# Patient Record
Sex: FEMALE | Race: WHITE | Employment: UNEMPLOYED | URBAN - METROPOLITAN AREA
[De-identification: names, ages, dates, MRNs, and addresses within clinical notes are randomized per-mention and may not be internally consistent; named-entity substitution may affect disease eponyms.]

---

## 2017-02-07 ENCOUNTER — TELEPHONE (OUTPATIENT)
Dept: FAMILY MEDICINE CLINIC | Age: 37
End: 2017-02-07

## 2017-02-09 ENCOUNTER — OFFICE VISIT (OUTPATIENT)
Dept: FAMILY MEDICINE CLINIC | Age: 37
End: 2017-02-09

## 2017-02-09 VITALS
BODY MASS INDEX: 27.46 KG/M2 | TEMPERATURE: 98.8 F | DIASTOLIC BLOOD PRESSURE: 84 MMHG | RESPIRATION RATE: 16 BRPM | HEART RATE: 80 BPM | WEIGHT: 155 LBS | SYSTOLIC BLOOD PRESSURE: 104 MMHG | HEIGHT: 63 IN

## 2017-02-09 DIAGNOSIS — R10.31 RIGHT LOWER QUADRANT ABDOMINAL PAIN: Primary | ICD-10-CM

## 2017-02-09 DIAGNOSIS — R11.0 NAUSEA: ICD-10-CM

## 2017-02-09 PROCEDURE — 99214 OFFICE O/P EST MOD 30 MIN: CPT | Performed by: NURSE PRACTITIONER

## 2017-02-09 RX ORDER — ACETAMINOPHEN AND CODEINE PHOSPHATE 300; 30 MG/1; MG/1
1-2 TABLET ORAL EVERY 6 HOURS PRN
Qty: 60 TABLET | Refills: 0 | Status: ON HOLD | OUTPATIENT
Start: 2017-02-09 | End: 2017-09-13 | Stop reason: HOSPADM

## 2017-02-09 RX ORDER — CIPROFLOXACIN 500 MG/1
500 TABLET, FILM COATED ORAL 2 TIMES DAILY
Qty: 20 TABLET | Refills: 0 | Status: SHIPPED | OUTPATIENT
Start: 2017-02-09 | End: 2017-02-19

## 2017-02-09 RX ORDER — METRONIDAZOLE 500 MG/1
500 TABLET ORAL 3 TIMES DAILY
Qty: 30 TABLET | Refills: 0 | Status: SHIPPED | OUTPATIENT
Start: 2017-02-09 | End: 2017-02-19

## 2017-02-09 ASSESSMENT — ENCOUNTER SYMPTOMS
EYES NEGATIVE: 1
BLOOD IN STOOL: 0
ANAL BLEEDING: 1
DIARRHEA: 0
RESPIRATORY NEGATIVE: 1
ABDOMINAL PAIN: 1
NAUSEA: 1
VOMITING: 0

## 2017-02-22 ENCOUNTER — TELEPHONE (OUTPATIENT)
Dept: FAMILY MEDICINE CLINIC | Age: 37
End: 2017-02-22

## 2017-02-22 RX ORDER — TRAMADOL HYDROCHLORIDE 50 MG/1
50 TABLET ORAL EVERY 6 HOURS PRN
Qty: 30 TABLET | Refills: 0 | Status: SHIPPED | OUTPATIENT
Start: 2017-02-22 | End: 2017-03-04

## 2017-02-22 RX ORDER — AZITHROMYCIN 250 MG/1
TABLET, FILM COATED ORAL
Qty: 6 TABLET | Refills: 0 | Status: SHIPPED | OUTPATIENT
Start: 2017-02-22 | End: 2017-03-04

## 2017-08-10 ENCOUNTER — HOSPITAL ENCOUNTER (OUTPATIENT)
Age: 37
Discharge: HOME | End: 2017-08-10
Payer: COMMERCIAL

## 2017-08-10 PROCEDURE — 93005 ELECTROCARDIOGRAM TRACING: CPT

## 2017-08-11 LAB
EKG ATRIAL RATE: 55 BPM
EKG P AXIS: 75 DEGREES
EKG P-R INTERVAL: 134 MS
EKG Q-T INTERVAL: 410 MS
EKG QRS DURATION: 86 MS
EKG QTC CALCULATION (BAZETT): 392 MS
EKG R AXIS: 51 DEGREES
EKG T AXIS: 40 DEGREES
EKG VENTRICULAR RATE: 55 BPM

## 2017-09-13 ENCOUNTER — ANESTHESIA (OUTPATIENT)
Dept: OPERATING ROOM | Age: 37
End: 2017-09-13
Payer: COMMERCIAL

## 2017-09-13 ENCOUNTER — ANESTHESIA EVENT (OUTPATIENT)
Dept: OPERATING ROOM | Age: 37
End: 2017-09-13
Payer: COMMERCIAL

## 2017-09-13 ENCOUNTER — HOSPITAL ENCOUNTER (OUTPATIENT)
Age: 37
Setting detail: OUTPATIENT SURGERY
Discharge: HOME | End: 2017-09-13
Attending: OBSTETRICS & GYNECOLOGY | Admitting: OBSTETRICS & GYNECOLOGY
Payer: COMMERCIAL

## 2017-09-13 VITALS
WEIGHT: 156.6 LBS | HEART RATE: 87 BPM | SYSTOLIC BLOOD PRESSURE: 101 MMHG | HEIGHT: 62 IN | OXYGEN SATURATION: 100 % | BODY MASS INDEX: 28.82 KG/M2 | DIASTOLIC BLOOD PRESSURE: 68 MMHG | TEMPERATURE: 97.8 F | RESPIRATION RATE: 16 BRPM

## 2017-09-13 VITALS
SYSTOLIC BLOOD PRESSURE: 141 MMHG | RESPIRATION RATE: 1 BRPM | DIASTOLIC BLOOD PRESSURE: 69 MMHG | TEMPERATURE: 97.7 F | OXYGEN SATURATION: 100 %

## 2017-09-13 LAB — PREGNANCY, URINE: NEGATIVE

## 2017-09-13 PROCEDURE — 7100000011 HC PHASE II RECOVERY - ADDTL 15 MIN: Performed by: OBSTETRICS & GYNECOLOGY

## 2017-09-13 PROCEDURE — 2500000003 HC RX 250 WO HCPCS: Performed by: NURSE ANESTHETIST, CERTIFIED REGISTERED

## 2017-09-13 PROCEDURE — 7100000000 HC PACU RECOVERY - FIRST 15 MIN: Performed by: OBSTETRICS & GYNECOLOGY

## 2017-09-13 PROCEDURE — 7100000010 HC PHASE II RECOVERY - FIRST 15 MIN: Performed by: OBSTETRICS & GYNECOLOGY

## 2017-09-13 PROCEDURE — 2580000003 HC RX 258: Performed by: OBSTETRICS & GYNECOLOGY

## 2017-09-13 PROCEDURE — 88305 TISSUE EXAM BY PATHOLOGIST: CPT

## 2017-09-13 PROCEDURE — 3600000003 HC SURGERY LEVEL 3 BASE: Performed by: OBSTETRICS & GYNECOLOGY

## 2017-09-13 PROCEDURE — 3700000001 HC ADD 15 MINUTES (ANESTHESIA): Performed by: OBSTETRICS & GYNECOLOGY

## 2017-09-13 PROCEDURE — 81025 URINE PREGNANCY TEST: CPT

## 2017-09-13 PROCEDURE — 6360000002 HC RX W HCPCS: Performed by: NURSE ANESTHETIST, CERTIFIED REGISTERED

## 2017-09-13 PROCEDURE — 2720000010 HC SURG SUPPLY STERILE: Performed by: OBSTETRICS & GYNECOLOGY

## 2017-09-13 PROCEDURE — 3600000013 HC SURGERY LEVEL 3 ADDTL 15MIN: Performed by: OBSTETRICS & GYNECOLOGY

## 2017-09-13 PROCEDURE — 3700000000 HC ANESTHESIA ATTENDED CARE: Performed by: OBSTETRICS & GYNECOLOGY

## 2017-09-13 PROCEDURE — C1758 CATHETER, URETERAL: HCPCS | Performed by: OBSTETRICS & GYNECOLOGY

## 2017-09-13 PROCEDURE — 7100000001 HC PACU RECOVERY - ADDTL 15 MIN: Performed by: OBSTETRICS & GYNECOLOGY

## 2017-09-13 PROCEDURE — 6370000000 HC RX 637 (ALT 250 FOR IP): Performed by: OBSTETRICS & GYNECOLOGY

## 2017-09-13 PROCEDURE — 6360000002 HC RX W HCPCS: Performed by: ANESTHESIOLOGY

## 2017-09-13 RX ORDER — SODIUM CHLORIDE 0.9 % (FLUSH) 0.9 %
10 SYRINGE (ML) INJECTION PRN
Status: CANCELLED | OUTPATIENT
Start: 2017-09-13

## 2017-09-13 RX ORDER — SODIUM CHLORIDE 0.9 % (FLUSH) 0.9 %
10 SYRINGE (ML) INJECTION PRN
Status: DISCONTINUED | OUTPATIENT
Start: 2017-09-13 | End: 2017-09-13 | Stop reason: HOSPADM

## 2017-09-13 RX ORDER — ONDANSETRON 2 MG/ML
INJECTION INTRAMUSCULAR; INTRAVENOUS PRN
Status: DISCONTINUED | OUTPATIENT
Start: 2017-09-13 | End: 2017-09-13 | Stop reason: SDUPTHER

## 2017-09-13 RX ORDER — SODIUM CHLORIDE 9 MG/ML
INJECTION, SOLUTION INTRAVENOUS CONTINUOUS
Status: DISCONTINUED | OUTPATIENT
Start: 2017-09-13 | End: 2017-09-13

## 2017-09-13 RX ORDER — HYDROCODONE BITARTRATE AND ACETAMINOPHEN 5; 325 MG/1; MG/1
TABLET ORAL
Status: DISCONTINUED
Start: 2017-09-13 | End: 2017-09-13 | Stop reason: HOSPADM

## 2017-09-13 RX ORDER — HYDROCODONE BITARTRATE AND ACETAMINOPHEN 5; 325 MG/1; MG/1
2 TABLET ORAL EVERY 4 HOURS PRN
Status: DISCONTINUED | OUTPATIENT
Start: 2017-09-13 | End: 2017-09-13 | Stop reason: HOSPADM

## 2017-09-13 RX ORDER — DOCUSATE SODIUM 100 MG/1
100 CAPSULE, LIQUID FILLED ORAL 2 TIMES DAILY
Status: CANCELLED | OUTPATIENT
Start: 2017-09-13

## 2017-09-13 RX ORDER — HYDROCODONE BITARTRATE AND ACETAMINOPHEN 5; 325 MG/1; MG/1
1 TABLET ORAL EVERY 6 HOURS PRN
Qty: 5 TABLET | Refills: 0 | Status: SHIPPED | OUTPATIENT
Start: 2017-09-13 | End: 2017-09-16

## 2017-09-13 RX ORDER — MORPHINE SULFATE 10 MG/ML
INJECTION, SOLUTION INTRAMUSCULAR; INTRAVENOUS PRN
Status: DISCONTINUED | OUTPATIENT
Start: 2017-09-13 | End: 2017-09-13 | Stop reason: SDUPTHER

## 2017-09-13 RX ORDER — FENTANYL CITRATE 50 UG/ML
50 INJECTION, SOLUTION INTRAMUSCULAR; INTRAVENOUS EVERY 5 MIN PRN
Status: DISCONTINUED | OUTPATIENT
Start: 2017-09-13 | End: 2017-09-13 | Stop reason: HOSPADM

## 2017-09-13 RX ORDER — FENTANYL CITRATE 50 UG/ML
INJECTION, SOLUTION INTRAMUSCULAR; INTRAVENOUS PRN
Status: DISCONTINUED | OUTPATIENT
Start: 2017-09-13 | End: 2017-09-13 | Stop reason: SDUPTHER

## 2017-09-13 RX ORDER — LABETALOL HYDROCHLORIDE 5 MG/ML
5 INJECTION, SOLUTION INTRAVENOUS EVERY 10 MIN PRN
Status: DISCONTINUED | OUTPATIENT
Start: 2017-09-13 | End: 2017-09-13 | Stop reason: HOSPADM

## 2017-09-13 RX ORDER — MEPERIDINE HYDROCHLORIDE 25 MG/ML
12.5 INJECTION INTRAMUSCULAR; INTRAVENOUS; SUBCUTANEOUS EVERY 5 MIN PRN
Status: DISCONTINUED | OUTPATIENT
Start: 2017-09-13 | End: 2017-09-13 | Stop reason: HOSPADM

## 2017-09-13 RX ORDER — ACETAMINOPHEN 325 MG/1
650 TABLET ORAL EVERY 4 HOURS PRN
Status: CANCELLED | OUTPATIENT
Start: 2017-09-13

## 2017-09-13 RX ORDER — SODIUM CHLORIDE 0.9 % (FLUSH) 0.9 %
10 SYRINGE (ML) INJECTION EVERY 12 HOURS SCHEDULED
Status: CANCELLED | OUTPATIENT
Start: 2017-09-13

## 2017-09-13 RX ORDER — KETOROLAC TROMETHAMINE 30 MG/ML
INJECTION, SOLUTION INTRAMUSCULAR; INTRAVENOUS PRN
Status: DISCONTINUED | OUTPATIENT
Start: 2017-09-13 | End: 2017-09-13 | Stop reason: SDUPTHER

## 2017-09-13 RX ORDER — ONDANSETRON 2 MG/ML
4 INJECTION INTRAMUSCULAR; INTRAVENOUS EVERY 6 HOURS PRN
Status: DISCONTINUED | OUTPATIENT
Start: 2017-09-13 | End: 2017-09-13 | Stop reason: HOSPADM

## 2017-09-13 RX ORDER — MORPHINE SULFATE 2 MG/ML
INJECTION, SOLUTION INTRAMUSCULAR; INTRAVENOUS
Status: DISCONTINUED
Start: 2017-09-13 | End: 2017-09-13 | Stop reason: HOSPADM

## 2017-09-13 RX ORDER — ONDANSETRON 2 MG/ML
4 INJECTION INTRAMUSCULAR; INTRAVENOUS
Status: DISCONTINUED | OUTPATIENT
Start: 2017-09-13 | End: 2017-09-13 | Stop reason: HOSPADM

## 2017-09-13 RX ORDER — IBUPROFEN 600 MG/1
600 TABLET ORAL EVERY 6 HOURS PRN
Qty: 60 TABLET | Refills: 1 | Status: SHIPPED | OUTPATIENT
Start: 2017-09-13 | End: 2017-09-25

## 2017-09-13 RX ORDER — SODIUM CHLORIDE 0.9 % (FLUSH) 0.9 %
10 SYRINGE (ML) INJECTION EVERY 12 HOURS SCHEDULED
Status: DISCONTINUED | OUTPATIENT
Start: 2017-09-13 | End: 2017-09-13 | Stop reason: HOSPADM

## 2017-09-13 RX ORDER — DEXAMETHASONE SODIUM PHOSPHATE 4 MG/ML
INJECTION, SOLUTION INTRA-ARTICULAR; INTRALESIONAL; INTRAMUSCULAR; INTRAVENOUS; SOFT TISSUE PRN
Status: DISCONTINUED | OUTPATIENT
Start: 2017-09-13 | End: 2017-09-13 | Stop reason: SDUPTHER

## 2017-09-13 RX ORDER — KETOROLAC TROMETHAMINE 30 MG/ML
INJECTION, SOLUTION INTRAMUSCULAR; INTRAVENOUS
Status: DISCONTINUED
Start: 2017-09-13 | End: 2017-09-13 | Stop reason: HOSPADM

## 2017-09-13 RX ORDER — ATROPINE SULFATE 1 MG/ML
INJECTION, SOLUTION INTRAMUSCULAR; INTRAVENOUS; SUBCUTANEOUS PRN
Status: DISCONTINUED | OUTPATIENT
Start: 2017-09-13 | End: 2017-09-13 | Stop reason: SDUPTHER

## 2017-09-13 RX ORDER — KETOROLAC TROMETHAMINE 30 MG/ML
30 INJECTION, SOLUTION INTRAMUSCULAR; INTRAVENOUS EVERY 6 HOURS
Status: CANCELLED | OUTPATIENT
Start: 2017-09-13 | End: 2017-09-15

## 2017-09-13 RX ORDER — LIDOCAINE HYDROCHLORIDE 10 MG/ML
INJECTION, SOLUTION INFILTRATION; PERINEURAL PRN
Status: DISCONTINUED | OUTPATIENT
Start: 2017-09-13 | End: 2017-09-13 | Stop reason: SDUPTHER

## 2017-09-13 RX ORDER — PROPOFOL 10 MG/ML
INJECTION, EMULSION INTRAVENOUS PRN
Status: DISCONTINUED | OUTPATIENT
Start: 2017-09-13 | End: 2017-09-13 | Stop reason: SDUPTHER

## 2017-09-13 RX ORDER — HYDROCODONE BITARTRATE AND ACETAMINOPHEN 5; 325 MG/1; MG/1
1 TABLET ORAL EVERY 4 HOURS PRN
Status: DISCONTINUED | OUTPATIENT
Start: 2017-09-13 | End: 2017-09-13 | Stop reason: HOSPADM

## 2017-09-13 RX ORDER — MIDAZOLAM HYDROCHLORIDE 1 MG/ML
INJECTION INTRAMUSCULAR; INTRAVENOUS PRN
Status: DISCONTINUED | OUTPATIENT
Start: 2017-09-13 | End: 2017-09-13 | Stop reason: SDUPTHER

## 2017-09-13 RX ORDER — SODIUM CHLORIDE, SODIUM LACTATE, POTASSIUM CHLORIDE, CALCIUM CHLORIDE 600; 310; 30; 20 MG/100ML; MG/100ML; MG/100ML; MG/100ML
INJECTION, SOLUTION INTRAVENOUS CONTINUOUS
Status: DISCONTINUED | OUTPATIENT
Start: 2017-09-13 | End: 2017-09-13 | Stop reason: HOSPADM

## 2017-09-13 RX ADMIN — FENTANYL CITRATE 25 MCG: 50 INJECTION INTRAMUSCULAR; INTRAVENOUS at 08:31

## 2017-09-13 RX ADMIN — FENTANYL CITRATE 50 MCG: 50 INJECTION INTRAMUSCULAR; INTRAVENOUS at 09:10

## 2017-09-13 RX ADMIN — SODIUM CHLORIDE: 9 INJECTION, SOLUTION INTRAVENOUS at 07:22

## 2017-09-13 RX ADMIN — ATROPINE SULFATE 0.5 MG: 1 INJECTION, SOLUTION INTRAMUSCULAR; INTRAVENOUS; SUBCUTANEOUS at 08:24

## 2017-09-13 RX ADMIN — LIDOCAINE HYDROCHLORIDE 50 MG: 10 INJECTION, SOLUTION INFILTRATION; PERINEURAL at 08:08

## 2017-09-13 RX ADMIN — MIDAZOLAM HYDROCHLORIDE 2 MG: 1 INJECTION INTRAMUSCULAR; INTRAVENOUS at 08:00

## 2017-09-13 RX ADMIN — MORPHINE SULFATE 2 MG: 10 INJECTION, SOLUTION INTRAMUSCULAR; INTRAVENOUS at 08:58

## 2017-09-13 RX ADMIN — HYDROCODONE BITARTRATE AND ACETAMINOPHEN 2 TABLET: 5; 325 TABLET ORAL at 10:02

## 2017-09-13 RX ADMIN — DEXAMETHASONE SODIUM PHOSPHATE 4 MG: 4 INJECTION, SOLUTION INTRAMUSCULAR; INTRAVENOUS at 08:15

## 2017-09-13 RX ADMIN — MEPERIDINE HYDROCHLORIDE 12.5 MG: 25 INJECTION, SOLUTION INTRAMUSCULAR; INTRAVENOUS; SUBCUTANEOUS at 09:05

## 2017-09-13 RX ADMIN — PROPOFOL 140 MG: 10 INJECTION, EMULSION INTRAVENOUS at 08:08

## 2017-09-13 RX ADMIN — MEPERIDINE HYDROCHLORIDE 12.5 MG: 25 INJECTION, SOLUTION INTRAMUSCULAR; INTRAVENOUS; SUBCUTANEOUS at 09:00

## 2017-09-13 RX ADMIN — KETOROLAC TROMETHAMINE 30 MG: 30 INJECTION, SOLUTION INTRAMUSCULAR; INTRAVENOUS at 08:59

## 2017-09-13 RX ADMIN — ONDANSETRON 4 MG: 2 INJECTION INTRAMUSCULAR; INTRAVENOUS at 08:15

## 2017-09-13 RX ADMIN — FENTANYL CITRATE 50 MCG: 50 INJECTION INTRAMUSCULAR; INTRAVENOUS at 09:15

## 2017-09-13 RX ADMIN — KETOROLAC TROMETHAMINE 30 MG: 30 INJECTION, SOLUTION INTRAMUSCULAR; INTRAVENOUS at 08:47

## 2017-09-13 RX ADMIN — FENTANYL CITRATE 50 MCG: 50 INJECTION INTRAMUSCULAR; INTRAVENOUS at 08:10

## 2017-09-13 ASSESSMENT — PULMONARY FUNCTION TESTS
PIF_VALUE: 0
PIF_VALUE: 4
PIF_VALUE: 4
PIF_VALUE: 0
PIF_VALUE: 3
PIF_VALUE: 4
PIF_VALUE: 3
PIF_VALUE: 0
PIF_VALUE: 15
PIF_VALUE: 0
PIF_VALUE: 0
PIF_VALUE: 4
PIF_VALUE: 3
PIF_VALUE: 0
PIF_VALUE: 0
PIF_VALUE: 3
PIF_VALUE: 0
PIF_VALUE: 3
PIF_VALUE: 2
PIF_VALUE: 3
PIF_VALUE: 4
PIF_VALUE: 3
PIF_VALUE: 3
PIF_VALUE: 4
PIF_VALUE: 4
PIF_VALUE: 3
PIF_VALUE: 1
PIF_VALUE: 4
PIF_VALUE: 6
PIF_VALUE: 7
PIF_VALUE: 13
PIF_VALUE: 10
PIF_VALUE: 0
PIF_VALUE: 2
PIF_VALUE: 4
PIF_VALUE: 3
PIF_VALUE: 4
PIF_VALUE: 8
PIF_VALUE: 3
PIF_VALUE: 2
PIF_VALUE: 3
PIF_VALUE: 11
PIF_VALUE: 3
PIF_VALUE: 0
PIF_VALUE: 4
PIF_VALUE: 8
PIF_VALUE: 2
PIF_VALUE: 5

## 2017-09-13 ASSESSMENT — PAIN SCALES - GENERAL
PAINLEVEL_OUTOF10: 9
PAINLEVEL_OUTOF10: 5
PAINLEVEL_OUTOF10: 0
PAINLEVEL_OUTOF10: 8
PAINLEVEL_OUTOF10: 10
PAINLEVEL_OUTOF10: 5
PAINLEVEL_OUTOF10: 6
PAINLEVEL_OUTOF10: 8

## 2017-09-13 ASSESSMENT — PAIN DESCRIPTION - DESCRIPTORS: DESCRIPTORS: CRAMPING;SHARP

## 2017-09-13 ASSESSMENT — PAIN DESCRIPTION - LOCATION
LOCATION: ABDOMEN
LOCATION: ABDOMEN

## 2017-09-13 ASSESSMENT — PAIN DESCRIPTION - PAIN TYPE
TYPE: SURGICAL PAIN
TYPE: SURGICAL PAIN

## 2017-09-13 ASSESSMENT — PAIN DESCRIPTION - FREQUENCY: FREQUENCY: CONTINUOUS

## 2017-09-25 ENCOUNTER — OFFICE VISIT (OUTPATIENT)
Dept: FAMILY MEDICINE CLINIC | Age: 37
End: 2017-09-25
Payer: COMMERCIAL

## 2017-09-25 VITALS
WEIGHT: 154 LBS | TEMPERATURE: 98.2 F | HEART RATE: 76 BPM | DIASTOLIC BLOOD PRESSURE: 82 MMHG | SYSTOLIC BLOOD PRESSURE: 112 MMHG | RESPIRATION RATE: 12 BRPM | HEIGHT: 63 IN | BODY MASS INDEX: 27.29 KG/M2

## 2017-09-25 DIAGNOSIS — K52.9 ACUTE GASTROENTERITIS: Primary | ICD-10-CM

## 2017-09-25 DIAGNOSIS — Z98.890 S/P DILATATION AND CURETTAGE: ICD-10-CM

## 2017-09-25 PROCEDURE — 99213 OFFICE O/P EST LOW 20 MIN: CPT | Performed by: NURSE PRACTITIONER

## 2017-09-25 PROCEDURE — 96372 THER/PROPH/DIAG INJ SC/IM: CPT | Performed by: NURSE PRACTITIONER

## 2017-09-25 RX ORDER — CIPROFLOXACIN 500 MG/1
500 TABLET, FILM COATED ORAL 2 TIMES DAILY
COMMUNITY
End: 2017-12-18

## 2017-09-25 RX ORDER — ONDANSETRON 4 MG/1
4 TABLET, FILM COATED ORAL EVERY 8 HOURS PRN
Qty: 20 TABLET | Refills: 0 | Status: SHIPPED | OUTPATIENT
Start: 2017-09-25 | End: 2017-12-18

## 2017-09-25 RX ORDER — SUCRALFATE 1 G/1
1 TABLET ORAL 4 TIMES DAILY
Qty: 60 TABLET | Refills: 0 | Status: SHIPPED | OUTPATIENT
Start: 2017-09-25 | End: 2017-12-18

## 2017-09-25 RX ORDER — PROMETHAZINE HYDROCHLORIDE 25 MG/ML
25 INJECTION, SOLUTION INTRAMUSCULAR; INTRAVENOUS ONCE
Status: COMPLETED | OUTPATIENT
Start: 2017-09-25 | End: 2017-09-25

## 2017-09-25 RX ORDER — METRONIDAZOLE 500 MG/1
500 TABLET ORAL 2 TIMES DAILY
COMMUNITY
End: 2017-12-18

## 2017-09-25 RX ORDER — FAMOTIDINE 40 MG/1
40 TABLET, FILM COATED ORAL EVERY EVENING
Qty: 30 TABLET | Refills: 0 | Status: SHIPPED | OUTPATIENT
Start: 2017-09-25 | End: 2018-01-08 | Stop reason: ALTCHOICE

## 2017-09-25 RX ADMIN — PROMETHAZINE HYDROCHLORIDE 25 MG: 25 INJECTION, SOLUTION INTRAMUSCULAR; INTRAVENOUS at 09:03

## 2017-09-25 NOTE — LETTER
[facility]  [address] [place] [address] Rd., [address] [address]  Phone: [phone]  Fax: [phone]    [place] [facility] SSDjarrod Briseno NP        September 25, 2017     Patient: Nataliia Pemberton   YOB: 1980   Date of Visit: 9/25/2017       To Whom it May Concern:    Hetal Hernandez was seen in my clinic on 9/25/2017. She may return to work on 9/29/17. Please excuse Joceline's absences. If you have any questions or concerns, please don't hesitate to call.     Sincerely,         [facility], ALEXIA

## 2017-09-25 NOTE — MR AVS SNAPSHOT
You may receive a survey about your visit with us today. The feedback from our patients helps us identify what is working well and where the service to all patients can be enhanced. Thank you! Today's Medication Changes          These changes are accurate as of: 9/25/17  8:56 AM.  If you have any questions, ask your nurse or doctor. START taking these medications           famotidine 40 MG tablet   Commonly known as:  PEPCID   Instructions: Take 1 tablet by mouth every evening   Quantity:  30 tablet   Refills:  0   Started by:  Yazmin De Oliveira NP       ondansetron 4 MG tablet   Commonly known as:  ZOFRAN   Instructions: Take 1 tablet by mouth every 8 hours as needed for Nausea or Vomiting   Quantity:  20 tablet   Refills:  0   Started by:  Yazmin De Oliveira NP       sucralfate 1 GM tablet   Commonly known as:  CARAFATE   Instructions:   Take 1 tablet by mouth 4 times daily   Quantity:  60 tablet   Refills:  0   Started by:  Yazmin De Oliveira NP         STOP taking these medications           ibuprofen 600 MG tablet   Commonly known as:  ADVIL;MOTRIN   Stopped by:  Yazmin De Oliveira NP            Where to Get Your Medications      These medications were sent to [address], [address]  [address]     Phone:  [phone]     famotidine 40 MG tablet    ondansetron 4 MG tablet    sucralfate 1 GM tablet               Your Current Medications Are              metroNIDAZOLE (FLAGYL) 500 MG tablet Take 500 mg by mouth 2 times daily    ciprofloxacin (CIPRO) 500 MG tablet Take 500 mg by mouth 2 times daily    ondansetron (ZOFRAN) 4 MG tablet Take 1 tablet by mouth every 8 hours as needed for Nausea or Vomiting    sucralfate (CARAFATE) 1 GM tablet Take 1 tablet by mouth 4 times daily    famotidine (PEPCID) 40 MG tablet Take 1 tablet by mouth every evening      Allergies              Amoxicillin Hives Additional Information        Basic Information     Date Of Birth Sex Race Ethnicity Preferred Language    1980 Female White Non-/Non  English      Problem List as of 9/25/2017  Date Reviewed: 9/13/2017          None      Preventive Care        Date Due    HIV screening is recommended for all people regardless of risk factors  aged 15-65 years at least once (lifetime) who have never been HIV tested. 6/2/1996    Tetanus Combination Vaccine (1 - Tdap) 6/2/2000    Yearly Flu Vaccine (1) 9/1/2017    Pap Smear 6/15/2018            MyChart Signup           Our records indicate that you have an active [employer] account. You can view your After Visit Summary by going to [email or web address] and logging in with your [employer] username and password. If you don't have a [employer] username and password but a parent or guardian has access to your record, the parent or guardian should login with their own [employer] username and password and access your record to view the After Visit Summary. Additional Information  If you have questions, please contact the physician practice where you receive care. Remember, [employer] is NOT to be used for urgent needs. For medical emergencies, dial 911. For questions regarding your [employer] account call [phone]. If you have a clinical question, please call your doctor's office.

## 2017-09-28 ENCOUNTER — TELEPHONE (OUTPATIENT)
Dept: FAMILY MEDICINE CLINIC | Age: 37
End: 2017-09-28

## 2017-10-04 ASSESSMENT — ENCOUNTER SYMPTOMS
RESPIRATORY NEGATIVE: 1
DIARRHEA: 1
EYES NEGATIVE: 1
VOMITING: 1
NAUSEA: 1

## 2017-10-04 NOTE — PROGRESS NOTES
After obtaining consent, and per orders of Av Gregory, injection of Phenergan 25 mg was given in left glut IM by Heber Matos. Patient instructed to remain in clinic for 20 minutes afterwards, and to report any adverse reaction to me immediately.
and curettage      Requested Prescriptions     Signed Prescriptions Disp Refills    ondansetron (ZOFRAN) 4 MG tablet 20 tablet 0     Sig: Take 1 tablet by mouth every 8 hours as needed for Nausea or Vomiting    sucralfate (CARAFATE) 1 GM tablet 60 tablet 0     Sig: Take 1 tablet by mouth 4 times daily    famotidine (PEPCID) 40 MG tablet 30 tablet 0     Sig: Take 1 tablet by mouth every evening     No orders of the defined types were placed in this encounter. Patient given educational materials - see patient instructions. Discussed use, benefit, and side effects of prescribed medications. All patient questions answered. Pt voiced understanding. Reviewed health maintenance. Patient agreed with treatment plan. Follow up as directed.        hold atb    Electronically signed by Ashley Treviño NP on 10/4/2017 at 9:47 AM

## 2017-12-18 ENCOUNTER — TELEPHONE (OUTPATIENT)
Dept: FAMILY MEDICINE CLINIC | Age: 37
End: 2017-12-18

## 2017-12-18 ENCOUNTER — OFFICE VISIT (OUTPATIENT)
Dept: FAMILY MEDICINE CLINIC | Age: 37
End: 2017-12-18
Payer: COMMERCIAL

## 2017-12-18 VITALS
BODY MASS INDEX: 28.7 KG/M2 | HEART RATE: 88 BPM | TEMPERATURE: 98.4 F | WEIGHT: 162 LBS | DIASTOLIC BLOOD PRESSURE: 80 MMHG | SYSTOLIC BLOOD PRESSURE: 116 MMHG | RESPIRATION RATE: 12 BRPM | HEIGHT: 63 IN

## 2017-12-18 DIAGNOSIS — Z87.442 PERSONAL HISTORY OF RENAL CALCULI: ICD-10-CM

## 2017-12-18 DIAGNOSIS — R30.0 DYSURIA: ICD-10-CM

## 2017-12-18 DIAGNOSIS — R10.9 RIGHT FLANK PAIN: Primary | ICD-10-CM

## 2017-12-18 LAB
BILIRUBIN URINE: NEGATIVE
BLOOD URINE, POC: NEGATIVE
CHARACTER, URINE: CLEAR
COLOR, URINE: YELLOW
GLUCOSE URINE: NEGATIVE MG/DL
KETONES, URINE: NEGATIVE
LEUKOCYTE CLUMPS, URINE: NORMAL
NITRITE, URINE: NEGATIVE
PH, URINE: 7.5
PROTEIN, URINE: NEGATIVE MG/DL
SPECIFIC GRAVITY, URINE: 1.02 (ref 1–1.03)
UROBILINOGEN, URINE: 0.2 EU/DL

## 2017-12-18 PROCEDURE — 99213 OFFICE O/P EST LOW 20 MIN: CPT | Performed by: NURSE PRACTITIONER

## 2017-12-18 PROCEDURE — 81003 URINALYSIS AUTO W/O SCOPE: CPT | Performed by: NURSE PRACTITIONER

## 2017-12-18 RX ORDER — KETOROLAC TROMETHAMINE 10 MG/1
10 TABLET, FILM COATED ORAL EVERY 6 HOURS PRN
Qty: 20 TABLET | Refills: 0 | Status: SHIPPED | OUTPATIENT
Start: 2017-12-18 | End: 2018-01-08 | Stop reason: ALTCHOICE

## 2017-12-18 RX ORDER — CYCLOBENZAPRINE HCL 5 MG
5 TABLET ORAL 3 TIMES DAILY PRN
Qty: 30 TABLET | Refills: 0 | Status: SHIPPED | OUTPATIENT
Start: 2017-12-18 | End: 2017-12-28

## 2017-12-18 RX ORDER — CIPROFLOXACIN 500 MG/1
500 TABLET, FILM COATED ORAL 2 TIMES DAILY
Qty: 20 TABLET | Refills: 0 | Status: SHIPPED | OUTPATIENT
Start: 2017-12-18 | End: 2017-12-28

## 2017-12-18 ASSESSMENT — PATIENT HEALTH QUESTIONNAIRE - PHQ9
2. FEELING DOWN, DEPRESSED OR HOPELESS: 0
SUM OF ALL RESPONSES TO PHQ9 QUESTIONS 1 & 2: 0
SUM OF ALL RESPONSES TO PHQ QUESTIONS 1-9: 0
1. LITTLE INTEREST OR PLEASURE IN DOING THINGS: 0

## 2017-12-18 NOTE — PATIENT INSTRUCTIONS
You may receive a survey about your visit with us today. The feedback from our patients helps us identify what is working well and where the service to all patients can be enhanced. Thank you! Patient Education        Kidney Stone: Care Instructions  Your Care Instructions    Kidney stones are formed when salts, minerals, and other substances normally found in the urine clump together. They can be as small as grains of sand or, rarely, as large as golf balls. While the stone is traveling through the ureter, which is the tube that carries urine from the kidney to the bladder, you will probably feel pain. The pain may be mild or very severe. You may also have some blood in your urine. As soon as the stone reaches the bladder, any intense pain should go away. If a stone is too large to pass on its own, you may need a medical procedure to help you pass the stone. The doctor has checked you carefully, but problems can develop later. If you notice any problems or new symptoms, get medical treatment right away. Follow-up care is a key part of your treatment and safety. Be sure to make and go to all appointments, and call your doctor if you are having problems. It's also a good idea to know your test results and keep a list of the medicines you take. How can you care for yourself at home? · Drink plenty of fluids, enough so that your urine is light yellow or clear like water. If you have kidney, heart, or liver disease and have to limit fluids, talk with your doctor before you increase the amount of fluids you drink. · Take pain medicines exactly as directed. Call your doctor if you think you are having a problem with your medicine. ¨ If the doctor gave you a prescription medicine for pain, take it as prescribed. ¨ If you are not taking a prescription pain medicine, ask your doctor if you can take an over-the-counter medicine. Read and follow all instructions on the label.   · Your doctor may ask you to strain your urine so that you can collect your kidney stone when it passes. You can use a kitchen strainer or a tea strainer to catch the stone. Store it in a plastic bag until you see your doctor again. Preventing future kidney stones  Some changes in your diet may help prevent kidney stones. Depending on the cause of your stones, your doctor may recommend that you:  · Drink plenty of fluids, enough so that your urine is light yellow or clear like water. If you have kidney, heart, or liver disease and have to limit fluids, talk with your doctor before you increase the amount of fluids you drink. · Limit coffee, tea, and alcohol. Also avoid grapefruit juice. · Do not take more than the recommended daily dose of vitamins C and D.  · Avoid antacids such as Gaviscon, Maalox, Mylanta, or Tums. · Limit the amount of salt (sodium) in your diet. · Eat a balanced diet that is not too high in protein. · Limit foods that are high in a substance called oxalate, which can cause kidney stones. These foods include dark green vegetables, rhubarb, chocolate, wheat bran, nuts, cranberries, and beans. When should you call for help? Call your doctor now or seek immediate medical care if:  ? · You cannot keep down fluids. ? · Your pain gets worse. ? · You have a fever or chills. ? · You have new or worse pain in your back just below your rib cage (the flank area). ? · You have new or more blood in your urine. ? Watch closely for changes in your health, and be sure to contact your doctor if:  ? · You do not get better as expected. Where can you learn more? Go to [email or web address]://Gaming for [email or web address].[email or web address] and sign in to your [employer] account. Enter J030 in the [employer] box to learn more about \"Kidney Stone: Care Instructions. \"     If you do not have an account, please click on the \"Sign Up Now\" link. Current as of: May 12, 2017  Content Version: 11.4  © 3217-7484 Healthwise, [employer].  Care instructions adapted under license by [facility] ([facility]). If you have questions about a medical condition or this instruction, always ask your healthcare professional. [address] any warranty or liability for your use of this information.

## 2017-12-18 NOTE — LETTER
[facility]  [address] [place] [address] Rd., [address] [address]  Phone: [phone]  Fax: [phone]    Ashley Treviño NP        December 18, 2017     Patient: Lorna Zamarripa   YOB: 1980   Date of Visit: 12/18/2017       To Whom it May Concern:    Asha Diamond was seen in my clinic on 12/18/2017. She may return to work on 12/21/2017. If you have any questions or concerns, please don't hesitate to call.     Sincerely,         Ashley Treviño NP

## 2017-12-18 NOTE — TELEPHONE ENCOUNTER
Patient saw Rivka Beatty today and was to return to work on 12/21/17. She stated that to use FMLA she has to be off 3 1/2 to 4 days. So she's requesting to return to work on Friday, 12/22/17. Please let her know if that's okay.

## 2017-12-20 LAB — URINE CULTURE, ROUTINE: NORMAL

## 2017-12-21 ENCOUNTER — HOSPITAL ENCOUNTER (OUTPATIENT)
Dept: CT IMAGING | Age: 37
Discharge: HOME | End: 2017-12-21
Payer: COMMERCIAL

## 2017-12-21 DIAGNOSIS — R10.9 RIGHT FLANK PAIN: ICD-10-CM

## 2017-12-21 PROCEDURE — 74176 CT ABD & PELVIS W/O CONTRAST: CPT

## 2017-12-31 NOTE — PROGRESS NOTES
Urine Trace  NEGATIVE Final 12/18/2017 10:30 AM [facility]   Color, Urine Yellow  YELLOW-STR Final 12/18/2017 10:30 AM [facility]   Character, Urine Clear  CLR-SL.ZULY Final 12/18/2017 10:30 AM [facility]   Performed at [facility] under CLIA # [id number]   Testing Performed By     Sarahi Coello Name Director Address Valid Date Range   [address]          Impression/Plan:  1. Right flank pain    2. Dysuria    3. Personal history of renal calculi      Requested Prescriptions     Signed Prescriptions Disp Refills    ciprofloxacin (CIPRO) 500 MG tablet 20 tablet 0     Sig: Take 1 tablet by mouth 2 times daily for 10 days    cyclobenzaprine (FLEXERIL) 5 MG tablet 30 tablet 0     Sig: Take 1 tablet by mouth 3 times daily as needed for Muscle spasms    ketorolac (TORADOL) 10 MG tablet 20 tablet 0     Sig: Take 1 tablet by mouth every 6 hours as needed for Pain     Orders Placed This Encounter   Procedures    Urine Culture     Order Specific Question:   Specify (ex-cath, midstream, cysto, etc)? Answer:   midstream    Urine Culture    CT ABDOMEN PELVIS WO IV CONTRAST Additional Contrast? None     Standing Status:   Future     Number of Occurrences:   1     Standing Expiration Date:   12/18/2018     Order Specific Question:   Additional Contrast?     Answer:   None     Order Specific Question:   Reason for exam:     Answer:   right flank pain hx stones    POCT Urinalysis No Micro (Auto)       Patient given educational materials - see patient instructions. Discussed use, benefit, and side effects of prescribed medications. All patient questions answered. Pt voiced understanding. Reviewed health maintenance. Patient agreed with treatment plan. Follow up as directed.           Electronically signed by José Prabhakar, ALEXIA on 12/31/2017 at 8:33 AM

## 2018-01-08 ENCOUNTER — OFFICE VISIT (OUTPATIENT)
Dept: FAMILY MEDICINE CLINIC | Age: 38
End: 2018-01-08
Payer: COMMERCIAL

## 2018-01-08 VITALS
SYSTOLIC BLOOD PRESSURE: 114 MMHG | DIASTOLIC BLOOD PRESSURE: 86 MMHG | HEART RATE: 88 BPM | BODY MASS INDEX: 27.66 KG/M2 | HEIGHT: 64 IN | RESPIRATION RATE: 12 BRPM | TEMPERATURE: 98.1 F | WEIGHT: 162 LBS

## 2018-01-08 DIAGNOSIS — J02.8 ACUTE PHARYNGITIS DUE TO OTHER SPECIFIED ORGANISMS: Primary | ICD-10-CM

## 2018-01-08 DIAGNOSIS — R59.0 CERVICAL LYMPHADENOPATHY: ICD-10-CM

## 2018-01-08 PROCEDURE — 99213 OFFICE O/P EST LOW 20 MIN: CPT | Performed by: NURSE PRACTITIONER

## 2018-01-08 RX ORDER — PREDNISONE 10 MG/1
10 TABLET ORAL DAILY
Qty: 7 TABLET | Refills: 0 | Status: SHIPPED | OUTPATIENT
Start: 2018-01-08 | End: 2018-01-15

## 2018-01-08 RX ORDER — AZITHROMYCIN 250 MG/1
TABLET, FILM COATED ORAL
Qty: 6 TABLET | Refills: 0 | Status: SHIPPED | OUTPATIENT
Start: 2018-01-08 | End: 2018-01-18

## 2018-01-11 ASSESSMENT — ENCOUNTER SYMPTOMS
RESPIRATORY NEGATIVE: 1
SORE THROAT: 1
EYES NEGATIVE: 1
ALLERGIC/IMMUNOLOGIC NEGATIVE: 1
DIARRHEA: 1

## 2018-01-11 NOTE — PROGRESS NOTES
medications for this visit. Allergies   Allergen Reactions    Amoxicillin Hives     Health Maintenance   Topic Date Due    HIV screen  1996    DTaP/Tdap/Td vaccine (1 - Tdap) 2000    Flu vaccine (1) 2017    Cervical cancer screen  06/15/2018         Objective:     Physical Exam   Constitutional: She is oriented to person, place, and time. She appears well-developed and well-nourished. HENT:   Head: Normocephalic. Mouth/Throat: Posterior oropharyngeal erythema present. No oropharyngeal exudate. Eyes: Conjunctivae are normal.   Neck: Neck supple. Cardiovascular: Normal rate, regular rhythm, normal heart sounds and intact distal pulses. Pulmonary/Chest: Effort normal and breath sounds normal.   Abdominal: Soft. Musculoskeletal: Normal range of motion. Lymphadenopathy:     She has cervical adenopathy. Neurological: She is alert and oriented to person, place, and time. Skin: Skin is warm and dry. Psychiatric: She has a normal mood and affect. Nursing note and vitals reviewed. /86   Pulse 88   Temp 98.1 °F (36.7 °C) (Oral)   Resp 12   Ht 5' 3.5\" (1.613 m)   Wt 162 lb (73.5 kg)   BMI 28.25 kg/m²       Impression/Plan:  1. Acute pharyngitis due to other specified organisms    2. Cervical lymphadenopathy      Requested Prescriptions     Signed Prescriptions Disp Refills    azithromycin (ZITHROMAX Z-NBA) 250 MG tablet 6 tablet 0     Si pills orally for 1 day, then 1 pill orally for 4 days    predniSONE (DELTASONE) 10 MG tablet 7 tablet 0     Sig: Take 1 tablet by mouth daily for 7 days     No orders of the defined types were placed in this encounter. Patient given educational materials - see patient instructions. Discussed use, benefit, and side effects of prescribed medications. All patient questions answered. Pt voiced understanding. Reviewed health maintenance. Patient agreed with treatment plan. Follow up as directed.           Electronically signed by Ronda Raza NP on 1/11/2018 at 1:27 PM

## 2018-01-22 ENCOUNTER — TELEPHONE (OUTPATIENT)
Dept: FAMILY MEDICINE CLINIC | Age: 38
End: 2018-01-22

## 2018-01-22 RX ORDER — OSELTAMIVIR PHOSPHATE 75 MG/1
75 CAPSULE ORAL DAILY
Qty: 10 CAPSULE | Refills: 0 | Status: SHIPPED | OUTPATIENT
Start: 2018-01-22 | End: 2018-02-01

## 2018-02-13 ENCOUNTER — HOSPITAL ENCOUNTER (OUTPATIENT)
Age: 38
Discharge: HOME | End: 2018-02-13
Payer: COMMERCIAL

## 2018-02-13 LAB
EKG ATRIAL RATE: 64 BPM
EKG P AXIS: 70 DEGREES
EKG P-R INTERVAL: 150 MS
EKG Q-T INTERVAL: 402 MS
EKG QRS DURATION: 82 MS
EKG QTC CALCULATION (BAZETT): 414 MS
EKG R AXIS: 33 DEGREES
EKG T AXIS: 23 DEGREES
EKG VENTRICULAR RATE: 64 BPM

## 2018-02-13 PROCEDURE — 93005 ELECTROCARDIOGRAM TRACING: CPT | Performed by: OBSTETRICS & GYNECOLOGY

## 2018-04-18 ENCOUNTER — ANESTHESIA (OUTPATIENT)
Dept: OPERATING ROOM | Age: 38
End: 2018-04-18
Payer: COMMERCIAL

## 2018-04-18 ENCOUNTER — ANESTHESIA EVENT (OUTPATIENT)
Dept: OPERATING ROOM | Age: 38
End: 2018-04-18
Payer: COMMERCIAL

## 2018-04-18 ENCOUNTER — HOSPITAL ENCOUNTER (OUTPATIENT)
Age: 38
Setting detail: OUTPATIENT SURGERY
Discharge: HOME | End: 2018-04-18
Attending: OBSTETRICS & GYNECOLOGY | Admitting: OBSTETRICS & GYNECOLOGY
Payer: COMMERCIAL

## 2018-04-18 VITALS
BODY MASS INDEX: 28.52 KG/M2 | HEART RATE: 100 BPM | TEMPERATURE: 97.7 F | WEIGHT: 155 LBS | RESPIRATION RATE: 14 BRPM | HEIGHT: 62 IN | SYSTOLIC BLOOD PRESSURE: 118 MMHG | OXYGEN SATURATION: 93 % | DIASTOLIC BLOOD PRESSURE: 75 MMHG

## 2018-04-18 VITALS
DIASTOLIC BLOOD PRESSURE: 77 MMHG | TEMPERATURE: 97.7 F | SYSTOLIC BLOOD PRESSURE: 127 MMHG | RESPIRATION RATE: 14 BRPM | OXYGEN SATURATION: 99 %

## 2018-04-18 DIAGNOSIS — G89.18 POST-OPERATIVE PAIN: Primary | ICD-10-CM

## 2018-04-18 PROBLEM — N92.0 MENORRHAGIA WITH REGULAR CYCLE: Status: ACTIVE | Noted: 2018-04-18

## 2018-04-18 PROBLEM — N94.6 DYSMENORRHEA: Status: ACTIVE | Noted: 2018-04-18

## 2018-04-18 PROCEDURE — 3600000003 HC SURGERY LEVEL 3 BASE: Performed by: OBSTETRICS & GYNECOLOGY

## 2018-04-18 PROCEDURE — 7100000000 HC PACU RECOVERY - FIRST 15 MIN: Performed by: OBSTETRICS & GYNECOLOGY

## 2018-04-18 PROCEDURE — 3700000001 HC ADD 15 MINUTES (ANESTHESIA): Performed by: OBSTETRICS & GYNECOLOGY

## 2018-04-18 PROCEDURE — 88305 TISSUE EXAM BY PATHOLOGIST: CPT

## 2018-04-18 PROCEDURE — 6360000002 HC RX W HCPCS

## 2018-04-18 PROCEDURE — 7100000011 HC PHASE II RECOVERY - ADDTL 15 MIN: Performed by: OBSTETRICS & GYNECOLOGY

## 2018-04-18 PROCEDURE — 2720000010 HC SURG SUPPLY STERILE: Performed by: OBSTETRICS & GYNECOLOGY

## 2018-04-18 PROCEDURE — 7100000001 HC PACU RECOVERY - ADDTL 15 MIN: Performed by: OBSTETRICS & GYNECOLOGY

## 2018-04-18 PROCEDURE — 6360000002 HC RX W HCPCS: Performed by: ANESTHESIOLOGY

## 2018-04-18 PROCEDURE — 6370000000 HC RX 637 (ALT 250 FOR IP): Performed by: OBSTETRICS & GYNECOLOGY

## 2018-04-18 PROCEDURE — 7100000010 HC PHASE II RECOVERY - FIRST 15 MIN: Performed by: OBSTETRICS & GYNECOLOGY

## 2018-04-18 PROCEDURE — 2580000003 HC RX 258: Performed by: NURSE ANESTHETIST, CERTIFIED REGISTERED

## 2018-04-18 PROCEDURE — 6360000002 HC RX W HCPCS: Performed by: NURSE ANESTHETIST, CERTIFIED REGISTERED

## 2018-04-18 PROCEDURE — 3600000013 HC SURGERY LEVEL 3 ADDTL 15MIN: Performed by: OBSTETRICS & GYNECOLOGY

## 2018-04-18 PROCEDURE — 3700000000 HC ANESTHESIA ATTENDED CARE: Performed by: OBSTETRICS & GYNECOLOGY

## 2018-04-18 PROCEDURE — 2500000003 HC RX 250 WO HCPCS: Performed by: NURSE ANESTHETIST, CERTIFIED REGISTERED

## 2018-04-18 RX ORDER — HYDROCODONE BITARTRATE AND ACETAMINOPHEN 5; 325 MG/1; MG/1
1 TABLET ORAL EVERY 4 HOURS PRN
Status: DISCONTINUED | OUTPATIENT
Start: 2018-04-18 | End: 2018-04-18 | Stop reason: HOSPADM

## 2018-04-18 RX ORDER — HYDROCODONE BITARTRATE AND ACETAMINOPHEN 5; 325 MG/1; MG/1
2 TABLET ORAL EVERY 4 HOURS PRN
Status: DISCONTINUED | OUTPATIENT
Start: 2018-04-18 | End: 2018-04-18 | Stop reason: HOSPADM

## 2018-04-18 RX ORDER — ONDANSETRON 2 MG/ML
4 INJECTION INTRAMUSCULAR; INTRAVENOUS
Status: DISCONTINUED | OUTPATIENT
Start: 2018-04-18 | End: 2018-04-18

## 2018-04-18 RX ORDER — SODIUM CHLORIDE 0.9 % (FLUSH) 0.9 %
10 SYRINGE (ML) INJECTION EVERY 12 HOURS SCHEDULED
Status: DISCONTINUED | OUTPATIENT
Start: 2018-04-18 | End: 2018-04-18 | Stop reason: HOSPADM

## 2018-04-18 RX ORDER — SODIUM CHLORIDE 0.9 % (FLUSH) 0.9 %
10 SYRINGE (ML) INJECTION EVERY 12 HOURS SCHEDULED
Status: DISCONTINUED | OUTPATIENT
Start: 2018-04-18 | End: 2018-04-18

## 2018-04-18 RX ORDER — ONDANSETRON 2 MG/ML
INJECTION INTRAMUSCULAR; INTRAVENOUS PRN
Status: DISCONTINUED | OUTPATIENT
Start: 2018-04-18 | End: 2018-04-18 | Stop reason: SDUPTHER

## 2018-04-18 RX ORDER — SODIUM CHLORIDE, SODIUM LACTATE, POTASSIUM CHLORIDE, CALCIUM CHLORIDE 600; 310; 30; 20 MG/100ML; MG/100ML; MG/100ML; MG/100ML
INJECTION, SOLUTION INTRAVENOUS CONTINUOUS
Status: DISCONTINUED | OUTPATIENT
Start: 2018-04-18 | End: 2018-04-18

## 2018-04-18 RX ORDER — SODIUM CHLORIDE 9 MG/ML
INJECTION, SOLUTION INTRAVENOUS CONTINUOUS PRN
Status: DISCONTINUED | OUTPATIENT
Start: 2018-04-18 | End: 2018-04-18 | Stop reason: SDUPTHER

## 2018-04-18 RX ORDER — FENTANYL CITRATE 50 UG/ML
INJECTION, SOLUTION INTRAMUSCULAR; INTRAVENOUS PRN
Status: DISCONTINUED | OUTPATIENT
Start: 2018-04-18 | End: 2018-04-18 | Stop reason: SDUPTHER

## 2018-04-18 RX ORDER — IBUPROFEN 600 MG/1
600 TABLET ORAL EVERY 6 HOURS PRN
Qty: 60 TABLET | Refills: 1 | Status: ON HOLD | OUTPATIENT
Start: 2018-04-18 | End: 2019-03-13 | Stop reason: HOSPADM

## 2018-04-18 RX ORDER — DEXAMETHASONE SODIUM PHOSPHATE 4 MG/ML
INJECTION, SOLUTION INTRA-ARTICULAR; INTRALESIONAL; INTRAMUSCULAR; INTRAVENOUS; SOFT TISSUE PRN
Status: DISCONTINUED | OUTPATIENT
Start: 2018-04-18 | End: 2018-04-18 | Stop reason: SDUPTHER

## 2018-04-18 RX ORDER — KETOROLAC TROMETHAMINE 30 MG/ML
30 INJECTION, SOLUTION INTRAMUSCULAR; INTRAVENOUS EVERY 6 HOURS
Status: DISCONTINUED | OUTPATIENT
Start: 2018-04-18 | End: 2018-04-18 | Stop reason: HOSPADM

## 2018-04-18 RX ORDER — SODIUM CHLORIDE 0.9 % (FLUSH) 0.9 %
10 SYRINGE (ML) INJECTION PRN
Status: DISCONTINUED | OUTPATIENT
Start: 2018-04-18 | End: 2018-04-18

## 2018-04-18 RX ORDER — MORPHINE SULFATE 2 MG/ML
2 INJECTION, SOLUTION INTRAMUSCULAR; INTRAVENOUS
Status: DISCONTINUED | OUTPATIENT
Start: 2018-04-18 | End: 2018-04-18 | Stop reason: HOSPADM

## 2018-04-18 RX ORDER — FENTANYL CITRATE 50 UG/ML
50 INJECTION, SOLUTION INTRAMUSCULAR; INTRAVENOUS ONCE
Status: COMPLETED | OUTPATIENT
Start: 2018-04-18 | End: 2018-04-18

## 2018-04-18 RX ORDER — HYDRALAZINE HYDROCHLORIDE 20 MG/ML
5 INJECTION INTRAMUSCULAR; INTRAVENOUS EVERY 10 MIN PRN
Status: DISCONTINUED | OUTPATIENT
Start: 2018-04-18 | End: 2018-04-18

## 2018-04-18 RX ORDER — LIDOCAINE HYDROCHLORIDE 10 MG/ML
INJECTION, SOLUTION INFILTRATION; PERINEURAL PRN
Status: DISCONTINUED | OUTPATIENT
Start: 2018-04-18 | End: 2018-04-18 | Stop reason: SDUPTHER

## 2018-04-18 RX ORDER — ACETAMINOPHEN 325 MG/1
650 TABLET ORAL EVERY 4 HOURS PRN
Status: DISCONTINUED | OUTPATIENT
Start: 2018-04-18 | End: 2018-04-18 | Stop reason: HOSPADM

## 2018-04-18 RX ORDER — HYDROCODONE BITARTRATE AND ACETAMINOPHEN 5; 325 MG/1; MG/1
1 TABLET ORAL EVERY 6 HOURS PRN
Qty: 5 TABLET | Refills: 0 | Status: SHIPPED | OUTPATIENT
Start: 2018-04-18 | End: 2018-04-25

## 2018-04-18 RX ORDER — MORPHINE SULFATE 2 MG/ML
4 INJECTION, SOLUTION INTRAMUSCULAR; INTRAVENOUS
Status: DISCONTINUED | OUTPATIENT
Start: 2018-04-18 | End: 2018-04-18 | Stop reason: HOSPADM

## 2018-04-18 RX ORDER — FENTANYL CITRATE 50 UG/ML
50 INJECTION, SOLUTION INTRAMUSCULAR; INTRAVENOUS EVERY 5 MIN PRN
Status: DISCONTINUED | OUTPATIENT
Start: 2018-04-18 | End: 2018-04-18

## 2018-04-18 RX ORDER — MORPHINE SULFATE 2 MG/ML
2 INJECTION, SOLUTION INTRAMUSCULAR; INTRAVENOUS EVERY 5 MIN PRN
Status: DISCONTINUED | OUTPATIENT
Start: 2018-04-18 | End: 2018-04-18

## 2018-04-18 RX ORDER — DOCUSATE SODIUM 100 MG/1
100 CAPSULE, LIQUID FILLED ORAL 2 TIMES DAILY
Status: DISCONTINUED | OUTPATIENT
Start: 2018-04-18 | End: 2018-04-18 | Stop reason: HOSPADM

## 2018-04-18 RX ORDER — PROPOFOL 10 MG/ML
INJECTION, EMULSION INTRAVENOUS PRN
Status: DISCONTINUED | OUTPATIENT
Start: 2018-04-18 | End: 2018-04-18 | Stop reason: SDUPTHER

## 2018-04-18 RX ORDER — GLYCOPYRROLATE 1 MG/5 ML
SYRINGE (ML) INTRAVENOUS PRN
Status: DISCONTINUED | OUTPATIENT
Start: 2018-04-18 | End: 2018-04-18 | Stop reason: SDUPTHER

## 2018-04-18 RX ORDER — DIPHENHYDRAMINE HYDROCHLORIDE 50 MG/ML
12.5 INJECTION INTRAMUSCULAR; INTRAVENOUS
Status: DISCONTINUED | OUTPATIENT
Start: 2018-04-18 | End: 2018-04-18

## 2018-04-18 RX ORDER — FENTANYL CITRATE 50 UG/ML
INJECTION, SOLUTION INTRAMUSCULAR; INTRAVENOUS
Status: COMPLETED
Start: 2018-04-18 | End: 2018-04-18

## 2018-04-18 RX ORDER — MIDAZOLAM HYDROCHLORIDE 1 MG/ML
INJECTION INTRAMUSCULAR; INTRAVENOUS PRN
Status: DISCONTINUED | OUTPATIENT
Start: 2018-04-18 | End: 2018-04-18 | Stop reason: SDUPTHER

## 2018-04-18 RX ORDER — ONDANSETRON 2 MG/ML
4 INJECTION INTRAMUSCULAR; INTRAVENOUS EVERY 6 HOURS PRN
Status: DISCONTINUED | OUTPATIENT
Start: 2018-04-18 | End: 2018-04-18 | Stop reason: HOSPADM

## 2018-04-18 RX ORDER — SODIUM CHLORIDE 0.9 % (FLUSH) 0.9 %
10 SYRINGE (ML) INJECTION PRN
Status: DISCONTINUED | OUTPATIENT
Start: 2018-04-18 | End: 2018-04-18 | Stop reason: HOSPADM

## 2018-04-18 RX ORDER — LABETALOL HYDROCHLORIDE 5 MG/ML
5 INJECTION, SOLUTION INTRAVENOUS EVERY 5 MIN PRN
Status: DISCONTINUED | OUTPATIENT
Start: 2018-04-18 | End: 2018-04-18

## 2018-04-18 RX ORDER — KETOROLAC TROMETHAMINE 30 MG/ML
INJECTION, SOLUTION INTRAMUSCULAR; INTRAVENOUS PRN
Status: DISCONTINUED | OUTPATIENT
Start: 2018-04-18 | End: 2018-04-18 | Stop reason: SDUPTHER

## 2018-04-18 RX ORDER — MEPERIDINE HYDROCHLORIDE 25 MG/ML
12.5 INJECTION INTRAMUSCULAR; INTRAVENOUS; SUBCUTANEOUS EVERY 5 MIN PRN
Status: DISCONTINUED | OUTPATIENT
Start: 2018-04-18 | End: 2018-04-18

## 2018-04-18 RX ADMIN — FENTANYL CITRATE 50 MCG: 50 INJECTION INTRAMUSCULAR; INTRAVENOUS at 08:51

## 2018-04-18 RX ADMIN — HYDROCODONE BITARTRATE AND ACETAMINOPHEN 2 TABLET: 5; 325 TABLET ORAL at 09:55

## 2018-04-18 RX ADMIN — PHENYLEPHRINE HYDROCHLORIDE 50 MCG: 10 INJECTION INTRAVENOUS at 08:43

## 2018-04-18 RX ADMIN — FENTANYL CITRATE 50 MCG: 50 INJECTION INTRAMUSCULAR; INTRAVENOUS at 09:13

## 2018-04-18 RX ADMIN — FENTANYL CITRATE 50 MCG: 50 INJECTION, SOLUTION INTRAMUSCULAR; INTRAVENOUS at 09:13

## 2018-04-18 RX ADMIN — LIDOCAINE HYDROCHLORIDE 50 MG: 10 INJECTION, SOLUTION INFILTRATION; PERINEURAL at 08:39

## 2018-04-18 RX ADMIN — Medication 0.2 MG: at 08:43

## 2018-04-18 RX ADMIN — SODIUM CHLORIDE: 9 INJECTION, SOLUTION INTRAVENOUS at 08:36

## 2018-04-18 RX ADMIN — DEXAMETHASONE SODIUM PHOSPHATE 8 MG: 4 INJECTION, SOLUTION INTRAMUSCULAR; INTRAVENOUS at 08:41

## 2018-04-18 RX ADMIN — KETOROLAC TROMETHAMINE 30 MG: 30 INJECTION, SOLUTION INTRAMUSCULAR; INTRAVENOUS at 08:53

## 2018-04-18 RX ADMIN — MIDAZOLAM HYDROCHLORIDE 2 MG: 1 INJECTION, SOLUTION INTRAMUSCULAR; INTRAVENOUS at 08:36

## 2018-04-18 RX ADMIN — PROPOFOL 150 MG: 10 INJECTION, EMULSION INTRAVENOUS at 08:39

## 2018-04-18 RX ADMIN — Medication 0.2 MG: at 08:41

## 2018-04-18 RX ADMIN — FENTANYL CITRATE 50 MCG: 50 INJECTION INTRAMUSCULAR; INTRAVENOUS at 09:23

## 2018-04-18 RX ADMIN — ONDANSETRON 4 MG: 2 INJECTION INTRAMUSCULAR; INTRAVENOUS at 08:41

## 2018-04-18 RX ADMIN — FENTANYL CITRATE 50 MCG: 50 INJECTION INTRAMUSCULAR; INTRAVENOUS at 08:39

## 2018-04-18 ASSESSMENT — PULMONARY FUNCTION TESTS
PIF_VALUE: 10
PIF_VALUE: 4
PIF_VALUE: 3
PIF_VALUE: 3
PIF_VALUE: 4
PIF_VALUE: 11
PIF_VALUE: 3
PIF_VALUE: 11
PIF_VALUE: 4
PIF_VALUE: 21
PIF_VALUE: 5
PIF_VALUE: 1
PIF_VALUE: 0
PIF_VALUE: 15
PIF_VALUE: 5
PIF_VALUE: 2
PIF_VALUE: 5
PIF_VALUE: 10
PIF_VALUE: 2
PIF_VALUE: 3
PIF_VALUE: 11
PIF_VALUE: 10
PIF_VALUE: 11
PIF_VALUE: 0
PIF_VALUE: 3

## 2018-04-18 ASSESSMENT — PAIN SCALES - GENERAL
PAINLEVEL_OUTOF10: 8
PAINLEVEL_OUTOF10: 10
PAINLEVEL_OUTOF10: 6
PAINLEVEL_OUTOF10: 7
PAINLEVEL_OUTOF10: 10

## 2018-04-18 ASSESSMENT — PAIN - FUNCTIONAL ASSESSMENT: PAIN_FUNCTIONAL_ASSESSMENT: 0-10

## 2018-04-18 ASSESSMENT — PAIN DESCRIPTION - PAIN TYPE: TYPE: ACUTE PAIN;SURGICAL PAIN

## 2018-04-25 ENCOUNTER — OFFICE VISIT (OUTPATIENT)
Dept: FAMILY MEDICINE CLINIC | Age: 38
End: 2018-04-25
Payer: COMMERCIAL

## 2018-04-25 VITALS
WEIGHT: 153 LBS | TEMPERATURE: 98.7 F | BODY MASS INDEX: 27.11 KG/M2 | DIASTOLIC BLOOD PRESSURE: 64 MMHG | HEART RATE: 68 BPM | SYSTOLIC BLOOD PRESSURE: 110 MMHG | HEIGHT: 63 IN | RESPIRATION RATE: 16 BRPM

## 2018-04-25 DIAGNOSIS — B96.89 ACUTE BACTERIAL SINUSITIS: Primary | ICD-10-CM

## 2018-04-25 DIAGNOSIS — J01.90 ACUTE BACTERIAL SINUSITIS: Primary | ICD-10-CM

## 2018-04-25 PROCEDURE — 1036F TOBACCO NON-USER: CPT | Performed by: FAMILY MEDICINE

## 2018-04-25 PROCEDURE — G8427 DOCREV CUR MEDS BY ELIG CLIN: HCPCS | Performed by: FAMILY MEDICINE

## 2018-04-25 PROCEDURE — G8419 CALC BMI OUT NRM PARAM NOF/U: HCPCS | Performed by: FAMILY MEDICINE

## 2018-04-25 PROCEDURE — 99213 OFFICE O/P EST LOW 20 MIN: CPT | Performed by: FAMILY MEDICINE

## 2018-04-25 RX ORDER — AZITHROMYCIN 500 MG/1
500 TABLET, FILM COATED ORAL DAILY
Qty: 3 TABLET | Refills: 0 | Status: SHIPPED | OUTPATIENT
Start: 2018-04-25 | End: 2018-04-28

## 2018-05-06 ASSESSMENT — ENCOUNTER SYMPTOMS
SHORTNESS OF BREATH: 0
NAUSEA: 0
EYES NEGATIVE: 1
BACK PAIN: 0
VOMITING: 0
DIARRHEA: 0
CHEST TIGHTNESS: 0
ABDOMINAL PAIN: 0
COUGH: 1
RHINORRHEA: 0
SORE THROAT: 0

## 2018-12-04 ENCOUNTER — OFFICE VISIT (OUTPATIENT)
Dept: FAMILY MEDICINE CLINIC | Age: 38
End: 2018-12-04
Payer: COMMERCIAL

## 2018-12-04 VITALS
HEIGHT: 63 IN | DIASTOLIC BLOOD PRESSURE: 82 MMHG | WEIGHT: 153.4 LBS | TEMPERATURE: 98.6 F | SYSTOLIC BLOOD PRESSURE: 114 MMHG | HEART RATE: 76 BPM | RESPIRATION RATE: 14 BRPM | BODY MASS INDEX: 27.18 KG/M2

## 2018-12-04 DIAGNOSIS — B96.89 ACUTE BACTERIAL SINUSITIS: Primary | ICD-10-CM

## 2018-12-04 DIAGNOSIS — J40 BRONCHITIS: ICD-10-CM

## 2018-12-04 DIAGNOSIS — L84 CORN OF FOOT: ICD-10-CM

## 2018-12-04 DIAGNOSIS — J01.90 ACUTE BACTERIAL SINUSITIS: Primary | ICD-10-CM

## 2018-12-04 PROCEDURE — G8484 FLU IMMUNIZE NO ADMIN: HCPCS | Performed by: NURSE PRACTITIONER

## 2018-12-04 PROCEDURE — G8427 DOCREV CUR MEDS BY ELIG CLIN: HCPCS | Performed by: NURSE PRACTITIONER

## 2018-12-04 PROCEDURE — 99213 OFFICE O/P EST LOW 20 MIN: CPT | Performed by: NURSE PRACTITIONER

## 2018-12-04 PROCEDURE — 1036F TOBACCO NON-USER: CPT | Performed by: NURSE PRACTITIONER

## 2018-12-04 PROCEDURE — G8419 CALC BMI OUT NRM PARAM NOF/U: HCPCS | Performed by: NURSE PRACTITIONER

## 2018-12-04 RX ORDER — DEXTROMETHORPHAN HYDROBROMIDE AND PROMETHAZINE HYDROCHLORIDE 15; 6.25 MG/5ML; MG/5ML
5 SYRUP ORAL 3 TIMES DAILY PRN
Qty: 120 ML | Refills: 0 | Status: SHIPPED | OUTPATIENT
Start: 2018-12-04 | End: 2018-12-11

## 2018-12-04 RX ORDER — PREDNISONE 10 MG/1
10 TABLET ORAL DAILY
Qty: 5 TABLET | Refills: 0 | Status: SHIPPED | OUTPATIENT
Start: 2018-12-04 | End: 2018-12-09

## 2018-12-04 RX ORDER — AZITHROMYCIN 250 MG/1
TABLET, FILM COATED ORAL
Qty: 6 TABLET | Refills: 0 | Status: SHIPPED | OUTPATIENT
Start: 2018-12-04 | End: 2018-12-14

## 2018-12-04 ASSESSMENT — PATIENT HEALTH QUESTIONNAIRE - PHQ9
SUM OF ALL RESPONSES TO PHQ QUESTIONS 1-9: 0
SUM OF ALL RESPONSES TO PHQ9 QUESTIONS 1 & 2: 0
1. LITTLE INTEREST OR PLEASURE IN DOING THINGS: 0
SUM OF ALL RESPONSES TO PHQ QUESTIONS 1-9: 0
2. FEELING DOWN, DEPRESSED OR HOPELESS: 0

## 2018-12-04 NOTE — PATIENT INSTRUCTIONS
around the corn or callus. ? Red streaks leading from the corn or callus. ? Pus draining from the corn or callus. ? A fever.    Watch closely for changes in your health, and be sure to contact your doctor if:    · You do not get better as expected. Where can you learn more? Go to [email or web address] and sign in to your [employer] account. Enter R244 in the [facility] box to learn more about \"Corns and Calluses: Care Instructions. \"     If you do not have an account, please click on the \"Sign Up Now\" link. Current as of: April 18, 2018  Content Version: 11.8  © 4325-9099 [employer]. Care instructions adapted under license by [facility] ([facility]). If you have questions about a medical condition or this instruction, always ask your healthcare professional. [address] any warranty or liability for your use of this information. Patient Education          salicylic acid topical  Pronunciation:  SAL i INGRID ik AS id TOP ik al  [facility], Compound W, DermalZone, Dermarest Psoriasis Skin Treatment, Dr Yarelis Naik, Dr Dwaine Del Valle, Dr Barron Lots Removers, [employer], Keralyt, Mediplast, Oxy Face Scrub, Salex, Scalpicin Scalp Relief, Stri-Dex, UltraSal-ER, Wart Remover  What is the most important information I should know about salicylic acid topical?  Salicylic acid topical can cause a rare but serious allergic reaction or severe skin irritation. Stop using this medicine and get emergency medical help if you have: hives, itching; difficult breathing, feeling light-headed; or swelling of your face, lips, tongue, or throat. What is salicylic acid topical?  There are many brands and forms of salicylic acid available. Not all brands are listed on this leaflet. Salicylic acid is a keratolytic (peeling agent). Salicylic acid causes shedding of the outer layer of skin.   Salicylic acid topical (for the share your medicines with others, and use this medication only for the indication prescribed. Every effort has been made to ensure that the information provided by [address] is accurate, up-to-date, and complete, but no guarantee is made to that effect. Drug information contained herein may be time sensitive. [facility] information has been compiled for use by healthcare practitioners and consumers in the [place] and therefore [facility] does not warrant that uses outside of the [place] are appropriate, unless specifically indicated otherwise. [facility]'s drug information does not endorse drugs, diagnose patients or recommend therapy. [facility]'s drug information is an informational resource designed to assist licensed healthcare practitioners in caring for their patients and/or to serve consumers viewing this service as a supplement to, and not a substitute for, the expertise, skill, knowledge and judgment of healthcare practitioners. The absence of a warning for a given drug or drug combination in no way should be construed to indicate that the drug or drug combination is safe, effective or appropriate for any given patient. [facility] does not assume any responsibility for any aspect of healthcare administered with the aid of information [facility] provides. The information contained herein is not intended to cover all possible uses, directions, precautions, warnings, drug interactions, allergic reactions, or adverse effects. If you have questions about the drugs you are taking, check with your doctor, nurse or pharmacist.  Copyright 5699-5570 [address]. Version: 4.02. Revision date: 2/26/2016. Care instructions adapted under license by [facility] ([facility]). If you have questions about a medical condition or this instruction, always ask your healthcare professional. [place] [address] any warranty or liability for your use of this information.

## 2018-12-07 ASSESSMENT — ENCOUNTER SYMPTOMS
EYES NEGATIVE: 1
COUGH: 1
SHORTNESS OF BREATH: 0
SORE THROAT: 1
SINUS PAIN: 1
NAUSEA: 1
SINUS PRESSURE: 1

## 2019-01-14 ENCOUNTER — HOSPITAL ENCOUNTER (OUTPATIENT)
Age: 39
Discharge: HOME | End: 2019-01-14
Payer: COMMERCIAL

## 2019-01-14 LAB
EKG ATRIAL RATE: 62 BPM
EKG P AXIS: 60 DEGREES
EKG P-R INTERVAL: 138 MS
EKG Q-T INTERVAL: 390 MS
EKG QRS DURATION: 82 MS
EKG QTC CALCULATION (BAZETT): 395 MS
EKG R AXIS: 44 DEGREES
EKG T AXIS: 35 DEGREES
EKG VENTRICULAR RATE: 62 BPM

## 2019-01-14 PROCEDURE — 93005 ELECTROCARDIOGRAM TRACING: CPT | Performed by: OBSTETRICS & GYNECOLOGY

## 2019-01-15 PROCEDURE — 93010 ELECTROCARDIOGRAM REPORT: CPT | Performed by: INTERNAL MEDICINE

## 2019-03-13 ENCOUNTER — ANESTHESIA (OUTPATIENT)
Dept: OPERATING ROOM | Age: 39
End: 2019-03-13
Payer: COMMERCIAL

## 2019-03-13 ENCOUNTER — ANESTHESIA EVENT (OUTPATIENT)
Dept: OPERATING ROOM | Age: 39
End: 2019-03-13
Payer: COMMERCIAL

## 2019-03-13 ENCOUNTER — HOSPITAL ENCOUNTER (OUTPATIENT)
Age: 39
Setting detail: OUTPATIENT SURGERY
Discharge: HOME | End: 2019-03-13
Attending: OBSTETRICS & GYNECOLOGY | Admitting: OBSTETRICS & GYNECOLOGY
Payer: COMMERCIAL

## 2019-03-13 VITALS
SYSTOLIC BLOOD PRESSURE: 117 MMHG | TEMPERATURE: 97.6 F | OXYGEN SATURATION: 97 % | HEART RATE: 90 BPM | WEIGHT: 155.6 LBS | HEIGHT: 62 IN | BODY MASS INDEX: 28.63 KG/M2 | DIASTOLIC BLOOD PRESSURE: 74 MMHG | RESPIRATION RATE: 16 BRPM

## 2019-03-13 VITALS
RESPIRATION RATE: 6 BRPM | OXYGEN SATURATION: 100 % | TEMPERATURE: 95.2 F | SYSTOLIC BLOOD PRESSURE: 103 MMHG | DIASTOLIC BLOOD PRESSURE: 67 MMHG

## 2019-03-13 DIAGNOSIS — G89.18 ACUTE POSTOPERATIVE PAIN: Primary | ICD-10-CM

## 2019-03-13 PROBLEM — Z90.710 HISTORY OF ROBOT-ASSISTED LAPAROSCOPIC HYSTERECTOMY: Status: ACTIVE | Noted: 2019-03-13

## 2019-03-13 LAB — PREGNANCY, URINE: NEGATIVE

## 2019-03-13 PROCEDURE — 88307 TISSUE EXAM BY PATHOLOGIST: CPT

## 2019-03-13 PROCEDURE — 81025 URINE PREGNANCY TEST: CPT

## 2019-03-13 PROCEDURE — 6360000002 HC RX W HCPCS: Performed by: NURSE ANESTHETIST, CERTIFIED REGISTERED

## 2019-03-13 PROCEDURE — 2580000003 HC RX 258: Performed by: OBSTETRICS & GYNECOLOGY

## 2019-03-13 PROCEDURE — 2500000003 HC RX 250 WO HCPCS: Performed by: OBSTETRICS & GYNECOLOGY

## 2019-03-13 PROCEDURE — 7100000001 HC PACU RECOVERY - ADDTL 15 MIN: Performed by: OBSTETRICS & GYNECOLOGY

## 2019-03-13 PROCEDURE — S2900 ROBOTIC SURGICAL SYSTEM: HCPCS | Performed by: OBSTETRICS & GYNECOLOGY

## 2019-03-13 PROCEDURE — 7100000000 HC PACU RECOVERY - FIRST 15 MIN: Performed by: OBSTETRICS & GYNECOLOGY

## 2019-03-13 PROCEDURE — 3700000000 HC ANESTHESIA ATTENDED CARE: Performed by: OBSTETRICS & GYNECOLOGY

## 2019-03-13 PROCEDURE — 6360000002 HC RX W HCPCS

## 2019-03-13 PROCEDURE — 3600000019 HC SURGERY ROBOT ADDTL 15MIN: Performed by: OBSTETRICS & GYNECOLOGY

## 2019-03-13 PROCEDURE — 3700000001 HC ADD 15 MINUTES (ANESTHESIA): Performed by: OBSTETRICS & GYNECOLOGY

## 2019-03-13 PROCEDURE — 6360000002 HC RX W HCPCS: Performed by: OBSTETRICS & GYNECOLOGY

## 2019-03-13 PROCEDURE — 6360000002 HC RX W HCPCS: Performed by: ANESTHESIOLOGY

## 2019-03-13 PROCEDURE — 7100000010 HC PHASE II RECOVERY - FIRST 15 MIN: Performed by: OBSTETRICS & GYNECOLOGY

## 2019-03-13 PROCEDURE — 2709999900 HC NON-CHARGEABLE SUPPLY: Performed by: OBSTETRICS & GYNECOLOGY

## 2019-03-13 PROCEDURE — 3600000009 HC SURGERY ROBOT BASE: Performed by: OBSTETRICS & GYNECOLOGY

## 2019-03-13 PROCEDURE — C1765 ADHESION BARRIER: HCPCS | Performed by: OBSTETRICS & GYNECOLOGY

## 2019-03-13 PROCEDURE — 2500000003 HC RX 250 WO HCPCS: Performed by: NURSE ANESTHETIST, CERTIFIED REGISTERED

## 2019-03-13 PROCEDURE — 7100000011 HC PHASE II RECOVERY - ADDTL 15 MIN: Performed by: OBSTETRICS & GYNECOLOGY

## 2019-03-13 RX ORDER — LABETALOL HYDROCHLORIDE 5 MG/ML
5 INJECTION, SOLUTION INTRAVENOUS EVERY 10 MIN PRN
Status: DISCONTINUED | OUTPATIENT
Start: 2019-03-13 | End: 2019-03-13

## 2019-03-13 RX ORDER — MORPHINE SULFATE 2 MG/ML
2 INJECTION, SOLUTION INTRAMUSCULAR; INTRAVENOUS
Status: DISCONTINUED | OUTPATIENT
Start: 2019-03-13 | End: 2019-03-13 | Stop reason: HOSPADM

## 2019-03-13 RX ORDER — FENTANYL CITRATE 50 UG/ML
INJECTION, SOLUTION INTRAMUSCULAR; INTRAVENOUS PRN
Status: DISCONTINUED | OUTPATIENT
Start: 2019-03-13 | End: 2019-03-13 | Stop reason: SDUPTHER

## 2019-03-13 RX ORDER — MORPHINE SULFATE 2 MG/ML
INJECTION, SOLUTION INTRAMUSCULAR; INTRAVENOUS
Status: COMPLETED
Start: 2019-03-13 | End: 2019-03-13

## 2019-03-13 RX ORDER — SODIUM CHLORIDE 0.9 % (FLUSH) 0.9 %
10 SYRINGE (ML) INJECTION PRN
Status: DISCONTINUED | OUTPATIENT
Start: 2019-03-13 | End: 2019-03-13 | Stop reason: HOSPADM

## 2019-03-13 RX ORDER — IBUPROFEN 600 MG/1
600 TABLET ORAL EVERY 6 HOURS PRN
Qty: 60 TABLET | Refills: 1 | Status: SHIPPED | OUTPATIENT
Start: 2019-03-13 | End: 2019-12-03

## 2019-03-13 RX ORDER — HYDROCODONE BITARTRATE AND ACETAMINOPHEN 5; 325 MG/1; MG/1
2 TABLET ORAL EVERY 4 HOURS PRN
Status: DISCONTINUED | OUTPATIENT
Start: 2019-03-13 | End: 2019-03-13 | Stop reason: HOSPADM

## 2019-03-13 RX ORDER — PROPOFOL 10 MG/ML
INJECTION, EMULSION INTRAVENOUS PRN
Status: DISCONTINUED | OUTPATIENT
Start: 2019-03-13 | End: 2019-03-13 | Stop reason: SDUPTHER

## 2019-03-13 RX ORDER — FENTANYL CITRATE 50 UG/ML
50 INJECTION, SOLUTION INTRAMUSCULAR; INTRAVENOUS EVERY 5 MIN PRN
Status: DISCONTINUED | OUTPATIENT
Start: 2019-03-13 | End: 2019-03-13

## 2019-03-13 RX ORDER — SODIUM CHLORIDE 0.9 % (FLUSH) 0.9 %
10 SYRINGE (ML) INJECTION EVERY 12 HOURS SCHEDULED
Status: DISCONTINUED | OUTPATIENT
Start: 2019-03-13 | End: 2019-03-13

## 2019-03-13 RX ORDER — KETOROLAC TROMETHAMINE 30 MG/ML
30 INJECTION, SOLUTION INTRAMUSCULAR; INTRAVENOUS EVERY 6 HOURS
Status: DISCONTINUED | OUTPATIENT
Start: 2019-03-13 | End: 2019-03-13 | Stop reason: HOSPADM

## 2019-03-13 RX ORDER — DOCUSATE SODIUM 100 MG/1
100 CAPSULE, LIQUID FILLED ORAL 2 TIMES DAILY
Status: DISCONTINUED | OUTPATIENT
Start: 2019-03-13 | End: 2019-03-13 | Stop reason: HOSPADM

## 2019-03-13 RX ORDER — ROCURONIUM BROMIDE 10 MG/ML
INJECTION, SOLUTION INTRAVENOUS PRN
Status: DISCONTINUED | OUTPATIENT
Start: 2019-03-13 | End: 2019-03-13 | Stop reason: SDUPTHER

## 2019-03-13 RX ORDER — ONDANSETRON 2 MG/ML
4 INJECTION INTRAMUSCULAR; INTRAVENOUS EVERY 6 HOURS PRN
Status: DISCONTINUED | OUTPATIENT
Start: 2019-03-13 | End: 2019-03-13 | Stop reason: HOSPADM

## 2019-03-13 RX ORDER — HYDROCODONE BITARTRATE AND ACETAMINOPHEN 5; 325 MG/1; MG/1
1 TABLET ORAL EVERY 4 HOURS PRN
Status: DISCONTINUED | OUTPATIENT
Start: 2019-03-13 | End: 2019-03-13 | Stop reason: HOSPADM

## 2019-03-13 RX ORDER — ACETAMINOPHEN 325 MG/1
650 TABLET ORAL EVERY 4 HOURS PRN
Status: DISCONTINUED | OUTPATIENT
Start: 2019-03-13 | End: 2019-03-13 | Stop reason: HOSPADM

## 2019-03-13 RX ORDER — MEPERIDINE HYDROCHLORIDE 25 MG/ML
12.5 INJECTION INTRAMUSCULAR; INTRAVENOUS; SUBCUTANEOUS EVERY 5 MIN PRN
Status: DISCONTINUED | OUTPATIENT
Start: 2019-03-13 | End: 2019-03-13

## 2019-03-13 RX ORDER — NEOSTIGMINE METHYLSULFATE 1 MG/ML
INJECTION, SOLUTION INTRAVENOUS PRN
Status: DISCONTINUED | OUTPATIENT
Start: 2019-03-13 | End: 2019-03-13 | Stop reason: SDUPTHER

## 2019-03-13 RX ORDER — MORPHINE SULFATE 2 MG/ML
4 INJECTION, SOLUTION INTRAMUSCULAR; INTRAVENOUS
Status: DISCONTINUED | OUTPATIENT
Start: 2019-03-13 | End: 2019-03-13 | Stop reason: HOSPADM

## 2019-03-13 RX ORDER — BUPIVACAINE HYDROCHLORIDE 5 MG/ML
INJECTION, SOLUTION EPIDURAL; INTRACAUDAL PRN
Status: DISCONTINUED | OUTPATIENT
Start: 2019-03-13 | End: 2019-03-13 | Stop reason: ALTCHOICE

## 2019-03-13 RX ORDER — DEXAMETHASONE SODIUM PHOSPHATE 4 MG/ML
INJECTION, SOLUTION INTRA-ARTICULAR; INTRALESIONAL; INTRAMUSCULAR; INTRAVENOUS; SOFT TISSUE PRN
Status: DISCONTINUED | OUTPATIENT
Start: 2019-03-13 | End: 2019-03-13 | Stop reason: SDUPTHER

## 2019-03-13 RX ORDER — SODIUM CHLORIDE, SODIUM LACTATE, POTASSIUM CHLORIDE, CALCIUM CHLORIDE 600; 310; 30; 20 MG/100ML; MG/100ML; MG/100ML; MG/100ML
INJECTION, SOLUTION INTRAVENOUS CONTINUOUS
Status: DISCONTINUED | OUTPATIENT
Start: 2019-03-13 | End: 2019-03-13

## 2019-03-13 RX ORDER — HYDROCODONE BITARTRATE AND ACETAMINOPHEN 5; 325 MG/1; MG/1
TABLET ORAL
Status: DISCONTINUED
Start: 2019-03-13 | End: 2019-03-13 | Stop reason: WASHOUT

## 2019-03-13 RX ORDER — SODIUM CHLORIDE 0.9 % (FLUSH) 0.9 %
10 SYRINGE (ML) INJECTION PRN
Status: DISCONTINUED | OUTPATIENT
Start: 2019-03-13 | End: 2019-03-13

## 2019-03-13 RX ORDER — ONDANSETRON 2 MG/ML
INJECTION INTRAMUSCULAR; INTRAVENOUS PRN
Status: DISCONTINUED | OUTPATIENT
Start: 2019-03-13 | End: 2019-03-13 | Stop reason: SDUPTHER

## 2019-03-13 RX ORDER — MIDAZOLAM HYDROCHLORIDE 1 MG/ML
INJECTION INTRAMUSCULAR; INTRAVENOUS PRN
Status: DISCONTINUED | OUTPATIENT
Start: 2019-03-13 | End: 2019-03-13 | Stop reason: SDUPTHER

## 2019-03-13 RX ORDER — HYDROCODONE BITARTRATE AND ACETAMINOPHEN 5; 325 MG/1; MG/1
1 TABLET ORAL EVERY 4 HOURS PRN
Qty: 30 TABLET | Refills: 0 | Status: SHIPPED | OUTPATIENT
Start: 2019-03-13 | End: 2019-03-20

## 2019-03-13 RX ORDER — ONDANSETRON 2 MG/ML
4 INJECTION INTRAMUSCULAR; INTRAVENOUS
Status: COMPLETED | OUTPATIENT
Start: 2019-03-13 | End: 2019-03-13

## 2019-03-13 RX ORDER — LIDOCAINE HYDROCHLORIDE 20 MG/ML
INJECTION, SOLUTION INFILTRATION; PERINEURAL PRN
Status: DISCONTINUED | OUTPATIENT
Start: 2019-03-13 | End: 2019-03-13 | Stop reason: SDUPTHER

## 2019-03-13 RX ORDER — SODIUM CHLORIDE 0.9 % (FLUSH) 0.9 %
10 SYRINGE (ML) INJECTION EVERY 12 HOURS SCHEDULED
Status: DISCONTINUED | OUTPATIENT
Start: 2019-03-13 | End: 2019-03-13 | Stop reason: HOSPADM

## 2019-03-13 RX ORDER — GLYCOPYRROLATE 1 MG/5 ML
SYRINGE (ML) INTRAVENOUS PRN
Status: DISCONTINUED | OUTPATIENT
Start: 2019-03-13 | End: 2019-03-13 | Stop reason: SDUPTHER

## 2019-03-13 RX ADMIN — KETOROLAC TROMETHAMINE 30 MG: 30 INJECTION, SOLUTION INTRAMUSCULAR at 11:39

## 2019-03-13 RX ADMIN — Medication 0.6 MG: at 11:02

## 2019-03-13 RX ADMIN — FENTANYL CITRATE 50 MCG: 50 INJECTION INTRAMUSCULAR; INTRAVENOUS at 10:26

## 2019-03-13 RX ADMIN — MIDAZOLAM HYDROCHLORIDE 2 MG: 1 INJECTION, SOLUTION INTRAMUSCULAR; INTRAVENOUS at 09:50

## 2019-03-13 RX ADMIN — LIDOCAINE HYDROCHLORIDE 100 MG: 20 INJECTION, SOLUTION INFILTRATION; PERINEURAL at 09:54

## 2019-03-13 RX ADMIN — FENTANYL CITRATE 100 MCG: 50 INJECTION INTRAMUSCULAR; INTRAVENOUS at 09:54

## 2019-03-13 RX ADMIN — FENTANYL CITRATE 100 MCG: 50 INJECTION INTRAMUSCULAR; INTRAVENOUS at 10:23

## 2019-03-13 RX ADMIN — HYDROMORPHONE HYDROCHLORIDE 0.25 MG: 1 INJECTION, SOLUTION INTRAMUSCULAR; INTRAVENOUS; SUBCUTANEOUS at 11:56

## 2019-03-13 RX ADMIN — Medication 0.2 MG: at 10:13

## 2019-03-13 RX ADMIN — DEXAMETHASONE SODIUM PHOSPHATE 8 MG: 4 INJECTION, SOLUTION INTRAMUSCULAR; INTRAVENOUS at 09:54

## 2019-03-13 RX ADMIN — ROCURONIUM BROMIDE 40 MG: 10 INJECTION INTRAVENOUS at 09:54

## 2019-03-13 RX ADMIN — FENTANYL CITRATE 50 MCG: 50 INJECTION INTRAMUSCULAR; INTRAVENOUS at 11:50

## 2019-03-13 RX ADMIN — SODIUM CHLORIDE, POTASSIUM CHLORIDE, SODIUM LACTATE AND CALCIUM CHLORIDE: 600; 310; 30; 20 INJECTION, SOLUTION INTRAVENOUS at 08:46

## 2019-03-13 RX ADMIN — SODIUM CHLORIDE, POTASSIUM CHLORIDE, SODIUM LACTATE AND CALCIUM CHLORIDE: 600; 310; 30; 20 INJECTION, SOLUTION INTRAVENOUS at 10:52

## 2019-03-13 RX ADMIN — ONDANSETRON 4 MG: 2 INJECTION INTRAMUSCULAR; INTRAVENOUS at 13:56

## 2019-03-13 RX ADMIN — FENTANYL CITRATE 50 MCG: 50 INJECTION INTRAMUSCULAR; INTRAVENOUS at 11:42

## 2019-03-13 RX ADMIN — MORPHINE SULFATE 2 MG: 2 INJECTION, SOLUTION INTRAMUSCULAR; INTRAVENOUS at 14:05

## 2019-03-13 RX ADMIN — Medication 2 G: at 10:14

## 2019-03-13 RX ADMIN — NEOSTIGMINE METHYLSULFATE 4 MG: 1 INJECTION, SOLUTION INTRAVENOUS at 11:02

## 2019-03-13 RX ADMIN — PROPOFOL 150 MG: 10 INJECTION, EMULSION INTRAVENOUS at 09:54

## 2019-03-13 RX ADMIN — ONDANSETRON HYDROCHLORIDE 4 MG: 4 INJECTION, SOLUTION INTRAMUSCULAR; INTRAVENOUS at 09:25

## 2019-03-13 ASSESSMENT — PULMONARY FUNCTION TESTS
PIF_VALUE: 17
PIF_VALUE: 16
PIF_VALUE: 22
PIF_VALUE: 16
PIF_VALUE: 23
PIF_VALUE: 16
PIF_VALUE: 16
PIF_VALUE: 31
PIF_VALUE: 24
PIF_VALUE: 17
PIF_VALUE: 15
PIF_VALUE: 22
PIF_VALUE: 14
PIF_VALUE: 22
PIF_VALUE: 17
PIF_VALUE: 21
PIF_VALUE: 15
PIF_VALUE: 15
PIF_VALUE: 16
PIF_VALUE: 17
PIF_VALUE: 20
PIF_VALUE: 16
PIF_VALUE: 15
PIF_VALUE: 22
PIF_VALUE: 17
PIF_VALUE: 23
PIF_VALUE: 22
PIF_VALUE: 15
PIF_VALUE: 24
PIF_VALUE: 16
PIF_VALUE: 22
PIF_VALUE: 16
PIF_VALUE: 20
PIF_VALUE: 24
PIF_VALUE: 15
PIF_VALUE: 22
PIF_VALUE: 17
PIF_VALUE: 6
PIF_VALUE: 15
PIF_VALUE: 24
PIF_VALUE: 15
PIF_VALUE: 16
PIF_VALUE: 24
PIF_VALUE: 16
PIF_VALUE: 23
PIF_VALUE: 22
PIF_VALUE: 23
PIF_VALUE: 18
PIF_VALUE: 19
PIF_VALUE: 21
PIF_VALUE: 14
PIF_VALUE: 14
PIF_VALUE: 1
PIF_VALUE: 22
PIF_VALUE: 22
PIF_VALUE: 15
PIF_VALUE: 22
PIF_VALUE: 24
PIF_VALUE: 16
PIF_VALUE: 23
PIF_VALUE: 15
PIF_VALUE: 16
PIF_VALUE: 22
PIF_VALUE: 17
PIF_VALUE: 22
PIF_VALUE: 23
PIF_VALUE: 16
PIF_VALUE: 25
PIF_VALUE: 16
PIF_VALUE: 22
PIF_VALUE: 1
PIF_VALUE: 22
PIF_VALUE: 22
PIF_VALUE: 15
PIF_VALUE: 22
PIF_VALUE: 17
PIF_VALUE: 24
PIF_VALUE: 22
PIF_VALUE: 23
PIF_VALUE: 18
PIF_VALUE: 16
PIF_VALUE: 15
PIF_VALUE: 23
PIF_VALUE: 16
PIF_VALUE: 22

## 2019-03-13 ASSESSMENT — PAIN SCALES - GENERAL
PAINLEVEL_OUTOF10: 7
PAINLEVEL_OUTOF10: 6
PAINLEVEL_OUTOF10: 7
PAINLEVEL_OUTOF10: 1
PAINLEVEL_OUTOF10: 6
PAINLEVEL_OUTOF10: 8
PAINLEVEL_OUTOF10: 10
PAINLEVEL_OUTOF10: 10
PAINLEVEL_OUTOF10: 2
PAINLEVEL_OUTOF10: 0

## 2019-03-13 ASSESSMENT — PAIN DESCRIPTION - LOCATION: LOCATION: ABDOMEN

## 2019-03-13 ASSESSMENT — PAIN DESCRIPTION - PAIN TYPE: TYPE: SURGICAL PAIN

## 2019-03-13 ASSESSMENT — PAIN - FUNCTIONAL ASSESSMENT: PAIN_FUNCTIONAL_ASSESSMENT: 0-10

## 2019-03-13 ASSESSMENT — PAIN DESCRIPTION - PROGRESSION: CLINICAL_PROGRESSION: GRADUALLY IMPROVING

## 2019-06-17 ENCOUNTER — OFFICE VISIT (OUTPATIENT)
Dept: FAMILY MEDICINE CLINIC | Age: 39
End: 2019-06-17
Payer: COMMERCIAL

## 2019-06-17 VITALS
BODY MASS INDEX: 26.54 KG/M2 | DIASTOLIC BLOOD PRESSURE: 76 MMHG | TEMPERATURE: 98.9 F | RESPIRATION RATE: 14 BRPM | HEIGHT: 63 IN | HEART RATE: 56 BPM | WEIGHT: 149.8 LBS | SYSTOLIC BLOOD PRESSURE: 100 MMHG

## 2019-06-17 DIAGNOSIS — M75.51 ACUTE BURSITIS OF RIGHT SHOULDER: Primary | ICD-10-CM

## 2019-06-17 DIAGNOSIS — M25.511 ACUTE PAIN OF RIGHT SHOULDER: ICD-10-CM

## 2019-06-17 PROCEDURE — G8419 CALC BMI OUT NRM PARAM NOF/U: HCPCS | Performed by: NURSE PRACTITIONER

## 2019-06-17 PROCEDURE — G8427 DOCREV CUR MEDS BY ELIG CLIN: HCPCS | Performed by: NURSE PRACTITIONER

## 2019-06-17 PROCEDURE — 96372 THER/PROPH/DIAG INJ SC/IM: CPT | Performed by: NURSE PRACTITIONER

## 2019-06-17 PROCEDURE — 99213 OFFICE O/P EST LOW 20 MIN: CPT | Performed by: NURSE PRACTITIONER

## 2019-06-17 PROCEDURE — 1036F TOBACCO NON-USER: CPT | Performed by: NURSE PRACTITIONER

## 2019-06-17 RX ORDER — LIDOCAINE 40 MG/G
CREAM TOPICAL
Qty: 1 TUBE | Refills: 2 | Status: SHIPPED | OUTPATIENT
Start: 2019-06-17 | End: 2019-12-03

## 2019-06-17 RX ORDER — METHYLPREDNISOLONE ACETATE 80 MG/ML
120 INJECTION, SUSPENSION INTRA-ARTICULAR; INTRALESIONAL; INTRAMUSCULAR; SOFT TISSUE ONCE
Status: COMPLETED | OUTPATIENT
Start: 2019-06-17 | End: 2019-06-17

## 2019-06-17 RX ORDER — PREDNISONE 1 MG/1
TABLET ORAL
Qty: 30 TABLET | Refills: 0 | Status: SHIPPED | OUTPATIENT
Start: 2019-06-17 | End: 2019-06-27

## 2019-06-17 RX ADMIN — METHYLPREDNISOLONE ACETATE 120 MG: 80 INJECTION, SUSPENSION INTRA-ARTICULAR; INTRALESIONAL; INTRAMUSCULAR; SOFT TISSUE at 12:11

## 2019-06-17 ASSESSMENT — PATIENT HEALTH QUESTIONNAIRE - PHQ9
SUM OF ALL RESPONSES TO PHQ QUESTIONS 1-9: 0
1. LITTLE INTEREST OR PLEASURE IN DOING THINGS: 0
SUM OF ALL RESPONSES TO PHQ9 QUESTIONS 1 & 2: 0
SUM OF ALL RESPONSES TO PHQ QUESTIONS 1-9: 0
2. FEELING DOWN, DEPRESSED OR HOPELESS: 0

## 2019-06-19 ENCOUNTER — HOSPITAL ENCOUNTER (OUTPATIENT)
Dept: GENERAL RADIOLOGY | Age: 39
Discharge: HOME | End: 2019-06-19
Payer: COMMERCIAL

## 2019-06-19 ENCOUNTER — HOSPITAL ENCOUNTER (OUTPATIENT)
Age: 39
Discharge: HOME | End: 2019-06-19
Payer: COMMERCIAL

## 2019-06-19 DIAGNOSIS — M75.51 ACUTE BURSITIS OF RIGHT SHOULDER: ICD-10-CM

## 2019-06-19 PROCEDURE — 73030 X-RAY EXAM OF SHOULDER: CPT

## 2019-07-08 ASSESSMENT — ENCOUNTER SYMPTOMS
BACK PAIN: 0
APNEA: 0
CHEST TIGHTNESS: 0
EYE PAIN: 0
COUGH: 0
SHORTNESS OF BREATH: 0
WHEEZING: 0
ABDOMINAL PAIN: 0
ABDOMINAL DISTENTION: 0
PHOTOPHOBIA: 0
RHINORRHEA: 0
EYE DISCHARGE: 0

## 2019-07-10 ENCOUNTER — TELEPHONE (OUTPATIENT)
Dept: FAMILY MEDICINE CLINIC | Age: 39
End: 2019-07-10

## 2019-07-11 ENCOUNTER — OFFICE VISIT (OUTPATIENT)
Dept: FAMILY MEDICINE CLINIC | Age: 39
End: 2019-07-11
Payer: COMMERCIAL

## 2019-07-11 VITALS
RESPIRATION RATE: 16 BRPM | SYSTOLIC BLOOD PRESSURE: 122 MMHG | BODY MASS INDEX: 27.21 KG/M2 | DIASTOLIC BLOOD PRESSURE: 68 MMHG | HEART RATE: 66 BPM | TEMPERATURE: 98.5 F | WEIGHT: 151.2 LBS

## 2019-07-11 DIAGNOSIS — M25.511 RIGHT SHOULDER PAIN, UNSPECIFIED CHRONICITY: Primary | ICD-10-CM

## 2019-07-11 PROCEDURE — 1036F TOBACCO NON-USER: CPT | Performed by: NURSE PRACTITIONER

## 2019-07-11 PROCEDURE — G8419 CALC BMI OUT NRM PARAM NOF/U: HCPCS | Performed by: NURSE PRACTITIONER

## 2019-07-11 PROCEDURE — 99213 OFFICE O/P EST LOW 20 MIN: CPT | Performed by: NURSE PRACTITIONER

## 2019-07-11 PROCEDURE — G8427 DOCREV CUR MEDS BY ELIG CLIN: HCPCS | Performed by: NURSE PRACTITIONER

## 2019-07-11 RX ORDER — TRAMADOL HYDROCHLORIDE 50 MG/1
50 TABLET ORAL EVERY 4 HOURS PRN
Qty: 42 TABLET | Refills: 0 | Status: SHIPPED | OUTPATIENT
Start: 2019-07-11 | End: 2019-07-18

## 2019-07-11 RX ORDER — CELECOXIB 100 MG/1
100 CAPSULE ORAL 2 TIMES DAILY
Qty: 60 CAPSULE | Refills: 1 | Status: SHIPPED | OUTPATIENT
Start: 2019-07-11 | End: 2019-12-03

## 2019-07-11 NOTE — LETTER
[address]  [address]  [address] [address]  Phone: [phone]  Fax: [phone]    GERALDINE Jones CNP        July 11, 2019     Patient: Lukas Brandon   YOB: 1980   Date of Visit: 7/11/2019       To Whom it May Concern:    Viral Dillard was seen in my clinic on 7/11/2019. She may return to work on 7/29/19. If you have any questions or concerns, please don't hesitate to call.     Sincerely,         GERALDINE Jones CNP

## 2019-07-11 NOTE — PROGRESS NOTES
tablet by mouth every 4 hours as needed for Pain for up to 7 days. Intended supply: 7 days. Take lowest dose possible to manage pain 42 tablet 0    celecoxib (CELEBREX) 100 MG capsule Take 1 capsule by mouth 2 times daily 60 capsule 1    lidocaine (LMX) 4 % cream 3 times daily as needed for pain to shoulder /apply topically 1 Tube 2    ibuprofen (ADVIL;MOTRIN) 600 MG tablet Take 1 tablet by mouth every 6 hours as needed for Pain 60 tablet 1     No current facility-administered medications for this visit. Allergies   Allergen Reactions    Amoxicillin Hives     Health Maintenance   Topic Date Due    Varicella Vaccine (1 of 2 - 13+ 2-dose series) 06/02/1994    HIV screen  06/02/1996    DTaP/Tdap/Td vaccine (1 - Tdap) 06/02/2000    Cervical cancer screen  06/15/2018    Flu vaccine (1) 09/01/2019    Pneumococcal 0-64 years Vaccine  Aged Out         Objective:     Physical Exam   Constitutional: She is oriented to person, place, and time. She appears well-developed and well-nourished. HENT:   Nose: Nose normal.   Mouth/Throat: Oropharynx is clear and moist.   Neck: Normal range of motion. Neck supple. Cardiovascular: Normal rate, regular rhythm and normal heart sounds. Pulmonary/Chest: Effort normal and breath sounds normal.   Abdominal: Soft. Musculoskeletal:        Right shoulder: She exhibits decreased range of motion, tenderness, pain and decreased strength. She exhibits no effusion, no crepitus and no deformity. Arms:  Positive rotator cuff exam.  Posterior shoulder tenderness with palpation   Neurological: She is alert and oriented to person, place, and time. Skin: Skin is warm. Capillary refill takes 2 to 3 seconds. Psychiatric: She has a normal mood and affect. Her behavior is normal.   Nursing note and vitals reviewed.     /68 (Site: Left Upper Arm)   Pulse 66   Temp 98.5 °F (36.9 °C) (Oral)   Resp 16   Wt 151 lb 3.2 oz (68.6 kg)   BMI 27.21 kg/m²

## 2019-07-15 ENCOUNTER — TELEPHONE (OUTPATIENT)
Dept: FAMILY MEDICINE CLINIC | Age: 39
End: 2019-07-15

## 2019-07-15 NOTE — TELEPHONE ENCOUNTER
7/15/19  Patient requesting to RTW on 7/22/19 with restrictions(provider preference) after her MRI done 7/20/19. Please advise.    Gina/shiloh  Dolv: 7/11/19
Patient notified that slip is at .
Normal

## 2019-07-17 ENCOUNTER — TELEPHONE (OUTPATIENT)
Dept: FAMILY MEDICINE CLINIC | Age: 39
End: 2019-07-17

## 2019-07-18 NOTE — TELEPHONE ENCOUNTER
Of these therapies were already provided.   If patient has not participated in physical therapy please sign her up

## 2019-07-22 ENCOUNTER — HOSPITAL ENCOUNTER (OUTPATIENT)
Dept: OCCUPATIONAL THERAPY | Age: 39
Setting detail: THERAPIES SERIES
Discharge: HOME | End: 2019-07-22
Payer: COMMERCIAL

## 2019-07-22 PROCEDURE — 97110 THERAPEUTIC EXERCISES: CPT

## 2019-07-22 PROCEDURE — 97165 OT EVAL LOW COMPLEX 30 MIN: CPT

## 2019-07-22 ASSESSMENT — PAIN SCALES - GENERAL: PAINLEVEL_OUTOF10: 7

## 2019-07-22 NOTE — PROGRESS NOTES
inspecting parts, pain that wakes her up at night. Balance  Sitting Balance: Independent  Standing Balance: Independent           Functional Mobility  Assist Level: Independent                    Activity Tolerance: Additional Comments: Patient tolerated evaluation well. Assessment:  Performance deficits / Impairments: Decreased high-level IADLs, Decreased ADL status, Decreased ROM  Prognosis: Good  Clinical Decision Making: Clinical Decision making was of Low Complexity as the result of analysis of data from a problem focused assessment, a consideration of a limited number of treatment options, no significant comorbidities affecting the plan of care and no modification or assistance required to complete the evaluation. Patient Education:  Patient Education: Gave patient a HEP that consisted of trap muscle stretch, levator stretch, chin tuck stretch. Treatment Initiated : Started a stretching program, patient was given a handout to begin a HEP. Kinesiotaping was completed on the upper trap muscle. Plan:  Times per week: 2  Plan weeks: 6  Current Treatment Recommendations: ROM, Pain Management  Plan Comment: POC initiated. Patient is having surgery on 7/31, unable to have therapy for 2 weeks after. 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 Short term goal 2: Patient will increase IR to thumb tip to bra line without increased pain in 6 weeks. Short term goal 3: Patient will report independence with HEP to decrease pain to work. Long term goals  Time Frame for Long term goals : 6 weeks  Long term goal 1: Patient will report a decrease in pain of 4/10 to be able to stand and inspect parts at work in 6 weeks. Long term goal 2: Patient will increase IR to thumb tip to bra line without increased pain in 6 weeks.   Long

## 2019-07-24 ENCOUNTER — HOSPITAL ENCOUNTER (OUTPATIENT)
Dept: OCCUPATIONAL THERAPY | Age: 39
Setting detail: THERAPIES SERIES
Discharge: HOME | End: 2019-07-24
Payer: COMMERCIAL

## 2019-07-24 PROCEDURE — 97140 MANUAL THERAPY 1/> REGIONS: CPT

## 2019-07-24 PROCEDURE — 97110 THERAPEUTIC EXERCISES: CPT

## 2019-07-29 ENCOUNTER — HOSPITAL ENCOUNTER (OUTPATIENT)
Dept: OCCUPATIONAL THERAPY | Age: 39
Setting detail: THERAPIES SERIES
Discharge: HOME | End: 2019-07-29
Payer: COMMERCIAL

## 2019-07-29 PROCEDURE — 97140 MANUAL THERAPY 1/> REGIONS: CPT

## 2019-07-29 PROCEDURE — 97110 THERAPEUTIC EXERCISES: CPT

## 2019-08-14 ENCOUNTER — HOSPITAL ENCOUNTER (OUTPATIENT)
Dept: OCCUPATIONAL THERAPY | Age: 39
Setting detail: THERAPIES SERIES
End: 2019-08-14
Payer: COMMERCIAL

## 2019-08-19 ENCOUNTER — HOSPITAL ENCOUNTER (OUTPATIENT)
Dept: OCCUPATIONAL THERAPY | Age: 39
Setting detail: THERAPIES SERIES
Discharge: HOME | End: 2019-08-19
Payer: COMMERCIAL

## 2019-08-19 PROCEDURE — 97110 THERAPEUTIC EXERCISES: CPT

## 2019-08-19 PROCEDURE — 97140 MANUAL THERAPY 1/> REGIONS: CPT

## 2019-08-19 NOTE — PROGRESS NOTES
6051 . [facility] 49  OUTPATIENT OCCUPATIONAL THERAPY  Daily Note  [address]    Time In: 36  Time Out: 9192  Minutes: 23  Timed Code Treatment Minutes: 23 Minutes                Date: 2019  Patient Name: Lukas Brandon        CSN: [id number]   : 1980  (46 y.o.)  Gender: female   Referring Practitioner: CAROLINA Waterman  Diagnosis: M25.511          General:  OT Visit Information  Onset Date: 19  OT Insurance Information: [employer]- 20 VISITS per calendar year  Total # of Visits Approved: 20  Total # of Visits to Date: 4  Certification Period Expiration Date: 19  Progress Note Counter: 4/10  Comments: No follow up with practitioner. Restrictions/Precautions:  Restrictions/Precautions: General Precautions    Position Activity Restriction  Other position/activity restrictions: On work restrictions of no lifting          Subjective:  Subjective: Patient reports she had pain for two days straight after last session. Isn't too bad today. Pain:  Patient Currently in Pain: No       Objective:     Upper Extremity Function  UE Stretching: Upper trap stretch, levator stretch, chin tuck stretch   UE Strengthing: sidelying ER 1#, prone scaption, horiz abd, prone extension. Additional Activities Comment  Additional Activities: Able to palpate trigger points in R upper trap and levator. IASTM and MFR to trigger points at upper trap and levator. Activity Tolerance: Additional Comments: Patient tolerated treatment well. Assessment:  Assessment: Progressing toward goals. Relief felt after IASTM and MFR. She is undecided about dry needling after having pain last visit. After discussion that we could do either IASTM or dry needling to decrease pain, she stated that she would like to try dry needling at least one more session. Will plan for next week.     Patient Education:  Patient Education: Reviewed

## 2019-08-26 ENCOUNTER — HOSPITAL ENCOUNTER (OUTPATIENT)
Dept: OCCUPATIONAL THERAPY | Age: 39
Setting detail: THERAPIES SERIES
Discharge: HOME | End: 2019-08-26
Payer: COMMERCIAL

## 2019-08-26 PROCEDURE — 97110 THERAPEUTIC EXERCISES: CPT

## 2019-08-29 ENCOUNTER — HOSPITAL ENCOUNTER (OUTPATIENT)
Dept: OCCUPATIONAL THERAPY | Age: 39
Setting detail: THERAPIES SERIES
Discharge: HOME | End: 2019-08-29
Payer: COMMERCIAL

## 2019-08-29 PROCEDURE — 97140 MANUAL THERAPY 1/> REGIONS: CPT

## 2019-08-29 PROCEDURE — 97110 THERAPEUTIC EXERCISES: CPT

## 2019-08-29 ASSESSMENT — PAIN DESCRIPTION - LOCATION: LOCATION: SHOULDER

## 2019-08-29 ASSESSMENT — PAIN SCALES - GENERAL: PAINLEVEL_OUTOF10: 5

## 2019-08-29 ASSESSMENT — PAIN DESCRIPTION - ORIENTATION: ORIENTATION: RIGHT

## 2019-08-29 NOTE — PROGRESS NOTES
reports improvement in right shoulder pain since starting therapy  Prognosis: Good    Patient Education:  Patient Education: Reviewed next scheduled appointment time            Plan:  Plan Comment: Continue POC.   Specific instructions for Next Treatment: upper trap stretching, STM to upper trap and levator scap, periscapular strengthening                   Dayana Ayala, OTR/L [id number]

## 2019-09-05 ENCOUNTER — HOSPITAL ENCOUNTER (OUTPATIENT)
Dept: OCCUPATIONAL THERAPY | Age: 39
Setting detail: THERAPIES SERIES
Discharge: HOME | End: 2019-09-05
Payer: COMMERCIAL

## 2019-09-05 PROCEDURE — 97110 THERAPEUTIC EXERCISES: CPT

## 2019-09-09 ENCOUNTER — HOSPITAL ENCOUNTER (OUTPATIENT)
Dept: OCCUPATIONAL THERAPY | Age: 39
Setting detail: THERAPIES SERIES
Discharge: HOME | End: 2019-09-09
Payer: COMMERCIAL

## 2019-09-09 PROCEDURE — 97110 THERAPEUTIC EXERCISES: CPT

## 2019-12-03 ENCOUNTER — OFFICE VISIT (OUTPATIENT)
Dept: FAMILY MEDICINE CLINIC | Age: 39
End: 2019-12-03
Payer: COMMERCIAL

## 2019-12-03 VITALS
HEART RATE: 68 BPM | WEIGHT: 158 LBS | SYSTOLIC BLOOD PRESSURE: 118 MMHG | HEIGHT: 62 IN | BODY MASS INDEX: 29.08 KG/M2 | RESPIRATION RATE: 16 BRPM | TEMPERATURE: 98.4 F | DIASTOLIC BLOOD PRESSURE: 86 MMHG

## 2019-12-03 DIAGNOSIS — J02.9 ACUTE PHARYNGITIS, UNSPECIFIED ETIOLOGY: ICD-10-CM

## 2019-12-03 DIAGNOSIS — A08.4 VIRAL GASTROENTERITIS: Primary | ICD-10-CM

## 2019-12-03 PROCEDURE — G8427 DOCREV CUR MEDS BY ELIG CLIN: HCPCS | Performed by: NURSE PRACTITIONER

## 2019-12-03 PROCEDURE — 99213 OFFICE O/P EST LOW 20 MIN: CPT | Performed by: NURSE PRACTITIONER

## 2019-12-03 PROCEDURE — 1036F TOBACCO NON-USER: CPT | Performed by: NURSE PRACTITIONER

## 2019-12-03 PROCEDURE — G8484 FLU IMMUNIZE NO ADMIN: HCPCS | Performed by: NURSE PRACTITIONER

## 2019-12-03 PROCEDURE — G8419 CALC BMI OUT NRM PARAM NOF/U: HCPCS | Performed by: NURSE PRACTITIONER

## 2019-12-03 RX ORDER — PROMETHAZINE HYDROCHLORIDE 25 MG/1
25 TABLET ORAL EVERY 8 HOURS PRN
Qty: 20 TABLET | Refills: 0 | Status: SHIPPED | OUTPATIENT
Start: 2019-12-03 | End: 2019-12-10

## 2019-12-03 RX ORDER — AZITHROMYCIN 250 MG/1
TABLET, FILM COATED ORAL
Qty: 6 TABLET | Refills: 0 | Status: SHIPPED | OUTPATIENT
Start: 2019-12-03 | End: 2019-12-13

## 2019-12-06 ASSESSMENT — ENCOUNTER SYMPTOMS
SORE THROAT: 1
VOMITING: 1
NAUSEA: 1

## 2020-01-28 ENCOUNTER — OFFICE VISIT (OUTPATIENT)
Dept: FAMILY MEDICINE CLINIC | Age: 40
End: 2020-01-28
Payer: COMMERCIAL

## 2020-01-28 VITALS
TEMPERATURE: 98.8 F | SYSTOLIC BLOOD PRESSURE: 106 MMHG | HEART RATE: 92 BPM | DIASTOLIC BLOOD PRESSURE: 64 MMHG | RESPIRATION RATE: 20 BRPM | BODY MASS INDEX: 28.53 KG/M2 | WEIGHT: 156 LBS

## 2020-01-28 PROCEDURE — 99213 OFFICE O/P EST LOW 20 MIN: CPT | Performed by: NURSE PRACTITIONER

## 2020-01-28 RX ORDER — AZITHROMYCIN 250 MG/1
TABLET, FILM COATED ORAL
Qty: 6 TABLET | Refills: 0 | Status: SHIPPED | OUTPATIENT
Start: 2020-01-28 | End: 2020-02-07

## 2020-01-28 RX ORDER — PREDNISONE 1 MG/1
5 TABLET ORAL 2 TIMES DAILY
Qty: 10 TABLET | Refills: 0 | Status: SHIPPED | OUTPATIENT
Start: 2020-01-28 | End: 2020-02-02

## 2020-01-28 RX ORDER — DEXTROMETHORPHAN HYDROBROMIDE AND PROMETHAZINE HYDROCHLORIDE 15; 6.25 MG/5ML; MG/5ML
5 SYRUP ORAL 3 TIMES DAILY PRN
Qty: 180 ML | Refills: 0 | Status: SHIPPED | OUTPATIENT
Start: 2020-01-28 | End: 2020-02-07

## 2020-01-28 ASSESSMENT — PATIENT HEALTH QUESTIONNAIRE - PHQ9
SUM OF ALL RESPONSES TO PHQ QUESTIONS 1-9: 0
2. FEELING DOWN, DEPRESSED OR HOPELESS: 0
SUM OF ALL RESPONSES TO PHQ9 QUESTIONS 1 & 2: 0
1. LITTLE INTEREST OR PLEASURE IN DOING THINGS: 0
SUM OF ALL RESPONSES TO PHQ QUESTIONS 1-9: 0

## 2020-01-29 ASSESSMENT — ENCOUNTER SYMPTOMS
SORE THROAT: 1
COUGH: 1

## 2020-01-29 NOTE — PROGRESS NOTES
Never Used   Substance Use Topics    Alcohol use: No     Alcohol/week: 0.0 standard drinks      Current Outpatient Medications   Medication Sig Dispense Refill    promethazine-dextromethorphan (PROMETHAZINE-DM) 6.25-15 MG/5ML syrup Take 5 mLs by mouth 3 times daily as needed for Cough 180 mL 0    azithromycin (ZITHROMAX Z-NBA) 250 MG tablet 2 pills orally for 1 day, then 1 pill orally for 4 days 6 tablet 0    predniSONE (DELTASONE) 5 MG tablet Take 1 tablet by mouth 2 times daily for 5 days 10 tablet 0     No current facility-administered medications for this visit. Allergies   Allergen Reactions    Amoxicillin Hives     Health Maintenance   Topic Date Due    Varicella Vaccine (1 of 2 - 2-dose childhood series) 06/02/1982    DTaP/Tdap/Td vaccine (1 - Tdap) 06/02/1992    HIV screen  06/02/1996    Cervical cancer screen  06/15/2018    Flu vaccine (1) 09/01/2019    Pneumococcal 0-64 years Vaccine  Aged Out         Objective:     Physical Exam  Vitals signs and nursing note reviewed. Constitutional:       Appearance: She is well-developed. HENT:      Head: Normocephalic. Right Ear: Tympanic membrane normal.      Left Ear: Tympanic membrane normal.      Mouth/Throat:      Pharynx: Posterior oropharyngeal erythema present. Tonsils: No tonsillar exudate. Neck:      Musculoskeletal: Neck supple. Cardiovascular:      Rate and Rhythm: Normal rate. Heart sounds: Normal heart sounds. Pulmonary:      Effort: Pulmonary effort is normal.      Breath sounds: Rhonchi present. Abdominal:      General: Bowel sounds are normal.      Palpations: Abdomen is soft. Musculoskeletal: Normal range of motion. Skin:     General: Skin is warm and dry. Neurological:      Mental Status: She is alert and oriented to person, place, and time. /64   Pulse 92   Temp 98.8 °F (37.1 °C) (Oral)   Resp 20   Wt 156 lb (70.8 kg)   LMP 03/06/2019   BMI 28.53 kg/m²       Impression/Plan:  1. Acute pharyngitis, unspecified etiology    2. Bronchitis      Requested Prescriptions     Signed Prescriptions Disp Refills    promethazine-dextromethorphan (PROMETHAZINE-DM) 6.25-15 MG/5ML syrup 180 mL 0     Sig: Take 5 mLs by mouth 3 times daily as needed for Cough    azithromycin (ZITHROMAX Z-NBA) 250 MG tablet 6 tablet 0     Si pills orally for 1 day, then 1 pill orally for 4 days    predniSONE (DELTASONE) 5 MG tablet 10 tablet 0     Sig: Take 1 tablet by mouth 2 times daily for 5 days     No orders of the defined types were placed in this encounter. Patient giveneducational materials - see patient instructions. Discussed use, benefit, and side effects of prescribed medications. All patient questions answered. Pt voiced understanding. Reviewed health maintenance. Patient agreedwith treatment plan. Follow up as directed. Continue medications as prescribed.  Educational information on above diagnosis  provided per AVS.  For work this week    Electronically signed by GERALDINE Romero CNP on 2020 at 11:21 AM

## 2020-02-24 ENCOUNTER — TELEPHONE (OUTPATIENT)
Dept: FAMILY MEDICINE CLINIC | Age: 40
End: 2020-02-24

## 2020-03-10 ENCOUNTER — OFFICE VISIT (OUTPATIENT)
Dept: FAMILY MEDICINE CLINIC | Age: 40
End: 2020-03-10
Payer: COMMERCIAL

## 2020-03-10 VITALS
BODY MASS INDEX: 27.46 KG/M2 | RESPIRATION RATE: 16 BRPM | DIASTOLIC BLOOD PRESSURE: 70 MMHG | WEIGHT: 155 LBS | SYSTOLIC BLOOD PRESSURE: 104 MMHG | HEIGHT: 63 IN | HEART RATE: 72 BPM

## 2020-03-10 PROCEDURE — 99213 OFFICE O/P EST LOW 20 MIN: CPT | Performed by: FAMILY MEDICINE

## 2020-03-10 RX ORDER — ESCITALOPRAM OXALATE 10 MG/1
10 TABLET ORAL DAILY
Qty: 30 TABLET | Refills: 5 | Status: SHIPPED | OUTPATIENT
Start: 2020-03-10 | End: 2020-08-11 | Stop reason: SDUPTHER

## 2020-03-14 ASSESSMENT — ENCOUNTER SYMPTOMS
COUGH: 0
SHORTNESS OF BREATH: 0
SORE THROAT: 0
DIARRHEA: 0
CHEST TIGHTNESS: 0
ABDOMINAL PAIN: 0
VOMITING: 0
EYES NEGATIVE: 1
BACK PAIN: 0
NAUSEA: 0
RHINORRHEA: 0

## 2020-03-14 NOTE — PROGRESS NOTES
[address] [address]  Dept: [phone]  Dept Fax: [phone]  Loc: [phone]  PROGRESS NOTE      VisitDate: 3/10/2020    Gareth Blackwell is a 46 y.o. female who presents today for:     Chief Complaint   Patient presents with    Weight Gain     would like to discuss starting adipex         Subjective:  HPI  Patient comes in to discuss weight loss. She would like to try Adipex however her BMI is below 30. History of adjustment disorder, symptoms worsening would like to go back on Lexapro    Review of Systems   Constitutional: Negative for activity change, appetite change, fatigue and fever. HENT: Negative for congestion, rhinorrhea and sore throat. Eyes: Negative. Respiratory: Negative for cough, chest tightness and shortness of breath. Cardiovascular: Negative for chest pain and palpitations. Gastrointestinal: Negative for abdominal pain, diarrhea, nausea and vomiting. Genitourinary: Negative for dysuria and urgency. Musculoskeletal: Negative for arthralgias and back pain. Neurological: Negative for dizziness and headaches. Psychiatric/Behavioral: Positive for dysphoric mood. The patient is nervous/anxious.       Past Medical History:   Diagnosis Date    Kidney stone     2005 with pregnancy      Past Surgical History:   Procedure Laterality Date    COLONOSCOPY  03/25/2016    Dr. Esau Petersen 9/13/2017    DILATATION AND CURETTAGE HYSTEROSCOPY, POSSIBLE MYOSURE performed by Kraig Herrera DO at [place] [address]  04/18/2018    HERNIA REPAIR      HYSTERECTOMY ABDOMINAL N/A 3/13/2019    ROBOTIC TOTAL LAPAROSCOPIC HYSTERECTOMY WITH BILATERAL SALPINGECTOMY performed by Kraig Herrera DO at [address] [place] [address] HYSTEROSCOPY  04/18/2018    [place] HYSTEROSCOPY,W/ENDOMETRIAL ABLATION N/A 4/18/2018    HYSTEROSCOPY ENDOMETRIAL ABLATION performed by Josephine

## 2020-08-11 ENCOUNTER — TELEPHONE (OUTPATIENT)
Dept: FAMILY MEDICINE CLINIC | Age: 40
End: 2020-08-11

## 2020-08-11 RX ORDER — ESCITALOPRAM OXALATE 10 MG/1
10 TABLET ORAL DAILY
Qty: 90 TABLET | Refills: 1 | Status: SHIPPED | OUTPATIENT
Start: 2020-08-11 | End: 2020-08-20 | Stop reason: SDUPTHER

## 2020-08-11 NOTE — TELEPHONE ENCOUNTER
Jordan Walker called requesting a refill on the following medications:  Requested Prescriptions     Pending Prescriptions Disp Refills    escitalopram (LEXAPRO) 10 MG tablet 30 tablet 5     Sig: Take 1 tablet by mouth daily     Pharmacy verified: [facility] on [address] [facility]  . pv      Date of last visit: 3/10/2020  Date of next visit (if applicable): Visit date not found

## 2020-08-20 ENCOUNTER — OFFICE VISIT (OUTPATIENT)
Dept: FAMILY MEDICINE CLINIC | Age: 40
End: 2020-08-20
Payer: MEDICAID

## 2020-08-20 VITALS
DIASTOLIC BLOOD PRESSURE: 64 MMHG | RESPIRATION RATE: 16 BRPM | SYSTOLIC BLOOD PRESSURE: 110 MMHG | HEART RATE: 63 BPM | TEMPERATURE: 97.7 F | BODY MASS INDEX: 26.1 KG/M2 | WEIGHT: 146.2 LBS

## 2020-08-20 PROCEDURE — 99213 OFFICE O/P EST LOW 20 MIN: CPT | Performed by: FAMILY MEDICINE

## 2020-08-20 RX ORDER — ESCITALOPRAM OXALATE 20 MG/1
20 TABLET ORAL DAILY
Qty: 30 TABLET | Refills: 5 | Status: SHIPPED | OUTPATIENT
Start: 2020-08-20 | End: 2020-12-28

## 2020-08-22 ASSESSMENT — ENCOUNTER SYMPTOMS
SHORTNESS OF BREATH: 0
CHEST TIGHTNESS: 0
VOMITING: 0
ABDOMINAL PAIN: 0
RHINORRHEA: 0
EYES NEGATIVE: 1
BACK PAIN: 0
COUGH: 0
SORE THROAT: 0
NAUSEA: 0
DIARRHEA: 0

## 2020-08-22 NOTE — PROGRESS NOTES
[address] [place] [facility] [address]  Dept: [phone]  Dept Fax: [phone]  Loc: [phone]  PROGRESS NOTE      VisitDate: 8/20/2020    Nikky Preston is a 45 y.o. female who presents today for:     Chief Complaint   Patient presents with    Medication Check     follow up on lexapro, was working well at first, she thinks it may need increased         Subjective:  HPI  Follow-up adjustment disorder with mixed depression and anxious symptoms. She has been on Lexapro for extended period of time. Had been very effective for over the past few months she felt like it is been less effective and notices her symptoms worsening. No suicidal thoughts. Some difficulty with sleep appetite has been poor some low energy difficulty focusing concentrating and lack of motivation are evident  Review of Systems   Constitutional: Negative for activity change, appetite change, fatigue and fever. HENT: Negative for congestion, rhinorrhea and sore throat. Eyes: Negative. Respiratory: Negative for cough, chest tightness and shortness of breath. Cardiovascular: Negative for chest pain and palpitations. Gastrointestinal: Negative for abdominal pain, diarrhea, nausea and vomiting. Genitourinary: Negative for dysuria and urgency. Musculoskeletal: Negative for arthralgias and back pain. Neurological: Negative for dizziness and headaches. Psychiatric/Behavioral: Positive for decreased concentration and dysphoric mood. Negative for suicidal ideas. The patient is nervous/anxious.       Past Medical History:   Diagnosis Date    Kidney stone     2005 with pregnancy      Past Surgical History:   Procedure Laterality Date    COLONOSCOPY  03/25/2016    Dr. Mckeon Ashlee 9/13/2017    DILATATION AND CURETTAGE HYSTEROSCOPY, POSSIBLE MYOSURE performed by Meryl Asif DO at [place] [address] 04/18/2018    HERNIA REPAIR      HYSTERECTOMY ABDOMINAL N/A 3/13/2019    ROBOTIC TOTAL LAPAROSCOPIC HYSTERECTOMY WITH BILATERAL SALPINGECTOMY performed by Lasha Garner DO at [address] HYSTEROSCOPY  04/18/2018    [place] HYSTEROSCOPY,W/ENDOMETRIAL ABLATION N/A 4/18/2018    HYSTEROSCOPY ENDOMETRIAL ABLATION performed by Lasha Garner DO at [address] [facility]       Family History   Problem Relation Age of Onset    Heart Disease Father     High Blood Pressure Father     High Cholesterol Father     Diabetes Father     Asthma Neg Hx      Social History     Tobacco Use    Smoking status: Never Smoker    Smokeless tobacco: Never Used   Substance Use Topics    Alcohol use: No     Alcohol/week: 0.0 standard drinks      Current Outpatient Medications   Medication Sig Dispense Refill    escitalopram (LEXAPRO) 20 MG tablet Take 1 tablet by mouth daily 30 tablet 5     No current facility-administered medications for this visit. Allergies   Allergen Reactions    Amoxicillin Hives     Health Maintenance   Topic Date Due    Varicella vaccine (1 of 2 - 2-dose childhood series) 06/02/1982    HIV screen  06/02/1996    DTaP/Tdap/Td vaccine (1 - Tdap) 06/02/2000    Cervical cancer screen  06/15/2018    Flu vaccine (1) 09/01/2020    Hepatitis A vaccine  Aged Out    Hepatitis B vaccine  Aged Out    Hib vaccine  Aged Out    Meningococcal (ACWY) vaccine  Aged Out    Pneumococcal 0-64 years Vaccine  Aged Out         Objective:     Physical Exam  Constitutional:       General: She is not in acute distress. Appearance: She is well-developed. She is not diaphoretic. HENT:      Head: Normocephalic and atraumatic. Eyes:      General: No scleral icterus. Conjunctiva/sclera: Conjunctivae normal.   Neck:      Thyroid: No thyromegaly. Vascular: No JVD. Comments: No bruits  Cardiovascular:      Rate and Rhythm: Normal rate and regular rhythm.       Heart sounds: Normal heart sounds. Pulmonary:      Effort: Pulmonary effort is normal. No respiratory distress. Breath sounds: Normal breath sounds. No wheezing or rales. Abdominal:      Palpations: Abdomen is soft. There is no mass. Tenderness: There is no abdominal tenderness. There is no guarding. Musculoskeletal:         General: No tenderness. Skin:     General: Skin is warm and dry. Findings: No rash. Neurological:      Mental Status: She is alert and oriented to person, place, and time. Cranial Nerves: No cranial nerve deficit. /64 (Site: Left Upper Arm)   Pulse 63   Temp 97.7 °F (36.5 °C) (Temporal)   Resp 16   Wt 146 lb 3.2 oz (66.3 kg)   LMP 03/06/2019   BMI 26.10 kg/m²       Impression/Plan:  1. Adjustment disorder with mixed anxiety and depressed mood      Requested Prescriptions     Signed Prescriptions Disp Refills    escitalopram (LEXAPRO) 20 MG tablet 30 tablet 5     Sig: Take 1 tablet by mouth daily     No orders of the defined types were placed in this encounter. Patient giveneducational materials - see patient instructions. Discussed use, benefit, and side effects of prescribed medications. All patient questions answered. Pt voiced understanding. Reviewed health maintenance. Patient agreedwith treatment plan. Follow up as directed. **This report has been created using voice recognition software. It may contain minor errorswhich are inherent in voice recognition technology. **       Electronically signed by Julia Ferguson MD on 8/22/2020 at 11:18 AM

## 2020-08-25 ENCOUNTER — HOSPITAL ENCOUNTER (EMERGENCY)
Age: 40
Discharge: HOME | End: 2020-08-25
Payer: MEDICAID

## 2020-08-25 VITALS
BODY MASS INDEX: 26.5 KG/M2 | HEIGHT: 62 IN | WEIGHT: 144 LBS | HEART RATE: 72 BPM | DIASTOLIC BLOOD PRESSURE: 77 MMHG | OXYGEN SATURATION: 99 % | SYSTOLIC BLOOD PRESSURE: 109 MMHG | RESPIRATION RATE: 16 BRPM | TEMPERATURE: 97.8 F

## 2020-08-25 LAB
BILIRUBIN URINE: NEGATIVE
BLOOD, URINE: NEGATIVE
CHARACTER, URINE: CLEAR
COLOR: COLORLESS
GLUCOSE URINE: NEGATIVE MG/DL
KETONES, URINE: NEGATIVE
LEUKOCYTE ESTERASE, URINE: NEGATIVE
NITRITE, URINE: NEGATIVE
PH, URINE: 8 (ref 5–9)
PROTEIN, URINE: NEGATIVE MG/DL
SPECIFIC GRAVITY, URINE: 1.01 (ref 1–1.03)
UROBILINOGEN, URINE: 0.2 EU/DL (ref 0.2–1)

## 2020-08-25 PROCEDURE — 81003 URINALYSIS AUTO W/O SCOPE: CPT

## 2020-08-25 PROCEDURE — 99213 OFFICE O/P EST LOW 20 MIN: CPT | Performed by: NURSE PRACTITIONER

## 2020-08-25 PROCEDURE — 99213 OFFICE O/P EST LOW 20 MIN: CPT

## 2020-08-25 RX ORDER — METHYLPREDNISOLONE 4 MG/1
TABLET ORAL
Qty: 1 KIT | Refills: 0 | Status: SHIPPED | OUTPATIENT
Start: 2020-08-25 | End: 2020-08-31

## 2020-08-25 RX ORDER — IBUPROFEN 200 MG
200 TABLET ORAL EVERY 6 HOURS PRN
COMMUNITY
End: 2020-10-01 | Stop reason: ALTCHOICE

## 2020-08-25 RX ORDER — CYCLOBENZAPRINE HCL 10 MG
10 TABLET ORAL 3 TIMES DAILY PRN
Qty: 15 TABLET | Refills: 0 | Status: SHIPPED | OUTPATIENT
Start: 2020-08-25 | End: 2020-10-01

## 2020-08-25 ASSESSMENT — PAIN DESCRIPTION - PROGRESSION: CLINICAL_PROGRESSION: GRADUALLY WORSENING

## 2020-08-25 ASSESSMENT — ENCOUNTER SYMPTOMS
ABDOMINAL PAIN: 0
VOMITING: 0
COUGH: 0
SHORTNESS OF BREATH: 0
NAUSEA: 0

## 2020-08-25 ASSESSMENT — PAIN DESCRIPTION - LOCATION: LOCATION: BACK

## 2020-08-25 ASSESSMENT — PAIN DESCRIPTION - ONSET: ONSET: PROGRESSIVE

## 2020-08-25 ASSESSMENT — PAIN SCALES - GENERAL: PAINLEVEL_OUTOF10: 8

## 2020-08-25 ASSESSMENT — PAIN DESCRIPTION - ORIENTATION: ORIENTATION: RIGHT;LOWER

## 2020-08-25 ASSESSMENT — PAIN - FUNCTIONAL ASSESSMENT: PAIN_FUNCTIONAL_ASSESSMENT: PREVENTS OR INTERFERES SOME ACTIVE ACTIVITIES AND ADLS

## 2020-08-25 ASSESSMENT — PAIN DESCRIPTION - FREQUENCY: FREQUENCY: CONTINUOUS

## 2020-08-25 NOTE — ED TRIAGE NOTES
Pt to room 1 with c/o low back pain (right) x 2 weeks. She also states she has noticed a bit of urinary frequency. She has a history of kidney stones.

## 2020-08-25 NOTE — ED PROVIDER NOTES
[facility]  [facility] Encounter       CHIEF COMPLAINT       Chief Complaint   Patient presents with    Back Pain     low, right    Urinary Frequency     \"a little\"       Nurses Notes reviewed and I agree except as noted in the HPI. HISTORY OF PRESENT ILLNESS   Susy Morales is a 45 y.o. female who presents for evaluation of right lower back pain that is been ongoing for the past 2 weeks. Patient states that she has had a mild amount of urinary frequency but denies any significant dysuria or hematuria. She does report a history of kidney stones in the past.  She states that she has been taking ibuprofen twice a day for the past 2 weeks without much relief. She denies any known injury or trauma to the area or any new excessive activities. The history is provided by the patient. REVIEW OF SYSTEMS     Review of Systems   Constitutional: Negative for chills and fever. Respiratory: Negative for cough and shortness of breath. Cardiovascular: Negative for chest pain. Gastrointestinal: Negative for abdominal pain, nausea and vomiting. Genitourinary: Positive for frequency. Negative for dysuria. Musculoskeletal: Negative for arthralgias and myalgias. Skin: Negative for rash. Neurological: Negative for weakness and numbness. PAST MEDICAL HISTORY         Diagnosis Date    Kidney stone     2005 with pregnancy    Kidney stones        SURGICALHISTORY     Patient  has a past surgical history that includes hernia repair; Tubal ligation; Colonoscopy (03/25/2016); Dilation and curettage of uterus (N/A, 9/13/2017); hysteroscopy (04/18/2018); Endometrial ablation (04/18/2018); pr hysteroscopy,w/endometrial ablation (N/A, 4/18/2018); and HYSTERECTOMY ABDOMINAL (N/A, 3/13/2019).     CURRENT MEDICATIONS       Previous Medications    ESCITALOPRAM (LEXAPRO) 20 MG TABLET    Take 1 tablet by mouth daily    IBUPROFEN (ADVIL;MOTRIN) 200 MG TABLET    Take 200 mg by mouth every 6 hours as needed for Pain       ALLERGIES     Patient is is allergic to amoxicillin. Patients   There is no immunization history on file for this patient. FAMILY HISTORY     Patient's family history includes Diabetes in her father; Heart Disease in her father; High Blood Pressure in her father; High Cholesterol in her father. SOCIAL HISTORY     Patient  reports that she has never smoked. She has never used smokeless tobacco. She reports that she does not drink alcohol or use drugs. PHYSICAL EXAM     ED TRIAGE VITALS  BP: 109/77, Temp: 97.8 °F (36.6 °C), Pulse: 72, Resp: 16, SpO2: 99 %,Estimated body mass index is 26.34 kg/m² as calculated from the following:    Height as of this encounter: 5' 2\" (1.575 m). Weight as of this encounter: 144 lb (65.3 kg). ,Patient's last menstrual period was 03/06/2019. Physical Exam  Vitals signs and nursing note reviewed. Constitutional:       General: She is not in acute distress. Appearance: She is well-developed. She is not diaphoretic. Eyes:      Conjunctiva/sclera:      Right eye: Right conjunctiva is not injected. Left eye: Left conjunctiva is not injected. Pupils: Pupils are equal.   Neck:      Musculoskeletal: Normal range of motion. Cardiovascular:      Rate and Rhythm: Normal rate and regular rhythm. Heart sounds: No murmur. Pulmonary:      Effort: Pulmonary effort is normal. No respiratory distress. Breath sounds: Normal breath sounds. Musculoskeletal:      Right knee: She exhibits normal range of motion. Left knee: She exhibits normal range of motion. Lumbar back: She exhibits tenderness (right paraspinal). She exhibits no bony tenderness and no swelling. Skin:     General: Skin is warm. Findings: No rash. Neurological:      Mental Status: She is alert and oriented to person, place, and time.    Psychiatric:         Behavior: Behavior normal.         DIAGNOSTIC RESULTS     Labs:  Results for orders placed or performed during the hospital encounter of 08/25/20   Urinalysis   Result Value Ref Range    Glucose, Ur Negative NEGATIVE mg/dl    Bilirubin Urine Negative NEGATIVE    Ketones, Urine Negative NEGATIVE    Specific Gravity, Urine 1.015 1.002 - 1.03    Blood, Urine Negative NEGATIVE    pH, Urine 8.00 5.0 - 9.0    Protein, Urine Negative NEGATIVE mg/dl    Urobilinogen, Urine 0.20 0.2 - 1.0 eu/dl    Nitrite, Urine Negative NEGATIVE    Leukocyte Esterase, Urine Negative NEGATIVE    Color, UA COLORLESS STRAW-YELL    Character, Urine Clear CLEAR-SL C       IMAGING:    No orders to display         EKG:  none    URGENT CARE COURSE:     Vitals:    08/25/20 1011   BP: 109/77   Pulse: 72   Resp: 16   Temp: 97.8 °F (36.6 °C)   TempSrc: Temporal   SpO2: 99%   Weight: 144 lb (65.3 kg)   Height: 5' 2\" (1.575 m)       Medications - No data to display         PROCEDURES:  None    FINAL IMPRESSION      1. Strain of lumbar region, initial encounter          DISPOSITION/ PLAN     Urine sample does not demonstrate any sign of urinary tract infection there is no blood in the urine. I discussed with the patient that I believe her symptoms are more muscular in nature due to the tenderness on palpation and the reproducible nature of the pain. I discussed with the patient that she should continue ibuprofen at home and she will also given a prescription for Medrol Dosepak and muscle relaxers. She is instructed to apply heat as well as over-the-counter icy hot or Biofreeze. She is agreeable to plan as discussed. PATIENT REFERRED TO:  Espinoza Cochran MD  [facility] [place] [address]      DISCHARGE MEDICATIONS:  New Prescriptions    CYCLOBENZAPRINE (FLEXERIL) 10 MG TABLET    Take 1 tablet by mouth 3 times daily as needed for Muscle spasms . Do not drive or operate heavy machinery while taking this medication. METHYLPREDNISOLONE (MEDROL, NBA,) 4 MG TABLET    Take by mouth.        Discontinued Medications    No medications on file       Current Discharge Medication List          GERALDINE Gamlbe CNP    (Please note that portions of this note were completed with a voice recognition program. Efforts were made to edit the dictations but occasionally words are mis-transcribed.)          GERALDINE Gamble CNP  08/25/20 [id number]

## 2020-09-08 ENCOUNTER — HOSPITAL ENCOUNTER (OUTPATIENT)
Age: 40
Discharge: HOME | End: 2020-09-08
Payer: MEDICAID

## 2020-09-08 ENCOUNTER — TELEPHONE (OUTPATIENT)
Dept: FAMILY MEDICINE CLINIC | Age: 40
End: 2020-09-08

## 2020-09-08 DIAGNOSIS — R68.83 CHILLS: ICD-10-CM

## 2020-09-08 DIAGNOSIS — R11.0 NAUSEA: ICD-10-CM

## 2020-09-08 PROCEDURE — 99211 OFF/OP EST MAY X REQ PHY/QHP: CPT

## 2020-09-08 PROCEDURE — U0003 INFECTIOUS AGENT DETECTION BY NUCLEIC ACID (DNA OR RNA); SEVERE ACUTE RESPIRATORY SYNDROME CORONAVIRUS 2 (SARS-COV-2) (CORONAVIRUS DISEASE [COVID-19]), AMPLIFIED PROBE TECHNIQUE, MAKING USE OF HIGH THROUGHPUT TECHNOLOGIES AS DESCRIBED BY CMS-2020-01-R: HCPCS

## 2020-09-10 LAB — SARS-COV-2: NOT DETECTED

## 2020-09-11 ENCOUNTER — TELEPHONE (OUTPATIENT)
Dept: FAMILY MEDICINE CLINIC | Age: 40
End: 2020-09-11

## 2020-09-11 ENCOUNTER — PATIENT MESSAGE (OUTPATIENT)
Dept: FAMILY MEDICINE CLINIC | Age: 40
End: 2020-09-11

## 2020-09-11 NOTE — TELEPHONE ENCOUNTER
Covid test was negative, still has extreme nausea, requesting something for this. Has not been able to eat it is so bad. She did have sore throat and headache but those symptoms have gotten better.      WCP  WM-E

## 2020-09-14 RX ORDER — ONDANSETRON 4 MG/1
4 TABLET, FILM COATED ORAL EVERY 8 HOURS PRN
Qty: 20 TABLET | Refills: 0 | Status: SHIPPED | OUTPATIENT
Start: 2020-09-14 | End: 2020-10-01 | Stop reason: ALTCHOICE

## 2020-09-14 NOTE — TELEPHONE ENCOUNTER
From: Eliecer Turk  To:  Anna Armstrong MD  Sent: 9/11/2020 4:12 PM EDT  Subject: Prescription Question    They was supposed to call on nausea medicine for me

## 2020-09-17 ENCOUNTER — VIRTUAL VISIT (OUTPATIENT)
Dept: FAMILY MEDICINE CLINIC | Age: 40
End: 2020-09-17
Payer: MEDICAID

## 2020-09-17 PROCEDURE — G8427 DOCREV CUR MEDS BY ELIG CLIN: HCPCS | Performed by: NURSE PRACTITIONER

## 2020-09-17 PROCEDURE — G8419 CALC BMI OUT NRM PARAM NOF/U: HCPCS | Performed by: NURSE PRACTITIONER

## 2020-09-17 PROCEDURE — 1036F TOBACCO NON-USER: CPT | Performed by: NURSE PRACTITIONER

## 2020-09-17 PROCEDURE — 99212 OFFICE O/P EST SF 10 MIN: CPT | Performed by: NURSE PRACTITIONER

## 2020-09-17 ASSESSMENT — ENCOUNTER SYMPTOMS
CONSTIPATION: 0
WHEEZING: 0
RHINORRHEA: 0
BACK PAIN: 0
COUGH: 0
ABDOMINAL PAIN: 0
SORE THROAT: 0
EYE PAIN: 0
ALLERGIC/IMMUNOLOGIC NEGATIVE: 1
EYE REDNESS: 0
DIARRHEA: 0
EYE DISCHARGE: 0
SHORTNESS OF BREATH: 0
VOMITING: 0
NAUSEA: 0
TROUBLE SWALLOWING: 0

## 2020-09-17 NOTE — PROGRESS NOTES
[address] [place] [address] [address] [address]  [facility] [address]  Dept: [phone]  Dept Fax: [phone]  Loc: [phone]     Visit Date:  9/17/2020      Patient:  Tata Peter  YOB: 1980    HPI:     Chief Complaint   Patient presents with    Other     return to work       Patient had upper respiratory infection symptoms for a few days, had a subsequent COVID-19 test which was negative. She needs a return to work clearance as she no longer has symptoms or any problems. This is a phone visit, as doxy. me is down. Other   Pertinent negatives include no abdominal pain, arthralgias, congestion, coughing, fatigue, fever, headaches, myalgias, nausea, rash, sore throat, vomiting or weakness. Medications    Current Outpatient Medications:     ondansetron (ZOFRAN) 4 MG tablet, Take 1 tablet by mouth every 8 hours as needed for Nausea or Vomiting, Disp: 20 tablet, Rfl: 0    ibuprofen (ADVIL;MOTRIN) 200 MG tablet, Take 200 mg by mouth every 6 hours as needed for Pain, Disp: , Rfl:     cyclobenzaprine (FLEXERIL) 10 MG tablet, Take 1 tablet by mouth 3 times daily as needed for Muscle spasms . Do not drive or operate heavy machinery while taking this medication. , Disp: 15 tablet, Rfl: 0    escitalopram (LEXAPRO) 20 MG tablet, Take 1 tablet by mouth daily, Disp: 30 tablet, Rfl: 5    The patient is allergic to amoxicillin. Past Medical History  Michaela Lazo  has a past medical history of Kidney stone and Kidney stones. Subjective:      Review of Systems   Constitutional: Negative for activity change, fatigue and fever. HENT: Negative for congestion, ear pain, rhinorrhea, sore throat and trouble swallowing. Eyes: Negative for pain, discharge and redness. Respiratory: Negative for cough, shortness of breath and wheezing. Cardiovascular: Negative.     Gastrointestinal: Negative for abdominal pain, constipation, diarrhea, nausea and vomiting. Endocrine: Negative. Genitourinary: Negative for dysuria, frequency and urgency. Musculoskeletal: Negative for arthralgias, back pain and myalgias. Skin: Negative for rash. Allergic/Immunologic: Negative. Neurological: Negative for dizziness, tremors, weakness and headaches. Hematological: Negative. Psychiatric/Behavioral: Negative for dysphoric mood and sleep disturbance. The patient is not nervous/anxious. Objective:     LMP 03/06/2019     Physical Exam  Constitutional:       General: She is not in acute distress. Neurological:      Mental Status: She is alert and oriented to person, place, and time. Psychiatric:         Mood and Affect: Mood normal.         Behavior: Behavior normal.         Thought Content: Thought content normal.         Judgment: Judgment normal.         Assessment/Plan:      Merle Mccartney was seen today for other. Diagnoses and all orders for this visit:    Return to work evaluation        Return if symptoms worsen or fail to improve. Patient instructions given andreviewed. Electronically signed by GERALDINE Avina CNP on 9/17/2020 at 1:02 PM      Return to work letter is placed at the  for the patient to  at her leisure. Precious Stroud is a 45 y.o. female evaluated via telephone on 9/17/2020. Consent:  She and/or health care decision maker is aware that that she may receive a bill for this telephone service, depending on her insurance coverage, and has provided verbal consent to proceed: Yes      I affirm this is a Patient Initiated Episode with a Patient who has not had a related appointment within my department in the past 7 days or scheduled within the next 24 hours.     Patient identification was verified at the start of the visit: Yes    Total Time: minutes: 5-10 minutes    Note: not billable if this call serves to triage the patient into an appointment for the relevant concern      Price Retana

## 2020-10-01 ENCOUNTER — OFFICE VISIT (OUTPATIENT)
Dept: FAMILY MEDICINE CLINIC | Age: 40
End: 2020-10-01
Payer: MEDICAID

## 2020-10-01 VITALS
DIASTOLIC BLOOD PRESSURE: 70 MMHG | HEART RATE: 72 BPM | RESPIRATION RATE: 16 BRPM | SYSTOLIC BLOOD PRESSURE: 128 MMHG | BODY MASS INDEX: 27.18 KG/M2 | WEIGHT: 148.6 LBS | TEMPERATURE: 98.6 F

## 2020-10-01 PROCEDURE — G8419 CALC BMI OUT NRM PARAM NOF/U: HCPCS | Performed by: FAMILY MEDICINE

## 2020-10-01 PROCEDURE — G8427 DOCREV CUR MEDS BY ELIG CLIN: HCPCS | Performed by: FAMILY MEDICINE

## 2020-10-01 PROCEDURE — 1036F TOBACCO NON-USER: CPT | Performed by: FAMILY MEDICINE

## 2020-10-01 PROCEDURE — G8484 FLU IMMUNIZE NO ADMIN: HCPCS | Performed by: FAMILY MEDICINE

## 2020-10-01 PROCEDURE — 99213 OFFICE O/P EST LOW 20 MIN: CPT | Performed by: FAMILY MEDICINE

## 2020-10-01 RX ORDER — BUPROPION HYDROCHLORIDE 150 MG/1
150 TABLET ORAL EVERY MORNING
Qty: 30 TABLET | Refills: 3 | Status: SHIPPED | OUTPATIENT
Start: 2020-10-01 | End: 2020-11-12 | Stop reason: SDUPTHER

## 2020-10-01 ASSESSMENT — ENCOUNTER SYMPTOMS
EYES NEGATIVE: 1
RHINORRHEA: 0
SHORTNESS OF BREATH: 0
COUGH: 0
NAUSEA: 0
CHEST TIGHTNESS: 0
ABDOMINAL PAIN: 0
VOMITING: 0
BACK PAIN: 0
SORE THROAT: 0
DIARRHEA: 0

## 2020-10-01 NOTE — PROGRESS NOTES
[address] [place] [facility] [address]  Dept: [phone]  Dept Fax: [phone]  Loc: [phone]  PROGRESS NOTE      VisitDate: 10/1/2020    Lidia Montano is a 45 y.o. female who presents today for:     Chief Complaint   Patient presents with    Medication Check     doesn't think lexapro is working very well over the last week    Flu Vaccine     declines         Subjective:  HPI  Follow-up adjustment disorder, Lexapro not working well for her to control her symptoms. She like to try Wellbutrin. She spoke to several people in it and done some of her own research on it feels it might be great medicine for her. .  Symptoms nervous anxious quick to anger irritable mood swings crying spells. Review of Systems   Constitutional: Negative for activity change, appetite change, fatigue and fever. HENT: Negative for congestion, rhinorrhea and sore throat. Eyes: Negative. Respiratory: Negative for cough, chest tightness and shortness of breath. Cardiovascular: Negative for chest pain and palpitations. Gastrointestinal: Negative for abdominal pain, diarrhea, nausea and vomiting. Genitourinary: Negative for dysuria and urgency. Musculoskeletal: Negative for arthralgias and back pain. Neurological: Negative for dizziness and headaches. Psychiatric/Behavioral: Negative for dysphoric mood. The patient is not nervous/anxious.       Past Medical History:   Diagnosis Date    Kidney stone     2005 with pregnancy    Kidney stones       Past Surgical History:   Procedure Laterality Date    COLONOSCOPY  03/25/2016    Dr. Hardy [place] 9/13/2017    DILATATION AND CURETTAGE HYSTEROSCOPY, POSSIBLE MYOSURE performed by Faustino Berumen DO at [place] [address]  04/18/2018    HERNIA REPAIR      HYSTERECTOMY ABDOMINAL N/A 3/13/2019    ROBOTIC TOTAL LAPAROSCOPIC HYSTERECTOMY WITH BILATERAL SALPINGECTOMY performed by Christofer Camargo DO at [facility] HYSTEROSCOPY  04/18/2018    [place] HYSTEROSCOPY,W/ENDOMETRIAL ABLATION N/A 4/18/2018    HYSTEROSCOPY ENDOMETRIAL ABLATION performed by Christofer Camargo DO at [facility]       Family History   Problem Relation Age of Onset    Heart Disease Father     High Blood Pressure Father     High Cholesterol Father     Diabetes Father     Asthma Neg Hx      Social History     Tobacco Use    Smoking status: Never Smoker    Smokeless tobacco: Never Used   Substance Use Topics    Alcohol use: No     Alcohol/week: 0.0 standard drinks      Current Outpatient Medications   Medication Sig Dispense Refill    buPROPion (WELLBUTRIN XL) 150 MG extended release tablet Take 1 tablet by mouth every morning 30 tablet 3    escitalopram (LEXAPRO) 20 MG tablet Take 1 tablet by mouth daily 30 tablet 5     No current facility-administered medications for this visit. Allergies   Allergen Reactions    Amoxicillin Hives     Health Maintenance   Topic Date Due    Varicella vaccine (1 of 2 - 2-dose childhood series) 06/02/1982    HIV screen  06/02/1996    DTaP/Tdap/Td vaccine (1 - Tdap) 06/02/2000    Cervical cancer screen  06/15/2018    Flu vaccine (1) 09/01/2020    Hepatitis A vaccine  Aged Out    Hepatitis B vaccine  Aged Out    Hib vaccine  Aged Out    Meningococcal (ACWY) vaccine  Aged Out    Pneumococcal 0-64 years Vaccine  Aged Out         Objective:     Physical Exam  Constitutional:       General: She is not in acute distress. Appearance: She is well-developed. She is not diaphoretic. HENT:      Head: Normocephalic and atraumatic. Eyes:      General: No scleral icterus. Conjunctiva/sclera: Conjunctivae normal.   Neck:      Thyroid: No thyromegaly. Vascular: No JVD. Comments: No bruits  Cardiovascular:      Rate and Rhythm: Normal rate and regular rhythm. Heart sounds: Normal heart sounds. Pulmonary:      Effort: Pulmonary effort is normal. No respiratory distress. Breath sounds: Normal breath sounds. No wheezing or rales. Abdominal:      Palpations: Abdomen is soft. There is no mass. Tenderness: There is no abdominal tenderness. There is no guarding. Musculoskeletal:         General: No tenderness. Skin:     General: Skin is warm and dry. Findings: No rash. Neurological:      Mental Status: She is alert and oriented to person, place, and time. Cranial Nerves: No cranial nerve deficit. /70 (Site: Left Upper Arm)   Pulse 72   Temp 98.6 °F (37 °C) (Infrared)   Resp 16   Wt 148 lb 9.6 oz (67.4 kg)   LMP 03/06/2019   BMI 27.18 kg/m²       Impression/Plan:  1. Adjustment disorder with mixed anxiety and depressed mood      Requested Prescriptions     Signed Prescriptions Disp Refills    buPROPion (WELLBUTRIN XL) 150 MG extended release tablet 30 tablet 3     Sig: Take 1 tablet by mouth every morning     No orders of the defined types were placed in this encounter. Discussed continuing Lexapro with Wellbutrin for 6 weeks we will have her follow-up then if things are going well we can begin to taper Lexapro    Patient giveneducational materials - see patient instructions. Discussed use, benefit, and side effects of prescribed medications. All patient questions answered. Pt voiced understanding. Reviewed health maintenance. Patient agreedwith treatment plan. Follow up as directed. **This report has been created using voice recognition software. It may contain minor errorswhich are inherent in voice recognition technology. **       Electronically signed by Olamide Mccollum MD on 10/1/2020 at 1:16 PM

## 2020-11-12 ENCOUNTER — VIRTUAL VISIT (OUTPATIENT)
Dept: FAMILY MEDICINE CLINIC | Age: 40
End: 2020-11-12
Payer: MEDICAID

## 2020-11-12 PROBLEM — F43.21 ADJUSTMENT DISORDER WITH DEPRESSED MOOD: Status: ACTIVE | Noted: 2020-11-12

## 2020-11-12 PROCEDURE — 99213 OFFICE O/P EST LOW 20 MIN: CPT | Performed by: FAMILY MEDICINE

## 2020-11-12 PROCEDURE — G8427 DOCREV CUR MEDS BY ELIG CLIN: HCPCS | Performed by: FAMILY MEDICINE

## 2020-11-12 RX ORDER — BUPROPION HYDROCHLORIDE 300 MG/1
300 TABLET ORAL EVERY MORNING
Qty: 30 TABLET | Refills: 2 | Status: SHIPPED | OUTPATIENT
Start: 2020-11-12 | End: 2020-12-28 | Stop reason: SDUPTHER

## 2020-11-12 NOTE — PROGRESS NOTES
2020    TELEHEALTH EVALUATION -- Audio/Visual (During [place]ODF-68 public health emergency)    HPI:    Lidia Montano (:  1980) has requested an audio/video evaluation for the following concern(s):    Follow-up depression on Lexapro and Wellbutrin. Started Wellbutrin last month not getting full benefit continues to see some increase in her appetite. She felt that it is helping however. Review of Systems    Prior to Visit Medications    Medication Sig Taking?  Authorizing Provider   buPROPion (WELLBUTRIN XL) 300 MG extended release tablet Take 1 tablet by mouth every morning Yes Isabell Arteaga MD   escitalopram (LEXAPRO) 20 MG tablet Take 1 tablet by mouth daily  Isabell Arteaga MD       Social History     Tobacco Use    Smoking status: Never Smoker    Smokeless tobacco: Never Used   Substance Use Topics    Alcohol use: No     Alcohol/week: 0.0 standard drinks    Drug use: No        Allergies   Allergen Reactions    Amoxicillin Hives   ,   Past Medical History:   Diagnosis Date    Kidney stone      with pregnancy    Kidney stones    ,   Past Surgical History:   Procedure Laterality Date    COLONOSCOPY  2016    Dr. Hardy Areas 2017    DILATATION AND CURETTAGE HYSTEROSCOPY, POSSIBLE MYOSURE performed by Faustino Berumen DO at [place] [address]  2018    HERNIA REPAIR      HYSTERECTOMY ABDOMINAL N/A 3/13/2019    ROBOTIC TOTAL LAPAROSCOPIC HYSTERECTOMY WITH BILATERAL SALPINGECTOMY performed by Faustino Berumen DO at [address] HYSTEROSCOPY  2018    [place] HYSTEROSCOPY,W/ENDOMETRIAL ABLATION N/A 2018    HYSTEROSCOPY ENDOMETRIAL ABLATION performed by Faustino Berumen DO at [address] [place] [address]     ,   Social History     Tobacco Use    Smoking status: Never Smoker    Smokeless tobacco: Never Used   Substance Use Topics    Alcohol use: No     Alcohol/week: 0.0 standard drinks    Drug use: No   ,   Family History   Problem Relation Age of Onset    Heart Disease Father     High Blood Pressure Father     High Cholesterol Father     Diabetes Father     Asthma Neg Hx    ,   There is no immunization history on file for this patient.,   Health Maintenance   Topic Date Due    Varicella vaccine (1 of 2 - 2-dose childhood series) 06/02/1982    HIV screen  06/02/1996    DTaP/Tdap/Td vaccine (1 - Tdap) 06/02/2000    Cervical cancer screen  06/15/2018    Flu vaccine (1) 09/01/2020    Hepatitis A vaccine  Aged Out    Hepatitis B vaccine  Aged Out    Hib vaccine  Aged Out    Meningococcal (ACWY) vaccine  Aged Out    Pneumococcal 0-64 years Vaccine  Aged Out       PHYSICAL EXAMINATION:  [ INSTRUCTIONS:  \"[x]\" Indicates a positive item  \"[]\" Indicates a negative item  -- DELETE ALL ITEMS NOT EXAMINED]  Vital Signs: (As obtained by patient/caregiver or practitioner observation)    Blood pressure-  Heart rate-    Respiratory rate-    Temperature-  Pulse oximetry-     Constitutional: [x] Appears well-developed and well-nourished [x] No apparent distress      [] Abnormal-   Mental status  [x] Alert and awake  [x] Oriented to person/place/time [x]Able to follow commands      Eyes:  EOM    []  Normal  [] Abnormal-  Sclera  []  Normal  [] Abnormal -         Discharge []  None visible  [] Abnormal -    HENT:   [x] Normocephalic, atraumatic.   [] Abnormal   [] Mouth/Throat: Mucous membranes are moist.     External Ears [] Normal  [] Abnormal-     Neck: [x] No visualized mass     Pulmonary/Chest: [x] Respiratory effort normal.  [x] No visualized signs of difficulty breathing or respiratory distress        [] Abnormal-      Musculoskeletal:   [] Normal gait with no signs of ataxia         [] Normal range of motion of neck        [] Abnormal-       Neurological:        [x] No Facial Asymmetry (Cranial nerve 7 motor function) (limited exam to video visit)          [] No gaze palsy        [] Abnormal-

## 2020-12-28 ENCOUNTER — OFFICE VISIT (OUTPATIENT)
Dept: FAMILY MEDICINE CLINIC | Age: 40
End: 2020-12-28
Payer: MEDICAID

## 2020-12-28 VITALS
BODY MASS INDEX: 25.61 KG/M2 | DIASTOLIC BLOOD PRESSURE: 70 MMHG | TEMPERATURE: 97.5 F | WEIGHT: 140 LBS | RESPIRATION RATE: 16 BRPM | SYSTOLIC BLOOD PRESSURE: 110 MMHG | HEART RATE: 80 BPM

## 2020-12-28 DIAGNOSIS — F43.21 ADJUSTMENT DISORDER WITH DEPRESSED MOOD: Primary | ICD-10-CM

## 2020-12-28 DIAGNOSIS — M77.8 TENDINITIS OF RIGHT SHOULDER: ICD-10-CM

## 2020-12-28 PROCEDURE — 1036F TOBACCO NON-USER: CPT | Performed by: FAMILY MEDICINE

## 2020-12-28 PROCEDURE — G8484 FLU IMMUNIZE NO ADMIN: HCPCS | Performed by: FAMILY MEDICINE

## 2020-12-28 PROCEDURE — G8427 DOCREV CUR MEDS BY ELIG CLIN: HCPCS | Performed by: FAMILY MEDICINE

## 2020-12-28 PROCEDURE — G8419 CALC BMI OUT NRM PARAM NOF/U: HCPCS | Performed by: FAMILY MEDICINE

## 2020-12-28 PROCEDURE — 99213 OFFICE O/P EST LOW 20 MIN: CPT | Performed by: FAMILY MEDICINE

## 2020-12-28 RX ORDER — BUPROPION HYDROCHLORIDE 300 MG/1
300 TABLET ORAL EVERY MORNING
Qty: 90 TABLET | Refills: 3 | Status: SHIPPED | OUTPATIENT
Start: 2020-12-28 | End: 2021-01-14 | Stop reason: SDUPTHER

## 2020-12-28 RX ORDER — PREDNISONE 20 MG/1
60 TABLET ORAL DAILY
Qty: 21 TABLET | Refills: 0 | Status: SHIPPED | OUTPATIENT
Start: 2020-12-28 | End: 2021-01-04

## 2021-01-03 ASSESSMENT — ENCOUNTER SYMPTOMS
NAUSEA: 0
DIARRHEA: 0
VOMITING: 0
CHEST TIGHTNESS: 0
COUGH: 0
EYES NEGATIVE: 1
SORE THROAT: 0
BACK PAIN: 0
RHINORRHEA: 0
ABDOMINAL PAIN: 0
SHORTNESS OF BREATH: 0

## 2021-01-03 NOTE — PROGRESS NOTES
[address] Vikram Mendez [facility] [address]  Dept: [phone]  Dept Fax: [phone]  Loc: [phone]  PROGRESS NOTE      VisitDate: 12/28/2020    Shoshana Bragg is a 45 y.o. female who presents today for:     Chief Complaint   Patient presents with    Depression     Anxiety 6 wk f/u after increasing wellbutrin    Shoulder Pain     intermit right shoulder pain, feels it pop with movements, sx worse 3 days. Took motrin without relief. Subjective:  HPI  Follow-up adjustment disorder doing well like to taper off Lexapro. Her symptoms been very well controlled. Also having right shoulder pain sore throat movements occasionally feels a pop, no acute injury    Review of Systems   Constitutional: Negative for activity change, appetite change, fatigue and fever. HENT: Negative for congestion, rhinorrhea and sore throat. Eyes: Negative. Respiratory: Negative for cough, chest tightness and shortness of breath. Cardiovascular: Negative for chest pain and palpitations. Gastrointestinal: Negative for abdominal pain, diarrhea, nausea and vomiting. Genitourinary: Negative for dysuria and urgency. Musculoskeletal: Positive for arthralgias. Negative for back pain. Neurological: Negative for dizziness and headaches. Psychiatric/Behavioral: Negative for dysphoric mood. The patient is not nervous/anxious.       Past Medical History:   Diagnosis Date    Kidney stone     2005 with pregnancy    Kidney stones       Past Surgical History:   Procedure Laterality Date    COLONOSCOPY  03/25/2016    Dr. Lyons [place] 9/13/2017    DILATATION AND CURETTAGE HYSTEROSCOPY, POSSIBLE MYOSURE performed by Magi Cleveland DO at [place] [address]  04/18/2018    HERNIA REPAIR      HYSTERECTOMY ABDOMINAL N/A 3/13/2019    ROBOTIC TOTAL LAPAROSCOPIC HYSTERECTOMY WITH BILATERAL SALPINGECTOMY performed by Ochoa Bass DO at [facility] HYSTEROSCOPY  04/18/2018    [place] HYSTEROSCOPY,W/ENDOMETRIAL ABLATION N/A 4/18/2018    HYSTEROSCOPY ENDOMETRIAL ABLATION performed by Ochoa Bass DO at [address] [place] [address]       Family History   Problem Relation Age of Onset    Heart Disease Father     High Blood Pressure Father     High Cholesterol Father     Diabetes Father     Asthma Neg Hx      Social History     Tobacco Use    Smoking status: Never Smoker    Smokeless tobacco: Never Used   Substance Use Topics    Alcohol use: No     Alcohol/week: 0.0 standard drinks      Current Outpatient Medications   Medication Sig Dispense Refill    buPROPion (WELLBUTRIN XL) 300 MG extended release tablet Take 1 tablet by mouth every morning 90 tablet 3    predniSONE (DELTASONE) 20 MG tablet Take 3 tablets by mouth daily for 7 days 21 tablet 0     No current facility-administered medications for this visit. Allergies   Allergen Reactions    Amoxicillin Hives     Health Maintenance   Topic Date Due    Hepatitis C screen  1981    Varicella vaccine (1 of 2 - 2-dose childhood series) 06/02/1982    HIV screen  06/02/1996    DTaP/Tdap/Td vaccine (1 - Tdap) 06/02/2000    Cervical cancer screen  06/15/2018    Flu vaccine (1) 09/01/2020    Hepatitis A vaccine  Aged Out    Hepatitis B vaccine  Aged Out    Hib vaccine  Aged Out    Meningococcal (ACWY) vaccine  Aged Out    Pneumococcal 0-64 years Vaccine  Aged Out         Objective:     Physical Exam  Constitutional:       General: She is not in acute distress. Appearance: She is well-developed. She is not diaphoretic. HENT:      Head: Normocephalic and atraumatic. Eyes:      General: No scleral icterus. Conjunctiva/sclera: Conjunctivae normal.   Neck:      Thyroid: No thyromegaly. Vascular: No JVD. Comments: No bruits  Cardiovascular:      Rate and Rhythm: Normal rate and regular rhythm.       Heart sounds: Normal heart sounds. Pulmonary:      Effort: Pulmonary effort is normal. No respiratory distress. Breath sounds: Normal breath sounds. No wheezing or rales. Abdominal:      Palpations: Abdomen is soft. There is no mass. Tenderness: There is no abdominal tenderness. There is no guarding. Musculoskeletal:         General: Tenderness present. Comments: Right shoulder tenderness mild rotator cuff weakness impingement negative   Skin:     General: Skin is warm and dry. Findings: No rash. Neurological:      Mental Status: She is alert and oriented to person, place, and time. Cranial Nerves: No cranial nerve deficit. /70   Pulse 80   Temp 97.5 °F (36.4 °C) (Temporal)   Resp 16   Wt 140 lb (63.5 kg)   LMP 03/06/2019   BMI 25.61 kg/m²       Impression/Plan:  1. Adjustment disorder with depressed mood    2. Tendinitis of right shoulder      Requested Prescriptions     Signed Prescriptions Disp Refills    buPROPion (WELLBUTRIN XL) 300 MG extended release tablet 90 tablet 3     Sig: Take 1 tablet by mouth every morning    predniSONE (DELTASONE) 20 MG tablet 21 tablet 0     Sig: Take 3 tablets by mouth daily for 7 days     No orders of the defined types were placed in this encounter. Patient giveneducational materials - see patient instructions. Discussed use, benefit, and side effects of prescribed medications. All patient questions answered. Pt voiced understanding. Reviewed health maintenance. Patient agreedwith treatment plan. Follow up as directed. **This report has been created using voice recognition software. It may contain minor errorswhich are inherent in voice recognition technology. **       Electronically signed by Andria Navarro MD on 1/3/2021 at 2:11 AM

## 2021-01-04 ENCOUNTER — TELEPHONE (OUTPATIENT)
Dept: FAMILY MEDICINE CLINIC | Age: 41
End: 2021-01-04

## 2021-01-04 NOTE — TELEPHONE ENCOUNTER
Patient has been having HA's for a week and says they are getting worse. She thought maybe it was from weaning off of her Wellbutrin. I told her I didn't think that would cause it but perhaps it was the prednisone. Please advise. Also needs work letter for today because she called off due to [address].  Ok?

## 2021-01-04 NOTE — LETTER
[address]  [facility] [address]  Phone: [phone]  Fax: [phone]    Nilda Villar MD        January 5, 2021     Patient: Mary Hollow   YOB: 1980       To Whom it May Concern:    Francisco Saenz is excused from work on 1/4/21. If you have any questions or concerns, please don't hesitate to call.     Sincerely,         Nilda Villar MD

## 2021-01-05 RX ORDER — BUTALBITAL, ACETAMINOPHEN AND CAFFEINE 50; 325; 40 MG/1; MG/1; MG/1
1 TABLET ORAL EVERY 6 HOURS PRN
Qty: 60 TABLET | Refills: 1 | Status: SHIPPED | OUTPATIENT
Start: 2021-01-05 | End: 2021-11-18 | Stop reason: ALTCHOICE

## 2021-01-05 NOTE — TELEPHONE ENCOUNTER
Patient states that she has tried Motrin for the HA's w/o relief. HA's mostly at night. Wants to know if you can Rx something.

## 2021-01-11 ENCOUNTER — HOSPITAL ENCOUNTER (OUTPATIENT)
Age: 41
Setting detail: SPECIMEN
Discharge: HOME | End: 2021-01-11
Payer: MEDICAID

## 2021-01-11 ENCOUNTER — VIRTUAL VISIT (OUTPATIENT)
Dept: FAMILY MEDICINE CLINIC | Age: 41
End: 2021-01-11
Payer: MEDICAID

## 2021-01-11 DIAGNOSIS — Z20.822 SUSPECTED 2019 NOVEL CORONAVIRUS INFECTION: Primary | ICD-10-CM

## 2021-01-11 PROCEDURE — G8419 CALC BMI OUT NRM PARAM NOF/U: HCPCS | Performed by: NURSE PRACTITIONER

## 2021-01-11 PROCEDURE — G8427 DOCREV CUR MEDS BY ELIG CLIN: HCPCS | Performed by: NURSE PRACTITIONER

## 2021-01-11 PROCEDURE — 1036F TOBACCO NON-USER: CPT | Performed by: NURSE PRACTITIONER

## 2021-01-11 PROCEDURE — U0003 INFECTIOUS AGENT DETECTION BY NUCLEIC ACID (DNA OR RNA); SEVERE ACUTE RESPIRATORY SYNDROME CORONAVIRUS 2 (SARS-COV-2) (CORONAVIRUS DISEASE [COVID-19]), AMPLIFIED PROBE TECHNIQUE, MAKING USE OF HIGH THROUGHPUT TECHNOLOGIES AS DESCRIBED BY CMS-2020-01-R: HCPCS

## 2021-01-11 PROCEDURE — G8484 FLU IMMUNIZE NO ADMIN: HCPCS | Performed by: NURSE PRACTITIONER

## 2021-01-11 PROCEDURE — 99214 OFFICE O/P EST MOD 30 MIN: CPT | Performed by: NURSE PRACTITIONER

## 2021-01-11 RX ORDER — GUAIFENESIN AND CODEINE PHOSPHATE 100; 10 MG/5ML; MG/5ML
5 SOLUTION ORAL 2 TIMES DAILY PRN
Qty: 70 ML | Refills: 0 | Status: SHIPPED | OUTPATIENT
Start: 2021-01-11 | End: 2021-01-14

## 2021-01-11 RX ORDER — PREDNISONE 20 MG/1
20 TABLET ORAL 2 TIMES DAILY
Qty: 10 TABLET | Refills: 0 | Status: SHIPPED | OUTPATIENT
Start: 2021-01-11 | End: 2021-01-16

## 2021-01-11 ASSESSMENT — ENCOUNTER SYMPTOMS
EYE REDNESS: 0
COUGH: 1
ALLERGIC/IMMUNOLOGIC NEGATIVE: 1
DIARRHEA: 1
EYE PAIN: 0
VOMITING: 0
EYE DISCHARGE: 0
WHEEZING: 0
CONSTIPATION: 0
ABDOMINAL PAIN: 0
BACK PAIN: 0
TROUBLE SWALLOWING: 0
RHINORRHEA: 0
SHORTNESS OF BREATH: 0
SORE THROAT: 1
NAUSEA: 0

## 2021-01-11 NOTE — LETTER
[address]  [facility] [address]  Phone: [phone]  Fax: [phone]    GERALDINE Glass CNP        January 11, 2021     Patient: Jennifer Moran   YOB: 1980   Date of Visit: 1/11/2021       To Whom it May Concern:    Bertrand Fan was seen in my clinic on 1/11/2021. She may return to work on when [facility] BERNADINE test returns negative this week. .    If you have any questions or concerns, please don't hesitate to call.     Sincerely,     Electronically signed by GERALDINE Glass CNP on 1/11/2021 at 11:10 AM

## 2021-01-11 NOTE — PROGRESS NOTES
[address] [place] [address]  [facility] [address]  Dept: [phone]  Dept Fax: [phone]  Loc: [phone]     Visit Date:  1/11/2021      Patient:  Greyson Oconnor  YOB: 1980    HPI:     Chief Complaint   Patient presents with    Cough       This is a video visit. Symptoms for 2 days starting with cough, anosmia, headache, diarrhea, sore throat. Denies fever, otalgia. Non-smoker. Medications    Current Outpatient Medications:     predniSONE (DELTASONE) 20 MG tablet, Take 1 tablet by mouth 2 times daily for 5 days, Disp: 10 tablet, Rfl: 0    guaiFENesin-codeine (TUSSI-ORGANIDIN NR) 100-10 MG/5ML syrup, Take 5 mLs by mouth 2 times daily as needed for Cough for up to 7 days. , Disp: 70 mL, Rfl: 0    butalbital-acetaminophen-caffeine (FIORICET, ESGIC) -40 MG per tablet, Take 1 tablet by mouth every 6 hours as needed for Headaches, Disp: 60 tablet, Rfl: 1    buPROPion (WELLBUTRIN XL) 300 MG extended release tablet, Take 1 tablet by mouth every morning, Disp: 90 tablet, Rfl: 3    The patient is allergic to amoxicillin. Past Medical History  Castro Sewell  has a past medical history of Kidney stone and Kidney stones. Subjective:      Review of Systems   Constitutional: Positive for fatigue. Negative for activity change and fever. HENT: Positive for congestion and sore throat. Negative for ear pain, rhinorrhea and trouble swallowing. Anosmia     Eyes: Negative for pain, discharge and redness. Respiratory: Positive for cough. Negative for shortness of breath and wheezing. Cardiovascular: Negative. Gastrointestinal: Positive for diarrhea. Negative for abdominal pain, constipation, nausea and vomiting. Endocrine: Negative. Genitourinary: Negative for dysuria, frequency and urgency. Musculoskeletal: Negative for arthralgias, back pain and myalgias. Skin: Negative for rash. Allergic/Immunologic: Negative. Neurological: Positive for headaches. Negative for dizziness, tremors and weakness. Hematological: Negative. Psychiatric/Behavioral: Negative for dysphoric mood and sleep disturbance. The patient is not nervous/anxious. Objective:     LMP 03/06/2019     Physical Exam  Constitutional:       General: She is not in acute distress. Appearance: She is ill-appearing. Neurological:      Mental Status: She is alert and oriented to person, place, and time. Psychiatric:         Mood and Affect: Mood normal.         Behavior: Behavior normal.         Thought Content: Thought content normal.         Judgment: Judgment normal.         Assessment/Plan:      Dejah Ramos was seen today for cough. Diagnoses and all orders for this visit:    Suspected 2019 novel coronavirus infection  -     COVID-19 Ambulatory; Future  -     predniSONE (DELTASONE) 20 MG tablet; Take 1 tablet by mouth 2 times daily for 5 days  -     guaiFENesin-codeine (TUSSI-ORGANIDIN NR) 100-10 MG/5ML syrup; Take 5 mLs by mouth 2 times daily as needed for Cough for up to 7 days. Return if symptoms worsen or fail to improve. John Arthur is a 45 y.o. female being evaluated by a Virtual Visit (video visit) encounter to address concerns as mentioned above. A caregiver was present when appropriate. Due to this being a TeleHealth encounter (During [facility] public health emergency), evaluation of the following organ systems was limited: Vitals/Constitutional/EENT/Resp/CV/GI//MS/Neuro/Skin/Heme-Lymph-Imm. Pursuant to the emergency declaration under the [address] [place] [address], [address] [place] [address] authority and the [employer] Act, this Virtual Visit was conducted with patient's (and/or legal guardian's) consent, to reduce the patient's risk of exposure to COVID-19 and provide necessary medical care. The patient (and/or legal guardian) has also been advised to contact this office for worsening conditions or problems, and seek emergency medical treatment and/or call 911 if deemed necessary. Patient identification was verified at the start of the visit: Yes    Total time spent for this encounter: 15 min    Services were provided through a video synchronous discussion virtually to substitute for in-person clinic visit. Patient and provider were located at their individual homes. --GERALDINE Davis CNP on 1/11/2021 at 11:25 AM    An electronic signature was used to authenticate this note.          Electronically signed by GERALDINE Davis CNP on 1/11/2021 at 11:25 AM

## 2021-01-13 LAB — SARS-COV-2: DETECTED

## 2021-01-14 ENCOUNTER — TELEPHONE (OUTPATIENT)
Dept: FAMILY MEDICINE CLINIC | Age: 41
End: 2021-01-14

## 2021-01-14 DIAGNOSIS — U07.1 COVID-19 VIRUS INFECTION: Primary | ICD-10-CM

## 2021-01-14 RX ORDER — GUAIFENESIN AND CODEINE PHOSPHATE 100; 10 MG/5ML; MG/5ML
5 SOLUTION ORAL 2 TIMES DAILY PRN
Qty: 50 ML | Refills: 0 | Status: SHIPPED | OUTPATIENT
Start: 2021-01-14 | End: 2021-01-19

## 2021-01-14 NOTE — TELEPHONE ENCOUNTER
Patient is requesting a refill of her cough syrup you gave her. She says it really helps with the cough.      Call patient back

## 2021-01-27 ENCOUNTER — TELEPHONE (OUTPATIENT)
Dept: FAMILY MEDICINE CLINIC | Age: 41
End: 2021-01-27

## 2021-01-27 RX ORDER — BUPROPION HYDROCHLORIDE 300 MG/1
300 TABLET ORAL EVERY MORNING
Qty: 90 TABLET | Refills: 3 | Status: SHIPPED | OUTPATIENT
Start: 2021-01-27 | End: 2022-01-19 | Stop reason: SDUPTHER

## 2021-01-27 RX ORDER — BUPROPION HYDROCHLORIDE 150 MG/1
150 TABLET ORAL EVERY MORNING
Qty: 90 TABLET | Refills: 3 | Status: SHIPPED | OUTPATIENT
Start: 2021-01-27 | End: 2022-01-19 | Stop reason: SDUPTHER

## 2021-01-27 RX ORDER — ESCITALOPRAM OXALATE 20 MG/1
20 TABLET ORAL DAILY
COMMUNITY
End: 2021-02-25 | Stop reason: SDUPTHER

## 2021-01-27 NOTE — TELEPHONE ENCOUNTER
Patient informed. Insurance will not cover the bupropion 450 mg because it is brand name, so a Rx for the 150mg and 300 mg tablets will need sent separately. Ok per verbal order from Dr Riana Humphrey. Rx's escribed.

## 2021-01-27 NOTE — TELEPHONE ENCOUNTER
Patient wants to know if she can take Lexapro AND Wellbutrin 450 mg. Says she is hungry all of the time on the Wellbutrin.

## 2021-02-08 RX ORDER — BUPROPION HYDROCHLORIDE 300 MG/1
300 TABLET ORAL EVERY MORNING
Qty: 90 TABLET | Refills: 3 | OUTPATIENT
Start: 2021-02-08

## 2021-02-08 NOTE — TELEPHONE ENCOUNTER
[facility] called requesting a refill on the following medications:  Requested Prescriptions     Pending Prescriptions Disp Refills    buPROPion (WELLBUTRIN XL) 300 MG extended release tablet 90 tablet 3     Sig: Take 1 tablet by mouth every morning Take with a 150 mg     Pharmacy verified: yes      *Patient is requesting a 90 day supply      Date of last visit: 12/28/2020  Date of next visit (if applicable): Visit date not found

## 2021-02-17 ENCOUNTER — OFFICE VISIT (OUTPATIENT)
Dept: FAMILY MEDICINE CLINIC | Age: 41
End: 2021-02-17
Payer: MEDICAID

## 2021-02-17 VITALS
RESPIRATION RATE: 16 BRPM | BODY MASS INDEX: 24.87 KG/M2 | WEIGHT: 136 LBS | SYSTOLIC BLOOD PRESSURE: 100 MMHG | TEMPERATURE: 97.1 F | OXYGEN SATURATION: 99 % | DIASTOLIC BLOOD PRESSURE: 68 MMHG | HEART RATE: 64 BPM

## 2021-02-17 DIAGNOSIS — J02.9 ACUTE PHARYNGITIS, UNSPECIFIED ETIOLOGY: Primary | ICD-10-CM

## 2021-02-17 DIAGNOSIS — R53.83 FATIGUE, UNSPECIFIED TYPE: ICD-10-CM

## 2021-02-17 LAB
INFLUENZA VIRUS A RNA: NORMAL
INFLUENZA VIRUS B RNA: NORMAL
STREPTOCOCCUS A RNA: NORMAL

## 2021-02-17 PROCEDURE — G8484 FLU IMMUNIZE NO ADMIN: HCPCS | Performed by: NURSE PRACTITIONER

## 2021-02-17 PROCEDURE — G8420 CALC BMI NORM PARAMETERS: HCPCS | Performed by: NURSE PRACTITIONER

## 2021-02-17 PROCEDURE — 87502 INFLUENZA DNA AMP PROBE: CPT | Performed by: NURSE PRACTITIONER

## 2021-02-17 PROCEDURE — 87651 STREP A DNA AMP PROBE: CPT | Performed by: NURSE PRACTITIONER

## 2021-02-17 PROCEDURE — G8427 DOCREV CUR MEDS BY ELIG CLIN: HCPCS | Performed by: NURSE PRACTITIONER

## 2021-02-17 PROCEDURE — 99213 OFFICE O/P EST LOW 20 MIN: CPT | Performed by: NURSE PRACTITIONER

## 2021-02-17 PROCEDURE — 1036F TOBACCO NON-USER: CPT | Performed by: NURSE PRACTITIONER

## 2021-02-17 RX ORDER — PREDNISONE 10 MG/1
10 TABLET ORAL 2 TIMES DAILY
Qty: 10 TABLET | Refills: 0 | Status: SHIPPED | OUTPATIENT
Start: 2021-02-17 | End: 2021-02-22

## 2021-02-17 RX ORDER — PROMETHAZINE HYDROCHLORIDE 25 MG/1
25 TABLET ORAL EVERY 8 HOURS PRN
Qty: 20 TABLET | Refills: 0 | Status: SHIPPED | OUTPATIENT
Start: 2021-02-17 | End: 2021-02-22

## 2021-02-17 RX ORDER — AZITHROMYCIN 250 MG/1
TABLET, FILM COATED ORAL
Qty: 6 TABLET | Refills: 0 | Status: SHIPPED | OUTPATIENT
Start: 2021-02-17 | End: 2021-02-27

## 2021-02-17 ASSESSMENT — PATIENT HEALTH QUESTIONNAIRE - PHQ9
SUM OF ALL RESPONSES TO PHQ QUESTIONS 1-9: 0
SUM OF ALL RESPONSES TO PHQ QUESTIONS 1-9: 0
SUM OF ALL RESPONSES TO PHQ9 QUESTIONS 1 & 2: 0
2. FEELING DOWN, DEPRESSED OR HOPELESS: 0

## 2021-02-18 ASSESSMENT — ENCOUNTER SYMPTOMS
SHORTNESS OF BREATH: 0
EYES NEGATIVE: 1
GASTROINTESTINAL NEGATIVE: 1
SORE THROAT: 1
COUGH: 0

## 2021-02-18 NOTE — PROGRESS NOTES
[address] Vikram Mendez [facility] [address]  Dept: [phone]  Dept Fax: [phone]  Loc: [phone]  PROGRESS NOTE      VisitDate: 2/17/2021    Sheryl Tillman is a 45 y.o. female who presents today for:     Chief Complaint   Patient presents with    Pharyngitis     symptoms since yesterday, ST, HA, stomach upset, denies vomitting or fever, taking nyquil without relief, covid positive 1mo ago          Subjective:  Patient presents for red appointment with complaint of a sore throat, headache, nausea. Denies any vomiting, diarrhea fever, chest pain, shortness of breath, chills. Patient had Covid approximately 1 month ago. History of kidney stones. Review of Systems   Constitutional: Positive for fatigue. Negative for chills and fever. HENT: Positive for sore throat. Eyes: Negative. Respiratory: Negative for cough and shortness of breath. Cardiovascular: Negative. Gastrointestinal: Negative. Endocrine: Negative. Genitourinary: Negative. Musculoskeletal: Negative. Neurological: Positive for headaches. Hematological: Negative. Psychiatric/Behavioral: Negative.       Past Medical History:   Diagnosis Date    Kidney stone     2005 with pregnancy    Kidney stones       Past Surgical History:   Procedure Laterality Date    COLONOSCOPY  03/25/2016    Dr. Regina Gaines 9/13/2017    DILATATION AND CURETTAGE HYSTEROSCOPY, POSSIBLE MYOSURE performed by Og Villatoro DO at [place] [address]  04/18/2018    HERNIA REPAIR      HYSTERECTOMY ABDOMINAL N/A 3/13/2019    ROBOTIC TOTAL LAPAROSCOPIC HYSTERECTOMY WITH BILATERAL SALPINGECTOMY performed by Og Villatoro DO at [address] [place] [address] HYSTEROSCOPY  04/18/2018    NH HYSTEROSCOPY,W/ENDOMETRIAL ABLATION N/A 4/18/2018    HYSTEROSCOPY ENDOMETRIAL ABLATION performed by [facility] DO at [facility] OR    TUBAL LIGATION       Family History   Problem Relation Age of Onset    Heart Disease Father     High Blood Pressure Father     High Cholesterol Father     Diabetes Father     Asthma Neg Hx      Social History     Tobacco Use    Smoking status: Never Smoker    Smokeless tobacco: Never Used   Substance Use Topics    Alcohol use: No     Alcohol/week: 0.0 standard drinks      Current Outpatient Medications   Medication Sig Dispense Refill    azithromycin (ZITHROMAX Z-NBA) 250 MG tablet 2 pills orally for 1 day, then 1 pill orally for 4 days 6 tablet 0    predniSONE (DELTASONE) 10 MG tablet Take 1 tablet by mouth 2 times daily for 5 days 10 tablet 0    promethazine (PHENERGAN) 25 MG tablet Take 1 tablet by mouth every 8 hours as needed for Nausea 20 tablet 0    escitalopram (LEXAPRO) 20 MG tablet Take 20 mg by mouth daily      buPROPion (WELLBUTRIN XL) 300 MG extended release tablet Take 1 tablet by mouth every morning Take with a 150 mg 90 tablet 3    buPROPion (WELLBUTRIN XL) 150 MG extended release tablet Take 1 tablet by mouth every morning Take with a 300 mg 90 tablet 3    butalbital-acetaminophen-caffeine (FIORICET, ESGIC) -40 MG per tablet Take 1 tablet by mouth every 6 hours as needed for Headaches 60 tablet 1     No current facility-administered medications for this visit.       Allergies   Allergen Reactions    Amoxicillin Hives     Health Maintenance   Topic Date Due    Hepatitis C screen  1980    Varicella vaccine (1 of 2 - 2-dose childhood series) 06/02/1982    HIV screen  06/02/1996    DTaP/Tdap/Td vaccine (1 - Tdap) 06/02/2000    Cervical cancer screen  06/15/2018    Flu vaccine (1) 01/11/2022 (Originally 9/1/2020)    Hepatitis A vaccine  Aged Out    Hepatitis B vaccine  Aged Out    Hib vaccine  Aged Out    Meningococcal (ACWY) vaccine  Aged Out    Pneumococcal 0-64 years Vaccine  Aged Out         Objective:     Physical Exam  Vitals signs and nursing note reviewed. Constitutional:       Appearance: Normal appearance. She is well-developed and normal weight. HENT:      Head: Normocephalic. Right Ear: Tympanic membrane normal.      Left Ear: Tympanic membrane normal.      Nose: Nose normal.      Mouth/Throat:      Pharynx: Oropharynx is clear. Posterior oropharyngeal erythema present. No oropharyngeal exudate. Eyes:      Conjunctiva/sclera: Conjunctivae normal.   Neck:      Musculoskeletal: Normal range of motion and neck supple. Cardiovascular:      Rate and Rhythm: Normal rate and regular rhythm. Heart sounds: Normal heart sounds. Pulmonary:      Effort: Pulmonary effort is normal.      Breath sounds: Normal breath sounds. Abdominal:      General: Bowel sounds are normal.      Palpations: Abdomen is soft. Musculoskeletal: Normal range of motion. Lymphadenopathy:      Cervical: Cervical adenopathy present. Skin:     General: Skin is warm and dry. Neurological:      General: No focal deficit present. Mental Status: She is alert and oriented to person, place, and time. Psychiatric:         Mood and Affect: Mood normal.         Thought Content: Thought content normal.       /68 (Site: Right Upper Arm)   Pulse 64   Temp 97.1 °F (36.2 °C) (Infrared)   Resp 16   Wt 136 lb (61.7 kg)   LMP 2019   SpO2 99%   BMI 24.87 kg/m²   Rapid influenza and rapid strep negative    Impression/Plan:  1. Acute pharyngitis, unspecified etiology    2.  Fatigue, unspecified type      Requested Prescriptions     Signed Prescriptions Disp Refills    azithromycin (ZITHROMAX Z-NBA) 250 MG tablet 6 tablet 0     Si pills orally for 1 day, then 1 pill orally for 4 days    predniSONE (DELTASONE) 10 MG tablet 10 tablet 0     Sig: Take 1 tablet by mouth 2 times daily for 5 days    promethazine (PHENERGAN) 25 MG tablet 20 tablet 0     Sig: Take 1 tablet by mouth every 8 hours as needed for Nausea     Orders Placed This Encounter   Procedures  POCT Influenza A/B DNA (Alere i)    POCT Rapid Strep A DNA (Alere i)       Patient giveneducational materials - see patient instructions. Discussed use, benefit, and side effects of prescribed medications. All patient questions answered. Pt voiced understanding. Reviewed health maintenance. Patient agreedwith treatment plan. Follow up as directed. Off work today. Fluids encouraged. Z-Rush, steroids and Phenergan prescribed. Continue medications as prescribed.  Educational information on above diagnosis  provided per AVS.    Electronically signed by GERALDINE Alvarado CNP on 2/18/2021 at 9:02 AM

## 2021-02-25 RX ORDER — ESCITALOPRAM OXALATE 20 MG/1
20 TABLET ORAL DAILY
Qty: 90 TABLET | Refills: 1 | Status: SHIPPED | OUTPATIENT
Start: 2021-02-25 | End: 2021-04-15

## 2021-02-25 NOTE — TELEPHONE ENCOUNTER
Theron Rouse called requesting a refill on the following medications:  Requested Prescriptions     Pending Prescriptions Disp Refills    escitalopram (LEXAPRO) 20 MG tablet 30 tablet      Sig: Take 1 tablet by mouth daily     Pharmacy verified:  .pv      Date of last visit: 11/11/21  Date of next visit (if applicable): Visit date not found    Pt states she has one pill left.

## 2021-03-04 RX ORDER — CYCLOBENZAPRINE HCL 10 MG
10 TABLET ORAL 3 TIMES DAILY PRN
Qty: 15 TABLET | Refills: 0 | Status: SHIPPED | OUTPATIENT
Start: 2021-03-04 | End: 2021-04-15

## 2021-04-08 ENCOUNTER — HOSPITAL ENCOUNTER (EMERGENCY)
Age: 41
Discharge: HOME | End: 2021-04-08
Payer: MEDICAID

## 2021-04-08 VITALS
OXYGEN SATURATION: 97 % | HEIGHT: 62 IN | SYSTOLIC BLOOD PRESSURE: 117 MMHG | DIASTOLIC BLOOD PRESSURE: 68 MMHG | WEIGHT: 131.8 LBS | RESPIRATION RATE: 16 BRPM | TEMPERATURE: 98.1 F | BODY MASS INDEX: 24.25 KG/M2 | HEART RATE: 83 BPM

## 2021-04-08 DIAGNOSIS — R10.9 LEFT FLANK PAIN: Primary | ICD-10-CM

## 2021-04-08 LAB
BILIRUBIN URINE: NEGATIVE
BLOOD, URINE: NEGATIVE
CHARACTER, URINE: CLEAR
COLOR: YELLOW
GLUCOSE URINE: NEGATIVE MG/DL
KETONES, URINE: NEGATIVE
LEUKOCYTE ESTERASE, URINE: NEGATIVE
NITRITE, URINE: NEGATIVE
PH, URINE: 8.5 (ref 5–9)
PROTEIN, URINE: NEGATIVE MG/DL
SPECIFIC GRAVITY, URINE: 1.01 (ref 1–1.03)
UROBILINOGEN, URINE: 0.2 EU/DL (ref 0.2–1)

## 2021-04-08 PROCEDURE — 99213 OFFICE O/P EST LOW 20 MIN: CPT

## 2021-04-08 PROCEDURE — 81003 URINALYSIS AUTO W/O SCOPE: CPT

## 2021-04-08 PROCEDURE — 99213 OFFICE O/P EST LOW 20 MIN: CPT | Performed by: NURSE PRACTITIONER

## 2021-04-08 RX ORDER — TAMSULOSIN HYDROCHLORIDE 0.4 MG/1
0.4 CAPSULE ORAL DAILY
Qty: 30 CAPSULE | Refills: 0 | Status: SHIPPED | OUTPATIENT
Start: 2021-04-08 | End: 2021-05-12 | Stop reason: ALTCHOICE

## 2021-04-08 RX ORDER — KETOROLAC TROMETHAMINE 10 MG/1
10 TABLET, FILM COATED ORAL EVERY 6 HOURS PRN
Qty: 20 TABLET | Refills: 0 | Status: SHIPPED | OUTPATIENT
Start: 2021-04-08 | End: 2021-05-12 | Stop reason: ALTCHOICE

## 2021-04-08 ASSESSMENT — PAIN DESCRIPTION - DESCRIPTORS: DESCRIPTORS: ACHING;DISCOMFORT

## 2021-04-08 ASSESSMENT — ENCOUNTER SYMPTOMS
ABDOMINAL PAIN: 0
CHEST TIGHTNESS: 0
SHORTNESS OF BREATH: 0
ALLERGIC/IMMUNOLOGIC NEGATIVE: 1
VOMITING: 0
WHEEZING: 0
COUGH: 0
NAUSEA: 0
ABDOMINAL DISTENTION: 0
DIARRHEA: 0
BACK PAIN: 1
ABDOMINAL SWELLING: 0
EYES NEGATIVE: 1

## 2021-04-08 ASSESSMENT — PAIN DESCRIPTION - LOCATION: LOCATION: FLANK

## 2021-04-08 NOTE — ED TRIAGE NOTES
Patient ambulated to room with complaint of left flank pain with no known injury. States she does have history of kidney stones and pain feels similar.

## 2021-04-11 ENCOUNTER — HOSPITAL ENCOUNTER (EMERGENCY)
Age: 41
Discharge: HOME | End: 2021-04-11
Attending: EMERGENCY MEDICINE
Payer: MEDICAID

## 2021-04-11 VITALS
SYSTOLIC BLOOD PRESSURE: 113 MMHG | RESPIRATION RATE: 16 BRPM | OXYGEN SATURATION: 98 % | HEART RATE: 79 BPM | TEMPERATURE: 98.7 F | BODY MASS INDEX: 23.96 KG/M2 | WEIGHT: 131 LBS | DIASTOLIC BLOOD PRESSURE: 73 MMHG

## 2021-04-11 DIAGNOSIS — T07.XXXA MULTIPLE BRUISES: Primary | ICD-10-CM

## 2021-04-11 LAB
ALBUMIN SERPL-MCNC: 4.7 G/DL (ref 3.5–5.1)
ALP BLD-CCNC: 58 U/L (ref 38–126)
ALT SERPL-CCNC: 10 U/L (ref 11–66)
ANION GAP SERPL CALCULATED.3IONS-SCNC: 7 MEQ/L (ref 8–16)
APTT: 32.2 SECONDS (ref 22–38)
AST SERPL-CCNC: 14 U/L (ref 5–40)
BASOPHILS # BLD: 0.2 %
BASOPHILS ABSOLUTE: 0 THOU/MM3 (ref 0–0.1)
BILIRUB SERPL-MCNC: 0.3 MG/DL (ref 0.3–1.2)
BILIRUBIN DIRECT: < 0.2 MG/DL (ref 0–0.3)
BUN BLDV-MCNC: 11 MG/DL (ref 7–22)
CALCIUM SERPL-MCNC: 9.6 MG/DL (ref 8.5–10.5)
CHLORIDE BLD-SCNC: 107 MEQ/L (ref 98–111)
CO2: 24 MEQ/L (ref 23–33)
CREAT SERPL-MCNC: 0.8 MG/DL (ref 0.4–1.2)
EOSINOPHIL # BLD: 2 %
EOSINOPHILS ABSOLUTE: 0.1 THOU/MM3 (ref 0–0.4)
ERYTHROCYTE [DISTWIDTH] IN BLOOD BY AUTOMATED COUNT: 12.4 % (ref 11.5–14.5)
ERYTHROCYTE [DISTWIDTH] IN BLOOD BY AUTOMATED COUNT: 42.5 FL (ref 35–45)
GFR SERPL CREATININE-BSD FRML MDRD: 80 ML/MIN/1.73M2
GLUCOSE BLD-MCNC: 95 MG/DL (ref 70–108)
HCT VFR BLD CALC: 42.6 % (ref 37–47)
HEMOGLOBIN: 13.8 GM/DL (ref 12–16)
IMMATURE GRANS (ABS): 0.01 THOU/MM3 (ref 0–0.07)
IMMATURE GRANULOCYTES: 0.2 %
INR BLD: 0.97 (ref 0.85–1.13)
IRON: 100 UG/DL (ref 50–170)
LYMPHOCYTES # BLD: 30.9 %
LYMPHOCYTES ABSOLUTE: 1.4 THOU/MM3 (ref 1–4.8)
MCH RBC QN AUTO: 30 PG (ref 26–33)
MCHC RBC AUTO-ENTMCNC: 32.4 GM/DL (ref 32.2–35.5)
MCV RBC AUTO: 92.6 FL (ref 81–99)
MONOCYTES # BLD: 8.4 %
MONOCYTES ABSOLUTE: 0.4 THOU/MM3 (ref 0.4–1.3)
NUCLEATED RED BLOOD CELLS: 0 /100 WBC
OSMOLALITY CALCULATION: 274.9 MOSMOL/KG (ref 275–300)
PLATELET # BLD: 256 THOU/MM3 (ref 130–400)
PMV BLD AUTO: 9.6 FL (ref 9.4–12.4)
POTASSIUM SERPL-SCNC: 4.4 MEQ/L (ref 3.5–5.2)
RBC # BLD: 4.6 MILL/MM3 (ref 4.2–5.4)
SEG NEUTROPHILS: 58.3 %
SEGMENTED NEUTROPHILS ABSOLUTE COUNT: 2.6 THOU/MM3 (ref 1.8–7.7)
SODIUM BLD-SCNC: 138 MEQ/L (ref 135–145)
TOTAL PROTEIN: 6.7 G/DL (ref 6.1–8)
WBC # BLD: 4.5 THOU/MM3 (ref 4.8–10.8)

## 2021-04-11 PROCEDURE — 99282 EMERGENCY DEPT VISIT SF MDM: CPT

## 2021-04-11 PROCEDURE — 36415 COLL VENOUS BLD VENIPUNCTURE: CPT

## 2021-04-11 PROCEDURE — 83540 ASSAY OF IRON: CPT

## 2021-04-11 PROCEDURE — 82248 BILIRUBIN DIRECT: CPT

## 2021-04-11 PROCEDURE — 80053 COMPREHEN METABOLIC PANEL: CPT

## 2021-04-11 PROCEDURE — 85610 PROTHROMBIN TIME: CPT

## 2021-04-11 PROCEDURE — 85730 THROMBOPLASTIN TIME PARTIAL: CPT

## 2021-04-11 PROCEDURE — 85025 COMPLETE CBC W/AUTO DIFF WBC: CPT

## 2021-04-11 ASSESSMENT — ENCOUNTER SYMPTOMS
RHINORRHEA: 0
DIARRHEA: 0
SHORTNESS OF BREATH: 0
SINUS PRESSURE: 0
BACK PAIN: 0
CONSTIPATION: 0
VOICE CHANGE: 0
TROUBLE SWALLOWING: 0
ABDOMINAL PAIN: 0
CHEST TIGHTNESS: 0
NAUSEA: 0
WHEEZING: 0
SORE THROAT: 0
VOMITING: 0
COUGH: 0

## 2021-04-11 NOTE — ED NOTES
Pt presents to ED via triage for c/o bruising easily. Pt reports history of being iron deficient, however has not had to take supplements in \"years. \" Pt reports L flank pain x2-3 days ago, pain has now subsided. Pt is concerned because there \"have been random bruises showing up on my arms and legs. \" Pt denies any recent injury or anything.       Logan Perez, JOVANNI  04/11/21 [id number]

## 2021-04-11 NOTE — ED PROVIDER NOTES
[facility]        CHIEF COMPLAINT    Chief Complaint   Patient presents with    Other     Bruising Easily- history of iron deficiency       Nurses Notes reviewed and I agree except as noted in the HPI. HPI    Dominic Gutierres is a 45 y.o. female who presents for evaluation of bruises on the elbow knee and a lot on the health I noted yesterday. Patient also feels like she is tired and fatigue, denies any shortness of breath or chest pain, no fever, no cough, no cold, no abdominal pain flank pain dysuria frequency. Patient states that she has low in iron and she is wondering if it is related to eat. Patient denies any Covid however she has a Covid infection in January 2021. Patient is concerned as leukemia and blood disorder runs in the family    REVIEW OF SYSTEMS    Review of Systems   Constitutional: Negative for appetite change, chills, diaphoresis, fatigue and fever. HENT: Negative for congestion, ear pain, postnasal drip, rhinorrhea, sinus pressure, sneezing, sore throat, trouble swallowing and voice change. Respiratory: Negative for cough, chest tightness, shortness of breath and wheezing. Cardiovascular: Negative for chest pain, palpitations and leg swelling. Gastrointestinal: Negative for abdominal pain, constipation, diarrhea, nausea and vomiting. Musculoskeletal: Negative for arthralgias, back pain, joint swelling, myalgias, neck pain and neck stiffness. Skin: Positive for rash. Bruises on the right elbow knee thigh   Neurological: Negative for dizziness, syncope, weakness, light-headedness, numbness and headaches. PAST MEDICAL HISTORY     has a past medical history of Kidney stone and Kidney stones. SURGICAL HISTORY   has a past surgical history that includes hernia repair; Tubal ligation; Colonoscopy (03/25/2016);  Dilation and curettage of uterus (N/A, 9/13/2017); hysteroscopy (04/18/2018); Endometrial ablation (04/18/2018); pr hysteroscopy,w/endometrial ablation (N/A, 4/18/2018); and HYSTERECTOMY ABDOMINAL (N/A, 3/13/2019). CURRENT MEDICATIONS    Previous Medications    BUPROPION (WELLBUTRIN XL) 150 MG EXTENDED RELEASE TABLET    Take 1 tablet by mouth every morning Take with a 300 mg    BUPROPION (WELLBUTRIN XL) 300 MG EXTENDED RELEASE TABLET    Take 1 tablet by mouth every morning Take with a 150 mg    BUTALBITAL-ACETAMINOPHEN-CAFFEINE (FIORICET, ESGIC) -40 MG PER TABLET    Take 1 tablet by mouth every 6 hours as needed for Headaches    CYCLOBENZAPRINE (FLEXERIL) 10 MG TABLET    Take 1 tablet by mouth 3 times daily as needed for Muscle spasms . Do not drive or operate heavy machinery while taking this medication. ESCITALOPRAM (LEXAPRO) 20 MG TABLET    Take 1 tablet by mouth daily    KETOROLAC (TORADOL) 10 MG TABLET    Take 1 tablet by mouth every 6 hours as needed for Pain    TAMSULOSIN (FLOMAX) 0.4 MG CAPSULE    Take 1 capsule by mouth daily       ALLERGIES    is allergic to amoxicillin. FAMILY HISTORY    She indicated that her mother is alive. She indicated that her father is alive. She indicated that the status of her neg hx is unknown.   family history includes Diabetes in her father; Heart Disease in her father; High Blood Pressure in her father; High Cholesterol in her father. SOCIAL HISTORY     reports that she has never smoked. She has never used smokeless tobacco. She reports that she does not drink alcohol or use drugs. PHYSICAL EXAM      INITIAL VITALS: /73   Pulse 79   Temp 98.7 °F (37.1 °C) (Oral)   Resp 16   Wt 131 lb (59.4 kg)   LMP 03/06/2019   SpO2 98%   BMI 23.96 kg/m² Estimated body mass index is 23.96 kg/m² as calculated from the following:    Height as of 4/8/21: 5' 2\" (1.575 m). Weight as of this encounter: 131 lb (59.4 kg). Physical Exam  Vitals signs reviewed. Constitutional:       Appearance: She is well-developed. RATE, ESTIMATED - Abnormal; Notable for the following components:    Est, Glom Filt Rate 80 (*)     All other components within normal limits   BASIC METABOLIC PANEL   APTT   PROTIME-INR   IRON     All other unresulted laboratory test above are normal:    Vitals:    Vitals:    04/11/21 1417   BP: 113/73   Pulse: 79   Resp: 16   Temp: 98.7 °F (37.1 °C)   TempSrc: Oral   SpO2: 98%   Weight: 131 lb (59.4 kg)       EMERGENCY DEPARTMENT COURSE:    Medications - No data to display    The pt was seen and evaluated by me. Within the department, I observed the pt's vitalsigns to be within acceptable range. Laboratory  studies were performed, results were reviewed with the patient. I observed the pt's condition to be hemodynamically stable during the duration of their stay. I explained my proposed course of treatment to the pt, and they were amenable to my decision. They were discharged home, and they will return to the ED if their symptoms become more severein nature, or otherwise change. Controlled Substances Monitoring:        CRITICAL CARE:   None. CONSULTS:  None    PROCEDURES:  None. FINAL IMPRESSION       1. Multiple bruises          DISPOSITION/PLAN  PATIENT REFERRED TO:  lA Jacques MD  [address] [facility] [address]  [phone]    Schedule an appointment as soon as possible for a visit in 1 week      [address] [facility] [address] EMERGENCY DEPT  [address]    As needed    DISCHARGE MEDICATIONS:  New Prescriptions    No medications on file         (Please note that portions of this note were completed with a voice recognition program and electronically transcribed. Efforts were [facility] edit the dictations but occasionally words are mis-transcribed . The transcription may contain errors not detected in proofreading.   This transcription was electronically signed.)     04/11/21 4:35 PM      Ilene Jerez MD      Emergency room physician           Ilene Jerez, MD  04/11/21 5947

## 2021-04-12 ENCOUNTER — TELEPHONE (OUTPATIENT)
Dept: FAMILY MEDICINE CLINIC | Age: 41
End: 2021-04-12

## 2021-04-12 NOTE — LETTER
[facility] [address]  [address]  [facility] [address]  Phone: [phone]  Fax: [phone]    GERALDINE Garcia CNP        April 12, 2021     Patient: Akila Jane   YOB: 1980           To Whom It May Concern: It is my medical opinion that Arnulfo Victor may be excused from work 4-12-21 and 4-13-21. She may return to work 4-14-21. If you have any questions or concerns, please don't hesitate to call.     Sincerely,        GERALDINE Garcia CNP

## 2021-04-12 NOTE — TELEPHONE ENCOUNTER
Was at [facility] yesterday for low back pain, they said she may have a kidney stone due to the location of her pain, urine was clear. Requesting a work slip for today and tomorrow.        Call patient back  [phone]

## 2021-04-14 ENCOUNTER — TELEPHONE (OUTPATIENT)
Dept: FAMILY MEDICINE CLINIC | Age: 41
End: 2021-04-14

## 2021-04-14 ENCOUNTER — NURSE ONLY (OUTPATIENT)
Dept: LAB | Age: 41
End: 2021-04-14

## 2021-04-14 DIAGNOSIS — R53.83 FATIGUE, UNSPECIFIED TYPE: ICD-10-CM

## 2021-04-14 DIAGNOSIS — R53.83 FATIGUE, UNSPECIFIED TYPE: Primary | ICD-10-CM

## 2021-04-14 LAB
IRON: 62 UG/DL (ref 50–170)
T4 FREE: 0.8 NG/DL (ref 0.93–1.76)
TSH SERPL DL<=0.05 MIU/L-ACNC: 2.76 UIU/ML (ref 0.4–4.2)
VITAMIN B-12: 296 PG/ML (ref 211–911)
VITAMIN D 25-HYDROXY: 46 NG/ML (ref 30–100)

## 2021-04-14 NOTE — TELEPHONE ENCOUNTER
Pt will have labs drawn at [facility] today, appt to discuss results scheduled with JR tomorrow

## 2021-04-14 NOTE — TELEPHONE ENCOUNTER
Patient called stating she has been feeling tired the past few days. She has been sleeping through the night but wondered if there is something that could be prescribed for her lack of energy or if labs need ordered. Please advise.     - Located within [facility]  pharmacy

## 2021-04-15 ENCOUNTER — OFFICE VISIT (OUTPATIENT)
Dept: FAMILY MEDICINE CLINIC | Age: 41
End: 2021-04-15
Payer: MEDICAID

## 2021-04-15 VITALS
WEIGHT: 133.6 LBS | TEMPERATURE: 98.3 F | RESPIRATION RATE: 16 BRPM | HEART RATE: 76 BPM | OXYGEN SATURATION: 100 % | SYSTOLIC BLOOD PRESSURE: 100 MMHG | BODY MASS INDEX: 24.44 KG/M2 | DIASTOLIC BLOOD PRESSURE: 68 MMHG

## 2021-04-15 DIAGNOSIS — R53.83 FATIGUE, UNSPECIFIED TYPE: Primary | ICD-10-CM

## 2021-04-15 DIAGNOSIS — U07.1 COVID-19 VIRUS INFECTION: ICD-10-CM

## 2021-04-15 DIAGNOSIS — F43.21 ADJUSTMENT DISORDER WITH DEPRESSED MOOD: ICD-10-CM

## 2021-04-15 PROCEDURE — 1036F TOBACCO NON-USER: CPT | Performed by: NURSE PRACTITIONER

## 2021-04-15 PROCEDURE — G8420 CALC BMI NORM PARAMETERS: HCPCS | Performed by: NURSE PRACTITIONER

## 2021-04-15 PROCEDURE — G8427 DOCREV CUR MEDS BY ELIG CLIN: HCPCS | Performed by: NURSE PRACTITIONER

## 2021-04-15 PROCEDURE — 99214 OFFICE O/P EST MOD 30 MIN: CPT | Performed by: NURSE PRACTITIONER

## 2021-04-15 RX ORDER — DULOXETIN HYDROCHLORIDE 60 MG/1
60 CAPSULE, DELAYED RELEASE ORAL DAILY
Qty: 30 CAPSULE | Refills: 5 | Status: SHIPPED | OUTPATIENT
Start: 2021-04-15 | End: 2021-04-26 | Stop reason: ALTCHOICE

## 2021-04-15 RX ORDER — DULOXETIN HYDROCHLORIDE 30 MG/1
30 CAPSULE, DELAYED RELEASE ORAL DAILY
Qty: 10 CAPSULE | Refills: 0 | Status: SHIPPED | OUTPATIENT
Start: 2021-04-15 | End: 2021-04-26 | Stop reason: ALTCHOICE

## 2021-04-15 NOTE — PATIENT INSTRUCTIONS
Patient Education        multivitamins and minerals  Pronunciation:  endy kamini VYE beba mins and [facility]  What is the most important information I should know about multivitamins and minerals? Never take more than the recommended dose of multivitamins and minerals. An overdose of vitamins A, D, E, or K can cause serious or life-threatening side effects if taken in large doses. Certain minerals may also cause serious overdose symptoms if you take too much. Do not take this medication with milk, other dairy products, calcium supplements, or antacids that contain calcium. What is multivitamins and minerals? This medicine is a combination of many different vitamins and minerals that are normally found in foods and other natural sources. Multivitamins and minerals are used to provide substances that are not taken in through the diet. Multivitamins and minerals are also used to treat vitamin or mineral deficiencies caused by illness, pregnancy, poor nutrition, digestive disorders, certain medications, and many other conditions. Multivitamins and minerals may also be used for purposes not listed in this medication guide. What should I discuss with my healthcare provider before taking multivitamins and minerals? Multivitamins and minerals can cause serious or life-threatening side effects if taken in large doses. Do not take more of this medicine than directed on the label or prescribed by your doctor. Ask a doctor or pharmacist if it is safe for you to use multivitamins and minerals if you have other medical conditions or allergies. Ask a doctor before using this medicine if you are pregnant or breastfeeding. Your dose needs may be different during pregnancy. Some vitamins and minerals can be harmful if taken in large doses. You may need to use a specially formulated prenatal vitamin. How should I take multivitamins and minerals?   Use exactly as directed on the label, or as prescribed by your doctor. Never take more than the recommended dose of multivitamins and minerals. Read the label of any vitamin and mineral product you take to make sure you are aware of what it contains. Take this medicine with a full glass of water. Avoid milk or other dairy products. You must chew the chewable tablet before you swallow it. Measure liquid medicine carefully. Use the dosing syringe provided, or use a medicine dose-measuring device (not a kitchen spoon). Dissolve the effervescent tablet in at least 4 ounces of water. Stir and drink this mixture right away. Swallow a capsule or tablet whole and do not crush, chew, or break it. Use multivitamins and minerals regularly to get the most benefit. Store at room temperature away from moisture and heat. Keep the liquid medicine from freezing. Store this medicine in its original container. Storing multivitamins in a glass container can ruin the medication. What happens if I miss a dose? Take the medicine as soon as you can, but skip the missed dose if it is almost time for your next dose. Do not take two doses at one time. What happens if I overdose? Seek emergency medical attention or call the Poison Help line at 1-288.869.7903. An overdose of vitamins A, D, E, or K can cause serious or life-threatening side effects if taken in large doses. Certain minerals may also cause serious overdose symptoms if you take too much. Overdose symptoms may include increased thirst or urination, severe stomach pain, vomiting, bloody diarrhea, black and tarry stools, hair loss, peeling skin, tingly feeling in or around your mouth, changes in menstrual periods, weight loss, severe headache, severe back pain, blood in your urine, pale skin, easy bruising or bleeding, severe drowsiness, slow heart rate, shallow breathing, weak and rapid pulse, confusion, muscle weakness, cold and clammy skin, blue lips, and seizure (convulsions).   What should I avoid while taking multivitamins and minerals? Avoid taking more than one multivitamin product at the same time unless your doctor tells you to. Taking similar products together can result in an overdose or serious side effects. Avoid the use of salt substitutes in your diet if your multivitamin and mineral contains potassium. If you are on a low-salt diet, ask your doctor before taking a vitamin or mineral supplement. Do not take this medicine with milk, other dairy products, calcium supplements, or antacids that contain calcium. Calcium may make it harder for your body to absorb certain minerals. What are the possible side effects of multivitamins and minerals? Get emergency medical help if you have signs of an allergic reaction: hives; difficulty breathing; swelling of your face, lips, tongue, or throat. Minerals (especially taken in large doses) can cause side effects such as tooth staining, increased urination, stomach bleeding, uneven heart rate, confusion, and muscle weakness or limp feeling. When taken as directed, multivitamins and minerals are not expected to cause serious side effects. Common side effects may include:  · upset stomach;  · headache; or  · unusual or unpleasant taste in your mouth. This is not a complete list of side effects and others may occur. Call your doctor for medical advice about side effects. You may report side effects to FDA at 5-125-FDA-5473. What other drugs will affect multivitamins and minerals? Vitamin and mineral supplements can interact with certain medications, or affect how medications work in your body.  Ask a doctor or pharmacist before using multivitamins and minerals with any other medications, especially:  · tretinoin or isotretinoin;  · an antacid;  · an antibiotic;  · a diuretic or \"water pill\";  · heart or blood pressure medications;  · a sulfa drug; or  · NSAIDs (nonsteroidal anti-inflammatory drugs) --ibuprofen (Advil, Motrin), naproxen (Aleve), celecoxib, diclofenac, indomethacin, meloxicam, and others. This list is not complete. Other drugs may affect multivitamins and minerals, including prescription and over-the-counter medicines, vitamins, and herbal products. Not all possible drug interactions are listed here. Where can I get more information? Your pharmacist can provide more information about multivitamins and minerals. Remember, keep this and all other medicines out of the reach of children, never share your medicines with others, and use this medication only for the indication prescribed. Every effort has been made to ensure that the information provided by [address] is accurate, up-to-date, and complete, but no guarantee is made to that effect. Drug information contained herein may be time sensitive. [facility] information has been compiled for use by healthcare practitioners and consumers in the [place] and therefore [facility] does not warrant that uses outside of the [place] are appropriate, unless specifically indicated otherwise. [facility]'s drug information does not endorse drugs, diagnose patients or recommend therapy. [facility] information is an informational resource designed to assist licensed healthcare practitioners in caring for their patients and/or to serve consumers viewing this service as a supplement to, and not a substitute for, the expertise, skill, knowledge and judgment of healthcare practitioners. The absence of a warning for a given drug or drug combination in no way should be construed to indicate that the drug or drug combination is safe, effective or appropriate for any given patient. [facility] does not assume any responsibility for any aspect of healthcare administered with the aid of information [facility] provides. The information contained herein is not intended to cover all possible uses, directions, precautions, warnings, drug interactions, allergic reactions, or adverse effects.  If you have questions about the drugs you are taking, check with your doctor, nurse or pharmacist.  Copyright 2761-8464 [address]: 3.03. Revision date: 8/23/2019. Care instructions adapted under license by [facility] ([facility]). If you have questions about a medical condition or this instruction, always ask your healthcare professional. [address] any warranty or liability for your use of this information. Patient Education        multivitamin with iron  Pronunciation:  MUL kamini VYE ta mins with EYE katie  What is the most important information I should know about multivitamins with iron? Never take more than the recommended dose of a multivitamin. Avoid taking any other multivitamin product within 2 hours before or after you take multivitamins with iron. Seek emergency medical attention if you think you have used too much of this medicine. An overdose of vitamins A, D, E, or K can cause serious or life-threatening side effects. Iron and other minerals contained in a multivitamin can also cause serious overdose symptoms if you take too much. What is multivitamins with iron? Multivitamins are a combination of many different vitamins that are normally found in foods and other natural sources. Iron is normally found in foods like red meat. In the body, iron becomes a part of your hemoglobin (HEEM o jacquelyn bin) and myoglobin (MY o jacquelyn bin). Hemoglobin carries oxygen through your blood to tissues and organs. Myoglobin helps your muscle cells store oxygen. Multivitamins with iron are used to provide vitamins and iron that are not taken in through the diet. They are also used to treat iron or vitamin deficiencies caused by illness, pregnancy, poor nutrition, digestive disorders, and many other conditions. Multivitamin and iron may also be used for purposes not listed in this medication guide. What should I discuss with my healthcare provider before taking multivitamins with iron?   Iron and certain vitamins can cause serious or life-threatening side effects if taken in large doses. Do not take more of this medicine than directed on the label or prescribed by your doctor. Ask a doctor or pharmacist if it is safe for you to take a multivitamins with iron if you have other medical conditions. Do not use this medicine without a doctor's advice if you are pregnant. Some vitamins and minerals can harm an unborn baby if taken in large doses. You may need to use a prenatal vitamin specially formulated for pregnant women. Your dose needs may also be different while you are nursing. Do not use this medicine without a doctor's advice if you are breast-feeding a baby. How should I take multivitamins with iron? Use this medicine as directed on the label, or as your doctor has prescribed. Do not use the medicine in larger amounts or for longer than recommended. Never take more than the recommended dose of multivitamins with iron. Avoid taking any other multivitamin product within 2 hours before or after you take multivitamins with iron. Taking similar vitamin products together at the same time can result in a vitamin overdose or serious side effects. Many multivitamin products also contain minerals such as calcium, magnesium, potassium, and zinc. Minerals (especially taken in large doses) can cause side effects such as tooth staining, increased urination, stomach bleeding, uneven heart rate, confusion, and muscle weakness or limp feeling. Read the label of any multivitamin product you take to make sure you are aware of what it contains. You may take the medicine with food if it upsets your stomach. The chewable tablet must be chewed or allowed to dissolve in the mouth before swallowing. Measure liquid medicine with the dosing syringe provided, or with a special dose-measuring spoon or medicine cup. If you do not have a dose-measuring device, ask your pharmacist for one.   Liquid or powder multivitamin may sometimes be mixed with water, fruit can also make it harder for your body to absorb iron. Avoid taking this multivitamin within 1 hour before or 2 hours after eating fish, meat, liver, and whole grain or \"fortified\" breads or cereals. Do not take this medicine with milk, other dairy products, calcium supplements, or antacids that contain calcium. Calcium may make it harder for your body to absorb certain ingredients of the multivitamin. What are the possible side effects of multivitamins with iron? Get emergency medical help if you have signs of an allergic reaction: hives; difficulty breathing; swelling of your face, lips, tongue, or throat. When taken as directed, multivitamins are not expected to cause serious side effects. Call your doctor if you have:  · bright red blood in your stools; or  · pain in your chest or throat when swallowing a tablet. Common side effects may include:  · constipation, diarrhea;  · nausea, vomiting, heartburn;  · stomach pain, upset stomach;  · black or dark-colored stools or urine;  · temporary staining of the teeth;  · headache; or  · unusual or unpleasant taste in your mouth. This is not a complete list of side effects and others may occur. Call your doctor for medical advice about side effects. You may report side effects to FDA at 7-223-FDA-4543. What other drugs will affect multivitamins with iron? Other drugs may interact with multivitamins with iron, including prescription and over-the-counter medicines, vitamins, and herbal products. Tell each of your health care providers about all medicines you use now and any medicine you start or stop using. Where can I get more information? Your pharmacist can provide more information about multivitamins with iron. Remember, keep this and all other medicines out of the reach of children, never share your medicines with others, and use this medication only for the indication prescribed.    Every effort has been made to ensure that the information provided by [facility]  is accurate, up-to-date, and complete, but no guarantee is made to that effect. Drug information contained herein may be time sensitive. [facility] information has been compiled for use by healthcare practitioners and consumers in the [facility] and therefore [facility] does not warrant that uses outside of the [facility] are appropriate, unless specifically indicated otherwise. [facility]'s drug information does not endorse drugs, diagnose patients or recommend therapy. [facility]'s drug information is an informational resource designed to assist licensed healthcare practitioners in caring for their patients and/or to serve consumers viewing this service as a supplement to, and not a substitute for, the expertise, skill, knowledge and judgment of healthcare practitioners. The absence of a warning for a given drug or drug combination in no way should be construed to indicate that the drug or drug combination is safe, effective or appropriate for any given patient. [facility] does not assume any responsibility for any aspect of healthcare administered with the aid of information [facility] provides. The information contained herein is not intended to cover all possible uses, directions, precautions, warnings, drug interactions, allergic reactions, or adverse effects. If you have questions about the drugs you are taking, check with your doctor, nurse or pharmacist.  Copyright 2129-9603 [address]: 3.07. Revision date: 1/26/2017. Care instructions adapted under license by [facility] ([facility]). If you have questions about a medical condition or this instruction, always ask your healthcare professional. [place] [address] any warranty or liability for your use of this information.        Patient Education        ascorbic acid (vitamin C)  Pronunciation:  as PRIYA leon AS id  Brand:  Acerola, Ascorbic Acid Quick Melts, C/Shabana Hips, Cecon, Cemill 500, C-Time, Ekaterina-C, N Ice with Vitamin C, Sunkist Vitamin C, Vasoflex HD, Vicks Vitamin C Drops, Vitamin C  What is the most important information I should know about ascorbic acid? Follow all directions on your medicine label and package. Tell each of your healthcare providers about all your medical conditions, allergies, and all medicines you use. What is ascorbic acid? Ascorbic acid (vitamin C) occurs naturally in foods such as citrus fruit, tomatoes, potatoes, and leafy vegetables. Vitamin C is important for bones and connective tissues, muscles, and blood vessels. Vitamin C also helps the body absorb iron, which is needed for red blood cell production. Ascorbic acid is used to treat and prevent vitamin C deficiency. Ascorbic acid may also be used for purposes not listed in this medication guide. What should I discuss with my healthcare provider before taking ascorbic acid? You should not use ascorbic acid if you have ever had an allergic reaction to a vitamin C supplement. Ask a doctor or pharmacist about using ascorbic acid if you have:  · kidney disease or a history of kidney stones;  · hereditary iron overload disorder (hematochromatosis); or  · if you smoke (smoking can make ascorbic acid less effective). Your dose needs may be different during pregnancy or while you are breast-feeding a baby. Do not use ascorbic acid without your doctor's advice in either case. How should I take ascorbic acid? Use exactly as directed on the label, or as prescribed by your doctor. Do not use in larger or smaller amounts or for longer than recommended. The recommended dietary allowance of vitamin C (ascorbic acid) increases with age. Follow your healthcare provider's instructions. You may also consult the Office of Dietary Supplements of the [address] [place] [address]. Department of Agriculture ([employer]) [employer] Database (formerly \"Recommended Daily Allowances\") listings for more information.   Drink plenty of liquids while you are taking ascorbic acid. The chewable tablet must be chewed before you swallow it. Ascorbic acid gum may be chewed as long as desired and then thrown away. Do not crush, chew, or break an extended-release tablet. Swallow it whole. Measure liquid medicine  with a special dose-measuring spoon or medicine cup. If you do not have a dose-measuring device, ask your pharmacist for one. Keep the orally disintegrating tablet in the package until you are ready to take it. Use dry hands to remove the tablet and place it in your mouth. Do not swallow the tablet whole. Allow it to dissolve in your mouth without chewing. Swallow several times as the tablet dissolves. Store ascorbic acid at room temperature away from moisture and heat. Do not stop using ascorbic acid suddenly after long-term use at high doses, or you could have \"conditional\" vitamin C deficiency. Symptoms include bleeding gums, feeling very tired, and red or blue pinpoint spots around your hair follicles. Follow your doctor's instructions about tapering your dose. Conditional vitamin C deficiency can be difficult to correct without medical supervision. What happens if I miss a dose? Take the missed dose as soon as you remember. Skip the missed dose if it is almost time for your next scheduled dose. Do not take extra medicine to make up the missed dose. What happens if I overdose? Seek emergency medical attention or call the Poison Help line at 1-310.551.8859. What should I avoid while taking ascorbic acid? Follow your doctor's instructions about any restrictions on food, beverages, or activity. What are the possible side effects of ascorbic acid? Get emergency medical help if you have any of these signs of an allergic reaction:  hives; difficult breathing; swelling of your face, lips, tongue, or throat.   Stop using ascorbic acid and call your doctor at once if you have:  · joint pain, weakness or tired feeling, weight loss, stomach pain;  · chills, fever, construed to indicate that the drug or drug combination is safe, effective or appropriate for any given patient. [facility] does not assume any responsibility for any aspect of healthcare administered with the aid of information [facility] provides. The information contained herein is not intended to cover all possible uses, directions, precautions, warnings, drug interactions, allergic reactions, or adverse effects. If you have questions about the drugs you are taking, check with your doctor, nurse or pharmacist.  Copyright 0793-7262 [facility] MURRY. Version: 3.02. Revision date: 11/19/2013. Care instructions adapted under license by [facility] ([facility]). If you have questions about a medical condition or this instruction, always ask your healthcare professional. [place] [address] any warranty or liability for your use of this information. Patient Education        Vitamin B12: About This Test  What is it? This blood test measures the amount of vitamin B12 in your blood. Your body needs this B vitamin to make blood cells and to keep your nervous system healthy. Why is this test done? This test is used to:  · Check for vitamin B12 deficiency anemia. The test is often done for those who've had stomach or bowel surgery. It's also done for those who have problems of the small intestine. And people may have the test if they have a family history of this anemia. · Check on the B12 level for someone who is on a diet that restricts certain foods (such as vegan). · Find the cause of other types of anemia. This includes megaloblastic anemia. Folate is usually measured at the same time. Lack of either vitamin can lead to this type of anemia. · Help find the cause of a decrease in mental abilities. It also can help find the cause of nervous system symptoms. These can include tingling or numbness of the arms or legs.   · See if a person has vitamin B12 deficiency anemia after being diagnosed with atrophic gastritis. How do you prepare for the test?  You may need to fast for 8 hours before the test. Your doctor may give you some specific instructions. How is the test done? A health professional uses a needle to take a blood sample, usually from the arm. How long does the test take? The test will take a few minutes. What happens after the test?  · You will probably be able to go home right away. · You can go back to your usual activities right away. Follow-up care is a key part of your treatment and safety. Be sure to make and go to all appointments, and call your doctor if you are having problems. It's also a good idea to keep a list of the medicines you take. Ask your doctor when you can expect to have your test results. Where can you learn more? Go to [email or web address] and sign in to your [employer] account. Enter Q213 in the SurDoc box to learn more about \"Vitamin B12: About This Test.\"     If you do not have an account, please click on the \"Sign Up Now\" link. Current as of: September 23, 2020               Content Version: 12.8  © 9833-3071 [employer]. Care instructions adapted under license by [facility] ([facility]). If you have questions about a medical condition or this instruction, always ask your healthcare professional. [address] any warranty or liability for your use of this information.

## 2021-04-16 NOTE — PROGRESS NOTES
[address] [address]  Dept: [phone]  Dept Fax: [phone]  Loc: [phone]  PROGRESS NOTE      VisitDate: 4/15/2021    Umu Ruano is a 45 y.o. female who presents today for:     Chief Complaint   Patient presents with    Discuss Labs     done 4/11/21 and 4/14/21, increase in fatigue         Subjective:  Patient presents with complaint of continued fatigue since Covid in January. Here to review recent labs. Denies any fever, dizziness, headache, syncope, chest pain, shortness of breath, abdominal pain, edema, snoring/sleep apnea. Patient requesting possible medication substitution regarding her anxiety/depression. Feels that her Lexapro may be causing some of the fatigue. Review of Systems   Constitutional: Positive for fatigue. Psychiatric/Behavioral: Positive for decreased concentration and dysphoric mood. The patient is nervous/anxious.       Past Medical History:   Diagnosis Date    Kidney stone     2005 with pregnancy    Kidney stones       Past Surgical History:   Procedure Laterality Date    COLONOSCOPY  03/25/2016    Dr. Michael Kidd 9/13/2017    DILATATION AND CURETTAGE HYSTEROSCOPY, POSSIBLE MYOSURE performed by Stella Estrella DO at [address] [place] [address]  04/18/2018    HERNIA REPAIR      HYSTERECTOMY ABDOMINAL N/A 3/13/2019    ROBOTIC TOTAL LAPAROSCOPIC HYSTERECTOMY WITH BILATERAL SALPINGECTOMY performed by Stella Estrella DO at [address] [place] [address] Drive HYSTEROSCOPY  04/18/2018    NE HYSTEROSCOPY,W/ENDOMETRIAL ABLATION N/A 4/18/2018    HYSTEROSCOPY ENDOMETRIAL ABLATION performed by Stella Estrella DO at [address] [place] [address]       Family History   Problem Relation Age of Onset    Heart Disease Father     High Blood Pressure Father     High Cholesterol Father     Diabetes Father     Asthma Neg Hx      Social History Tobacco Use    Smoking status: Never Smoker    Smokeless tobacco: Never Used   Substance Use Topics    Alcohol use: No     Alcohol/week: 0.0 standard drinks      Current Outpatient Medications   Medication Sig Dispense Refill    DULoxetine (CYMBALTA) 30 MG extended release capsule Take 1 capsule by mouth daily 10 capsule 0    DULoxetine (CYMBALTA) 60 MG extended release capsule Take 1 capsule by mouth daily 30 capsule 5    ketorolac (TORADOL) 10 MG tablet Take 1 tablet by mouth every 6 hours as needed for Pain 20 tablet 0    tamsulosin (FLOMAX) 0.4 MG capsule Take 1 capsule by mouth daily 30 capsule 0    buPROPion (WELLBUTRIN XL) 300 MG extended release tablet Take 1 tablet by mouth every morning Take with a 150 mg 90 tablet 3    buPROPion (WELLBUTRIN XL) 150 MG extended release tablet Take 1 tablet by mouth every morning Take with a 300 mg 90 tablet 3    butalbital-acetaminophen-caffeine (FIORICET, ESGIC) -40 MG per tablet Take 1 tablet by mouth every 6 hours as needed for Headaches 60 tablet 1     No current facility-administered medications for this visit. Allergies   Allergen Reactions    Amoxicillin Hives     Health Maintenance   Topic Date Due    Hepatitis C screen  Never done    Varicella vaccine (1 of 2 - 2-dose childhood series) Never done    HIV screen  Never done    COVID-19 Vaccine (1) Never done    DTaP/Tdap/Td vaccine (1 - Tdap) Never done    Cervical cancer screen  06/15/2018    Flu vaccine (Season Ended) 01/11/2022 (Originally 9/1/2021)    Hepatitis A vaccine  Aged Out    Hepatitis B vaccine  Aged Out    Hib vaccine  Aged Out    Meningococcal (ACWY) vaccine  Aged Out    Pneumococcal 0-64 years Vaccine  Aged Out         Objective:     Physical Exam  Vitals signs and nursing note reviewed. Constitutional:       Appearance: Normal appearance. She is well-developed and normal weight. HENT:      Head: Normocephalic.       Nose: Nose normal.      Mouth/Throat: Pharynx: Oropharynx is clear. Eyes:      Conjunctiva/sclera: Conjunctivae normal.   Neck:      Musculoskeletal: Neck supple. Thyroid: No thyroid mass, thyromegaly or thyroid tenderness. Vascular: No carotid bruit. Cardiovascular:      Rate and Rhythm: Normal rate and regular rhythm. Pulses: Normal pulses. Heart sounds: Normal heart sounds. Pulmonary:      Effort: Pulmonary effort is normal.      Breath sounds: Normal breath sounds. Abdominal:      General: Bowel sounds are normal.      Palpations: Abdomen is soft. Musculoskeletal: Normal range of motion. Lymphadenopathy:      Cervical: No cervical adenopathy. Skin:     General: Skin is warm and dry. Neurological:      General: No focal deficit present. Mental Status: She is alert and oriented to person, place, and time. Psychiatric:         Attention and Perception: Attention normal.         Mood and Affect: Mood normal.         Speech: Speech normal.         Behavior: Behavior normal. Behavior is cooperative. Thought Content:  Thought content normal.         Cognition and Memory: Cognition normal.         Judgment: Judgment normal.       /68 (Site: Right Upper Arm)   Pulse 76   Temp 98.3 °F (36.8 °C) (Oral)   Resp 16   Wt 133 lb 9.6 oz (60.6 kg)   LMP 03/06/2019   SpO2 100%   BMI 24.44 kg/m²     Component      Latest Ref Rng & Units 4/14/2021 4/11/2021   WBC      4.8 - 10.8 thou/mm3  4.5 (L)   RBC      4.20 - 5.40 mill/mm3  4.60   Hemoglobin Quant      12.0 - 16.0 gm/dl  13.8   Hematocrit      37.0 - 47.0 %  42.6   MCV      81.0 - 99.0 fL  92.6   MCH      26.0 - 33.0 pg  30.0   MCHC      32.2 - 35.5 gm/dl  32.4   RDW-CV      11.5 - 14.5 %  12.4   RDW-SD      35.0 - 45.0 fL  42.5   Platelet Count      208 - 400 thou/mm3  256   MPV      9.4 - 12.4 fL  9.6   Seg Neutrophils      %  58.3   Lymphocytes      %  30.9   Monocytes      %  8.4   Eosinophils      %  2.0   Basophils      %  0.2   Immature Granulocytes      %  0.2   Segs Absolute      1 - 7 thou/mm3  2.6   Lymphocytes Absolute      1.0 - 4.8 thou/mm3  1.4   Monocytes Absolute      0.4 - 1.3 thou/mm3  0.4   Eosinophils Absolute      0.0 - 0.4 thou/mm3  0.1   Basophils Absolute      0.0 - 0.1 thou/mm3  0.0   Immature Grans (Abs)      0.00 - 0.07 thou/mm3  0.01   Nucleated Red Blood Cells      /100 wbc  0   Glucose, UA      NEGATIVE mg/dl     Bilirubin, Urine      NEGATIVE     Ketones, Urine      NEGATIVE     Specific Gravity, Urine      1.002 - 1.030     Blood, Urine      NEGATIVE     pH, Urine      5.0 - 9.0     Protein, Urine      NEGATIVE mg/dl     Urobilinogen, Urine      0.2 - 1.0 eu/dl     Nitrite, Urine      NEGATIVE     Leukocyte Esterase, Urine      NEGATIVE     Color, UA      STRAW-YELLOW     Character, Urine      CLEAR-SL CLOUD     SARS-CoV-2      Not Detected     Iron      50 - 170 ug/dL 62 100   Anion Gap      8.0 - 16.0 meq/L  7.0 (L)   Osmolality Calc      275.0 - 300.0 mOsmol/kg  274.9 (L)   Est, Glom Filt Rate      ml/min/1.73m2  80 (A)   Vit D, 25-Hydroxy      30 - 100 ng/ml 46    Vitamin B-12      211 - 911 pg/mL 296    T4 Free      0.93 - 1.76 ng/dL 0.80 (L)    TSH      0.400 - 4.200 uIU/mL 2.760    Labs reviewed with patient  Impression/Plan:  1. Fatigue, unspecified type    2. COVID-19 virus infection    3. Adjustment disorder with depressed mood      Requested Prescriptions     Signed Prescriptions Disp Refills    DULoxetine (CYMBALTA) 30 MG extended release capsule 10 capsule 0     Sig: Take 1 capsule by mouth daily    DULoxetine (CYMBALTA) 60 MG extended release capsule 30 capsule 5     Sig: Take 1 capsule by mouth daily     No orders of the defined types were placed in this encounter. Patient giveneducational materials - see patient instructions. Discussed use, benefit, and side effects of prescribed medications. All patient questions answered. Pt voiced understanding. Reviewed health maintenance.  Patient agreedwith treatment plan. Follow up as directed. Trial Cymbalta 30 mg 1 p.o. daily for 10 days then increase to 60 mg daily. Discontinue Lexapro. Multivitamin discussed and encouraged to patient. Continue medications as prescribed. Educational information on above diagnosis  provided per AVS.  Follow-up 1 month.    30 minutes    Electronically signed by Kathyleen Leyden, APRN - CNP on 4/16/2021 at 2:19 PM

## 2021-04-22 ENCOUNTER — PATIENT MESSAGE (OUTPATIENT)
Dept: FAMILY MEDICINE CLINIC | Age: 41
End: 2021-04-22

## 2021-04-22 NOTE — TELEPHONE ENCOUNTER
From: Kashmir Arora  To: GERALDINE Cabral - JAVED  Sent: 4/22/2021 8:21 AM EDT  Subject: Prescription Question    The cmybalta that Im taking I had to stop because it was making me feel hungry all the time and I was eating a lot was wondering if you can put me on something else that doesnt make me feel that way and help with energy

## 2021-04-26 RX ORDER — VENLAFAXINE HYDROCHLORIDE 37.5 MG/1
37.5 CAPSULE, EXTENDED RELEASE ORAL DAILY
Qty: 30 CAPSULE | Refills: 1 | Status: SHIPPED | OUTPATIENT
Start: 2021-04-26 | End: 2021-05-12

## 2021-05-12 ENCOUNTER — OFFICE VISIT (OUTPATIENT)
Dept: FAMILY MEDICINE CLINIC | Age: 41
End: 2021-05-12
Payer: MEDICAID

## 2021-05-12 VITALS
WEIGHT: 135.4 LBS | RESPIRATION RATE: 16 BRPM | SYSTOLIC BLOOD PRESSURE: 120 MMHG | DIASTOLIC BLOOD PRESSURE: 70 MMHG | OXYGEN SATURATION: 99 % | BODY MASS INDEX: 24.76 KG/M2 | TEMPERATURE: 98.5 F | HEART RATE: 74 BPM

## 2021-05-12 DIAGNOSIS — F43.21 ADJUSTMENT DISORDER WITH DEPRESSED MOOD: Primary | ICD-10-CM

## 2021-05-12 DIAGNOSIS — R45.1 AGITATION: ICD-10-CM

## 2021-05-12 PROCEDURE — 1036F TOBACCO NON-USER: CPT | Performed by: NURSE PRACTITIONER

## 2021-05-12 PROCEDURE — G8420 CALC BMI NORM PARAMETERS: HCPCS | Performed by: NURSE PRACTITIONER

## 2021-05-12 PROCEDURE — 99214 OFFICE O/P EST MOD 30 MIN: CPT | Performed by: NURSE PRACTITIONER

## 2021-05-12 PROCEDURE — G8427 DOCREV CUR MEDS BY ELIG CLIN: HCPCS | Performed by: NURSE PRACTITIONER

## 2021-05-12 RX ORDER — ALPRAZOLAM 0.25 MG/1
0.25 TABLET ORAL 3 TIMES DAILY PRN
Qty: 90 TABLET | Refills: 0 | Status: SHIPPED | OUTPATIENT
Start: 2021-05-12 | End: 2021-06-11

## 2021-05-12 RX ORDER — PAROXETINE HYDROCHLORIDE 20 MG/1
20 TABLET, FILM COATED ORAL DAILY
Qty: 30 TABLET | Refills: 3 | Status: SHIPPED | OUTPATIENT
Start: 2021-05-12 | End: 2021-06-01 | Stop reason: ALTCHOICE

## 2021-05-13 NOTE — PROGRESS NOTES
ENDOMETRIAL ABLATION performed by Ondina Regalado DO at [facility]       Family History   Problem Relation Age of Onset    Heart Disease Father     High Blood Pressure Father     High Cholesterol Father     Diabetes Father     Asthma Neg Hx      Social History     Tobacco Use    Smoking status: Never Smoker    Smokeless tobacco: Never Used   Substance Use Topics    Alcohol use: No     Alcohol/week: 0.0 standard drinks      Current Outpatient Medications   Medication Sig Dispense Refill    PARoxetine (PAXIL) 20 MG tablet Take 1 tablet by mouth daily 30 tablet 3    ALPRAZolam (XANAX) 0.25 MG tablet Take 1 tablet by mouth 3 times daily as needed for Anxiety for up to 30 days. 90 tablet 0    buPROPion (WELLBUTRIN XL) 300 MG extended release tablet Take 1 tablet by mouth every morning Take with a 150 mg 90 tablet 3    buPROPion (WELLBUTRIN XL) 150 MG extended release tablet Take 1 tablet by mouth every morning Take with a 300 mg 90 tablet 3    butalbital-acetaminophen-caffeine (FIORICET, ESGIC) -40 MG per tablet Take 1 tablet by mouth every 6 hours as needed for Headaches 60 tablet 1     No current facility-administered medications for this visit. Allergies   Allergen Reactions    Amoxicillin Hives     Health Maintenance   Topic Date Due    Hepatitis C screen  Never done    Varicella vaccine (1 of 2 - 2-dose childhood series) Never done    COVID-19 Vaccine (1) Never done    HIV screen  Never done    DTaP/Tdap/Td vaccine (1 - Tdap) Never done    Cervical cancer screen  06/15/2018    Flu vaccine (Season Ended) 01/11/2022 (Originally 9/1/2021)    Hepatitis A vaccine  Aged Out    Hepatitis B vaccine  Aged Out    Hib vaccine  Aged Out    Meningococcal (ACWY) vaccine  Aged Out    Pneumococcal 0-64 years Vaccine  Aged Out         Objective:     Physical Exam  Vitals signs and nursing note reviewed. Constitutional:       Appearance: Normal appearance.  She is normal weight. HENT:      Head: Normocephalic. Cardiovascular:      Rate and Rhythm: Normal rate and regular rhythm. Pulses: Normal pulses. Heart sounds: Normal heart sounds. Pulmonary:      Effort: Pulmonary effort is normal.      Breath sounds: Normal breath sounds. Neurological:      General: No focal deficit present. Mental Status: She is alert and oriented to person, place, and time. Psychiatric:         Attention and Perception: Attention normal.         Mood and Affect: Mood is anxious. Affect is angry and tearful. Speech: Speech is rapid and pressured. Behavior: Behavior normal. Behavior is cooperative. Thought Content: Thought content is not paranoid. Thought content does not include homicidal or suicidal ideation. Cognition and Memory: Cognition normal.         Judgment: Judgment normal.       /70 (Site: Right Upper Arm)   Pulse 74   Temp 98.5 °F (36.9 °C) (Oral)   Resp 16   Wt 135 lb 6.4 oz (61.4 kg)   LMP 03/06/2019   SpO2 99%   BMI 24.76 kg/m²       Impression/Plan:  1. Adjustment disorder with depressed mood    2. Agitation      Requested Prescriptions     Signed Prescriptions Disp Refills    PARoxetine (PAXIL) 20 MG tablet 30 tablet 3     Sig: Take 1 tablet by mouth daily    ALPRAZolam (XANAX) 0.25 MG tablet 90 tablet 0     Sig: Take 1 tablet by mouth 3 times daily as needed for Anxiety for up to 30 days. No orders of the defined types were placed in this encounter. Patient giveneducational materials - see patient instructions. Discussed use, benefit, and side effects of prescribed medications. All patient questions answered. Pt voiced understanding. Reviewed health maintenance. Patient agreedwith treatment plan. Follow up as directed. Xanax and Paxil prescribed. OARRS report reviewed. Continue current meds. [employer] testing performed pending results. Continue medications as prescribed.  Educational information on above diagnosis  provided per AVS. follow-up 1 month       30 minutes  Electronically signed by GERALDINE Lechuga CNP on 5/13/2021 at 10:57 AM

## 2021-05-20 ENCOUNTER — TELEPHONE (OUTPATIENT)
Dept: FAMILY MEDICINE CLINIC | Age: 41
End: 2021-05-20

## 2021-05-20 NOTE — TELEPHONE ENCOUNTER
----- Message from [facility], GERALDINE - CNP sent at 5/20/2021  1:48 PM EDT -----  [employer] testing does indicate that patient will metabolize Paxil and Wellbutrin which she is taking currently. Does indicate the patient will not metabolize/utilize Celexa, Lexapro and Zoloft. Also patient will not tolerate Valium. Follow-up as needed.

## 2021-06-01 RX ORDER — ESCITALOPRAM OXALATE 5 MG/1
5 TABLET ORAL DAILY
Qty: 30 TABLET | Refills: 1 | Status: SHIPPED | OUTPATIENT
Start: 2021-06-01 | End: 2021-06-21 | Stop reason: SDUPTHER

## 2021-06-01 NOTE — TELEPHONE ENCOUNTER
Paroxetine makes her sleep all the time. It doesn't matter what time of day she takes it, she is just very sleepy. She said the Lexapro really worked well for her, but the [employer] test said otherwise. Willing to try Lexapro again.         BEV  [facility]

## 2021-06-21 ENCOUNTER — TELEPHONE (OUTPATIENT)
Dept: FAMILY MEDICINE CLINIC | Age: 41
End: 2021-06-21

## 2021-06-21 RX ORDER — ESCITALOPRAM OXALATE 10 MG/1
10 TABLET ORAL DAILY
Qty: 30 TABLET | Refills: 3 | Status: SHIPPED | OUTPATIENT
Start: 2021-06-21 | End: 2021-08-03

## 2021-06-21 NOTE — TELEPHONE ENCOUNTER
Patient is taking Lexapro 5 mg and is requesting to have the dosage increased. Still has mood swings.     [address] [place]

## 2021-06-28 ENCOUNTER — PATIENT MESSAGE (OUTPATIENT)
Dept: FAMILY MEDICINE CLINIC | Age: 41
End: 2021-06-28

## 2021-07-13 ENCOUNTER — PATIENT MESSAGE (OUTPATIENT)
Dept: FAMILY MEDICINE CLINIC | Age: 41
End: 2021-07-13

## 2021-07-14 RX ORDER — CYCLOBENZAPRINE HCL 10 MG
10 TABLET ORAL EVERY 8 HOURS PRN
Qty: 30 TABLET | Refills: 0 | Status: SHIPPED | OUTPATIENT
Start: 2021-07-14 | End: 2021-12-06 | Stop reason: ALTCHOICE

## 2021-07-14 NOTE — TELEPHONE ENCOUNTER
From: Judy Clark  To: GERALDINE Mata - CNP  Sent: 7/13/2021 1:59 PM EDT  Subject: Prescription Question    I was wondering if Cuauhtemoc Tierney can prescribe me something for my back it is hurting really bad

## 2021-07-20 ENCOUNTER — PATIENT MESSAGE (OUTPATIENT)
Dept: FAMILY MEDICINE CLINIC | Age: 41
End: 2021-07-20

## 2021-07-20 NOTE — TELEPHONE ENCOUNTER
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

## 2021-07-20 NOTE — TELEPHONE ENCOUNTER
A for work slip today for shoulder strain.   Recommend a follow-up appointment symptoms persist or need additional time off

## 2021-08-03 ENCOUNTER — OFFICE VISIT (OUTPATIENT)
Dept: FAMILY MEDICINE CLINIC | Age: 41
End: 2021-08-03
Payer: MEDICAID

## 2021-08-03 VITALS
WEIGHT: 131 LBS | HEIGHT: 63 IN | SYSTOLIC BLOOD PRESSURE: 100 MMHG | HEART RATE: 82 BPM | TEMPERATURE: 98.3 F | RESPIRATION RATE: 12 BRPM | BODY MASS INDEX: 23.21 KG/M2 | DIASTOLIC BLOOD PRESSURE: 74 MMHG

## 2021-08-03 DIAGNOSIS — G25.81 RLS (RESTLESS LEGS SYNDROME): ICD-10-CM

## 2021-08-03 DIAGNOSIS — F33.1 MODERATE EPISODE OF RECURRENT MAJOR DEPRESSIVE DISORDER (HCC): ICD-10-CM

## 2021-08-03 DIAGNOSIS — F43.23 ADJUSTMENT DISORDER WITH MIXED ANXIETY AND DEPRESSED MOOD: Primary | ICD-10-CM

## 2021-08-03 DIAGNOSIS — G89.29 CHRONIC RIGHT SHOULDER PAIN: ICD-10-CM

## 2021-08-03 DIAGNOSIS — M25.511 CHRONIC RIGHT SHOULDER PAIN: ICD-10-CM

## 2021-08-03 PROCEDURE — 99214 OFFICE O/P EST MOD 30 MIN: CPT | Performed by: NURSE PRACTITIONER

## 2021-08-03 PROCEDURE — G8427 DOCREV CUR MEDS BY ELIG CLIN: HCPCS | Performed by: NURSE PRACTITIONER

## 2021-08-03 PROCEDURE — 1036F TOBACCO NON-USER: CPT | Performed by: NURSE PRACTITIONER

## 2021-08-03 PROCEDURE — G8420 CALC BMI NORM PARAMETERS: HCPCS | Performed by: NURSE PRACTITIONER

## 2021-08-03 RX ORDER — PRAMIPEXOLE DIHYDROCHLORIDE 0.12 MG/1
0.12 TABLET ORAL NIGHTLY
Qty: 30 TABLET | Refills: 1 | Status: SHIPPED | OUTPATIENT
Start: 2021-08-03 | End: 2021-12-06 | Stop reason: ALTCHOICE

## 2021-08-03 SDOH — ECONOMIC STABILITY: FOOD INSECURITY: WITHIN THE PAST 12 MONTHS, YOU WORRIED THAT YOUR FOOD WOULD RUN OUT BEFORE YOU GOT MONEY TO BUY MORE.: NEVER TRUE

## 2021-08-03 SDOH — ECONOMIC STABILITY: FOOD INSECURITY: WITHIN THE PAST 12 MONTHS, THE FOOD YOU BOUGHT JUST DIDN'T LAST AND YOU DIDN'T HAVE MONEY TO GET MORE.: NEVER TRUE

## 2021-08-03 ASSESSMENT — SOCIAL DETERMINANTS OF HEALTH (SDOH): HOW HARD IS IT FOR YOU TO PAY FOR THE VERY BASICS LIKE FOOD, HOUSING, MEDICAL CARE, AND HEATING?: NOT HARD AT ALL

## 2021-08-03 NOTE — PATIENT INSTRUCTIONS
Patient Education        Recovering From Depression: Care Instructions  Your Care Instructions     Taking good care of yourself is important as you recover from depression. In time, your symptoms will fade as your treatment takes hold. Do not give up. Instead, focus your energy on getting better. Your mood will improve. It just takes some time. Focus on things that can help you feel better, such as being with friends and family, eating well, and getting enough rest. But take things slowly. Do not do too much too soon. You will begin to feel better gradually. Follow-up care is a key part of your treatment and safety. Be sure to make and go to all appointments, and call your doctor if you are having problems. It's also a good idea to know your test results and keep a list of the medicines you take. How can you care for yourself at home? Be realistic  · If you have a large task to do, break it up into smaller steps you can handle, and just do what you can. · You may want to put off important decisions until your depression has lifted. If you have plans that will have a major impact on your life, such as marriage, divorce, or a job change, try to wait a bit. Talk it over with friends and loved ones who can help you look at the overall picture first.  · Reaching out to people for help is important. Do not isolate yourself. Let your family and friends help you. Find someone you can trust and confide in, and talk to that person. · Be patient, and be kind to yourself. Remember that depression is not your fault and is not something you can overcome with willpower alone. Treatment is important for depression, just like for any other illness. Feeling better takes time, and your mood will improve little by little. Stay active  · Stay busy and get outside. Take a walk, or try some other light exercise. · Talk with your doctor about an exercise program. Exercise can help with mild depression.   · Go to a movie or concert. Take part in a [employer] activity or other social gathering. Go to a [employer] game. · Ask a friend to have dinner with you. Take care of yourself  · Eat a balanced diet with plenty of fresh fruits and vegetables, whole grains, and lean protein. If you have lost your appetite, eat small snacks rather than large meals. · Avoid using illegal drugs or marijuana and drinking alcohol. Do not take medicines that have not been prescribed for you. They may interfere with medicines you may be taking for depression, or they may make your depression worse. · Take your medicines exactly as they are prescribed. You may start to feel better within 1 to 3 weeks of taking antidepressant medicine. But it can take as many as 6 to 8 weeks to see more improvement. If you have questions or concerns about your medicines, or if you do not notice any improvement by 3 weeks, talk to your doctor. · Continue to take your medicine after your symptoms improve. Taking your medicine for at least 6 months after you feel better can help keep you from getting depressed again. If this isn't the first time you have been depressed, your doctor may recommend you to take medicine even longer. · If you have any side effects from your medicine, tell your doctor. Many side effects are mild and will go away on their own after you have been taking the medicine for a few weeks. Some may last longer. Talk to your doctor if side effects are bothering you too much. You might be able to try a different medicine. · Continue counseling. It may help prevent depression from returning, especially if you've had multiple episodes of depression. Talk with your counselor if you are having a hard time attending your sessions or you think the sessions aren't working. Don't just stop going. · Get enough sleep. Talk to your doctor if you are having problems sleeping. · Avoid sleeping pills unless they are prescribed by the doctor treating your depression.  Sleeping instruction, always ask your healthcare professional. [address] any warranty or liability for your use of this information. Patient Education        Depression Treatment: Care Instructions  Your Care Instructions     Depression is a condition that affects the way you feel, think, and act. It causes symptoms such as low energy, loss of interest in daily activities, and sadness or grouchiness that goes on for a long time. Depression is very common and affects men and women of all ages. Depression is a medical illness caused by changes in the natural chemicals in your brain. It is not a character flaw, and it does not mean that you are a bad or weak person. It does not mean that you are going crazy. It is important to know that depression can be treated. Medicines, counseling, and self-care can all help. Many people do not get help because they are embarrassed or think that they will get over the depression on their own. But some people do not get better without treatment. Follow-up care is a key part of your treatment and safety. Be sure to make and go to all appointments, and call your doctor if you are having problems. It's also a good idea to know your test results and keep a list of the medicines you take. How can you care for yourself at home? Learn about antidepressant medicines  Antidepressant medicines can improve or end the symptoms of depression. You may need to take the medicine for at least 6 months, and often longer. Keep taking your medicine even if you feel better. If you stop taking it too soon, your symptoms may come back or get worse. You may start to feel better within 1 to 3 weeks of taking antidepressant medicine. But it can take as many as 6 to 8 weeks to see more improvement. Talk to your doctor if you have problems with your medicine or if you do not notice any improvement after 3 weeks. Antidepressants can make you feel tired, dizzy, or nervous.  Some people have dry mouth, constipation, headaches, sexual problems, an upset stomach, or diarrhea. Many of these side effects are mild and go away on their own after you take the medicine for a few weeks. Some may last longer. Talk to your doctor if side effects bother you too much. You might be able to try a different medicine. If you are pregnant or breastfeeding, talk to your doctor about what medicines you can take. Learn about counseling  In many cases, counseling can work as well as medicines to treat mild to moderate depression. Counseling is done by licensed mental health providers, such as psychologists, social workers, and some types of nurses. It can be done in one-on-one sessions or in a group setting. Many people find group sessions helpful. Cognitive-behavioral therapy is a type of counseling. In this treatment, you learn how to see and change unhelpful thinking styles that may be adding to your depression. Counseling and medicines often work well when used together. When should you call for help? Call 911 anytime you think you may need emergency care. For example, call if:    · You feel you cannot stop from hurting yourself or someone else. Call your doctor now or seek immediate medical care if:    · You hear voices.     · You feel much more depressed. Watch closely for changes in your health, and be sure to contact your doctor if:    · You are having problems with your depression medicine.     · You are not getting better as expected. Where can you learn more? Go to https://chpescarlettewbria.[employer]. org and sign in to your [employer] account. Enter V573 in the [employer] box to learn more about \"Depression Treatment: Care Instructions. \"     If you do not have an account, please click on the \"Sign Up Now\" link. Current as of: September 23, 2020               Content Version: 12.9  © 4881-2265 Healthwise, [employer]. Care instructions adapted under license by [facility] ([facility]). If you have questions about a medical condition or this instruction, always ask your healthcare professional. [address] any warranty or liability for your use of this information. Patient Education        Depression and Chronic Disease: Care Instructions  Your Care Instructions     A chronic disease is one that you have for a long time. Some chronic diseases can be controlled, but they usually cannot be cured. Depression is common in people with chronic diseases, but it often goes unnoticed. Many people have concerns about seeking treatment for a mental health problem. You may think it's a sign of weakness, or you don't want people to know about it. It's important to overcome these reasons for not seeking treatment. Treating depression or anxiety is good for your health. Follow-up care is a key part of your treatment and safety. Be sure to make and go to all appointments, and call your doctor if you are having problems. It's also a good idea to know your test results and keep a list of the medicines you take. How can you care for yourself at home? Watch for symptoms of depression  The symptoms of depression are often subtle at first. You may think they are caused by your disease rather than depression. Or you may think it is normal to be depressed when you have a chronic disease. If you are depressed you may:  · Feel sad or hopeless. · Feel guilty or worthless. · Not enjoy the things you used to enjoy. · Feel hopeless, as though life is not worth living. · Have trouble thinking or remembering. · Have low energy, and you may not eat or sleep well. · Pull away from others. · Think often about death or killing yourself. (Keep the numbers for these national suicide hotlines: 5-547-982-TALK [1-233.721.3460] and 9-805-QJOYEPW [1-630.809.7539]. )  Get treatment  By treating your depression, you can feel more hopeful and have more energy.  If you feel better, you may take better care of yourself, so your health may improve. · Talk to your doctor if you have any changes in mood during treatment for your disease. · Ask your doctor for help. Counseling, antidepressant medicine, or a combination of the two can help most people with depression. Often a combination works best. Counseling can also help you cope with having a chronic disease. When should you call for help? Call 911 anytime you think you may need emergency care. For example, call if:    · You feel like hurting yourself or someone else.     · Someone you know has depression and is about to attempt or is attempting suicide. Call your doctor now or seek immediate medical care if:    · You hear voices.     · Someone you know has depression and:  ? Starts to give away his or her possessions. ? Uses illegal drugs or drinks alcohol heavily. ? Talks or writes about death, including writing suicide notes or talking about guns, knives, or pills. ? Starts to spend a lot of time alone. ? Acts very aggressively or suddenly appears calm. Watch closely for changes in your health, and be sure to contact your doctor if:    · You do not get better as expected. Where can you learn more? Go to https://N3TWORKpeSolve Media.[employer]. org and sign in to your [employer] account. Enter X058 in the [employer] box to learn more about \"Depression and Chronic Disease: Care Instructions. \"     If you do not have an account, please click on the \"Sign Up Now\" link. Current as of: September 23, 2020               Content Version: 12.9  © 2006-2021 [employer]. Care instructions adapted under license by [facility] ([facility]). If you have questions about a medical condition or this instruction, always ask your healthcare professional. [place] [address] any warranty or liability for your use of this information.          Patient Education        Restless Legs Syndrome: Care Instructions  Your Care Instructions  Restless legs syndrome is a common nervous system problem. People with this syndrome feel a creeping, achy, or unpleasant feeling in the legs and an overpowering urge to move them. It often occurs in the evening and at night and can lead to sleep problems and tiredness. Your doctor may suggest doing a study of your sleep patterns to figure out what is happening when you try to sleep. Many people get relief from symptoms when they get regular exercise, eat well, and avoid caffeine, alcohol, and tobacco.  Follow-up care is a key part of your treatment and safety. Be sure to make and go to all appointments, and call your doctor if you are having problems. It's also a good idea to know your test results and keep a list of the medicines you take. How can you care for yourself at home? · Take your medicines exactly as prescribed. Call your doctor if you think you are having a problem with your medicine. · Try bathing in hot or cold water. Applying a heating pad or ice bag to your legs may also help symptoms. · Stretch and massage your legs before bed or when discomfort begins. · Get some exercise for at least 30 minutes a day on most days of the week. Stop exercising at least 3 hours before bedtime. · Try to plan for situations where you will need to remain seated for long stretches. For example, if you are traveling by car, plan some stops so you can get out and walk around. · Tell your doctor about any medicines you are taking. This includes all over-the-counter, prescription, and herbal medicines. Some medicines, such as antidepressants, antihistamines, and cold and sinus medicines, can make your symptoms worse. · Avoid caffeine products, such as coffee, tea, cola, and chocolate. Caffeine can interrupt your sleep and stimulate you. · Do not smoke. Nicotine can make restless legs worse. If you need help quitting, talk to your doctor about stop-smoking programs and medicines.  These can increase your chances of quitting for good. · Do not drink alcohol late in the evening. Take steps to help you sleep better  · Get plenty of sunlight in the outdoors, particularly later in the afternoon. · Use the evening hours for settling down. Avoid activities that challenge you in the hours before bedtime. · Eat meals at regular times, and do not snack before bedtime. · Keep your bedroom quiet, dark, and cool. Try using a sleep mask and earplugs to help you sleep. · Limit how much you drink at night to reduce your need to get up to urinate. But do not go to bed thirsty. · Run a fan or other steady \"white noise\" during the night if noises wake you up. · [place] the bed for sleeping and sex. Do your reading or TV watching in another room. · Once you are in bed, relax from head to toe, and guide your mind to pleasant thoughts. · Do not stay in bed longer than 8 hours, and try to avoid naps. When should you call for help? Watch closely for changes in your health, and be sure to contact your doctor if:    · You are still not getting enough sleep.     · Your symptoms become more severe or happen more often. Where can you learn more? Go to https://Breezy GardenspeFraktalia Studioseb.[employer]. org and sign in to your "[employer] account. Enter Q891 in the [facility] box to learn more about \"Restless Legs Syndrome: Care Instructions. \"     If you do not have an account, please click on the \"Sign Up Now\" link. Current as of: August 4, 2020               Content Version: 12.9  © 2006-2021 Healthwise, [facility]. Care instructions adapted under license by [facility] ([facility]). If you have questions about a medical condition or this instruction, always ask your healthcare professional. [place] [address] any warranty or liability for your use of this information.

## 2021-08-04 ENCOUNTER — HOSPITAL ENCOUNTER (OUTPATIENT)
Age: 41
Discharge: HOME | End: 2021-08-04
Payer: MEDICAID

## 2021-08-04 ENCOUNTER — HOSPITAL ENCOUNTER (OUTPATIENT)
Dept: GENERAL RADIOLOGY | Age: 41
Discharge: HOME | End: 2021-08-04
Payer: MEDICAID

## 2021-08-04 DIAGNOSIS — M25.511 CHRONIC RIGHT SHOULDER PAIN: ICD-10-CM

## 2021-08-04 DIAGNOSIS — G89.29 CHRONIC RIGHT SHOULDER PAIN: ICD-10-CM

## 2021-08-04 PROCEDURE — 73030 X-RAY EXAM OF SHOULDER: CPT

## 2021-08-05 ENCOUNTER — TELEPHONE (OUTPATIENT)
Dept: FAMILY MEDICINE CLINIC | Age: 41
End: 2021-08-05

## 2021-08-05 NOTE — TELEPHONE ENCOUNTER
----- Message from GERALDINE Tapia CNP sent at 8/5/2021  8:07 AM EDT -----  Unremarkable right shoulder x-ray proceed with MRI

## 2021-08-08 ASSESSMENT — ENCOUNTER SYMPTOMS
COUGH: 0
CHEST TIGHTNESS: 0
ABDOMINAL PAIN: 0
WHEEZING: 0
SHORTNESS OF BREATH: 0
BACK PAIN: 0
ABDOMINAL DISTENTION: 0

## 2021-08-08 NOTE — PROGRESS NOTES
not drive or operate heavy machinery while taking this medication. 30 tablet 0    buPROPion (WELLBUTRIN XL) 300 MG extended release tablet Take 1 tablet by mouth every morning Take with a 150 mg 90 tablet 3    buPROPion (WELLBUTRIN XL) 150 MG extended release tablet Take 1 tablet by mouth every morning Take with a 300 mg 90 tablet 3    butalbital-acetaminophen-caffeine (FIORICET, ESGIC) -40 MG per tablet Take 1 tablet by mouth every 6 hours as needed for Headaches 60 tablet 1     No current facility-administered medications for this visit. Allergies   Allergen Reactions    Amoxicillin Hives     Health Maintenance   Topic Date Due    Hepatitis C screen  Never done    Varicella vaccine (1 of 2 - 2-dose childhood series) Never done    COVID-19 Vaccine (1) Never done    HIV screen  Never done    DTaP/Tdap/Td vaccine (1 - Tdap) Never done    Cervical cancer screen  06/15/2018    Lipid screen  06/02/2021    Flu vaccine (1) 09/01/2021    Hepatitis A vaccine  Aged Out    Hepatitis B vaccine  Aged Out    Hib vaccine  Aged Out    Meningococcal (ACWY) vaccine  Aged Out    Pneumococcal 0-64 years Vaccine  Aged Out         Objective:     Physical Exam  Vitals and nursing note reviewed. Constitutional:       Appearance: Normal appearance. She is well-developed and normal weight. HENT:      Head: Normocephalic. Right Ear: Tympanic membrane and ear canal normal.      Left Ear: Tympanic membrane and ear canal normal.      Nose: Nose normal.      Mouth/Throat:      Pharynx: Oropharynx is clear. Eyes:      Extraocular Movements: Extraocular movements intact. Conjunctiva/sclera: Conjunctivae normal.   Cardiovascular:      Rate and Rhythm: Normal rate and regular rhythm. Pulses: Normal pulses. Heart sounds: Normal heart sounds. Pulmonary:      Effort: Pulmonary effort is normal.      Breath sounds: Normal breath sounds.    Abdominal:      General: Bowel sounds are normal. medications. All patient questions answered. Pt voiced understanding. Reviewed health maintenance. Patient agreedwith treatment plan. Follow up as directed. X-ray right shoulder ordered. MRI to follow x-ray right shoulder. Continue Wellbutrin. Discontinue Lexapro. Trial of Trintellix 1 mg 1 p.o. daily. Flexeril also prescribed for muscle spasms and tension in the neck and shoulder region. Mirapex prescribed for restless leg syndrome. Shoulder exercises provided to patient. Restless leg syndrome information provided to patient. Depression information provided to patient. Declines counseling at this time. Follow-up 1 month.        Electronically signed by GERALDINE Fitch CNP on 8/8/2021 at 8:05 AM

## 2021-08-09 ENCOUNTER — PATIENT MESSAGE (OUTPATIENT)
Dept: FAMILY MEDICINE CLINIC | Age: 41
End: 2021-08-09

## 2021-08-09 ENCOUNTER — TELEPHONE (OUTPATIENT)
Dept: FAMILY MEDICINE CLINIC | Age: 41
End: 2021-08-09

## 2021-08-09 ENCOUNTER — HOSPITAL ENCOUNTER (OUTPATIENT)
Age: 41
Discharge: HOME | End: 2021-08-09
Payer: MEDICAID

## 2021-08-09 DIAGNOSIS — U07.1 COVID-19: Primary | ICD-10-CM

## 2021-08-09 DIAGNOSIS — R05.8 COUGH WITH EXPOSURE TO COVID-19 VIRUS: Primary | ICD-10-CM

## 2021-08-09 DIAGNOSIS — Z20.822 COUGH WITH EXPOSURE TO COVID-19 VIRUS: Primary | ICD-10-CM

## 2021-08-09 LAB
INFLUENZA A: NOT DETECTED
INFLUENZA B: NOT DETECTED
SARS-COV-2 RNA, RT PCR: NOT DETECTED

## 2021-08-09 PROCEDURE — 87636 SARSCOV2 & INF A&B AMP PRB: CPT

## 2021-08-09 RX ORDER — AZITHROMYCIN 250 MG/1
TABLET, FILM COATED ORAL
Qty: 6 TABLET | Refills: 0 | Status: SHIPPED | OUTPATIENT
Start: 2021-08-09 | End: 2021-08-19

## 2021-08-09 RX ORDER — BENZONATATE 200 MG/1
200 CAPSULE ORAL 3 TIMES DAILY PRN
Qty: 30 CAPSULE | Refills: 0 | Status: SHIPPED | OUTPATIENT
Start: 2021-08-09 | End: 2021-08-16

## 2021-08-09 NOTE — LETTER
[address]  [address]  [facility] [address]  Phone: [phone]  Fax: [phone]    GERALDINE Rodriguez CNP        August 9, 2021     Patient: Jon Parra   YOB: 1980           To Whom It May Concern: It is my medical opinion that Anisha Elena may be excused from work 8-9-21 due to illness. If you have any questions or concerns, please don't hesitate to call.     Sincerely,        GERALDINE Rodriguez CNP

## 2021-08-09 NOTE — LETTER
[address]  [address]  [facility] [address]  Phone: [phone]  Fax: [phone]    GERALDINE Rodriguez CNP        August 9, 2021     Patient: Jon Parra   YOB: 1980           To Whom It May Concern: It is my medical opinion that Anisha Elena may be excused from work 8-9-21 and 8-10-21 due to illness. If you have any questions or concerns, please don't hesitate to call.     Sincerely,        GERALDINE Rodriguez CNP

## 2021-08-09 NOTE — TELEPHONE ENCOUNTER
From: Meryl Valdez  To: GERALDINE Boss - CNP  Sent: 8/9/2021 5:03 AM EDT  Subject: Non-Urgent Medical Question    I was wondering if I can checked for covid   because I have a dry cough I have been up coughing all and headache chills we had a case at work last week and I just want to make sure and was wondering if I can get a doctors note for today

## 2021-08-09 NOTE — TELEPHONE ENCOUNTER
Exposure to Covid at work, now has a cough and feels terrible. I already ordered a Covid test in a My Chart message. She is now requesting something for her cough and a note to be off work today.      BEV BLACKMONP

## 2021-08-10 ENCOUNTER — PATIENT MESSAGE (OUTPATIENT)
Dept: FAMILY MEDICINE CLINIC | Age: 41
End: 2021-08-10

## 2021-08-11 ENCOUNTER — PATIENT MESSAGE (OUTPATIENT)
Dept: FAMILY MEDICINE CLINIC | Age: 41
End: 2021-08-11

## 2021-08-11 NOTE — TELEPHONE ENCOUNTER
From: Janny Fields  To: GERALDINE Siddiqui - CNP  Sent: 8/11/2021 7:21 AM EDT  Subject: Non-Urgent Medical Question    I was wondering instead of doing it day by day I woke up with a bad headache this morning can I just go back Monday to work [employer] (Optional): in the superficial subcutaneous fat

## 2021-08-11 NOTE — LETTER
[address]  [address]  [facility] [address]  Phone: [phone]  Fax: [phone] [phone], APRN - CNP        August 11, 2021     Patient: Roseann Anaya   YOB: 1980           To Whom It May Concern: It is my medical opinion that Jayce Leon may be excused from work 8-9-21 thru 8-13-21 due to illness. She may return to work 8-16-21. If you have any questions or concerns, please don't hesitate to call.     Sincerely,        Hemal Quintero, APRN - CNP

## 2021-08-18 ENCOUNTER — HOSPITAL ENCOUNTER (OUTPATIENT)
Dept: MRI IMAGING | Age: 41
Discharge: HOME | End: 2021-08-18
Payer: MEDICAID

## 2021-08-18 DIAGNOSIS — G89.29 CHRONIC RIGHT SHOULDER PAIN: ICD-10-CM

## 2021-08-18 DIAGNOSIS — M25.511 CHRONIC RIGHT SHOULDER PAIN: ICD-10-CM

## 2021-08-18 PROCEDURE — 73221 MRI JOINT UPR EXTREM W/O DYE: CPT

## 2021-08-19 ENCOUNTER — OFFICE VISIT (OUTPATIENT)
Dept: FAMILY MEDICINE CLINIC | Age: 41
End: 2021-08-19
Payer: MEDICAID

## 2021-08-19 ENCOUNTER — TELEPHONE (OUTPATIENT)
Dept: FAMILY MEDICINE CLINIC | Age: 41
End: 2021-08-19

## 2021-08-19 VITALS
HEIGHT: 62 IN | DIASTOLIC BLOOD PRESSURE: 84 MMHG | BODY MASS INDEX: 23.55 KG/M2 | SYSTOLIC BLOOD PRESSURE: 122 MMHG | WEIGHT: 128 LBS | HEART RATE: 84 BPM | RESPIRATION RATE: 16 BRPM

## 2021-08-19 DIAGNOSIS — S43.431D TEAR OF RIGHT GLENOID LABRUM, SUBSEQUENT ENCOUNTER: ICD-10-CM

## 2021-08-19 DIAGNOSIS — M25.511 RIGHT SHOULDER PAIN, UNSPECIFIED CHRONICITY: ICD-10-CM

## 2021-08-19 DIAGNOSIS — M25.511 CHRONIC RIGHT SHOULDER PAIN: Primary | ICD-10-CM

## 2021-08-19 DIAGNOSIS — G89.29 CHRONIC RIGHT SHOULDER PAIN: Primary | ICD-10-CM

## 2021-08-19 PROCEDURE — 99213 OFFICE O/P EST LOW 20 MIN: CPT | Performed by: NURSE PRACTITIONER

## 2021-08-19 PROCEDURE — 1036F TOBACCO NON-USER: CPT | Performed by: NURSE PRACTITIONER

## 2021-08-19 PROCEDURE — G8427 DOCREV CUR MEDS BY ELIG CLIN: HCPCS | Performed by: NURSE PRACTITIONER

## 2021-08-19 PROCEDURE — G8420 CALC BMI NORM PARAMETERS: HCPCS | Performed by: NURSE PRACTITIONER

## 2021-08-19 RX ORDER — KETOROLAC TROMETHAMINE 10 MG/1
10 TABLET, FILM COATED ORAL EVERY 6 HOURS PRN
Qty: 20 TABLET | Refills: 0 | Status: ON HOLD | OUTPATIENT
Start: 2021-08-19 | End: 2021-12-09 | Stop reason: HOSPADM

## 2021-08-19 RX ORDER — ACETAMINOPHEN AND CODEINE PHOSPHATE 300; 30 MG/1; MG/1
1 TABLET ORAL EVERY 4 HOURS PRN
Qty: 28 TABLET | Refills: 0 | Status: SHIPPED | OUTPATIENT
Start: 2021-08-19 | End: 2021-08-26

## 2021-08-19 RX ORDER — TIZANIDINE 4 MG/1
4 TABLET ORAL EVERY 8 HOURS PRN
Qty: 60 TABLET | Refills: 0 | Status: SHIPPED | OUTPATIENT
Start: 2021-08-19 | End: 2021-12-06 | Stop reason: ALTCHOICE

## 2021-08-19 NOTE — LETTER
[address]  [address]  [facility] [address]  Phone: [phone]  Fax: [phone]    GERALDINE Claros CNP        August 19, 2021     Patient: Pavel Colby   YOB: 1980   Date of Visit: 8/19/2021       To Whom It May Concern: It is my medical opinion that Scherry Galeazzi may be excused from work 8-18-21 thru 8-20-21 and return to work 8-23-21. If you have any questions or concerns, please don't hesitate to call.     Sincerely,        GERALDINE Claros CNP

## 2021-08-19 NOTE — TELEPHONE ENCOUNTER
----- Message from GERALDINE Rodriguez CNP sent at 8/19/2021  9:00 AM EDT -----  MRI of the shoulder indicates erosion of the humeral head, thickening of the shoulder capsule abnormal signals of the anterior and inferior labrum. No evidence to indicate full-thickness rotator cuff tear. I would like patient to consult with shoulder specialist soon as possible due to her increasing pain.

## 2021-08-21 ASSESSMENT — ENCOUNTER SYMPTOMS
COUGH: 0
SHORTNESS OF BREATH: 0
BACK PAIN: 0
WHEEZING: 0
CHEST TIGHTNESS: 0
ABDOMINAL DISTENTION: 0
ABDOMINAL PAIN: 0

## 2021-08-21 NOTE — PROGRESS NOTES
[address] [place] [facility] [address]  Dept: [phone]  Dept Fax: [phone]  Loc: [phone]  PROGRESS NOTE      VisitDate: 8/19/2021    Harris Knox is a 37 y.o. female who presents today for:     Chief Complaint   Patient presents with    Shoulder Pain     Right shoulder is still bothering her. See MRI of shoulder         Subjective:  Presents with continued right shoulder pain. Reports that she works at a [employer] and lifts frequently. Feels that this is exacerbated her right shoulder pain. Patient slight improvement with physical therapy. Here to review MRI results. Requesting pain medication. Also requesting work slip until consultation with Ortho. History of anxiety/depression, kidney stones. Patient reports difficulty sleeping due to pain right shoulder. Reports right shoulder movement extremely limited. Mood stable        Review of Systems   Constitutional: Negative for activity change, appetite change, chills, fatigue and fever. Eyes: Negative for visual disturbance. Respiratory: Negative for cough, chest tightness, shortness of breath and wheezing. Cardiovascular: Negative for chest pain, palpitations and leg swelling. Gastrointestinal: Negative for abdominal distention and abdominal pain. Genitourinary: Negative for dysuria. Musculoskeletal: Negative for arthralgias, back pain and neck pain. Skin: Negative. Negative for rash. Neurological: Negative for dizziness, light-headedness and headaches. Hematological: Negative for adenopathy. Psychiatric/Behavioral: Positive for decreased concentration, dysphoric mood and sleep disturbance. The patient is nervous/anxious. All other systems reviewed and are negative.     Past Medical History:   Diagnosis Date    Kidney stone     2005 with pregnancy    Kidney stones       Past Surgical History:   Procedure Laterality Date    COLONOSCOPY  2016    Dr. Leah Apgar 2017    DILATATION AND CURETTAGE HYSTEROSCOPY, POSSIBLE MYOSURE performed by Blessing Stacy DO at [place] [address]  2018    HERNIA REPAIR      HYSTERECTOMY ABDOMINAL N/A 3/13/2019    ROBOTIC TOTAL LAPAROSCOPIC HYSTERECTOMY WITH BILATERAL SALPINGECTOMY performed by Blessing Stacy DO at [address] HYSTEROSCOPY  2018    [place] HYSTEROSCOPY,W/ENDOMETRIAL ABLATION N/A 2018    HYSTEROSCOPY ENDOMETRIAL ABLATION performed by Blessing Stacy DO at [address] [place] [address]       Family History   Problem Relation Age of Onset    Heart Disease Father     High Blood Pressure Father     High Cholesterol Father     Diabetes Father     Asthma Neg Hx      Social History     Tobacco Use    Smoking status: Never Smoker    Smokeless tobacco: Never Used   Substance Use Topics    Alcohol use: No     Alcohol/week: 0.0 standard drinks      Current Outpatient Medications   Medication Sig Dispense Refill    acetaminophen-codeine (TYLENOL/CODEINE #3) 300-30 MG per tablet Take 1 tablet by mouth every 4 hours as needed for Pain for up to 7 days.  Take lowest dose possible to manage pain 28 tablet 0    tiZANidine (ZANAFLEX) 4 MG tablet Take 1 tablet by mouth every 8 hours as needed (muscle spasm) 60 tablet 0    buPROPion (WELLBUTRIN XL) 300 MG extended release tablet Take 1 tablet by mouth every morning Take with a 150 mg 90 tablet 3    buPROPion (WELLBUTRIN XL) 150 MG extended release tablet Take 1 tablet by mouth every morning Take with a 300 mg 90 tablet 3    ketorolac (TORADOL) 10 MG tablet Take 1 tablet by mouth every 6 hours as needed for Pain (Patient not taking: Reported on 2021) 20 tablet 0    VORTIoxetine (TRINTELLIX) 10 MG TABS tablet Take 1 tablet by mouth daily (Patient not taking: Reported on 2021) 30 tablet 3    pramipexole (MIRAPEX) 0.125 MG tablet Take 1 tablet by mouth nightly (Patient not taking: Reported on 8/19/2021) 30 tablet 1    cyclobenzaprine (FLEXERIL) 10 MG tablet Take 1 tablet by mouth every 8 hours as needed for Muscle spasms . Do not drive or operate heavy machinery while taking this medication. (Patient not taking: Reported on 8/19/2021) 30 tablet 0    butalbital-acetaminophen-caffeine (FIORICET, ESGIC) -40 MG per tablet Take 1 tablet by mouth every 6 hours as needed for Headaches 60 tablet 1     No current facility-administered medications for this visit. Allergies   Allergen Reactions    Amoxicillin Hives     Health Maintenance   Topic Date Due    Hepatitis C screen  Never done    Varicella vaccine (1 of 2 - 2-dose childhood series) Never done    COVID-19 Vaccine (1) Never done    HIV screen  Never done    DTaP/Tdap/Td vaccine (1 - Tdap) Never done    Cervical cancer screen  06/15/2018    Lipid screen  06/02/2021    Flu vaccine (1) 09/01/2021    Hepatitis A vaccine  Aged Out    Hepatitis B vaccine  Aged Out    Hib vaccine  Aged Out    Meningococcal (ACWY) vaccine  Aged Out    Pneumococcal 0-64 years Vaccine  Aged Out         Objective:     Physical Exam  Vitals and nursing note reviewed. Constitutional:       Appearance: Normal appearance. She is well-developed and normal weight. HENT:      Head: Normocephalic. Right Ear: Tympanic membrane and ear canal normal.      Left Ear: Tympanic membrane and ear canal normal.      Nose: Nose normal.      Mouth/Throat:      Pharynx: Oropharynx is clear. Eyes:      Extraocular Movements: Extraocular movements intact. Conjunctiva/sclera: Conjunctivae normal.   Cardiovascular:      Rate and Rhythm: Normal rate and regular rhythm. Pulses: Normal pulses. Heart sounds: Normal heart sounds. Pulmonary:      Effort: Pulmonary effort is normal.      Breath sounds: Normal breath sounds.    Abdominal:      General: Bowel sounds are normal.      Palpations: Abdomen is soft.   Musculoskeletal:      Right shoulder: Tenderness present. No crepitus. Decreased range of motion. Decreased strength. Cervical back: Neck supple. Skin:     General: Skin is warm and dry. Neurological:      General: No focal deficit present. Mental Status: She is alert and oriented to person, place, and time. Psychiatric:         Mood and Affect: Mood normal.         Thought Content: Thought content normal.         Judgment: Judgment normal.       /84   Pulse 84   Resp 16   Ht 5' 2\" (1.575 m)   Wt 128 lb (58.1 kg)   LMP 03/06/2019   BMI 23.41 kg/m²     9:00 AM EDT       MRI of the shoulder indicates erosion of the humeral head, thickening of the shoulder capsule abnormal signals of the anterior and inferior labrum.  No evidence to indicate full-thickness rotator cuff tear.  I would like patient to consult with shoulder specialist soon as possible due to her increasing pain.          Routing History    Priority Sent On From To Message Type    8/19/2021  9:00 AM Ayo Jumper, APRN - CNP [facility] Clinical Support Result Notes    8/19/2021  8:23 AM Dominick, Fareed Martinez Incoming Radiant Results From [address], CNP Results   Orders Requiring a Screening Form    Procedure Order Status Form Status    MRI SHOULDER RIGHT WO CONTRAST Completed Created   Radiation Dose Estimates    No radiation information found for this patient   Narrative   PROCEDURE: MRI SHOULDER RIGHT WO CONTRAST       CLINICAL INFORMATION Chronic right shoulder pain, Chronic right shoulder pain.       COMPARISON: Plain radiographs dated 4 August 2021.       TECHNIQUE: A noncontrast MRI scan was carried out through the right shoulder.       FINDINGS:               There is a type II acromion with mild lateral downsloping. These findings produce mild indentation upon the supraspinatus tendon. The rotator cuff is intact with no evidence of a full-thickness tear.  There is no retraction of the supraspinatus    musculotendinous junction. There is a tiny amount of fluid in the subacromial and subdeltoid spaces.       The bicipital tendon is in a normal location. There is slight thickening of the distal subscapularis tendon. The remaining muscles around the shoulder joint are well-developed. There is mild degenerative change involving the glenohumeral joint. There is    no significant shoulder effusion.       There is abnormal signal intensity in the anterior/inferior glenoid labrum. There is thickening of the shoulder capsule inferiorly.       There is an erosion in the humeral head laterally. This may be related to previous trauma. The remaining bony structures appear to be intact.           Impression       1. Mild indentation upon the supraspinatus tendon by the acromion with no evidence of a full-thickness rotator cuff tear. 2. Tiny amount of fluid in the subacromial and subdeltoid spaces. 3. Slight thickening of the distal subscapularis tendon. 4. Mild degenerative change involving the glenohumeral articulation. 5. Abnormal signal intensity in the anterior/inferior glenoid labrum. 6. Thickening of the shoulder capsule inferiorly. 7. Erosion in the humeral head laterally which may be related to previous trauma. 8. Otherwise negative MRI scan of the right shoulder.                   **This report has been created using voice recognition software. It may contain minor errors which are inherent in voice recognition technology. **       Final report electronically signed by DR Daljit Dimas on 8/19/2021 8:21 AM       Order History    Open Order Details   PACS Images     Show images for MRI SHOULDER        Impression/Plan:  1. Chronic right shoulder pain    2.  Tear of right glenoid labrum, subsequent encounter      Requested Prescriptions     Signed Prescriptions Disp Refills    acetaminophen-codeine (TYLENOL/CODEINE #3) 300-30 MG per tablet 28 tablet 0     Sig: Take 1 tablet by mouth every 4 hours as needed for Pain for up to 7 days. Take lowest dose possible to manage pain    tiZANidine (ZANAFLEX) 4 MG tablet 60 tablet 0     Sig: Take 1 tablet by mouth every 8 hours as needed (muscle spasm)     Orders Placed This Encounter   Procedures   Shamika Paul MD, Orthopedic Surgery, [facility]     Referral Priority:   Routine     Referral Type:   Eval and Treat     Referral Reason:   Specialty Services Required     Referred to Provider:   Ilda Lindsay MD     Requested Specialty:   Orthopedic Surgery     Number of Visits Requested:   1       Patient given edu cational materials - see patient instructions. Discussed use, benefit, and side effects of prescribed medications. All patient questions answered. Pt voiced understanding. Reviewed health maintenance. Patient agreedwith treatment plan. Follow up as directed. Tylenol 3 1 every 4 hours as needed pain ordered. OARRS report reviewed. Zanaflex 4 mg q. 8 also prescribed. Consult with Ortho. Work slip provided.     Electronically signed by GERALDINE Peterson CNP on 8/21/2021 at 8:05 AM

## 2021-09-09 DIAGNOSIS — M25.511 CHRONIC RIGHT SHOULDER PAIN: Primary | ICD-10-CM

## 2021-09-09 DIAGNOSIS — G89.29 CHRONIC RIGHT SHOULDER PAIN: Primary | ICD-10-CM

## 2021-09-09 RX ORDER — TRAMADOL HYDROCHLORIDE 50 MG/1
50 TABLET ORAL EVERY 4 HOURS PRN
Qty: 42 TABLET | Refills: 0 | Status: SHIPPED | OUTPATIENT
Start: 2021-09-09 | End: 2021-09-16

## 2021-11-16 ENCOUNTER — HOSPITAL ENCOUNTER (OUTPATIENT)
Dept: GENERAL RADIOLOGY | Age: 41
Discharge: HOME | End: 2021-11-16
Payer: MEDICAID

## 2021-11-16 ENCOUNTER — HOSPITAL ENCOUNTER (OUTPATIENT)
Age: 41
Discharge: HOME | End: 2021-11-16
Payer: MEDICAID

## 2021-11-16 DIAGNOSIS — M54.2 NECK PAIN: ICD-10-CM

## 2021-11-16 LAB
ALBUMIN SERPL-MCNC: 5 G/DL (ref 3.5–5.1)
ANION GAP SERPL CALCULATED.3IONS-SCNC: 11 MEQ/L (ref 8–16)
BASOPHILS # BLD: 0.6 %
BASOPHILS ABSOLUTE: 0 THOU/MM3 (ref 0–0.1)
BUN BLDV-MCNC: 7 MG/DL (ref 7–22)
CALCIUM SERPL-MCNC: 9.7 MG/DL (ref 8.5–10.5)
CHLORIDE BLD-SCNC: 102 MEQ/L (ref 98–111)
CO2: 27 MEQ/L (ref 23–33)
CREAT SERPL-MCNC: 0.7 MG/DL (ref 0.4–1.2)
EKG ATRIAL RATE: 74 BPM
EKG P AXIS: 57 DEGREES
EKG P-R INTERVAL: 166 MS
EKG Q-T INTERVAL: 380 MS
EKG QRS DURATION: 80 MS
EKG QTC CALCULATION (BAZETT): 421 MS
EKG R AXIS: 47 DEGREES
EKG T AXIS: 56 DEGREES
EKG VENTRICULAR RATE: 74 BPM
EOSINOPHIL # BLD: 1.7 %
EOSINOPHILS ABSOLUTE: 0.1 THOU/MM3 (ref 0–0.4)
ERYTHROCYTE [DISTWIDTH] IN BLOOD BY AUTOMATED COUNT: 12.3 % (ref 11.5–14.5)
ERYTHROCYTE [DISTWIDTH] IN BLOOD BY AUTOMATED COUNT: 42 FL (ref 35–45)
GFR SERPL CREATININE-BSD FRML MDRD: > 90 ML/MIN/1.73M2
GLUCOSE BLD-MCNC: 91 MG/DL (ref 70–108)
HCT VFR BLD CALC: 42.2 % (ref 37–47)
HEMOGLOBIN: 14 GM/DL (ref 12–16)
IMMATURE GRANS (ABS): 0.01 THOU/MM3 (ref 0–0.07)
IMMATURE GRANULOCYTES: 0.3 %
LYMPHOCYTES # BLD: 33.5 %
LYMPHOCYTES ABSOLUTE: 1.2 THOU/MM3 (ref 1–4.8)
MCH RBC QN AUTO: 30.8 PG (ref 26–33)
MCHC RBC AUTO-ENTMCNC: 33.2 GM/DL (ref 32.2–35.5)
MCV RBC AUTO: 92.7 FL (ref 81–99)
MONOCYTES # BLD: 10.8 %
MONOCYTES ABSOLUTE: 0.4 THOU/MM3 (ref 0.4–1.3)
MRSA NASAL SCREEN RT-PCR: NEGATIVE
MRSA SCREEN RT-PCR: NEGATIVE
NUCLEATED RED BLOOD CELLS: 0 /100 WBC
PLATELET # BLD: 323 THOU/MM3 (ref 130–400)
PMV BLD AUTO: 9.2 FL (ref 9.4–12.4)
POTASSIUM SERPL-SCNC: 4.7 MEQ/L (ref 3.5–5.2)
PREALBUMIN: 28.8 MG/DL (ref 20–40)
RBC # BLD: 4.55 MILL/MM3 (ref 4.2–5.4)
SEG NEUTROPHILS: 53.1 %
SEGMENTED NEUTROPHILS ABSOLUTE COUNT: 1.9 THOU/MM3 (ref 1.8–7.7)
SODIUM BLD-SCNC: 140 MEQ/L (ref 135–145)
STAPH AUREUS SCREEN RT-PCR: POSITIVE
WBC # BLD: 3.6 THOU/MM3 (ref 4.8–10.8)

## 2021-11-16 PROCEDURE — 93010 ELECTROCARDIOGRAM REPORT: CPT | Performed by: NUCLEAR MEDICINE

## 2021-11-16 PROCEDURE — 84134 ASSAY OF PREALBUMIN: CPT

## 2021-11-16 PROCEDURE — 82040 ASSAY OF SERUM ALBUMIN: CPT

## 2021-11-16 PROCEDURE — 87641 MR-STAPH DNA AMP PROBE: CPT

## 2021-11-16 PROCEDURE — 71046 X-RAY EXAM CHEST 2 VIEWS: CPT

## 2021-11-16 PROCEDURE — 93005 ELECTROCARDIOGRAM TRACING: CPT | Performed by: ORTHOPAEDIC SURGERY

## 2021-11-16 PROCEDURE — 85025 COMPLETE CBC W/AUTO DIFF WBC: CPT

## 2021-11-16 PROCEDURE — 36415 COLL VENOUS BLD VENIPUNCTURE: CPT

## 2021-11-16 PROCEDURE — 80048 BASIC METABOLIC PNL TOTAL CA: CPT

## 2021-11-16 PROCEDURE — 87640 STAPH A DNA AMP PROBE: CPT

## 2021-11-18 ENCOUNTER — OFFICE VISIT (OUTPATIENT)
Dept: FAMILY MEDICINE CLINIC | Age: 41
End: 2021-11-18
Payer: MEDICAID

## 2021-11-18 VITALS
WEIGHT: 133.2 LBS | TEMPERATURE: 97.8 F | DIASTOLIC BLOOD PRESSURE: 60 MMHG | SYSTOLIC BLOOD PRESSURE: 106 MMHG | HEIGHT: 63 IN | OXYGEN SATURATION: 98 % | BODY MASS INDEX: 23.6 KG/M2 | RESPIRATION RATE: 16 BRPM | HEART RATE: 82 BPM

## 2021-11-18 DIAGNOSIS — Z01.818 PREOP EXAMINATION: Primary | ICD-10-CM

## 2021-11-18 DIAGNOSIS — G47.9 SLEEP DISTURBANCE: ICD-10-CM

## 2021-11-18 PROCEDURE — G8427 DOCREV CUR MEDS BY ELIG CLIN: HCPCS | Performed by: NURSE PRACTITIONER

## 2021-11-18 PROCEDURE — 99242 OFF/OP CONSLTJ NEW/EST SF 20: CPT | Performed by: NURSE PRACTITIONER

## 2021-11-18 PROCEDURE — G8484 FLU IMMUNIZE NO ADMIN: HCPCS | Performed by: NURSE PRACTITIONER

## 2021-11-18 PROCEDURE — G8420 CALC BMI NORM PARAMETERS: HCPCS | Performed by: NURSE PRACTITIONER

## 2021-11-18 RX ORDER — TRAZODONE HYDROCHLORIDE 50 MG/1
50 TABLET ORAL NIGHTLY
Qty: 30 TABLET | Refills: 1 | Status: SHIPPED | OUTPATIENT
Start: 2021-11-18 | End: 2021-12-03 | Stop reason: SDUPTHER

## 2021-11-18 ASSESSMENT — ENCOUNTER SYMPTOMS
CHEST TIGHTNESS: 0
ABDOMINAL PAIN: 0
ABDOMINAL DISTENTION: 0
BACK PAIN: 0
COUGH: 0
SHORTNESS OF BREATH: 0
WHEEZING: 0

## 2021-11-18 NOTE — PROGRESS NOTES
DILATION AND CURETTAGE OF UTERUS N/A 9/13/2017    DILATATION AND CURETTAGE HYSTEROSCOPY, POSSIBLE MYOSURE performed by Camila Jewell DO at [place] [address]  04/18/2018    HERNIA REPAIR      HYSTERECTOMY ABDOMINAL N/A 3/13/2019    ROBOTIC TOTAL LAPAROSCOPIC HYSTERECTOMY WITH BILATERAL SALPINGECTOMY performed by Camila Jewell DO at [address] [facility] HYSTEROSCOPY  04/18/2018    [place] HYSTEROSCOPY,W/ENDOMETRIAL ABLATION N/A 4/18/2018    HYSTEROSCOPY ENDOMETRIAL ABLATION performed by Camila Jewell DO at [address] [place] [address]       Family History   Problem Relation Age of Onset    Heart Disease Father     High Blood Pressure Father     High Cholesterol Father     Diabetes Father     Asthma Neg Hx      Social History     Tobacco Use    Smoking status: Never Smoker    Smokeless tobacco: Never Used   Substance Use Topics    Alcohol use: No     Alcohol/week: 0.0 standard drinks      Current Outpatient Medications   Medication Sig Dispense Refill    traZODone (DESYREL) 50 MG tablet Take 1 tablet by mouth nightly 30 tablet 1    ketorolac (TORADOL) 10 MG tablet Take 1 tablet by mouth every 6 hours as needed for Pain 20 tablet 0    tiZANidine (ZANAFLEX) 4 MG tablet Take 1 tablet by mouth every 8 hours as needed (muscle spasm) 60 tablet 0    pramipexole (MIRAPEX) 0.125 MG tablet Take 1 tablet by mouth nightly 30 tablet 1    cyclobenzaprine (FLEXERIL) 10 MG tablet Take 1 tablet by mouth every 8 hours as needed for Muscle spasms . Do not drive or operate heavy machinery while taking this medication. 30 tablet 0    buPROPion (WELLBUTRIN XL) 300 MG extended release tablet Take 1 tablet by mouth every morning Take with a 150 mg 90 tablet 3    buPROPion (WELLBUTRIN XL) 150 MG extended release tablet Take 1 tablet by mouth every morning Take with a 300 mg 90 tablet 3     No current facility-administered medications for this visit.      Allergies   Allergen Reactions    Amoxicillin Hives     Health Maintenance   Topic Date Due    Hepatitis C screen  Never done    Varicella vaccine (1 of 2 - 2-dose childhood series) Never done    COVID-19 Vaccine (1) Never done    HIV screen  Never done    DTaP/Tdap/Td vaccine (1 - Tdap) Never done    Lipid screen  06/02/2021    Flu vaccine (1) Never done    Hepatitis A vaccine  Aged Out    Hepatitis B vaccine  Aged Out    Hib vaccine  Aged Out    Meningococcal (ACWY) vaccine  Aged Out    Pneumococcal 0-64 years Vaccine  Aged Out         Objective:     Physical Exam  Vitals and nursing note reviewed. Constitutional:       Appearance: Normal appearance. She is well-developed and normal weight. HENT:      Head: Normocephalic. Right Ear: Tympanic membrane and ear canal normal.      Left Ear: Tympanic membrane and ear canal normal.      Nose: Nose normal.      Mouth/Throat:      Pharynx: Oropharynx is clear. Eyes:      Extraocular Movements: Extraocular movements intact. Conjunctiva/sclera: Conjunctivae normal.   Cardiovascular:      Rate and Rhythm: Normal rate and regular rhythm. Pulses: Normal pulses. Heart sounds: Normal heart sounds. Pulmonary:      Effort: Pulmonary effort is normal.      Breath sounds: Normal breath sounds. Abdominal:      General: Bowel sounds are normal.      Palpations: Abdomen is soft. Musculoskeletal:      Cervical back: Neck supple. Pain with movement, spinous process tenderness and muscular tenderness present. Decreased range of motion. Skin:     General: Skin is warm and dry. Neurological:      General: No focal deficit present. Mental Status: She is alert and oriented to person, place, and time. Psychiatric:         Mood and Affect: Mood normal.         Thought Content:  Thought content normal.         Judgment: Judgment normal.       /60 (Site: Right Upper Arm)   Pulse 82   Temp 97.8 °F (36.6 °C) (Oral)   Resp 16   Ht 5' 3\" (1.6 m)   Wt 133 lb 3.2 oz (60.4 kg)   LMP 03/06/2019   SpO2 98%   BMI 23.60 kg/m²   Component      Latest Ref Rng & Units 11/16/2021   WBC      4.8 - 10.8 thou/mm3 3.6 (L)   RBC      4.20 - 5.40 mill/mm3 4.55   Hemoglobin Quant      12.0 - 16.0 gm/dl 14.0   Hematocrit      37.0 - 47.0 % 42.2   MCV      81.0 - 99.0 fL 92.7   MCH      26.0 - 33.0 pg 30.8   MCHC      32.2 - 35.5 gm/dl 33.2   RDW-CV      11.5 - 14.5 % 12.3   RDW-SD      35.0 - 45.0 fL 42.0   Platelet Count      755 - 400 thou/mm3 323   MPV      9.4 - 12.4 fL 9.2 (L)   Seg Neutrophils      % 53.1   Lymphocytes      % 33.5   Monocytes      % 10.8   Eosinophils      % 1.7   Basophils      % 0.6   Immature Granulocytes      % 0.3   Segs Absolute      1.8 - 7.7 thou/mm3 1.9   Lymphocytes Absolute      1.0 - 4.8 thou/mm3 1.2   Monocytes Absolute      0.4 - 1.3 thou/mm3 0.4   Eosinophils Absolute      0.0 - 0.4 thou/mm3 0.1   Basophils Absolute      0.0 - 0.1 thou/mm3 0.0   Immature Grans (Abs)      0.00 - 0.07 thou/mm3 0.01   Nucleated Red Blood Cells      /100 wbc 0   Sodium      135 - 145 meq/L 140   Potassium      3.5 - 5.2 meq/L 4.7   Chloride      98 - 111 meq/L 102   CO2      23 - 33 meq/L 27   Glucose      70 - 108 mg/dL 91   BUN      7 - 22 mg/dL 7   Creatinine      0.4 - 1.2 mg/dL 0.7   Calcium      8.5 - 10.5 mg/dL 9.7   MRSA Nasal Screen RT-PCR       NEGATIVE   Staph Aureus Screen RT-PCR       POSITIVE (A)   MRSA SCREEN RT-PCR       NEGATIVE   Prealbumin      20.0 - 40.0 mg/dl 28.8   Albumin      3.5 - 5.1 g/dL 5.0   Anion Gap      8.0 - 16.0 meq/L 11.0   Est, Glom Filt Rate      ml/min/1.73m2 >90        PROCEDURE: XR CHEST (2 VW)       CLINICAL INFORMATION: Neck pain. Preoperative assessment.       COMPARISON: No prior study.       TECHNIQUE: PA and lateral views of the chest performed.           FINDINGS:    Lines/tubes: None.       Heart/mediastinum: The heart size is normal. The pulmonary vascularity is unremarkable.       Lungs:  The lungs are hyperinflated with flattening of the hemidiaphragms bilaterally. No focal consolidation, pleural effusion, or pneumothorax is identified.       Bones: The visualized skeletal structures appear intact.               Impression   No acute intrathoracic process. Clear lungs.               **This report has been created using voice recognition software.  It may contain minor errors which are inherent in voice recognition technology. **       Final report electronically signed by Dr Krys Ozuna on 11/16/2021 11:12 AM         Order History    Open Order Details     PACS Images     Show images for XR CHEST (2 VW)    Results History Report    View Report     Associated Diagnoses   : Yes (not seen)     Next appt: 12/07/2021 at 10:30 AM in Pre-Admission Testing ([facility])     0 Result Notes     Ref Range & Units 11/16/21 0920   Ventricular Rate BPM 74    Atrial Rate BPM 74    P-R Interval ms 166    QRS Duration ms 80    Q-T Interval ms 380    QTc Calculation (Bazett) ms 421    P Axis degrees 57    R Axis degrees 47    T Axis degrees 56    Resulting Agency  [facility]             Narrative & Impression    Normal sinus rhythm  Normal ECG  When compared with ECG of 14-JAN-2019 17:05,  No significant change was found  Confirmed by Lindsey Hartmann ([id number]) on 11/16/2021 6:26:06 PM      Specimen Collected: 11/16/21 09:20 Last Resulted: 11/16/21 18:26        Lab Flowsheet     Order Details     View Encounter     Lab and Collection Details     Routing     Result History - Result Edited             Scans on Order [id number]    Scan on 11/16/2021  6:26 PM: EKG 12-LEADScan on 11/16/2021  6:26 PM: EKG 12-LEAD         Result Care Coordination      Patient Communication    Add Comments  Add Notifications  Back to Top             Order Report    Order Details        Neck pain             Impression/Plan:  1. Preop examination    2.  Sleep disturbance      Requested Prescriptions     Signed Prescriptions Disp Refills    traZODone (DESYREL) 50 MG tablet 30 tablet 1     Sig: Take 1 tablet by mouth nightly     No orders of the defined types were placed in this encounter. Patient giveneducational materials - see patient instructions. Discussed use, benefit, and side effects of prescribed medications. All patient questions answered. Pt voiced understanding. Reviewed health maintenance. Patient agreedwith treatment plan. Follow up as directed. Acceptable risk for surgery Parul Daniels FNP  Trial trazodone 50 mg 1 p.o. at bedtime #30 with 1 refill. Contact me 2 to 3 weeks to update progress.        Electronically signed by GERALDINE Ware CNP on 11/18/2021 at 10:10 AM

## 2021-11-30 NOTE — PROGRESS NOTES
Attempted PAT appointment reminder call. Patient not available.  Not able leave message on cell because voicemail not set up, and other number person who answered hung up x2

## 2021-12-03 NOTE — PROGRESS NOTES
PAT appointment reminder call done  Arrival time and location given; [id number]  Bring drivers license and insurance  Bring list of medications with dosage and frequency  If possible bring caregiver for appointment  Appointment may last 2 hours

## 2021-12-06 NOTE — PROGRESS NOTES
Mupirocin ointment 22 grams BID to both nostrils for 5 days. No refills called in to [facility] on [address].

## 2021-12-07 ENCOUNTER — HOSPITAL ENCOUNTER (OUTPATIENT)
Dept: PREADMISSION TESTING | Age: 41
Discharge: HOME | End: 2021-12-07

## 2021-12-08 ENCOUNTER — ANESTHESIA (OUTPATIENT)
Dept: OPERATING ROOM | Age: 41
End: 2021-12-08
Payer: MEDICAID

## 2021-12-08 ENCOUNTER — HOSPITAL ENCOUNTER (OUTPATIENT)
Age: 41
Discharge: HOME HEALTH | End: 2021-12-09
Attending: ORTHOPAEDIC SURGERY | Admitting: ORTHOPAEDIC SURGERY
Payer: MEDICAID

## 2021-12-08 ENCOUNTER — APPOINTMENT (OUTPATIENT)
Dept: GENERAL RADIOLOGY | Age: 41
End: 2021-12-08
Attending: ORTHOPAEDIC SURGERY
Payer: MEDICAID

## 2021-12-08 ENCOUNTER — ANESTHESIA EVENT (OUTPATIENT)
Dept: OPERATING ROOM | Age: 41
End: 2021-12-08
Payer: MEDICAID

## 2021-12-08 VITALS — TEMPERATURE: 96.6 F | DIASTOLIC BLOOD PRESSURE: 70 MMHG | OXYGEN SATURATION: 100 % | SYSTOLIC BLOOD PRESSURE: 106 MMHG

## 2021-12-08 DIAGNOSIS — Z98.1 S/P CERVICAL SPINAL FUSION: Primary | ICD-10-CM

## 2021-12-08 PROBLEM — M48.02 CERVICAL SPINAL STENOSIS: Status: ACTIVE | Noted: 2021-12-08

## 2021-12-08 LAB
ABO: NORMAL
ANTIBODY SCREEN: NORMAL
RH FACTOR: NORMAL

## 2021-12-08 PROCEDURE — 3700000001 HC ADD 15 MINUTES (ANESTHESIA): Performed by: ORTHOPAEDIC SURGERY

## 2021-12-08 PROCEDURE — 7100000001 HC PACU RECOVERY - ADDTL 15 MIN: Performed by: ORTHOPAEDIC SURGERY

## 2021-12-08 PROCEDURE — 2720000010 HC SURG SUPPLY STERILE: Performed by: ORTHOPAEDIC SURGERY

## 2021-12-08 PROCEDURE — 7100000011 HC PHASE II RECOVERY - ADDTL 15 MIN: Performed by: ORTHOPAEDIC SURGERY

## 2021-12-08 PROCEDURE — 6360000002 HC RX W HCPCS

## 2021-12-08 PROCEDURE — 6360000002 HC RX W HCPCS: Performed by: ANESTHESIOLOGY

## 2021-12-08 PROCEDURE — 72020 X-RAY EXAM OF SPINE 1 VIEW: CPT

## 2021-12-08 PROCEDURE — 86901 BLOOD TYPING SEROLOGIC RH(D): CPT

## 2021-12-08 PROCEDURE — 36415 COLL VENOUS BLD VENIPUNCTURE: CPT

## 2021-12-08 PROCEDURE — 2580000003 HC RX 258: Performed by: PHYSICIAN ASSISTANT

## 2021-12-08 PROCEDURE — 6370000000 HC RX 637 (ALT 250 FOR IP): Performed by: NURSE ANESTHETIST, CERTIFIED REGISTERED

## 2021-12-08 PROCEDURE — L0120 CERV FLEX N/ADJ FOAM PRE OTS: HCPCS | Performed by: ORTHOPAEDIC SURGERY

## 2021-12-08 PROCEDURE — 6360000002 HC RX W HCPCS: Performed by: PHYSICIAN ASSISTANT

## 2021-12-08 PROCEDURE — 3600000014 HC SURGERY LEVEL 4 ADDTL 15MIN: Performed by: ORTHOPAEDIC SURGERY

## 2021-12-08 PROCEDURE — 6360000002 HC RX W HCPCS: Performed by: NURSE ANESTHETIST, CERTIFIED REGISTERED

## 2021-12-08 PROCEDURE — 3600000004 HC SURGERY LEVEL 4 BASE: Performed by: ORTHOPAEDIC SURGERY

## 2021-12-08 PROCEDURE — C1713 ANCHOR/SCREW BN/BN,TIS/BN: HCPCS | Performed by: ORTHOPAEDIC SURGERY

## 2021-12-08 PROCEDURE — 2580000003 HC RX 258: Performed by: NURSE ANESTHETIST, CERTIFIED REGISTERED

## 2021-12-08 PROCEDURE — 6370000000 HC RX 637 (ALT 250 FOR IP): Performed by: PHYSICIAN ASSISTANT

## 2021-12-08 PROCEDURE — 6370000000 HC RX 637 (ALT 250 FOR IP)

## 2021-12-08 PROCEDURE — 2709999900 HC NON-CHARGEABLE SUPPLY: Performed by: ORTHOPAEDIC SURGERY

## 2021-12-08 PROCEDURE — 2500000003 HC RX 250 WO HCPCS: Performed by: NURSE ANESTHETIST, CERTIFIED REGISTERED

## 2021-12-08 PROCEDURE — 2500000003 HC RX 250 WO HCPCS: Performed by: ORTHOPAEDIC SURGERY

## 2021-12-08 PROCEDURE — 3700000000 HC ANESTHESIA ATTENDED CARE: Performed by: ORTHOPAEDIC SURGERY

## 2021-12-08 PROCEDURE — 86900 BLOOD TYPING SEROLOGIC ABO: CPT

## 2021-12-08 PROCEDURE — 7100000010 HC PHASE II RECOVERY - FIRST 15 MIN: Performed by: ORTHOPAEDIC SURGERY

## 2021-12-08 PROCEDURE — 7100000000 HC PACU RECOVERY - FIRST 15 MIN: Performed by: ORTHOPAEDIC SURGERY

## 2021-12-08 PROCEDURE — 86850 RBC ANTIBODY SCREEN: CPT

## 2021-12-08 DEVICE — 4.0MM VARIABLE ANGLE SCREW, SELF-TAPPING, 14MM
Type: IMPLANTABLE DEVICE | Status: FUNCTIONAL
Brand: ADMIRAL

## 2021-12-08 DEVICE — CERVICAL PLATE, 4 LEVEL, 66MM
Type: IMPLANTABLE DEVICE | Status: FUNCTIONAL
Brand: ADMIRAL

## 2021-12-08 DEVICE — DBM T41120 1CC GRAFTON GEL
Type: IMPLANTABLE DEVICE | Status: FUNCTIONAL
Brand: GRAFTON®AND GRAFTON PLUS®DEMINERALIZED BONE MATRIX (DBM)

## 2021-12-08 DEVICE — IMPLANTABLE DEVICE: Type: IMPLANTABLE DEVICE | Status: FUNCTIONAL

## 2021-12-08 RX ORDER — GLYCOPYRROLATE 1 MG/5 ML
SYRINGE (ML) INTRAVENOUS PRN
Status: DISCONTINUED | OUTPATIENT
Start: 2021-12-08 | End: 2021-12-08 | Stop reason: SDUPTHER

## 2021-12-08 RX ORDER — SODIUM CHLORIDE 0.9 % (FLUSH) 0.9 %
10 SYRINGE (ML) INJECTION EVERY 12 HOURS SCHEDULED
Status: DISCONTINUED | OUTPATIENT
Start: 2021-12-08 | End: 2021-12-08

## 2021-12-08 RX ORDER — ROCURONIUM BROMIDE 10 MG/ML
INJECTION, SOLUTION INTRAVENOUS PRN
Status: DISCONTINUED | OUTPATIENT
Start: 2021-12-08 | End: 2021-12-08 | Stop reason: SDUPTHER

## 2021-12-08 RX ORDER — ONDANSETRON 4 MG/1
4 TABLET, ORALLY DISINTEGRATING ORAL EVERY 8 HOURS PRN
Status: DISCONTINUED | OUTPATIENT
Start: 2021-12-08 | End: 2021-12-09 | Stop reason: HOSPADM

## 2021-12-08 RX ORDER — ONDANSETRON 2 MG/ML
4 INJECTION INTRAMUSCULAR; INTRAVENOUS EVERY 6 HOURS PRN
Status: DISCONTINUED | OUTPATIENT
Start: 2021-12-08 | End: 2021-12-09 | Stop reason: HOSPADM

## 2021-12-08 RX ORDER — DEXAMETHASONE SODIUM PHOSPHATE 10 MG/ML
8 INJECTION, EMULSION INTRAMUSCULAR; INTRAVENOUS EVERY 8 HOURS
Status: COMPLETED | OUTPATIENT
Start: 2021-12-08 | End: 2021-12-09

## 2021-12-08 RX ORDER — FENTANYL CITRATE 50 UG/ML
INJECTION, SOLUTION INTRAMUSCULAR; INTRAVENOUS PRN
Status: DISCONTINUED | OUTPATIENT
Start: 2021-12-08 | End: 2021-12-08 | Stop reason: SDUPTHER

## 2021-12-08 RX ORDER — TRAZODONE HYDROCHLORIDE 100 MG/1
100 TABLET ORAL NIGHTLY
Status: DISCONTINUED | OUTPATIENT
Start: 2021-12-08 | End: 2021-12-09 | Stop reason: HOSPADM

## 2021-12-08 RX ORDER — NEOSTIGMINE METHYLSULFATE 5 MG/5 ML
SYRINGE (ML) INTRAVENOUS PRN
Status: DISCONTINUED | OUTPATIENT
Start: 2021-12-08 | End: 2021-12-08 | Stop reason: SDUPTHER

## 2021-12-08 RX ORDER — FENTANYL CITRATE 50 UG/ML
50 INJECTION, SOLUTION INTRAMUSCULAR; INTRAVENOUS EVERY 5 MIN PRN
Status: DISCONTINUED | OUTPATIENT
Start: 2021-12-08 | End: 2021-12-08 | Stop reason: HOSPADM

## 2021-12-08 RX ORDER — SENNA PLUS 8.6 MG/1
1 TABLET ORAL DAILY PRN
Status: DISCONTINUED | OUTPATIENT
Start: 2021-12-08 | End: 2021-12-09 | Stop reason: HOSPADM

## 2021-12-08 RX ORDER — SODIUM CHLORIDE 9 MG/ML
25 INJECTION, SOLUTION INTRAVENOUS PRN
Status: DISCONTINUED | OUTPATIENT
Start: 2021-12-08 | End: 2021-12-08

## 2021-12-08 RX ORDER — POLYETHYLENE GLYCOL 3350 17 G/17G
17 POWDER, FOR SOLUTION ORAL DAILY PRN
Status: DISCONTINUED | OUTPATIENT
Start: 2021-12-08 | End: 2021-12-09 | Stop reason: HOSPADM

## 2021-12-08 RX ORDER — MIDAZOLAM HYDROCHLORIDE 1 MG/ML
INJECTION INTRAMUSCULAR; INTRAVENOUS PRN
Status: DISCONTINUED | OUTPATIENT
Start: 2021-12-08 | End: 2021-12-08 | Stop reason: SDUPTHER

## 2021-12-08 RX ORDER — SODIUM CHLORIDE 9 MG/ML
25 INJECTION, SOLUTION INTRAVENOUS PRN
Status: DISCONTINUED | OUTPATIENT
Start: 2021-12-08 | End: 2021-12-09 | Stop reason: HOSPADM

## 2021-12-08 RX ORDER — SODIUM CHLORIDE 0.9 % (FLUSH) 0.9 %
5-40 SYRINGE (ML) INJECTION PRN
Status: DISCONTINUED | OUTPATIENT
Start: 2021-12-08 | End: 2021-12-09 | Stop reason: HOSPADM

## 2021-12-08 RX ORDER — ONDANSETRON 2 MG/ML
INJECTION INTRAMUSCULAR; INTRAVENOUS PRN
Status: DISCONTINUED | OUTPATIENT
Start: 2021-12-08 | End: 2021-12-08 | Stop reason: SDUPTHER

## 2021-12-08 RX ORDER — CYCLOBENZAPRINE HCL 10 MG
10 TABLET ORAL 3 TIMES DAILY PRN
Status: DISCONTINUED | OUTPATIENT
Start: 2021-12-08 | End: 2021-12-09 | Stop reason: HOSPADM

## 2021-12-08 RX ORDER — DEXAMETHASONE SODIUM PHOSPHATE 10 MG/ML
INJECTION, EMULSION INTRAMUSCULAR; INTRAVENOUS PRN
Status: DISCONTINUED | OUTPATIENT
Start: 2021-12-08 | End: 2021-12-08 | Stop reason: SDUPTHER

## 2021-12-08 RX ORDER — SODIUM CHLORIDE 0.9 % (FLUSH) 0.9 %
5-40 SYRINGE (ML) INJECTION EVERY 12 HOURS SCHEDULED
Status: DISCONTINUED | OUTPATIENT
Start: 2021-12-08 | End: 2021-12-09 | Stop reason: HOSPADM

## 2021-12-08 RX ORDER — OXYCODONE HYDROCHLORIDE AND ACETAMINOPHEN 5; 325 MG/1; MG/1
2 TABLET ORAL EVERY 4 HOURS PRN
Status: DISCONTINUED | OUTPATIENT
Start: 2021-12-08 | End: 2021-12-09 | Stop reason: HOSPADM

## 2021-12-08 RX ORDER — LIDOCAINE HYDROCHLORIDE 20 MG/ML
INJECTION, SOLUTION INTRAVENOUS PRN
Status: DISCONTINUED | OUTPATIENT
Start: 2021-12-08 | End: 2021-12-08 | Stop reason: SDUPTHER

## 2021-12-08 RX ORDER — SCOLOPAMINE TRANSDERMAL SYSTEM 1 MG/1
1 PATCH, EXTENDED RELEASE TRANSDERMAL
Status: DISCONTINUED | OUTPATIENT
Start: 2021-12-08 | End: 2021-12-08

## 2021-12-08 RX ORDER — SCOLOPAMINE TRANSDERMAL SYSTEM 1 MG/1
PATCH, EXTENDED RELEASE TRANSDERMAL PRN
Status: DISCONTINUED | OUTPATIENT
Start: 2021-12-08 | End: 2021-12-08 | Stop reason: SDUPTHER

## 2021-12-08 RX ORDER — PROPOFOL 10 MG/ML
INJECTION, EMULSION INTRAVENOUS PRN
Status: DISCONTINUED | OUTPATIENT
Start: 2021-12-08 | End: 2021-12-08 | Stop reason: SDUPTHER

## 2021-12-08 RX ORDER — SODIUM CHLORIDE 9 MG/ML
INJECTION, SOLUTION INTRAVENOUS CONTINUOUS
Status: DISCONTINUED | OUTPATIENT
Start: 2021-12-08 | End: 2021-12-09 | Stop reason: HOSPADM

## 2021-12-08 RX ORDER — SODIUM CHLORIDE 0.9 % (FLUSH) 0.9 %
10 SYRINGE (ML) INJECTION PRN
Status: DISCONTINUED | OUTPATIENT
Start: 2021-12-08 | End: 2021-12-08

## 2021-12-08 RX ORDER — OXYCODONE HYDROCHLORIDE AND ACETAMINOPHEN 5; 325 MG/1; MG/1
TABLET ORAL
Status: COMPLETED
Start: 2021-12-08 | End: 2021-12-08

## 2021-12-08 RX ORDER — BUPROPION HYDROCHLORIDE 300 MG/1
300 TABLET ORAL NIGHTLY
Status: DISCONTINUED | OUTPATIENT
Start: 2021-12-08 | End: 2021-12-09 | Stop reason: HOSPADM

## 2021-12-08 RX ORDER — SODIUM CHLORIDE, SODIUM LACTATE, POTASSIUM CHLORIDE, CALCIUM CHLORIDE 600; 310; 30; 20 MG/100ML; MG/100ML; MG/100ML; MG/100ML
INJECTION, SOLUTION INTRAVENOUS CONTINUOUS PRN
Status: DISCONTINUED | OUTPATIENT
Start: 2021-12-08 | End: 2021-12-08 | Stop reason: SDUPTHER

## 2021-12-08 RX ORDER — BUPROPION HYDROCHLORIDE 150 MG/1
150 TABLET ORAL NIGHTLY
Status: DISCONTINUED | OUTPATIENT
Start: 2021-12-08 | End: 2021-12-09 | Stop reason: HOSPADM

## 2021-12-08 RX ORDER — OXYCODONE HYDROCHLORIDE AND ACETAMINOPHEN 5; 325 MG/1; MG/1
1 TABLET ORAL EVERY 4 HOURS PRN
Status: DISCONTINUED | OUTPATIENT
Start: 2021-12-08 | End: 2021-12-09 | Stop reason: HOSPADM

## 2021-12-08 RX ORDER — LIDOCAINE HYDROCHLORIDE AND EPINEPHRINE 10; 10 MG/ML; UG/ML
INJECTION, SOLUTION INFILTRATION; PERINEURAL PRN
Status: DISCONTINUED | OUTPATIENT
Start: 2021-12-08 | End: 2021-12-08 | Stop reason: HOSPADM

## 2021-12-08 RX ADMIN — OXYCODONE HYDROCHLORIDE AND ACETAMINOPHEN 2 TABLET: 5; 325 TABLET ORAL at 12:35

## 2021-12-08 RX ADMIN — DEXAMETHASONE SODIUM PHOSPHATE 8 MG: 10 INJECTION, EMULSION INTRAMUSCULAR; INTRAVENOUS at 16:44

## 2021-12-08 RX ADMIN — SODIUM CHLORIDE 25 ML: 9 INJECTION, SOLUTION INTRAVENOUS at 07:40

## 2021-12-08 RX ADMIN — FENTANYL CITRATE 100 MCG: 50 INJECTION INTRAMUSCULAR; INTRAVENOUS at 08:19

## 2021-12-08 RX ADMIN — DEXAMETHASONE SODIUM PHOSPHATE 10 MG: 10 INJECTION, EMULSION INTRAMUSCULAR; INTRAVENOUS at 08:26

## 2021-12-08 RX ADMIN — BUPROPION HYDROCHLORIDE 300 MG: 300 TABLET, EXTENDED RELEASE ORAL at 19:17

## 2021-12-08 RX ADMIN — OXYCODONE AND ACETAMINOPHEN 2 TABLET: 5; 325 TABLET ORAL at 12:35

## 2021-12-08 RX ADMIN — CYCLOBENZAPRINE 10 MG: 10 TABLET, FILM COATED ORAL at 23:24

## 2021-12-08 RX ADMIN — SODIUM CHLORIDE: 9 INJECTION, SOLUTION INTRAVENOUS at 16:39

## 2021-12-08 RX ADMIN — Medication 3 MG: at 10:41

## 2021-12-08 RX ADMIN — SCOPALAMINE 1 PATCH: 1 PATCH, EXTENDED RELEASE TRANSDERMAL at 08:15

## 2021-12-08 RX ADMIN — ONDANSETRON HYDROCHLORIDE 4 MG: 4 INJECTION, SOLUTION INTRAMUSCULAR; INTRAVENOUS at 10:41

## 2021-12-08 RX ADMIN — Medication 0.6 MG: at 10:41

## 2021-12-08 RX ADMIN — CEFAZOLIN 2000 MG: 10 INJECTION, POWDER, FOR SOLUTION INTRAVENOUS at 23:23

## 2021-12-08 RX ADMIN — CYCLOBENZAPRINE 10 MG: 10 TABLET, FILM COATED ORAL at 15:09

## 2021-12-08 RX ADMIN — HYDROMORPHONE HYDROCHLORIDE 0.5 MG: 1 INJECTION, SOLUTION INTRAMUSCULAR; INTRAVENOUS; SUBCUTANEOUS at 11:27

## 2021-12-08 RX ADMIN — DEXAMETHASONE SODIUM PHOSPHATE 8 MG: 10 INJECTION, EMULSION INTRAMUSCULAR; INTRAVENOUS at 23:23

## 2021-12-08 RX ADMIN — PROPOFOL 160 MG: 10 INJECTION, EMULSION INTRAVENOUS at 08:19

## 2021-12-08 RX ADMIN — FENTANYL CITRATE 50 MCG: 50 INJECTION INTRAMUSCULAR; INTRAVENOUS at 08:43

## 2021-12-08 RX ADMIN — ROCURONIUM BROMIDE 50 MG: 10 INJECTION INTRAVENOUS at 08:19

## 2021-12-08 RX ADMIN — SODIUM CHLORIDE, POTASSIUM CHLORIDE, SODIUM LACTATE AND CALCIUM CHLORIDE: 600; 310; 30; 20 INJECTION, SOLUTION INTRAVENOUS at 10:16

## 2021-12-08 RX ADMIN — CEFAZOLIN 2000 MG: 10 INJECTION, POWDER, FOR SOLUTION INTRAVENOUS at 16:44

## 2021-12-08 RX ADMIN — OXYCODONE HYDROCHLORIDE AND ACETAMINOPHEN 1 TABLET: 5; 325 TABLET ORAL at 23:24

## 2021-12-08 RX ADMIN — SODIUM CHLORIDE: 9 INJECTION, SOLUTION INTRAVENOUS at 10:15

## 2021-12-08 RX ADMIN — FENTANYL CITRATE 50 MCG: 50 INJECTION INTRAMUSCULAR; INTRAVENOUS at 11:00

## 2021-12-08 RX ADMIN — FENTANYL CITRATE 50 MCG: 50 INJECTION INTRAMUSCULAR; INTRAVENOUS at 11:03

## 2021-12-08 RX ADMIN — HYDROMORPHONE HYDROCHLORIDE 0.5 MG: 1 INJECTION, SOLUTION INTRAMUSCULAR; INTRAVENOUS; SUBCUTANEOUS at 11:22

## 2021-12-08 RX ADMIN — LIDOCAINE HYDROCHLORIDE 100 MG: 20 INJECTION, SOLUTION INTRAVENOUS at 08:19

## 2021-12-08 RX ADMIN — HYDROMORPHONE HYDROCHLORIDE 0.5 MG: 1 INJECTION, SOLUTION INTRAMUSCULAR; INTRAVENOUS; SUBCUTANEOUS at 15:09

## 2021-12-08 RX ADMIN — BUPROPION HYDROCHLORIDE 150 MG: 150 TABLET, EXTENDED RELEASE ORAL at 19:17

## 2021-12-08 RX ADMIN — OXYCODONE HYDROCHLORIDE AND ACETAMINOPHEN 2 TABLET: 5; 325 TABLET ORAL at 19:18

## 2021-12-08 RX ADMIN — CEFAZOLIN 2000 MG: 10 INJECTION, POWDER, FOR SOLUTION INTRAVENOUS at 08:25

## 2021-12-08 RX ADMIN — TRAZODONE HYDROCHLORIDE 100 MG: 100 TABLET ORAL at 19:18

## 2021-12-08 RX ADMIN — MIDAZOLAM 2 MG: 1 INJECTION INTRAMUSCULAR; INTRAVENOUS at 08:15

## 2021-12-08 ASSESSMENT — PULMONARY FUNCTION TESTS
PIF_VALUE: 16
PIF_VALUE: 15
PIF_VALUE: 16
PIF_VALUE: 18
PIF_VALUE: 16
PIF_VALUE: 16
PIF_VALUE: 14
PIF_VALUE: 15
PIF_VALUE: 14
PIF_VALUE: 14
PIF_VALUE: 16
PIF_VALUE: 15
PIF_VALUE: 14
PIF_VALUE: 1
PIF_VALUE: 16
PIF_VALUE: 16
PIF_VALUE: 15
PIF_VALUE: 16
PIF_VALUE: 14
PIF_VALUE: 16
PIF_VALUE: 21
PIF_VALUE: 16
PIF_VALUE: 15
PIF_VALUE: 16
PIF_VALUE: 16
PIF_VALUE: 14
PIF_VALUE: 16
PIF_VALUE: 15
PIF_VALUE: 16
PIF_VALUE: 15
PIF_VALUE: 15
PIF_VALUE: 16
PIF_VALUE: 15
PIF_VALUE: 14
PIF_VALUE: 16
PIF_VALUE: 15
PIF_VALUE: -12
PIF_VALUE: 16
PIF_VALUE: 18
PIF_VALUE: 16
PIF_VALUE: 15
PIF_VALUE: 14
PIF_VALUE: 16
PIF_VALUE: 15
PIF_VALUE: 16
PIF_VALUE: 14
PIF_VALUE: 17
PIF_VALUE: 3
PIF_VALUE: 14
PIF_VALUE: 16
PIF_VALUE: 14
PIF_VALUE: 16
PIF_VALUE: 15
PIF_VALUE: 16
PIF_VALUE: 3
PIF_VALUE: 16
PIF_VALUE: 26
PIF_VALUE: 16
PIF_VALUE: 15
PIF_VALUE: 16
PIF_VALUE: 15
PIF_VALUE: 16
PIF_VALUE: 15
PIF_VALUE: 15
PIF_VALUE: 17
PIF_VALUE: 16
PIF_VALUE: 21
PIF_VALUE: 14
PIF_VALUE: 16
PIF_VALUE: 15
PIF_VALUE: 16
PIF_VALUE: 16
PIF_VALUE: 5
PIF_VALUE: 15
PIF_VALUE: 16
PIF_VALUE: 14
PIF_VALUE: 17
PIF_VALUE: 16
PIF_VALUE: 15
PIF_VALUE: 16
PIF_VALUE: 15
PIF_VALUE: 15
PIF_VALUE: 11
PIF_VALUE: 16
PIF_VALUE: 16
PIF_VALUE: 14
PIF_VALUE: 16
PIF_VALUE: 16
PIF_VALUE: 14
PIF_VALUE: 8
PIF_VALUE: 17
PIF_VALUE: 15
PIF_VALUE: 14
PIF_VALUE: 16
PIF_VALUE: 21
PIF_VALUE: 16
PIF_VALUE: 14
PIF_VALUE: 15
PIF_VALUE: 4
PIF_VALUE: 16
PIF_VALUE: 14
PIF_VALUE: 16
PIF_VALUE: 15
PIF_VALUE: 16
PIF_VALUE: 16
PIF_VALUE: 14
PIF_VALUE: 14
PIF_VALUE: 15
PIF_VALUE: 16
PIF_VALUE: 1
PIF_VALUE: 14
PIF_VALUE: 14
PIF_VALUE: 16
PIF_VALUE: 14
PIF_VALUE: 16
PIF_VALUE: 14
PIF_VALUE: 17
PIF_VALUE: 15
PIF_VALUE: 16
PIF_VALUE: 14
PIF_VALUE: 16
PIF_VALUE: 16
PIF_VALUE: -12
PIF_VALUE: 15
PIF_VALUE: 16
PIF_VALUE: 16
PIF_VALUE: 14
PIF_VALUE: 16
PIF_VALUE: 15
PIF_VALUE: 14
PIF_VALUE: 14
PIF_VALUE: 15
PIF_VALUE: 16
PIF_VALUE: 16
PIF_VALUE: 15
PIF_VALUE: 16
PIF_VALUE: 9
PIF_VALUE: 14
PIF_VALUE: 16
PIF_VALUE: 16
PIF_VALUE: 15

## 2021-12-08 ASSESSMENT — PAIN SCALES - GENERAL
PAINLEVEL_OUTOF10: 7
PAINLEVEL_OUTOF10: 8
PAINLEVEL_OUTOF10: 9
PAINLEVEL_OUTOF10: 9
PAINLEVEL_OUTOF10: 7
PAINLEVEL_OUTOF10: 9
PAINLEVEL_OUTOF10: 9
PAINLEVEL_OUTOF10: 8
PAINLEVEL_OUTOF10: 8
PAINLEVEL_OUTOF10: 9
PAINLEVEL_OUTOF10: 3
PAINLEVEL_OUTOF10: 9

## 2021-12-08 ASSESSMENT — PAIN DESCRIPTION - PAIN TYPE: TYPE: SURGICAL PAIN

## 2021-12-08 ASSESSMENT — PAIN DESCRIPTION - ORIENTATION: ORIENTATION: ANTERIOR;POSTERIOR

## 2021-12-08 ASSESSMENT — PAIN DESCRIPTION - LOCATION: LOCATION: NECK

## 2021-12-08 NOTE — H&P
[address] [id number]                       PREOPERATIVE HISTORY AND PHYSICAL    PATIENT NAME: Juan Carlos Ford                    :        1980  MED REC NO:   [id number]                           ROOM:  ACCOUNT NO:   [de-identified]                           ADMIT DATE: 2021  PROVIDER:     Ortiz Holder PA-C    This is a patient of Dr. Daniela Coffey who will be undergoing surgery at  [address] [address] on the date of 2021 undergoing an  anterior cervical diskectomy and fusion C4 to C7 with allograft bone and  plating. CHIEF COMPLAINT:  Cervical pain and right upper extremity radiculopathy. HISTORY OF PRESENT ILLNESS:  The patient is a 55-year-old female who  presented to the office for a preop history and physical prior to  upcoming planned cervical spine surgery. She is a patient who has been  dealing with symptoms of significant cervical pain that radiates to  bilateral trapezius muscles and right upper extremity that she describes  as burning and aching. She also complains of a constant headache  associated with the cervical pain. She does have a known  partial-thickness rotator cuff tear on her right side and being followed  by Dr. Rakel Herr, but was then worked up with an MRI of her cervical  spine due to recurrent symptoms which did demonstrate significant spinal  stenosis from C4 to C7 with associated retrolisthesis C4/C5. She has  failed to improve despite conservative treatment including  anti-inflammatories, oral steroids, ice, physical therapy, cortisone  injection in her shoulders and has elected to proceed with surgical  intervention. She has been surgically cleared by her PCP, TERESA Solitario, to undergo surgery. ALLERGIES:  AMOXICILLIN, REACTION RASH. PAST MEDICAL HISTORY:  No significant past medical history. CURRENT MEDICATIONS:  Include Wellbutrin and trazodone.     PAST SURGICAL HISTORY:  Hysterectomy in 2019. SOCIAL HISTORY:  She is a nonsmoker. Currently lives in a private home  with her family. REVIEW OF SYSTEMS:  GENERAL:  Denies fever, fatigue, or chills. RESPIRATORY:  Denies shortness of breath or productive cough. CARDIOVASCULAR:  Denies chest pain, palpitations, or abnormal heartbeat. GASTROINTESTINAL:  Denies nausea, vomiting, or diarrhea. GENITOURINARY:  Denies dysuria or bladder incontinence. MUSCULOSKELETAL:  Significant for cervical pain and right arm pain. Denies low back pain, muscle pain, joint pain, or joint swelling. NEUROLOGIC:  Positive for headaches. Denies faintings or blackouts. PHYSICAL EXAMINATION:  VITAL SIGNS:  Most recently dated on 10/25/2021 demonstrate a height of  5 feet 2-1/2 inches, weight 132 pounds, BMI 23.76. GENERAL:  The patient is calm, cooperative, alert, and oriented x3,  sitting in the exam room chair in no acute distress. MUSCULOSKELETAL:  Examination of the cervical spine demonstrates skin is  warm, dry, and intact with no open wounds or lesions. There is no  tenderness to palpation in the midline or bilateral paraspinal muscles. Examination of bilateral upper extremities demonstrates skin is warm,  dry, and intact with no open wounds or lesions. Distal pulses are +2 at  the wrist bilaterally. Sensation is intact to light touch. There is  weakness graded 4/5 with right-sided , bilateral finger extension,  bilateral wrist extension as well as 3+/5 with left-sided elbow flexion  and extension on the left and then 3/5 on the right elbow flexion and  extension. Grayson sign is negative bilaterally. IMAGING:  MRI cervical spine without contrast completed on 10/19/2021 at  [facility]:  CONCLUSION:  1. At C5-C6, central and lateral, asymmetric to right, disk protrusion  subtly deforms right ventral cord margin, contacting the right C6 nerve  roots. Mild spinal stenosis. Mild right lateral recess stenosis. Mild  right foraminal stenosis. 2.  At C6-C7, shallow right lateral disk protrusion encroaching upon  ventral dural sac to the right C7 nerve root. 3.  C7-T1 shallow concentric disk displacement, asymmetric to the right  gently encroaches upon ventral dural sac, greater on the right. 4.  C3-C4 shallow right lateral inferior foramen protrusion encroaching  upon the right C4 nerve roots. 5. 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 6.  At C4-C5, there is a 2-mm retrolisthesis with mild left foraminal  narrowing appreciated. LABORATORY DATA:  Dated 11/16/2021 demonstrate MRSA screen is negative. Hemoglobin 14.0, hematocrit of 42.2. ASSESSMENT:  1. Cervical spinal stenosis with radiculopathy and weakness. 2.  Retrolisthesis of C4/C5. PLAN:  The plan moving forward for the patient is for her to undergo  surgery at [facility] on the date of 12/08/2021  undergoing anterior cervical diskectomy and fusion C4 to C7 with  allograft bone and plating. CONCLUSION:  The patient is a 78-year-old female who has been dealing  with significant symptoms of cervical pain and predominant right upper  extremity radiculopathy with bilateral upper extremity weakness. She  has been diagnosed with a partial right rotator cuff tear, but then was  dealing with recurrent symptoms and worked up with an MRI of her  cervical spine which did demonstrate significant spinal stenosis from C4  to C7 with associated retrolisthesis of C4/C5. She has failed to  improve despite conservative treatment and management including physical  therapy, pain medications, activity modifications, ice and has elected  to proceed with surgical intervention. She has been surgically cleared  by her PCP, TERESA Chan, to undergo surgery. We have received  informed consent from the patient.   We have gone over the risks and  benefits of surgery which include postoperative pain, dysphagia, blood  loss, blood loss requiring transfusion, wound infection, nerve root  injury, nerve root irritation, CSF leak, and difficulty with anesthesia. I have answered the questions and concerns to the patient's  satisfaction. Absolutely, no guarantees have been made as far as  results of the surgery.         Muna Gonzalez    D: 12/07/2021 18:14:41       T: 12/07/2021 18:19:38     [email or web address]  Job#: [id number]     Doc#: [id number]

## 2021-12-08 NOTE — H&P
I have reviewed the history and physical and examined the patient. I find no relevant changes. I have reviewed with the patient and/or family members, during the preoperative office visit the risks, benefits, and alternatives to the procedure.     Omega Galindo MD

## 2021-12-08 NOTE — ANESTHESIA PRE PROCEDURE
Department of Anesthesiology  Preprocedure Note       Name:  Hugh Sharif   Age:  37 y.o.  :  1980                                          MRN:  [id number]         Date:  2021      Surgeon: Johnie Lynn):  Sherie Pearl MD    Procedure: Procedure(s):  ACDF C4-C7    Medications prior to admission:   Prior to Admission medications    Medication Sig Start Date End Date Taking? Authorizing Provider   METAMUCIL FIBER PO Take 1 capsule by mouth daily 3 in 1 fiber    Historical Provider, MD   traZODone (DESYREL) 100 MG tablet Take 1 tablet by mouth nightly 12/3/21   Jina Romo MD   ketorolac (TORADOL) 10 MG tablet Take 1 tablet by mouth every 6 hours as needed for Pain 21  GERALIDNE Grant - CNP   buPROPion (WELLBUTRIN XL) 300 MG extended release tablet Take 1 tablet by mouth every morning Take with a 150 mg  Patient taking differently: Take 300 mg by mouth nightly Take with a 150 mg 21   Jina Romo MD   buPROPion (WELLBUTRIN XL) 150 MG extended release tablet Take 1 tablet by mouth every morning Take with a 300 mg  Patient taking differently: Take 150 mg by mouth nightly Take with a 300 mg 21   Jina Romo MD       Current medications:    No current facility-administered medications for this encounter. Allergies: Allergies   Allergen Reactions    Amoxicillin Hives       Problem List:    Patient Active Problem List   Diagnosis Code    Menorrhagia with regular cycle N92.0    Dysmenorrhea N94.6    History of robot-assisted laparoscopic hysterectomy Z90.710    Adjustment disorder with depressed mood F43.21       Past Medical History:        Diagnosis Date    Kidney stone      with pregnancy    Kidney stones     PONV (postoperative nausea and vomiting)     Prolonged emergence from general anesthesia     \"During Hysterectomy- bradycardia- meds to increase heart rate. \"       Past Surgical History:        Procedure Laterality Date    COLONOSCOPY 03/25/2016    Dr. Quesada Melrose 9/13/2017    DILATATION AND CURETTAGE HYSTEROSCOPY, POSSIBLE MYOSURE performed by Filippo Malave DO at [address] [address]  04/18/2018    HERNIA REPAIR      HYSTERECTOMY ABDOMINAL N/A 3/13/2019    ROBOTIC TOTAL LAPAROSCOPIC HYSTERECTOMY WITH BILATERAL SALPINGECTOMY performed by Filippo Malave DO at [facility] HYSTEROSCOPY  04/18/2018    CA HYSTEROSCOPY,W/ENDOMETRIAL ABLATION N/A 4/18/2018    HYSTEROSCOPY ENDOMETRIAL ABLATION performed by Filippo Malave DO at [address] [place] [address]         Social History:    Social History     Tobacco Use    Smoking status: Never Smoker    Smokeless tobacco: Never Used   Substance Use Topics    Alcohol use: No     Alcohol/week: 0.0 standard drinks                                Counseling given: Not Answered      Vital Signs (Current): There were no vitals filed for this visit.                                            BP Readings from Last 3 Encounters:   11/18/21 106/60   08/19/21 122/84   08/03/21 100/74       NPO Status:                                                                                 BMI:   Wt Readings from Last 3 Encounters:   11/18/21 133 lb 3.2 oz (60.4 kg)   08/19/21 128 lb (58.1 kg)   08/03/21 131 lb (59.4 kg)     There is no height or weight on file to calculate BMI.    CBC:   Lab Results   Component Value Date    WBC 3.6 11/16/2021    RBC 4.55 11/16/2021    HGB 14.0 11/16/2021    HCT 42.2 11/16/2021    MCV 92.7 11/16/2021    RDW 16.7 02/09/2017     11/16/2021       CMP:   Lab Results   Component Value Date     11/16/2021    K 4.7 11/16/2021     11/16/2021    CO2 27 11/16/2021    BUN 7 11/16/2021    CREATININE 0.7 11/16/2021    LABGLOM >90 11/16/2021    GLUCOSE 91 11/16/2021    PROT 6.7 04/11/2021    CALCIUM 9.7 11/16/2021    BILITOT 0.3 04/11/2021    ALKPHOS 58 04/11/2021    AST 14 04/11/2021    ALT 10 04/11/2021 POC Tests: No results for input(s): POCGLU, POCNA, POCK, POCCL, POCBUN, POCHEMO, POCHCT in the last 72 hours. Coags:   Lab Results   Component Value Date    INR 0.97 04/11/2021    APTT 32.2 04/11/2021       HCG (If Applicable):   Lab Results   Component Value Date    PREGTESTUR NEGATIVE 03/13/2019        ABGs: No results found for: PHART, PO2ART, WHC3FVJ, MHF9LNC, BEART, R4GCQARL     Type & Screen (If Applicable):  No results found for: LABABO, LABRH    Drug/Infectious Status (If Applicable):  No results found for: HIV, HEPCAB    COVID-19 Screening (If Applicable):   Lab Results   Component Value Date    COVID19 NOT DETECTED 08/09/2021           Anesthesia Evaluation  Patient summary reviewed   history of anesthetic complications: PONV. Airway: Mallampati: I  TM distance: >3 FB   Neck ROM: full  Mouth opening: > = 3 FB Dental: normal exam         Pulmonary:normal exam                               Cardiovascular:  Exercise tolerance: good (>4 METS),                     Neuro/Psych:               GI/Hepatic/Renal:   (+) renal disease: kidney stones,           Endo/Other:                     Abdominal:             Vascular: Other Findings:             Anesthesia Plan      general     ASA 2       Induction: intravenous. MIPS: Postoperative opioids intended and Prophylactic antiemetics administered. Anesthetic plan and risks discussed with patient.       Plan discussed with CRNA and surgical team.                  Jerome Fernandes MD   12/8/2021

## 2021-12-08 NOTE — BRIEF OP NOTE
Brief Postoperative Note      Patient: Carson Garcia  YOB: 1980  MRN: [id number]    Date of Procedure: 12/8/2021    Pre-Op Diagnosis: CERVICAL SPINAL STENOSIS    Post-Op Diagnosis: Same       Procedure:  ACDF C4-T1    Surgeon(s):  Sharon Mead MD    Assistant:  Matthew Villasenor [facility]    Anesthesia: General    Estimated Blood Loss (mL): less than 330     Complications: None    Specimens:   * No specimens in log *    Implants:  Implant Name Type Inv.  Item Serial No.  Lot No. LRB No. Used Action   GRAFT BNE SUB 1CC DEMIN BNE MTRX GEL GRFTON  GRAFT BNE SUB 1CC DEMIN BNE MTRX  [employer]  N/A 1 Implanted   BONE GRAFT FIBREX DEMIN BONE FIBERS  BONE GRAFT FIBREX DEMIN BONE FIBERS  [employer]  N/A 1 Implanted   [employer]- Elemax Cortical Spacer, Parallel-Small 6mm   [id number]  [id number] N/A 1 Implanted   RTI SURGICAL-ELEMAX CORTICAL SPACER, SMALL 6MM   [id number]  [id number] N/A 1 Implanted   RTI SURGICAL-ELEMAX CORTICAL SPACER, SMALL 6MM   [id number]  [id number] N/A 1 Implanted   RTI SURGICAL-ELEMAX CORTICAL SPACER, SMALL 7MM   [id number]  [id number] N/A 1 Implanted         Drains: * No LDAs found *    Findings: same    Electronically signed by Sharon Mead MD on 12/8/2021 at 10:45 AM

## 2021-12-08 NOTE — PROGRESS NOTES
1450- pt received to 2E, reports pain, uncomfortable. IV fluid infusing, MAY drain present left anterior neck. Vista collar in place. VS assessed. Pt oriented to room, call light. No further needs, concerns voiced. Call light in reach, bed locked, lowered.

## 2021-12-08 NOTE — ANESTHESIA POSTPROCEDURE EVALUATION
Department of Anesthesiology  Postprocedure Note    Patient: Yun Gordon  MRN: [id number]  YOB: 1980  Date of evaluation: 12/8/2021  Time:  1:52 PM     Procedure Summary     Date: 12/08/21 Room / Location: [address] [facility]    Anesthesia Start: Melida Nye Anesthesia Stop: 1104    Procedure: ACDF C4-C7-T1 (N/A Neck) Diagnosis: (CERVICAL SPINAL STENOSIS)    Surgeons: Codi Anderson MD Responsible Provider: Nancy Cruz MD    Anesthesia Type: general ASA Status: 2          Anesthesia Type: general    Ami Phase I: Ami Score: 9    Ami Phase II: Ami Score: 10    Last vitals: Reviewed and per EMR flowsheets.        Anesthesia Post Evaluation

## 2021-12-08 NOTE — PROGRESS NOTES
Patient admitted to [facility] room 16 with family at bedside. Bed in low position side rails up call light in reach. Patient denies questions at this time.

## 2021-12-08 NOTE — PROGRESS NOTES
Pt returned to [facility] room 16. Vitals and assessment as charted. 0.9 infusing, @750ml to count from PACU. Pt has sherbert and water. Family at the bedside. Pt and family verbalized understanding of call light use. Call light in reach. Bed alarm on.

## 2021-12-08 NOTE — PLAN OF CARE
Problem: Pain:  Goal: Pain level will decrease  Description: Pain level will decrease  Outcome: Ongoing  Goal: Control of acute pain  Description: Control of acute pain  Outcome: Ongoing  Goal: Control of chronic pain  Description: Control of chronic pain  Outcome: Ongoing     Problem: Bowel/Gastric:  Goal: Ability to prevent future episodes of altered bowel function as condition permits will improve  Description: Ability to prevent future episodes of altered bowel function as condition permits will improve  Outcome: Ongoing     Problem: Fluid Volume:  Goal: Will show no signs or symptoms of fluid imbalance  Description: Will show no signs or symptoms of fluid imbalance  Outcome: Ongoing     Problem: Respiratory:  Goal: Ability to maintain adequate ventilation will improve  Outcome: Ongoing     Problem: Safety:  Goal: Ability to remain free from injury will improve  Outcome: Ongoing     Problem: Sensory:  Goal: Pain level will decrease  Description: Pain level will decrease  Outcome: Ongoing     Problem: Skin Integrity:  Goal: Skin integrity will be maintained  Outcome: Ongoing     Problem: Tissue Perfusion:  Goal: Risk of venous thrombosis will decrease  Outcome: Ongoing     Problem: Urinary Elimination:  Goal: Ability to reestablish a normal urinary elimination pattern will improve - after catheter removal  Outcome: Ongoing     Care plan reviewed with patient. Patient verbalizes understanding of the plan of care and contributes to goal setting.

## 2021-12-09 VITALS
DIASTOLIC BLOOD PRESSURE: 75 MMHG | SYSTOLIC BLOOD PRESSURE: 106 MMHG | WEIGHT: 125.8 LBS | HEIGHT: 62 IN | HEART RATE: 105 BPM | RESPIRATION RATE: 14 BRPM | OXYGEN SATURATION: 96 % | BODY MASS INDEX: 23.15 KG/M2 | TEMPERATURE: 98.3 F

## 2021-12-09 PROCEDURE — 6370000000 HC RX 637 (ALT 250 FOR IP): Performed by: PHYSICIAN ASSISTANT

## 2021-12-09 PROCEDURE — 6360000002 HC RX W HCPCS: Performed by: PHYSICIAN ASSISTANT

## 2021-12-09 RX ORDER — OXYCODONE HYDROCHLORIDE AND ACETAMINOPHEN 5; 325 MG/1; MG/1
1 TABLET ORAL EVERY 4 HOURS PRN
Qty: 42 TABLET | Refills: 0 | Status: SHIPPED | OUTPATIENT
Start: 2021-12-09 | End: 2021-12-16

## 2021-12-09 RX ORDER — CYCLOBENZAPRINE HCL 10 MG
10 TABLET ORAL 3 TIMES DAILY PRN
Qty: 50 TABLET | Refills: 0 | Status: SHIPPED | OUTPATIENT
Start: 2021-12-09 | End: 2021-12-19

## 2021-12-09 RX ADMIN — PHENOL 1 SPRAY: 1.5 LIQUID ORAL at 08:08

## 2021-12-09 RX ADMIN — DEXAMETHASONE SODIUM PHOSPHATE 8 MG: 10 INJECTION, EMULSION INTRAMUSCULAR; INTRAVENOUS at 08:07

## 2021-12-09 RX ADMIN — HYDROMORPHONE HYDROCHLORIDE 0.5 MG: 1 INJECTION, SOLUTION INTRAMUSCULAR; INTRAVENOUS; SUBCUTANEOUS at 01:54

## 2021-12-09 RX ADMIN — CYCLOBENZAPRINE 10 MG: 10 TABLET, FILM COATED ORAL at 08:07

## 2021-12-09 RX ADMIN — OXYCODONE HYDROCHLORIDE AND ACETAMINOPHEN 2 TABLET: 5; 325 TABLET ORAL at 08:07

## 2021-12-09 RX ADMIN — HYDROMORPHONE HYDROCHLORIDE 0.5 MG: 1 INJECTION, SOLUTION INTRAMUSCULAR; INTRAVENOUS; SUBCUTANEOUS at 07:03

## 2021-12-09 RX ADMIN — BENZOCAINE AND MENTHOL 1 LOZENGE: 15; 3.6 LOZENGE ORAL at 08:08

## 2021-12-09 ASSESSMENT — PAIN SCALES - GENERAL
PAINLEVEL_OUTOF10: 8
PAINLEVEL_OUTOF10: 8
PAINLEVEL_OUTOF10: 9

## 2021-12-09 NOTE — PROGRESS NOTES
Discharge instructions reviewed with patient, family using teachback method. No concerns voiced. Transport requested for wheelchair to discharge lobby.

## 2021-12-09 NOTE — OP NOTE
[address] [place] [address]                                OPERATIVE REPORT    PATIENT NAME: Jennifer Cruz                    :        1980  MED REC NO:   [id number]                           ROOM:       0003  ACCOUNT NO:   [de-identified]                           ADMIT DATE: 2021  PROVIDER:     Yoandy Hawley. Won Gupta M.D.    Brigitte Martinez:  2021    PREOPERATIVE DIAGNOSES:  Cervical spondylosis and stenosis with  neurocompression at levels of C4 to levels of T1, producing a right  upper extremity radiculopathy. POSTOPERATIVE DIAGNOSES:  Cervical spondylosis and stenosis with  neurocompression at levels of C4 to levels of T1, producing a right  upper extremity radiculopathy. PROCEDURES:  1. Anterior cervical diskectomies at levels of C4-C5, C5-C6, C6-C7, and      C7-T1 with complete uncus decompression and clearance of canal at each      of these four levels of surgery. 2.  Anterior cervical interbody fusion at levels of C4-C5, C5-C6, C6-C7,      and C7-T1.  3.  Use of tricortical and structural bone graft, Surgalign for      interbody fusion at the levels of C4 through T1.  4.  Use of allograft bone graft Surgalign FiberX 5-mL kit filling the      central portion of each of these for cortical bone graft at the levels      of C4 through T1.  5.  Plating of cervical spine at levels of C4 through T1 with use of a      66-mm C-spine Admiral plate attached to bone with a total of 10 screws      each measuring 14 mm in length. SURGEON:  Yoandy Hawley. MD Linda    ASSISTANT:  Princess Nicole PA-C    ANESTHESIA:  General.    ESTIMATED BLOOD LOSS:  100 mL. DRAINS:  Ricardofrancisca Banegas. COMPLICATIONS:  Zero. INDICATIONS:  The patient presents, 37years of age, having symptoms of  neck and arm pain to the right upper extremity, causing significant  difficulty with the use of the right upper extremity.   She is having  pain and it does not improve despite multiple conservative treatment  options. She has tried oral steroids, applied ice therapy, cortisone  injections, but despite this she has had persistent pain in the right  upper extremity. We planned today to come in for cervical spine ACDF at  levels of C4 through C7. But, I looked at the MRI again, I reviewed and  also pressure noted at the level of right side C7-T1, this should be  addressed. This was also in the MRI report and therefore I will include  the level of C7-T1 for relief of the right-sided narrowing, they will  cause persistent problems if left untreated. DESCRIPTION OF PROCEDURE:  We brought the patient to the operating room  whereupon entry, a timeout would be observed. Her anesthetic was  delivered and airway was secured. Escalante catheter would not be used. She was positioned supine upon the operative table in a supine manner  with a gentle extension at the head and neck area for a thorough  prepping and draping of the cervical spine to be performed. After  induction of anesthesia and an airway being well secured. Her arms were  tucked to the side. A soap scrubbed solution, sterile toweling, and  ChloraPrep solution would be used. We applied sterile sheath, Yari Hasting as  well, marking the plane in the leftward in the carotid tubercle location  and then going leftward direction at the line of incision, where I would  inject 3 mL of 1% lidocaine with epinephrine. With hemostasis  maintained involving the infusion of IV Ancef to completion, we incised  the skin, maintained hemostasis, and would divide the platysmal layer. Identified the omohyoid muscle we would section this as well and divide  the pretracheal fascia exposing the levels of C4 through levels of T1. We placed a pin at the level of C5-C6 and taken x-rays to confirm levels  of surgery.   With the levels of operation confirmed, we would proceed  and then place [place] pins at levels of C4-C5 and set the [place]  retractor, bring the microscope in and perform a complete anterior  cervical spine discectomy at levels of C4-C5 with the use of curettage  melonie and Kerrison rongeur for a complete release of neurocompression. They cannot be thoroughly cleared, endplates well decorticated, and  posterior osteophytes removed, and PLL taken down, so that the ball-tip  probe could then pass well through each of the opening foramina across  the levels of C4-C5. With the relief of the neurocompression achieved,  the interbody graft was chosen from the [place] bone bank. A 6 x 14 x  11 mm size of bone filled with the use of the Surgalign FiberX bone  graft and tamped into place. This fit very well. We then moved the C4  distraction pin at the level of C6 and similarly for level of C5-C6  would distract level of C5-C6, set the retractor and proceeded to  perform a complete cervical spine discectomy again with the use of  curettage melonie and Kerrison rongeur for complete relief of all  neurocompression. Within this interbody surface, which is well  decorticated and prepared for fusion and also decompressed. A similar  graft was chosen. A 6 x 14 x 11 mm graft by Surgalign filled with the  use of a Surgalign FiberX bone graft DBM and tamped into place. This  also fit very well. So, I can move the C5 being at the level of C7 and  then distract the level of C6-C7 similarly. Bringing the microscope and  distract the level of C6-C7 and proceed to perform a complete cervical  spine discectomy with the use of curettage melonie and Kerrison rongeur and  cleared the canal very thoroughly and checking up the opening foramina  post-decompression and removal of the PLL, with this interbody surface  would except a 6 x 14 mm sized graft. Also, by Mindi with the use  of a DBM bone graft by FiberX and tamped into place.   I removed the C6  pin at the level of T1 and for the final level of C7-T1, similarly we  would distract this next disk space, used curettage melonie and Kerrison  for complete discectomy. Prepared the interbody surface for fusion,  carried out posterior osteophytes and disk herniation in the right  foramina at the level of C7-T1. All this is showing stenosis, so the  ball-tipped probe could then pass well to each of these opening  foramina. Within this interbody surface, a 7 x 14 mm graft was chosen. Also, with the use of the [facility] by Surgalign the FiberX bone graft and  tamped into place. This all fit in very well and we would proceed to  finish with removal of the distraction pins. The graft was well seated. I placed wax and thrombin in both of the holes and would proceed to  apply a pleated 66 mm C-spine Admiral plate which we attached to the  bone using 10 screws total attaching the plate to bone throughout and  also engaging each locking mechanism. This would include inspection  that there was no soft tissue entrapment and there was no bleeding. All  hemostasis was well maintained. I then applied a [place] drain  and closed in layers   and we would proceed to apply Sterile  dressings. The drain tube was sewn in. Dressings were applied. Aspen  collar was also applied and we then reviewed x-rays that   Show   well placed bone grafting and plate placed across the levels of C4  through T1. My first assistant was also Malini Muñoz PA-C, as well  throughout the operation. Malini Muñoz PA-C assisted throughout the procedure with positioning,  draping, retraction, wound closure, dressing, and splint application.         Araseli Adams M.D.    D: 12/08/2021 11:06:00       T: 12/08/2021 16:11:17     [email or web address]  Job#: [id number]     Doc#: [id number]    CC:

## 2021-12-09 NOTE — CARE COORDINATION
12/9/21, 9:08 AM EST  DISCHARGE PLANNING EVALUATION:    Daphne Payton       Admitted: 12/8/2021/ [facility] day: 0   Location: -03/003-A Reason for admit: Cervical spinal stenosis [M48.02]   PMH:  has a past medical history of Kidney stone, Kidney stones, PONV (postoperative nausea and vomiting), and Prolonged emergence from general anesthesia. Procedure:   12/8 ACDF C4-T1    Barriers to Discharge:  POD #1. Afebrile. On room air. Ox4. Cervical Collar. JPx1. IVF, prn flexeril, prn IV dilaudid, prn percocet. Bowel meds. IV ancef and IV decadron completed. PCP: Jamilah Chu MD   %    Patient Goals/Plan/Treatment Preferences: Sp[facility] with [facility]; states she lives at home with her parents, boyfriend, and her 2 children. She did not use any DME PTA. She cares for herself independently. She drives, but her boyfriend does most the driving, and she has a PCP. [facility] states she plans to return home with her family at discharge and is agreeable to [facility] at discharge for nurse. She denies any further needs. Transportation/Food Security/Housekeeping Addressed:  No issues identified. Ole Barajas 81 and spoke with [facility]; new referral given and reported patient discharging home today with drains in place. [facility] states they cannot take the patient d/t staffing issues. [id number] Called and LM with Brenna Braswell at [facility] to see if they can accept referral with discharge today. [phone] and spoke with Johnathon Cochran at [facility]; referral given, notified discharge home today with 1 drain in place. Johnathon Cochran states they will see her. 12/9/21, 10:21 AM EST    Home w/family and [facility]. Patient goals/plan/ treatment preferences discussed by  and . Patient goals/plan/ treatment preferences reviewed with patient/ family. Patient/ family verbalize understanding of discharge plan and are in agreement with goal/plan/treatment preferences.   Understanding was demonstrated using the teach back method. AVS provided by RN at time of discharge, which includes all necessary medical information pertaining to the patients current course of illness, treatment, post-discharge goals of care, and treatment preferences.     Services After Discharge  Services At/After Discharge: Nursing Services

## 2021-12-09 NOTE — PROGRESS NOTES
Department of Orthopedic Surgery  Spine Service  [place], [place] Progress Note        Subjective:    12/9/21  Lorenzo Monaco is resting in bed. She is doing overall well. Pain relief with UE radiculopathy. Swallowing discomfort as expected. Needs to have some breakfast this AM and then ok to discharge home with [facility] for drain. Vitals  VITALS:  /66   Pulse 100   Temp 97.6 °F (36.4 °C) (Oral)   Resp 16   Ht 5' 2\" (1.575 m)   Wt 125 lb 12.8 oz (57.1 kg)   LMP 03/06/2019   SpO2 97%   BMI 23.01 kg/m²   24HR INTAKE/OUTPUT:    Intake/Output Summary (Last 24 hours) at 12/9/2021 0730  Last data filed at 12/9/2021 0052  Gross per 24 hour   Intake 2877 ml   Output 165 ml   Net 2712 ml     URINARY CATHETER OUTPUT ([place]):     DRAIN/TUBE OUTPUT:  Closed/Suction Drain Left; Anterior Neck Bulb-Output (ml): 30 ml      PHYSICAL EXAM:    Orientation:  alert and oriented to person, place and time    Incision:  dressing in place, clean, dry, intact    Upper Extremity Motor :   Deltoid:  5/5  Biceps:  5/5  Triceps:  5/5  Wrist Flexion:  5/5  Wrist Extension:  5/5  Finger Flexion:  5/5  Finger Extension:  5/5    Upper Motor Neuron Signs:  None  Upper Extremity Sensory:  Intact C3-T1 distribution    Flatus:  negative    ABNORMAL EXAM FINDINGS:  none    LABS:    HgB:    Lab Results   Component Value Date    HGB 14.0 11/16/2021         ASSESSMENT AND PLAN:    Post operative day 1     1:  Monitor labs and drain output  2:  Activity Level:  OOB as tolerated   3:  Pain Control:  Controlled  4:  Discharge Planning:  Home today with [facility] for drains.

## 2021-12-10 ENCOUNTER — TELEPHONE (OUTPATIENT)
Dept: FAMILY MEDICINE CLINIC | Age: 41
End: 2021-12-10

## 2021-12-29 ENCOUNTER — TELEPHONE (OUTPATIENT)
Dept: CARDIOLOGY CLINIC | Age: 41
End: 2021-12-29

## 2021-12-29 NOTE — TELEPHONE ENCOUNTER
Pt needs new pt  appt [employer]OS clearance form faxed . pt needs clearance for back sx with Dr. Victorino Fermin. Clearance for scanned into chart.

## 2022-01-09 NOTE — TELEPHONE ENCOUNTER
? Cause, ? Malignant ? Parapneumonic?  1/9 chest tube removed, now with drop in H/H, follow up with surgery recs, transfuse prbcs, patient with hematoma, surgery planning to evacuate and place chest tube   ok

## 2022-01-20 RX ORDER — BUPROPION HYDROCHLORIDE 150 MG/1
150 TABLET ORAL EVERY MORNING
Qty: 90 TABLET | Refills: 3 | Status: SHIPPED | OUTPATIENT
Start: 2022-01-20 | End: 2022-06-13

## 2022-01-20 RX ORDER — BUPROPION HYDROCHLORIDE 300 MG/1
300 TABLET ORAL EVERY MORNING
Qty: 90 TABLET | Refills: 3 | Status: SHIPPED | OUTPATIENT
Start: 2022-01-20 | End: 2022-06-13

## 2022-01-20 NOTE — TELEPHONE ENCOUNTER
Please approve or deny     Last Visit Date:  11/18/2021       Next Visit Date:    Visit date not found

## 2022-02-14 ENCOUNTER — OFFICE VISIT (OUTPATIENT)
Dept: FAMILY MEDICINE CLINIC | Age: 42
End: 2022-02-14
Payer: MEDICAID

## 2022-02-14 VITALS
HEART RATE: 92 BPM | HEIGHT: 63 IN | SYSTOLIC BLOOD PRESSURE: 90 MMHG | WEIGHT: 119 LBS | BODY MASS INDEX: 21.09 KG/M2 | DIASTOLIC BLOOD PRESSURE: 60 MMHG | RESPIRATION RATE: 16 BRPM | TEMPERATURE: 98.7 F

## 2022-02-14 DIAGNOSIS — R59.0 CERVICAL LYMPHADENOPATHY: Primary | ICD-10-CM

## 2022-02-14 DIAGNOSIS — R11.0 NAUSEA: ICD-10-CM

## 2022-02-14 PROCEDURE — 1036F TOBACCO NON-USER: CPT | Performed by: NURSE PRACTITIONER

## 2022-02-14 PROCEDURE — G8427 DOCREV CUR MEDS BY ELIG CLIN: HCPCS | Performed by: NURSE PRACTITIONER

## 2022-02-14 PROCEDURE — G8420 CALC BMI NORM PARAMETERS: HCPCS | Performed by: NURSE PRACTITIONER

## 2022-02-14 PROCEDURE — G8484 FLU IMMUNIZE NO ADMIN: HCPCS | Performed by: NURSE PRACTITIONER

## 2022-02-14 PROCEDURE — 99214 OFFICE O/P EST MOD 30 MIN: CPT | Performed by: NURSE PRACTITIONER

## 2022-02-14 RX ORDER — ONDANSETRON 4 MG/1
4 TABLET, FILM COATED ORAL EVERY 8 HOURS PRN
Qty: 9 TABLET | Refills: 0 | Status: SHIPPED | OUTPATIENT
Start: 2022-02-14 | End: 2022-02-17

## 2022-02-14 RX ORDER — CLINDAMYCIN HYDROCHLORIDE 300 MG/1
300 CAPSULE ORAL 2 TIMES DAILY
Qty: 20 CAPSULE | Refills: 0 | Status: SHIPPED | OUTPATIENT
Start: 2022-02-14 | End: 2022-02-23 | Stop reason: ALTCHOICE

## 2022-02-14 ASSESSMENT — ENCOUNTER SYMPTOMS
CONSTIPATION: 0
VOMITING: 0
EYE DISCHARGE: 0
COUGH: 0
RHINORRHEA: 0
WHEEZING: 0
DIARRHEA: 0
SHORTNESS OF BREATH: 0
BACK PAIN: 0
EYE REDNESS: 0
ABDOMINAL PAIN: 0
NAUSEA: 1
SORE THROAT: 0
TROUBLE SWALLOWING: 0
EYE PAIN: 0
ALLERGIC/IMMUNOLOGIC NEGATIVE: 1

## 2022-02-23 ENCOUNTER — OFFICE VISIT (OUTPATIENT)
Dept: FAMILY MEDICINE CLINIC | Age: 42
End: 2022-02-23
Payer: MEDICAID

## 2022-02-23 VITALS
RESPIRATION RATE: 16 BRPM | HEART RATE: 92 BPM | WEIGHT: 121 LBS | BODY MASS INDEX: 21.43 KG/M2 | OXYGEN SATURATION: 98 % | TEMPERATURE: 98.5 F | SYSTOLIC BLOOD PRESSURE: 96 MMHG | DIASTOLIC BLOOD PRESSURE: 70 MMHG

## 2022-02-23 DIAGNOSIS — R22.1 MASS IN NECK: ICD-10-CM

## 2022-02-23 DIAGNOSIS — Z98.1 HISTORY OF FUSION OF CERVICAL SPINE: ICD-10-CM

## 2022-02-23 DIAGNOSIS — K11.8 SUBMANDIBULAR GLAND MASS: ICD-10-CM

## 2022-02-23 DIAGNOSIS — F43.23 ADJUSTMENT DISORDER WITH MIXED ANXIETY AND DEPRESSED MOOD: Primary | ICD-10-CM

## 2022-02-23 DIAGNOSIS — R44.1 HALLUCINATIONS, VISUAL: ICD-10-CM

## 2022-02-23 DIAGNOSIS — R25.1 TREMORS OF NERVOUS SYSTEM: ICD-10-CM

## 2022-02-23 PROCEDURE — 1036F TOBACCO NON-USER: CPT | Performed by: NURSE PRACTITIONER

## 2022-02-23 PROCEDURE — G8420 CALC BMI NORM PARAMETERS: HCPCS | Performed by: NURSE PRACTITIONER

## 2022-02-23 PROCEDURE — 99214 OFFICE O/P EST MOD 30 MIN: CPT | Performed by: NURSE PRACTITIONER

## 2022-02-23 PROCEDURE — G8427 DOCREV CUR MEDS BY ELIG CLIN: HCPCS | Performed by: NURSE PRACTITIONER

## 2022-02-23 PROCEDURE — G8484 FLU IMMUNIZE NO ADMIN: HCPCS | Performed by: NURSE PRACTITIONER

## 2022-02-23 RX ORDER — CYCLOBENZAPRINE HCL 10 MG
TABLET ORAL
COMMUNITY
Start: 2022-01-25 | End: 2022-08-02

## 2022-02-23 RX ORDER — HYDROCODONE BITARTRATE AND ACETAMINOPHEN 5; 325 MG/1; MG/1
TABLET ORAL
COMMUNITY
Start: 2022-01-25

## 2022-02-23 ASSESSMENT — PATIENT HEALTH QUESTIONNAIRE - PHQ9
2. FEELING DOWN, DEPRESSED OR HOPELESS: 0
SUM OF ALL RESPONSES TO PHQ QUESTIONS 1-9: 0
10. IF YOU CHECKED OFF ANY PROBLEMS, HOW DIFFICULT HAVE THESE PROBLEMS MADE IT FOR YOU TO DO YOUR WORK, TAKE CARE OF THINGS AT HOME, OR GET ALONG WITH OTHER PEOPLE: 0
1. LITTLE INTEREST OR PLEASURE IN DOING THINGS: 0
8. MOVING OR SPEAKING SO SLOWLY THAT OTHER PEOPLE COULD HAVE NOTICED. OR THE OPPOSITE, BEING SO FIGETY OR RESTLESS THAT YOU HAVE BEEN MOVING AROUND A LOT MORE THAN USUAL: 0
9. THOUGHTS THAT YOU WOULD BE BETTER OFF DEAD, OR OF HURTING YOURSELF: 0
3. TROUBLE FALLING OR STAYING ASLEEP: 0
5. POOR APPETITE OR OVEREATING: 0
SUM OF ALL RESPONSES TO PHQ QUESTIONS 1-9: 0
6. FEELING BAD ABOUT YOURSELF - OR THAT YOU ARE A FAILURE OR HAVE LET YOURSELF OR YOUR FAMILY DOWN: 0
SUM OF ALL RESPONSES TO PHQ9 QUESTIONS 1 & 2: 0
4. FEELING TIRED OR HAVING LITTLE ENERGY: 0
SUM OF ALL RESPONSES TO PHQ QUESTIONS 1-9: 0
SUM OF ALL RESPONSES TO PHQ QUESTIONS 1-9: 0
7. TROUBLE CONCENTRATING ON THINGS, SUCH AS READING THE NEWSPAPER OR WATCHING TELEVISION: 0

## 2022-02-23 ASSESSMENT — ENCOUNTER SYMPTOMS
ABDOMINAL PAIN: 0
CHEST TIGHTNESS: 0
BACK PAIN: 0
ABDOMINAL DISTENTION: 0
COUGH: 0
SHORTNESS OF BREATH: 0
WHEEZING: 0

## 2022-02-23 NOTE — PATIENT INSTRUCTIONS
Patient Education        Submandibular Gland Removal: Before Your Surgery  What is submandibular gland removal?  Submandibular gland removal is a type of surgery. It removes a saliva gland below your lower jaw. You may need this surgery if you have an infection or a tumor. Or you may need it if you have a blocked saliva duct. This duct is a tube that carries saliva from the gland to the mouth. To do the surgery, the doctor makes a cut in your neck under your lower jaw. This cut is called an incision. Then he or she removes the gland and closes the incision with stitches or glue. If your saliva duct is blocked, the doctor may also make a small incision under your tongue. Then the doctor can take out whatever is blocking the duct. You will probably go home on the same day as the surgery. Taking out the gland won't leave you with a dry mouth. Our mouths have many saliva glands. Follow-up care is a key part of your treatment and safety. Be sure to make and go to all appointments, and call your doctor if you are having problems. It's also a good idea to know your test results and keep a list of the medicines you take. How do you prepare for surgery? Surgery can be stressful. This information will help you understand what you can expect. And it will help you safely prepare for surgery. Preparing for surgery    · Be sure you have someone to take you home. Anesthesia and pain medicine will make it unsafe for you to drive or get home on your own.     · Understand exactly what surgery is planned, along with the risks, benefits, and other options.     · If you take aspirin or some other blood thinner, ask your doctor if you should stop taking it before your surgery. Make sure that you understand exactly what your doctor wants you to do. These medicines increase the risk of bleeding.     · Tell your doctor ALL the medicines, vitamins, supplements, and herbal remedies you take.  Some may increase the risk of problems during your surgery. Your doctor will tell you if you should stop taking any of them before the surgery and how soon to do it.     · Make sure your doctor and the hospital have a copy of your advance directive. If you don't have one, you may want to prepare one. It lets others know your health care wishes. It's a good thing to have before any type of surgery or procedure. What happens on the day of surgery?    · Follow the instructions exactly about when to stop eating and drinking. If you don't, your surgery may be canceled. If your doctor told you to take your medicines on the day of surgery, take them with only a sip of water.     · Follow your doctor's instructions about when to bathe or shower before your surgery. Do not apply lotions, perfumes, deodorants, or nail polish.     · Do not shave the surgical site yourself.     · Take off all jewelry and piercings. And take out contact lenses, if you wear them. At the hospital or surgery center    · Bring a picture ID.     · The area for surgery is often marked to make sure there are no errors. .     · You will be kept comfortable and safe by your anesthesia provider. You will be asleep during the surgery.     · The surgery will take 1 to 2 hours.     · You may have a drain near your incision. The doctor will tell you when it can come out. When should you call your doctor? · You have questions or concerns.     · You don't understand how to prepare for your surgery.     · You become ill before the surgery (such as fever, flu, or a cold).     · You need to reschedule or have changed your mind about having the surgery. Where can you learn more? Go to [email or web address] and sign in to your [employer] account. Enter A632 in the GameSkinny box to learn more about \"Submandibular Gland Removal: Before Your Surgery. \"     If you do not have an account, please click on the \"Sign Up Now\" link.   Current as of: September 8,

## 2022-02-23 NOTE — PROGRESS NOTES
[address] [place] [address]  Dept: [phone]  Dept Fax: [phone]  Loc: [phone]  PROGRESS NOTE      VisitDate: 2/23/2022    Pedro Johnson is a 37 y.o. female who presents today for:     Chief Complaint   Patient presents with    Mass     lump under chin still there had noticed the lump after her cervical surgery.  Hallucinations     she states she has seen spiders only in her room, sometimes she wakes up in the middle of the night and see's it and another time she seen a bright light in her room when it was dark. This all started after her neck surgery and does not have any hallucinations any other times, only in her room. Subjective:  Patient presents with complaint of no improvement mass right-sided jaw/chin. Patient reports that she has finished her recent antibiotic therapy/clindamycin. Patient also reports visual hallucinations for the past 3 weeks. Reports that she frequently sees spiders in her bedroom which are not there. Reports that she awakens to the hallucinations. Patient also complains of worsening tremors. Recent history of cervical fusion 12/8/2021. History anxiety/depression, chronic right shoulder pain, chronic neck pain kidney stones. Patient does report some mild dysphagia since cervical fusion. Patient reports she is scheduled with Ortho for consultation regarding her chronic right shoulder pain. Review of Systems   Constitutional: Negative for activity change, appetite change, chills, fatigue and fever. Eyes: Negative for visual disturbance. Respiratory: Negative for cough, chest tightness, shortness of breath and wheezing. Cardiovascular: Negative for chest pain, palpitations and leg swelling. Gastrointestinal: Negative for abdominal distention and abdominal pain. Genitourinary: Negative for dysuria.    Musculoskeletal: Positive for neck pain and neck stiffness. Negative for arthralgias and back pain. Skin: Negative. Negative for rash. Neurological: Positive for tremors. Negative for dizziness, seizures, speech difficulty, light-headedness, numbness and headaches. Hematological: Negative for adenopathy. Psychiatric/Behavioral: Positive for decreased concentration and dysphoric mood. The patient is nervous/anxious. All other systems reviewed and are negative. Past Medical History:   Diagnosis Date    Kidney stone     2005 with pregnancy    Kidney stones     PONV (postoperative nausea and vomiting)     Prolonged emergence from general anesthesia     \"During Hysterectomy- bradycardia- meds to increase heart rate. \"      Past Surgical History:   Procedure Laterality Date    CERVICAL FUSION N/A 12/8/2021    ACDF C4-C7-T1 performed by Park Merlin, MD at [place] [address]  03/25/2016    Dr. Lisa Guillen 9/13/2017    DILATATION AND CURETTAGE HYSTEROSCOPY, POSSIBLE MYOSURE performed by Sebastián Beckham DO at [place] [address]  04/18/2018    HERNIA REPAIR      HYSTERECTOMY ABDOMINAL N/A 3/13/2019    ROBOTIC TOTAL LAPAROSCOPIC HYSTERECTOMY WITH BILATERAL SALPINGECTOMY performed by Sebastián Beckham DO at [address] HYSTEROSCOPY  04/18/2018    WI HYSTEROSCOPY,W/ENDOMETRIAL ABLATION N/A 4/18/2018    HYSTEROSCOPY ENDOMETRIAL ABLATION performed by Sebastián Beckham DO at [address] [place] [address]       Family History   Problem Relation Age of Onset    Heart Disease Father     High Blood Pressure Father     High Cholesterol Father     Diabetes Father     Breast Cancer Mother 64    Asthma Neg Hx      Social History     Tobacco Use    Smoking status: Never Smoker    Smokeless tobacco: Never Used   Substance Use Topics    Alcohol use: No     Alcohol/week: 0.0 standard drinks      Current Outpatient Medications   Medication Sig Dispense Refill    cyclobenzaprine (FLEXERIL) 10 MG tablet TAKE 1 TABLET BY MOUTH THREE TIMES DAILY      HYDROcodone-acetaminophen (NORCO) 5-325 MG per tablet TAKE 1 TABLET BY MOUTH EVERY 4 HOURS FOR 7 DAYS      buPROPion (WELLBUTRIN XL) 300 MG extended release tablet Take 1 tablet by mouth every morning Take with a 150 mg 90 tablet 3    buPROPion (WELLBUTRIN XL) 150 MG extended release tablet Take 1 tablet by mouth every morning Take with a 300 mg 90 tablet 3    METAMUCIL FIBER PO Take 1 capsule by mouth daily 3 in 1 fiber      traZODone (DESYREL) 100 MG tablet Take 1 tablet by mouth nightly 90 tablet 1     No current facility-administered medications for this visit. Allergies   Allergen Reactions    Amoxicillin Hives     Health Maintenance   Topic Date Due    Hepatitis C screen  Never done    Varicella vaccine (1 of 2 - 2-dose childhood series) Never done    COVID-19 Vaccine (1) Never done    Depression Monitoring  Never done    HIV screen  Never done    DTaP/Tdap/Td vaccine (1 - Tdap) Never done    Lipid screen  06/02/2021    Breast cancer screen  Never done    Flu vaccine (1) Never done    Hepatitis A vaccine  Aged Out    Hepatitis B vaccine  Aged Out    Hib vaccine  Aged Out    Meningococcal (ACWY) vaccine  Aged Out    Pneumococcal 0-64 years Vaccine  Aged Out         Objective:     Physical Exam  Vitals and nursing note reviewed. Constitutional:       Appearance: Normal appearance. She is well-developed and normal weight. HENT:      Head: Normocephalic. Right Ear: Tympanic membrane and ear canal normal.      Left Ear: Tympanic membrane and ear canal normal.      Nose: Nose normal.      Mouth/Throat:      Pharynx: Oropharynx is clear. Eyes:      Extraocular Movements: Extraocular movements intact. Conjunctiva/sclera: Conjunctivae normal.      Pupils: Pupils are equal, round, and reactive to light. Cardiovascular:      Rate and Rhythm: Normal rate and regular rhythm. Pulses: Normal pulses.       Heart sounds: Normal heart sounds. Pulmonary:      Effort: Pulmonary effort is normal.      Breath sounds: Normal breath sounds. Abdominal:      General: Bowel sounds are normal.      Palpations: Abdomen is soft. Musculoskeletal:         General: Normal range of motion. Cervical back: Neck supple. Lymphadenopathy:      Head:      Right side of head: Submandibular adenopathy present. Comments: Single pronounced submandibular nodule palpable nontender fixed approximately 1.5 cm x 1.5 cm. Dentition appears intact. Skin:     General: Skin is warm and dry. Neurological:      General: No focal deficit present. Mental Status: She is alert and oriented to person, place, and time. Cranial Nerves: No facial asymmetry. Coordination: Romberg sign negative. Coordination abnormal. Finger-Nose-Finger Test abnormal and Heel to [facility] Test abnormal.   Psychiatric:         Mood and Affect: Mood normal.         Thought Content: Thought content normal.         Judgment: Judgment normal.       BP 96/70   Pulse 92   Temp 98.5 °F (36.9 °C) (Oral)   Resp 16   Wt 121 lb (54.9 kg)   LMP 03/06/2019   SpO2 98%   BMI 21.43 kg/m²       Impression/Plan:  1. Adjustment disorder with mixed anxiety and depressed mood    2. Mass in neck    3. History of fusion of cervical spine    4. Tremors of nervous system    5. Hallucinations, visual    6.  Submandibular gland mass      Requested Prescriptions      No prescriptions requested or ordered in this encounter     Orders Placed This Encounter   Procedures    CT SOFT TISSUE NECK W CONTRAST     Standing Status:   Future     Standing Expiration Date:   2/23/2023     Order Specific Question:   STAT Creatinine as needed:     Answer:   Yes     Order Specific Question:   Reason for exam:     Answer:   neck mass right submand, tremors, hallicnations, recent cervical fusion    CT HEAD WO CONTRAST     Standing Status:   Future     Standing Expiration Date:   2/23/2023    CBC with Auto Differential     Standing Status:   Future     Standing Expiration Date:   2/23/2023    Basic Metabolic Panel     Standing Status:   Future     Standing Expiration Date:   2/23/2023    T4, Free     Standing Status:   Future     Standing Expiration Date:   2/23/2023    TSH     Standing Status:   Future     Standing Expiration Date:   2/23/2023   Pino Hearn MD, Neurology, [facility]     Referral Priority:   Routine     Referral Type:   Eval and Treat     Referral Reason:   Specialty Services Required     Referred to Provider:   Miguel Pacheco MD     Requested Specialty:   Neurology     Number of Visits Requested:   1       Patient giveneducational materials - see patient instructions. Discussed use, benefit, and side effects of prescribed medications. All patient questions answered. Pt voiced understanding. Reviewed health maintenance. Patient agreedwith treatment plan. Follow up as directed. CT soft tissue neck ordered. CT of head without contrast ordered. Consult neurology. CBC BMP TSH free T4 ordered. Follow-up after testing. Continue medications as prescribed.  Educational information on above diagnosis  provided per AVS.      Electronically signed by GERALDINE Pelayo CNP on 2/23/2022 at 12:59 PM

## 2022-02-28 ENCOUNTER — NURSE ONLY (OUTPATIENT)
Dept: LAB | Age: 42
End: 2022-02-28

## 2022-02-28 DIAGNOSIS — R22.1 MASS IN NECK: ICD-10-CM

## 2022-02-28 DIAGNOSIS — K11.8 SUBMANDIBULAR GLAND MASS: ICD-10-CM

## 2022-02-28 DIAGNOSIS — R44.1 HALLUCINATIONS, VISUAL: ICD-10-CM

## 2022-02-28 DIAGNOSIS — R25.1 TREMORS OF NERVOUS SYSTEM: ICD-10-CM

## 2022-02-28 LAB
ANION GAP SERPL CALCULATED.3IONS-SCNC: 9 MEQ/L (ref 8–16)
BASOPHILS # BLD: 0.4 %
BASOPHILS ABSOLUTE: 0 THOU/MM3 (ref 0–0.1)
BUN BLDV-MCNC: 10 MG/DL (ref 7–22)
CALCIUM SERPL-MCNC: 9.4 MG/DL (ref 8.5–10.5)
CHLORIDE BLD-SCNC: 105 MEQ/L (ref 98–111)
CO2: 26 MEQ/L (ref 23–33)
CREAT SERPL-MCNC: 0.7 MG/DL (ref 0.4–1.2)
EOSINOPHIL # BLD: 1.5 %
EOSINOPHILS ABSOLUTE: 0.1 THOU/MM3 (ref 0–0.4)
ERYTHROCYTE [DISTWIDTH] IN BLOOD BY AUTOMATED COUNT: 12.6 % (ref 11.5–14.5)
ERYTHROCYTE [DISTWIDTH] IN BLOOD BY AUTOMATED COUNT: 43.4 FL (ref 35–45)
GFR SERPL CREATININE-BSD FRML MDRD: > 90 ML/MIN/1.73M2
GLUCOSE BLD-MCNC: 83 MG/DL (ref 70–108)
HCT VFR BLD CALC: 41.5 % (ref 37–47)
HEMOGLOBIN: 13.8 GM/DL (ref 12–16)
IMMATURE GRANS (ABS): 0.01 THOU/MM3 (ref 0–0.07)
IMMATURE GRANULOCYTES: 0.2 %
LYMPHOCYTES # BLD: 22.8 %
LYMPHOCYTES ABSOLUTE: 1 THOU/MM3 (ref 1–4.8)
MCH RBC QN AUTO: 31.2 PG (ref 26–33)
MCHC RBC AUTO-ENTMCNC: 33.3 GM/DL (ref 32.2–35.5)
MCV RBC AUTO: 93.7 FL (ref 81–99)
MONOCYTES # BLD: 11.5 %
MONOCYTES ABSOLUTE: 0.5 THOU/MM3 (ref 0.4–1.3)
NUCLEATED RED BLOOD CELLS: 0 /100 WBC
PLATELET # BLD: 297 THOU/MM3 (ref 130–400)
PMV BLD AUTO: 10.3 FL (ref 9.4–12.4)
POTASSIUM SERPL-SCNC: 4.8 MEQ/L (ref 3.5–5.2)
RBC # BLD: 4.43 MILL/MM3 (ref 4.2–5.4)
SEG NEUTROPHILS: 63.6 %
SEGMENTED NEUTROPHILS ABSOLUTE COUNT: 2.9 THOU/MM3 (ref 1.8–7.7)
SODIUM BLD-SCNC: 140 MEQ/L (ref 135–145)
T4 FREE: 0.93 NG/DL (ref 0.93–1.76)
TSH SERPL DL<=0.05 MIU/L-ACNC: 3.84 UIU/ML (ref 0.4–4.2)
WBC # BLD: 4.6 THOU/MM3 (ref 4.8–10.8)

## 2022-03-01 ENCOUNTER — PATIENT MESSAGE (OUTPATIENT)
Dept: FAMILY MEDICINE CLINIC | Age: 42
End: 2022-03-01

## 2022-03-01 NOTE — TELEPHONE ENCOUNTER
From: Humberto Milner  To: Kitty Hector  Sent: 3/1/2022 9:15 AM EST  Subject: Arley Trinidad    Did you get the chance to look at the results of my blood work .

## 2022-04-06 ENCOUNTER — OFFICE VISIT (OUTPATIENT)
Dept: FAMILY MEDICINE CLINIC | Age: 42
End: 2022-04-06
Payer: MEDICAID

## 2022-04-06 ENCOUNTER — NURSE TRIAGE (OUTPATIENT)
Dept: OTHER | Facility: CLINIC | Age: 42
End: 2022-04-06

## 2022-04-06 VITALS
HEART RATE: 88 BPM | WEIGHT: 117 LBS | HEIGHT: 63 IN | DIASTOLIC BLOOD PRESSURE: 88 MMHG | BODY MASS INDEX: 20.73 KG/M2 | SYSTOLIC BLOOD PRESSURE: 138 MMHG | RESPIRATION RATE: 20 BRPM

## 2022-04-06 DIAGNOSIS — F41.8 SITUATIONAL ANXIETY: Primary | ICD-10-CM

## 2022-04-06 PROCEDURE — 1036F TOBACCO NON-USER: CPT | Performed by: NURSE PRACTITIONER

## 2022-04-06 PROCEDURE — G8427 DOCREV CUR MEDS BY ELIG CLIN: HCPCS | Performed by: NURSE PRACTITIONER

## 2022-04-06 PROCEDURE — G8420 CALC BMI NORM PARAMETERS: HCPCS | Performed by: NURSE PRACTITIONER

## 2022-04-06 PROCEDURE — 99213 OFFICE O/P EST LOW 20 MIN: CPT | Performed by: NURSE PRACTITIONER

## 2022-04-06 RX ORDER — DIAZEPAM 2 MG/1
2 TABLET ORAL EVERY 8 HOURS PRN
Qty: 60 TABLET | Refills: 0 | Status: SHIPPED | OUTPATIENT
Start: 2022-04-06 | End: 2022-05-06

## 2022-04-06 NOTE — TELEPHONE ENCOUNTER
Received call from Purvi at [facility] with [employer]. Subjective: Caller states \"having really bad anxiety,  20 minutes agog bp 139/98, bilateral arm pain, neck surgery in December\"     Current Symptoms: arms pain    Onset: 1 week ago; worsening    Associated Symptoms: none    Pain Severity: 5/10; aching; constant    Temperature: denies    What has been tried: motrin and aspirin did not help    LMP: NA Pregnant: NA    Recommended disposition: See PCP within 3 Days for further evaluation of anxiety. If unable to be scheduled, caller advised to go to [facility]. Care advice provided, patient verbalizes understanding; denies any other questions or concerns; instructed to call back for any new or worsening symptoms. Patient/Caller agrees with recommended disposition; writer provided warm transfer to Jeremiah Larson at [facility] for appointment scheduling     Attention Provider: Thank you for allowing me to participate in the care of your patient. The patient was connected to triage in response to information provided to the ECC/PSC. Please do not respond through this encounter as the response is not directed to a shared pool.           Reason for Disposition   Symptoms of anxiety or panic and has not been evaluated for this by physician    Protocols used: ANXIETY AND PANIC ATTACK-ADULT-[place]

## 2022-04-07 ASSESSMENT — ENCOUNTER SYMPTOMS
BACK PAIN: 0
COUGH: 0
ABDOMINAL DISTENTION: 0
ABDOMINAL PAIN: 0
SHORTNESS OF BREATH: 0
CHEST TIGHTNESS: 0
WHEEZING: 0

## 2022-04-07 NOTE — PROGRESS NOTES
[address] [place] [address]  Dept: [phone]  Dept Fax: [phone]  Loc: [phone]  PROGRESS NOTE      VisitDate: 4/6/2022    Samara Oleary is a 37 y.o. female who presents today for:     Chief Complaint   Patient presents with    Anxiety     Bilateral arm pains, she is very shakey. Her fiance is getting a biopsy done on his left lung and she is very upset about this. He is also scheduled for a PET scan. Subjective:  Patient complains of elevated anxiety, shakiness. Reports spouse is currently having a biopsy performed on a lung mass. Patient requesting medication for nerves/anxiety. Review of Systems   Constitutional: Negative for activity change, appetite change, chills, fatigue and fever. Eyes: Negative for visual disturbance. Respiratory: Negative for cough, chest tightness, shortness of breath and wheezing. Cardiovascular: Negative for chest pain, palpitations and leg swelling. Gastrointestinal: Negative for abdominal distention and abdominal pain. Genitourinary: Negative for dysuria. Musculoskeletal: Negative for arthralgias, back pain and neck pain. Skin: Negative. Negative for rash. Neurological: Positive for tremors. Negative for dizziness, light-headedness and headaches. Hematological: Negative for adenopathy. Psychiatric/Behavioral: Positive for decreased concentration. Negative for dysphoric mood. The patient is nervous/anxious. All other systems reviewed and are negative. Past Medical History:   Diagnosis Date    Kidney stone     2005 with pregnancy    Kidney stones     PONV (postoperative nausea and vomiting)     Prolonged emergence from general anesthesia     \"During Hysterectomy- bradycardia- meds to increase heart rate. \"      Past Surgical History:   Procedure Laterality Date    CERVICAL FUSION N/A 12/8/2021    ACDF C4-C7-T1 performed by Floyd Cruz MD at [address]  03/25/2016    Dr. Vonda Norris 9/13/2017    DILATATION AND CURETTAGE HYSTEROSCOPY, POSSIBLE MYOSURE performed by Giovana Faye DO at [place] [address]  04/18/2018    HERNIA REPAIR      HYSTERECTOMY ABDOMINAL N/A 3/13/2019    ROBOTIC TOTAL LAPAROSCOPIC HYSTERECTOMY WITH BILATERAL SALPINGECTOMY performed by Giovana Faye DO at [facility] HYSTEROSCOPY  04/18/2018    MN HYSTEROSCOPY,W/ENDOMETRIAL ABLATION N/A 4/18/2018    HYSTEROSCOPY ENDOMETRIAL ABLATION performed by Giovana Faye DO at [facility]       Family History   Problem Relation Age of Onset    Heart Disease Father     High Blood Pressure Father     High Cholesterol Father     Diabetes Father     Breast Cancer Mother 64    Asthma Neg Hx      Social History     Tobacco Use    Smoking status: Never Smoker    Smokeless tobacco: Never Used   Substance Use Topics    Alcohol use: No     Alcohol/week: 0.0 standard drinks      Current Outpatient Medications   Medication Sig Dispense Refill    diazePAM (VALIUM) 2 MG tablet Take 1 tablet by mouth every 8 hours as needed for Anxiety for up to 30 days.  60 tablet 0    buPROPion (WELLBUTRIN XL) 300 MG extended release tablet Take 1 tablet by mouth every morning Take with a 150 mg 90 tablet 3    buPROPion (WELLBUTRIN XL) 150 MG extended release tablet Take 1 tablet by mouth every morning Take with a 300 mg 90 tablet 3    METAMUCIL FIBER PO Take 1 capsule by mouth daily 3 in 1 fiber      traZODone (DESYREL) 100 MG tablet Take 1 tablet by mouth nightly 90 tablet 1    cyclobenzaprine (FLEXERIL) 10 MG tablet TAKE 1 TABLET BY MOUTH THREE TIMES DAILY (Patient not taking: Reported on 4/6/2022)      HYDROcodone-acetaminophen (NORCO) 5-325 MG per tablet TAKE 1 TABLET BY MOUTH EVERY 4 HOURS FOR 7 DAYS (Patient not taking: Reported on 4/6/2022)       No current facility-administered medications for this visit. Allergies   Allergen Reactions    Amoxicillin Hives     Health Maintenance   Topic Date Due    Hepatitis C screen  Never done    Varicella vaccine (1 of 2 - 2-dose childhood series) Never done    COVID-19 Vaccine (1) Never done    HIV screen  Never done    DTaP/Tdap/Td vaccine (1 - Tdap) Never done    Lipid screen  06/02/2021    Breast cancer screen  Never done    Flu vaccine (Season Ended) 09/01/2022    Depression Monitoring  02/23/2023    Hepatitis A vaccine  Aged Out    Hepatitis B vaccine  Aged Out    Hib vaccine  Aged Out    Meningococcal (ACWY) vaccine  Aged Out    Pneumococcal 0-64 years Vaccine  Aged Out         Objective:     Physical Exam  Vitals and nursing note reviewed. Constitutional:       Appearance: Normal appearance. She is well-developed and normal weight. HENT:      Head: Normocephalic. Right Ear: Tympanic membrane and ear canal normal.      Left Ear: Tympanic membrane and ear canal normal.      Nose: Nose normal.      Mouth/Throat:      Pharynx: Oropharynx is clear. Eyes:      Extraocular Movements: Extraocular movements intact. Conjunctiva/sclera: Conjunctivae normal.   Cardiovascular:      Rate and Rhythm: Normal rate and regular rhythm. Pulses: Normal pulses. Heart sounds: Normal heart sounds. Pulmonary:      Effort: Pulmonary effort is normal.      Breath sounds: Normal breath sounds. Abdominal:      General: Bowel sounds are normal.      Palpations: Abdomen is soft. Musculoskeletal:         General: Normal range of motion. Cervical back: Normal range of motion and neck supple. Skin:     General: Skin is warm and dry. Neurological:      General: No focal deficit present. Mental Status: She is alert and oriented to person, place, and time. Psychiatric:         Mood and Affect: Mood normal.         Thought Content:  Thought content normal.         Judgment: Judgment normal.       /88   Pulse 88 Resp 20   Ht 5' 2.5\" (1.588 m)   Wt 117 lb (53.1 kg)   LMP 03/06/2019   BMI 21.06 kg/m²       Impression/Plan:  1. Situational anxiety      Requested Prescriptions     Signed Prescriptions Disp Refills    diazePAM (VALIUM) 2 MG tablet 60 tablet 0     Sig: Take 1 tablet by mouth every 8 hours as needed for Anxiety for up to 30 days. No orders of the defined types were placed in this encounter. Patient giveneducational materials - see patient instructions. Discussed use, benefit, and side effects of prescribed medications. All patient questions answered. Pt voiced understanding. Reviewed health maintenance. Patient agreedwith treatment plan. Follow up as directed. Valium 2 mg 1 p.o. every 8 as needed anxiety #60. OARRS report reviewed. Follow-up as needed.        Electronically signed by GERALDINE Coleman CNP on 4/7/2022 at 9:07 AM

## 2022-04-14 ENCOUNTER — INITIAL CONSULT (OUTPATIENT)
Dept: NEUROLOGY | Age: 42
End: 2022-04-14
Payer: MEDICAID

## 2022-04-14 VITALS
OXYGEN SATURATION: 97 % | DIASTOLIC BLOOD PRESSURE: 74 MMHG | WEIGHT: 117 LBS | SYSTOLIC BLOOD PRESSURE: 110 MMHG | HEIGHT: 62 IN | BODY MASS INDEX: 21.53 KG/M2 | HEART RATE: 77 BPM

## 2022-04-14 DIAGNOSIS — G25.2 ACTION TREMOR: Primary | ICD-10-CM

## 2022-04-14 PROCEDURE — 1036F TOBACCO NON-USER: CPT | Performed by: PSYCHIATRY & NEUROLOGY

## 2022-04-14 PROCEDURE — 99204 OFFICE O/P NEW MOD 45 MIN: CPT | Performed by: PSYCHIATRY & NEUROLOGY

## 2022-04-14 PROCEDURE — G8427 DOCREV CUR MEDS BY ELIG CLIN: HCPCS | Performed by: PSYCHIATRY & NEUROLOGY

## 2022-04-14 PROCEDURE — G8420 CALC BMI NORM PARAMETERS: HCPCS | Performed by: PSYCHIATRY & NEUROLOGY

## 2022-04-14 RX ORDER — TOPIRAMATE 50 MG/1
TABLET, FILM COATED ORAL
Qty: 30 TABLET | Refills: 3 | Status: SHIPPED | OUTPATIENT
Start: 2022-04-14 | End: 2022-07-11 | Stop reason: SDUPTHER

## 2022-04-14 NOTE — PATIENT INSTRUCTIONS
1. Proceed with CT head WO contrast as scheduled  2. Start Topamax 25 mg tablet daily for one week then 50 mg daily there after. Take with plenty of fluids. 3. Call with any new symptoms or concerns. 4. Follow up in 6 weeks.

## 2022-04-15 ENCOUNTER — HOSPITAL ENCOUNTER (EMERGENCY)
Age: 42
Discharge: HOME | End: 2022-04-15
Payer: COMMERCIAL

## 2022-04-15 VITALS
BODY MASS INDEX: 21.71 KG/M2 | HEIGHT: 62 IN | DIASTOLIC BLOOD PRESSURE: 85 MMHG | RESPIRATION RATE: 16 BRPM | HEART RATE: 90 BPM | TEMPERATURE: 97.2 F | OXYGEN SATURATION: 98 % | WEIGHT: 118 LBS | SYSTOLIC BLOOD PRESSURE: 131 MMHG

## 2022-04-15 DIAGNOSIS — M62.838 MUSCLE SPASM OF RIGHT SHOULDER: Primary | ICD-10-CM

## 2022-04-15 PROCEDURE — 99212 OFFICE O/P EST SF 10 MIN: CPT | Performed by: NURSE PRACTITIONER

## 2022-04-15 PROCEDURE — 99213 OFFICE O/P EST LOW 20 MIN: CPT

## 2022-04-15 RX ORDER — IBUPROFEN 400 MG/1
400 TABLET ORAL EVERY 6 HOURS PRN
COMMUNITY

## 2022-04-15 ASSESSMENT — PAIN - FUNCTIONAL ASSESSMENT: PAIN_FUNCTIONAL_ASSESSMENT: ACTIVITIES ARE NOT PREVENTED

## 2022-04-15 ASSESSMENT — PAIN DESCRIPTION - FREQUENCY: FREQUENCY: INTERMITTENT

## 2022-04-15 ASSESSMENT — PAIN DESCRIPTION - DESCRIPTORS: DESCRIPTORS: ACHING

## 2022-04-15 ASSESSMENT — ENCOUNTER SYMPTOMS
BACK PAIN: 0
COLOR CHANGE: 0

## 2022-04-15 NOTE — ED TRIAGE NOTES
Patient ambulated to room with complaint of right shoulder pain that radiates to front of neck. States she had neck surgery in December and has seen her doctor for this pain recently but now it is radiating to from of neck.  States she has an appointment scheduled for Monday

## 2022-04-15 NOTE — ED PROVIDER NOTES
[facility]  Urgent Care Encounter       CHIEF COMPLAINT       Chief Complaint   Patient presents with    Other     right shoulder pain radiating to neck       Nurses Notes reviewed and I agree except as noted in the HPI. HISTORY OF PRESENT ILLNESS   Wilman Garces is a 37 y.o. female who presents to the urgent care center complaining of chronic right shoulder pain. The patient was seen by orthopedics and had a cervical spine surgery in December 2021. Patient stated that after the surgery her shoulder pain never really did go away. The patient states that she has been followed up with orthopedics as well as her primary care provider who gave her Valium 2 mg to help her relax. The patient states that she just started physical therapy as well as for the right shoulder. Patient complains of pain to the right side of the neck the right shoulder and right anterior chest.  She denies any shortness of breath, diaphoresis nausea or vomiting. Present time patient sitting on table skin is warm dry patient does not appear to be in any acute distress  is at the bedside. The history is provided by the patient. No  was used. Shoulder Problem  Location:  Shoulder  Shoulder location:  R shoulder  Injury: no    Pain details:     Quality:  Cramping and aching    Radiates to:  R shoulder and R upper arm    Severity:  Moderate    Onset quality:  Unable to specify    Timing:  Constant    Progression:  Unchanged  Handedness:  Right-handed  Dislocation: no    Prior injury to area:  No  Relieved by:  Nothing  Worsened by: Movement and stretching area  Associated symptoms: decreased range of motion and neck pain    Associated symptoms: no back pain, no numbness, no stiffness, no swelling and no tingling        REVIEW OF SYSTEMS     Review of Systems   Constitutional: Positive for activity change. Musculoskeletal: Positive for arthralgias, neck pain and neck stiffness. Negative for back pain, joint swelling and stiffness. Right shoulder   Skin: Negative for color change and pallor. Psychiatric/Behavioral: The patient is nervous/anxious. PAST MEDICAL HISTORY         Diagnosis Date    Kidney stone     2005 with pregnancy    Kidney stones     PONV (postoperative nausea and vomiting)     Prolonged emergence from general anesthesia     \"During Hysterectomy- bradycardia- meds to increase heart rate. \"       SURGICALHISTORY     Patient  has a past surgical history that includes hernia repair; Tubal ligation; Colonoscopy (03/25/2016); Dilation and curettage of uterus (N/A, 9/13/2017); hysteroscopy (04/18/2018); Endometrial ablation (04/18/2018); pr hysteroscopy,w/endometrial ablation (N/A, 4/18/2018); HYSTERECTOMY ABDOMINAL (N/A, 3/13/2019); and cervical fusion (N/A, 12/8/2021). CURRENT MEDICATIONS       Current Discharge Medication List      CONTINUE these medications which have NOT CHANGED    Details   ibuprofen (ADVIL;MOTRIN) 400 MG tablet Take 400 mg by mouth every 6 hours as needed for Pain      topiramate (TOPAMAX) 50 MG tablet Take Topamax 25 mg tablet daily for one week then 50 mg daily there after. Take with plenty of fluids. Qty: 30 tablet, Refills: 3      diazePAM (VALIUM) 2 MG tablet Take 1 tablet by mouth every 8 hours as needed for Anxiety for up to 30 days. Qty: 60 tablet, Refills: 0    Associated Diagnoses: Situational anxiety      cyclobenzaprine (FLEXERIL) 10 MG tablet TAKE 1 TABLET BY MOUTH THREE TIMES DAILY      HYDROcodone-acetaminophen (NORCO) 5-325 MG per tablet TAKE 1 TABLET BY MOUTH EVERY 4 HOURS FOR 7 DAYS      !!  buPROPion (WELLBUTRIN XL) 300 MG extended release tablet Take 1 tablet by mouth every morning Take with a 150 mg  Qty: 90 tablet, Refills: 3      !! buPROPion (WELLBUTRIN XL) 150 MG extended release tablet Take 1 tablet by mouth every morning Take with a 300 mg  Qty: 90 tablet, Refills: 3      METAMUCIL FIBER PO Take 1 capsule by mouth daily 3 in 1 fiber      traZODone (DESYREL) 100 MG tablet Take 1 tablet by mouth nightly  Qty: 90 tablet, Refills: 1       !! - Potential duplicate medications found. Please discuss with provider. ALLERGIES     Patient is is allergic to amoxicillin. Patients   There is no immunization history on file for this patient. FAMILY HISTORY     Patient's family history includes Breast Cancer (age of onset: 64) in her mother; Diabetes in her father; Heart Disease in her father; High Blood Pressure in her father; High Cholesterol in her father; No Known Problems in her brother, brother, brother, sister, sister, and sister. SOCIAL HISTORY     Patient  reports that she has never smoked. She has never used smokeless tobacco. She reports that she does not drink alcohol and does not use drugs. PHYSICAL EXAM     ED TRIAGE VITALS  BP: 131/85, Temp: 97.2 °F (36.2 °C), Pulse: 90, Resp: 16, SpO2: 98 %,Estimated body mass index is 21.58 kg/m² as calculated from the following:    Height as of this encounter: 5' 2\" (1.575 m). Weight as of this encounter: 118 lb (53.5 kg). ,Patient's last menstrual period was 03/06/2019. Physical Exam  Vitals and nursing note reviewed. Constitutional:       General: She is not in acute distress. Appearance: Normal appearance. She is well-developed. She is not ill-appearing, toxic-appearing or diaphoretic. HENT:      Head: Normocephalic. Right Ear: External ear normal.      Left Ear: External ear normal.      Nose: Nose normal.   Neck:      Thyroid: No thyroid mass or thyromegaly. Trachea: Trachea normal.     Cardiovascular:      Rate and Rhythm: Normal rate. Pulmonary:      Effort: Pulmonary effort is normal.   Musculoskeletal:         General: Tenderness present. Right shoulder: Tenderness present. No swelling or bony tenderness. Decreased range of motion. Normal strength. Cervical back: Neck supple. No rigidity.  Pain with movement and muscular tenderness present. No spinous process tenderness. Decreased range of motion. Comments: Right shoulder   Lymphadenopathy:      Cervical: No cervical adenopathy. Skin:     General: Skin is warm and dry. Capillary Refill: Capillary refill takes less than 2 seconds. Neurological:      Mental Status: She is alert and oriented to person, place, and time. Psychiatric:         Mood and Affect: Mood normal.         Behavior: Behavior normal. Behavior is cooperative. DIAGNOSTIC RESULTS     Labs:No results found for this visit on 04/15/22. IMAGING:    No orders to display         EKG:      URGENT CARE COURSE:     Vitals:    04/15/22 0836   BP: 131/85   Pulse: 90   Resp: 16   Temp: 97.2 °F (36.2 °C)   TempSrc: Temporal   SpO2: 98%   Weight: 118 lb (53.5 kg)   Height: 5' 2\" (1.575 m)       Medications - No data to display         PROCEDURES:  None    FINAL IMPRESSION      1. Muscle spasm of right shoulder          DISPOSITION/ PLAN     Patient declined any additional medication or treatment at this time. She states that she has Valium as well and has ibuprofen at home which she will continue to use. Patient states that she does see her orthopedic doctor next week but just wanted some reassurance at this time. The patient will continue with physical therapy as scheduled. She was also advised to use ice or heat to the sore areas and gentle range of motion exercises. This patient has any changes such as chest pain, shortness of breath diaphoresis or any concerns she is to be reevaluated. She is agreeable to the treatment plan and left ambulatory without any problems.       PATIENT REFERRED TO:  Mouna Watson MD  [address] [place] [address] / [facility] [place] [address]      DISCHARGE MEDICATIONS:  Current Discharge Medication List          Current Discharge Medication List          Current Discharge Medication List          GERALDINE Calderon - CNP    (Please note that portions of this note were completed with a voice recognition program. Efforts were made to edit the dictations but occasionally words are mis-transcribed.)           June Flores, GERALDINE - JAVED  04/15/22 [id number]

## 2022-04-22 NOTE — PROGRESS NOTES
Chief Complaint   Patient presents with    Consultation     tremors         Giovanni Aleman is a 37 y.o. female who presents today for evaluation of bilateral hand tremor for the past 1 year that has worsened in the past 6 months. She can have good days and bad days. Her symptoms are worse with activity and when she is anxious. She can sometimes struggle with using utensils to eat. She has not been tried on any medications. Family history is significant for tremor in her father. She is bothered by her symptoms in a social setting. TSH done 2/28/22=3.840. No history of head injury. She is scheduled for CT head in a a few weeks. She denies chest pain. No shortness of breath, no neck pain. No vision changes. No dysphagia. No fever. No rash. No weight loss. History provided by patient. Past Medical History:   Diagnosis Date    Kidney stone     2005 with pregnancy    Kidney stones     PONV (postoperative nausea and vomiting)     Prolonged emergence from general anesthesia     \"During Hysterectomy- bradycardia- meds to increase heart rate. \"       Patient Active Problem List   Diagnosis    Menorrhagia with regular cycle    Dysmenorrhea    History of robot-assisted laparoscopic hysterectomy    Adjustment disorder with depressed mood    Cervical spinal stenosis       Allergies   Allergen Reactions    Amoxicillin Hives       Current Outpatient Medications   Medication Sig Dispense Refill    diazePAM (VALIUM) 2 MG tablet Take 1 tablet by mouth every 8 hours as needed for Anxiety for up to 30 days.  60 tablet 0    cyclobenzaprine (FLEXERIL) 10 MG tablet TAKE 1 TABLET BY MOUTH THREE TIMES DAILY      HYDROcodone-acetaminophen (NORCO) 5-325 MG per tablet TAKE 1 TABLET BY MOUTH EVERY 4 HOURS FOR 7 DAYS      buPROPion (WELLBUTRIN XL) 300 MG extended release tablet Take 1 tablet by mouth every morning Take with a 150 mg 90 tablet 3    buPROPion (WELLBUTRIN XL) 150 MG extended release tablet Take 1 tablet by mouth every morning Take with a 300 mg 90 tablet 3    METAMUCIL FIBER PO Take 1 capsule by mouth daily 3 in 1 fiber      traZODone (DESYREL) 100 MG tablet Take 1 tablet by mouth nightly 90 tablet 1     No current facility-administered medications for this visit. Social History     Socioeconomic History    Marital status: Single     Spouse name: None    Number of children: None    Years of education: None    Highest education level: None   Occupational History    None   Tobacco Use    Smoking status: Never Smoker    Smokeless tobacco: Never Used   Vaping Use    Vaping Use: Never used   Substance and Sexual Activity    Alcohol use: No     Alcohol/week: 0.0 standard drinks    Drug use: No    Sexual activity: Yes     Partners: Male   Other Topics Concern    None   Social History Narrative    None     Social Determinants of Health     Financial Resource Strain: Low Risk     Difficulty of Paying Living Expenses: Not hard at all   Food Insecurity: No Food Insecurity    Worried About Running Out of Food in the Last Year: Never true    Miguel of Food in the Last Year: Never true   Transportation Needs:     Lack of Transportation (Medical): Not on file    Lack of Transportation (Non-Medical):  Not on file   Physical Activity:     Days of Exercise per Week: Not on file    Minutes of Exercise per Session: Not on file   Stress:     Feeling of Stress : Not on file   Social Connections:     Frequency of Communication with Friends and Family: Not on file    Frequency of Social Gatherings with Friends and Family: Not on file    Attends [employer] Services: Not on file    Active Member of Clubs or Organizations: Not on file    Attends Club or Organization Meetings: Not on file    Marital Status: Not on file   Intimate Partner Violence:     Fear of Current or Ex-Partner: Not on file    Emotionally Abused: Not on file    Physically Abused: Not on file    Sexually Abused: Not on file   Housing Stability:     Unable to Pay for Housing in the Last Year: Not on file    Number of Places Lived in the Last Year: Not on file    Unstable Housing in the Last Year: Not on file       Family History   Problem Relation Age of Onset    Heart Disease Father     High Blood Pressure Father     High Cholesterol Father     Diabetes Father     Breast Cancer Mother 64    No Known Problems Sister     No Known Problems Brother     No Known Problems Sister     No Known Problems Sister     No Known Problems Brother     No Known Problems Brother     Asthma Neg Hx          I reviewed the past medical history, allergies, medications, social history and family history. Review of Systems   All systems reviewed, and are all negative, except what is mentioned in HPI      Vitals:    04/14/22 0810   BP: 110/74   Site: Right Upper Arm   Position: Sitting   Cuff Size: Medium Adult   Pulse: 77   SpO2: 97%   Weight: 117 lb (53.1 kg)   Height: 5' 2\" (1.575 m)       Physical Examination:  General appearance - alert, well appearing, and in no distress, oriented to person, place, and time and normal weight  Mental status- Level of Alertness: awake  Orientation: person, place, time  Memory: normal  Fund of Knowledge: normal  Attention/Concentration: normal  Language: normal. Mood is normal.   Neck - supple, no significant adenopathy, carotids upstroke normal bilaterally. There is no axillary lymphadenopathy. There is no carotid bruit. No neck lymphadenopathy.  No thyroid enlargement   Neurological -   Cranial Mgwrqe-CC-HJM:.   Cranial nerve II: Normal   Cranial nerve III: Pupils: equal, round, reactive to light  Cranial nerves III, IV, VI: Extraocular Movements: intact   Cranial nerve V: Facial sensation: intact   Cranial nerve VII:Facial strength: intact   Cranial nerve VIII: Hearing: intact   Cranial nerve IX: Palate Elevation intact bilaterally  Cranial nerve XI: Shoulder shrug intact bilaterally  Cranial nerve XII: Tongue midline   neck supple without rigidity, there is  limitation of range of motion of the neck, bilaterally. DTR's are Intact distal and symmetric  Babinski sign negative  Motor exam is 5/5 in the upper and lower extremities. Normal muscle tone. There is no muscle atrophy. Sensory is intact for light touch. Coordination: finger to nose,   intact  Gait and station intact   Abnormal movement there is bilateral hand tremor, and head tremor. No voice tremor. vibration normal, proprioception normal  Skin - warm, dry to touch, normal coloration, no rashes, no suspicious skin lesions  Superficial temporal artery pulses are normal.   There is no resting tremor, no pin rolling, no bradykinesia, no Hypohonia, normal blink rate. Musculoskeletal: Has no hand arthritis, no limitation of ROM in any of the four extremities. There is no leg edema. The Heart was regular in rate and rhythm. No heart murmur  Chest Clear, with  good effort. Abdomen soft, intact bowel sounds. Results for orders placed in visit on 21    CT HEAD WO CONTRAST    Narrative  Ordering Provider: [address] [place] [address]    Radiology Department  Patient:  [facility]. :  1980   Sex: Egkomi, [address]  Location:  [place] [address]   Unit #:   [id number]  Acct #:  [de-identified]  Ordering Phys: Brooke Tejeda MD    Exam Date: 21  Accession #:  [id number]  Exam:  CT   CT Head Without Contrast 74232  Result: See Report    STUDY:  CT BRAIN WITHOUT CONTRAST    REASON FOR EXAM:   Female, 37years old.   Headache    RADIATION DOSAGE (If Supplied By Facility):  CTDIvol = ( is 42 ) mGy, DLP  = ( 670 ) mGycm    TECHNIQUE:   Transaxial CT imaging of the brain was performed without  administration of intravenous contrast material.    Individualized dose optimization techniques were used for this CT.    COMPARISON:   None.  ___________________________________    FINDINGS:    There is no acute bleed or infarct. There are normal white matter tracts. The ventricles are normal in configuration. There is no hydrocephalus. The visualized paranasal sinuses are clear. The mastoid air cells are well aerated. There is no skull fracture. IMPRESSION:    No acute intracranial abnormality. Electronically Signed:  Suha Brownlee MD  2021/09/21 at 10:24 EDT  Tel [phone], Service support  [phone], Fax [phone]          cc: Valencia Baker MD; Gunjan Salamanca MD      Dictated by:  Lindsay Avila MD on 09/21/21 1024  Technologist:   Nelson Muñoz) [facility]  Transcribed by:  Lindsay Avila MD on 09/21/21 1024    Report Signed by:  Sachi Stringer on 09/21/21 1024    We reviewed the patient records from referring provider and available information in the EHR       ASSESSMENT:      Diagnosis Orders   1. Action tremor        This is a 37year old female who presents with bilateral hand and head tremor for the past 1 year that has progressively gotten worse in the past 6 months. Family history is significant for tremor in her father. On exam, there is symmetric  bilateral hand and head tremor noted. The patient was counseled about her symptoms and work up recommended, she was also counseled about medication options and side effects. We shared with the patient that her symptoms are not consistent with parkinson's disease, but rather essential tremor. Of note, she is on Wellbutrin  mg daily, this can contribute to her tremor. She is scheduled for CT head WO contrast order by her PCP. She is bothered by the tremor and is willing to try medication to help. We will start her on Topamax 25 mg tablet daily for one week then 50 mg daily there after to be further increased based on patient feedback and response. After detailed discussion with patient we agreed on the following plan. Plan    1. Proceed with CT head WO contrast as scheduled  2.  Start Topamax 25 mg tablet daily for one week then 50 mg daily there after. Take with plenty of fluids. 3. Call with any new symptoms or concerns. 4. Follow up in 6 weeks.      Total time 47 min      Tammi Saul MD

## 2022-04-26 RX ORDER — PROMETHAZINE HYDROCHLORIDE 25 MG/1
25 TABLET ORAL EVERY 8 HOURS PRN
Qty: 20 TABLET | Refills: 0 | Status: SHIPPED | OUTPATIENT
Start: 2022-04-26 | End: 2022-05-03

## 2022-04-27 ENCOUNTER — HOSPITAL ENCOUNTER (OUTPATIENT)
Dept: CT IMAGING | Age: 42
Discharge: HOME | End: 2022-04-27
Payer: COMMERCIAL

## 2022-04-27 DIAGNOSIS — R44.1 HALLUCINATIONS, VISUAL: ICD-10-CM

## 2022-04-27 DIAGNOSIS — K11.8 SUBMANDIBULAR GLAND MASS: ICD-10-CM

## 2022-04-27 DIAGNOSIS — R25.1 TREMORS OF NERVOUS SYSTEM: ICD-10-CM

## 2022-04-27 DIAGNOSIS — R22.1 MASS IN NECK: ICD-10-CM

## 2022-04-27 DIAGNOSIS — Z98.1 HISTORY OF FUSION OF CERVICAL SPINE: ICD-10-CM

## 2022-04-27 PROCEDURE — 70450 CT HEAD/BRAIN W/O DYE: CPT

## 2022-04-27 PROCEDURE — 70491 CT SOFT TISSUE NECK W/DYE: CPT

## 2022-04-27 PROCEDURE — 6360000004 HC RX CONTRAST MEDICATION: Performed by: NURSE PRACTITIONER

## 2022-04-27 RX ADMIN — IOPAMIDOL 80 ML: 755 INJECTION, SOLUTION INTRAVENOUS at 14:58

## 2022-05-17 ENCOUNTER — TELEPHONE (OUTPATIENT)
Dept: NEUROLOGY | Age: 42
End: 2022-05-17

## 2022-05-17 DIAGNOSIS — G25.2 ACTION TREMOR: Primary | ICD-10-CM

## 2022-05-17 NOTE — TELEPHONE ENCOUNTER
Increased anxiety can contribute to tremor as well as the Wellbutrin. We can try her on primidone 50 mg nightly to see if this helps.    Marky Menendez, CNP

## 2022-05-17 NOTE — TELEPHONE ENCOUNTER
Patient notified and verbalized understanding. Patient agreeable to the Primidone to [address] [place] [address] pharmacy. Please advise. Thank you.

## 2022-05-17 NOTE — TELEPHONE ENCOUNTER
Patient called stating she is having increased twitching/trenirs in her legs. She tried the Topamax but stopped it on her own due to side effects of numbness. She is also on Wellbutrin XL that last note stated can contribute to her tremor. She is also experiencing increased anxiety and stress as her spouse is ill. She is asking for something to help with her legs. Please advise. Thank you.

## 2022-05-18 RX ORDER — PRIMIDONE 50 MG/1
50 TABLET ORAL NIGHTLY
Qty: 30 TABLET | Refills: 2 | Status: SHIPPED | OUTPATIENT
Start: 2022-05-18 | End: 2022-06-13 | Stop reason: SINTOL

## 2022-06-13 ENCOUNTER — OFFICE VISIT (OUTPATIENT)
Dept: FAMILY MEDICINE CLINIC | Age: 42
End: 2022-06-13
Payer: COMMERCIAL

## 2022-06-13 VITALS
HEIGHT: 63 IN | BODY MASS INDEX: 22.15 KG/M2 | HEART RATE: 76 BPM | DIASTOLIC BLOOD PRESSURE: 86 MMHG | SYSTOLIC BLOOD PRESSURE: 110 MMHG | WEIGHT: 125 LBS | RESPIRATION RATE: 16 BRPM

## 2022-06-13 DIAGNOSIS — F43.23 ADJUSTMENT DISORDER WITH MIXED ANXIETY AND DEPRESSED MOOD: ICD-10-CM

## 2022-06-13 DIAGNOSIS — F41.8 SITUATIONAL ANXIETY: Primary | ICD-10-CM

## 2022-06-13 DIAGNOSIS — G25.2 ACTION TREMOR: Primary | ICD-10-CM

## 2022-06-13 DIAGNOSIS — R25.1 TREMORS OF NERVOUS SYSTEM: ICD-10-CM

## 2022-06-13 PROCEDURE — G8420 CALC BMI NORM PARAMETERS: HCPCS | Performed by: NURSE PRACTITIONER

## 2022-06-13 PROCEDURE — 1036F TOBACCO NON-USER: CPT | Performed by: NURSE PRACTITIONER

## 2022-06-13 PROCEDURE — G8427 DOCREV CUR MEDS BY ELIG CLIN: HCPCS | Performed by: NURSE PRACTITIONER

## 2022-06-13 PROCEDURE — 99213 OFFICE O/P EST LOW 20 MIN: CPT | Performed by: NURSE PRACTITIONER

## 2022-06-13 RX ORDER — DULOXETIN HYDROCHLORIDE 30 MG/1
30 CAPSULE, DELAYED RELEASE ORAL DAILY
Qty: 30 CAPSULE | Refills: 2 | Status: SHIPPED | OUTPATIENT
Start: 2022-06-13 | End: 2022-07-18

## 2022-06-13 RX ORDER — DIAZEPAM 2 MG/1
2 TABLET ORAL EVERY 8 HOURS PRN
COMMUNITY

## 2022-06-13 RX ORDER — TOPIRAMATE 50 MG/1
TABLET, FILM COATED ORAL
Qty: 30 TABLET | Refills: 3 | OUTPATIENT
Start: 2022-06-13

## 2022-06-13 ASSESSMENT — ENCOUNTER SYMPTOMS
COUGH: 0
BACK PAIN: 0
WHEEZING: 0
CHEST TIGHTNESS: 0
ABDOMINAL DISTENTION: 0
SHORTNESS OF BREATH: 0
ABDOMINAL PAIN: 0

## 2022-06-13 NOTE — PROGRESS NOTES
[address] [place] [id number]  Dept: [phone]  Dept Fax: [phone]  Loc: [phone]  PROGRESS NOTE      VisitDate: 6/13/2022    Gege Fallon is a 40 y.o. female who presents today for:     Chief Complaint   Patient presents with    Anxiety     Discuss meds. She is only taking Wellbutrin 150. She was taking 300 mg but her legs were shaking all the time, so she decreased it to 150. Her legs are not shaking as much, but her left leg is very tense. Subjective:  Patient presents with complaint increased anxiety, depressive mood and panic attacks. Patient currently taking care of spouse and father with a recent diagnosis of cancer. Reports that she did decrease her Wellbutrin to 150 mg daily and has noticed a subtle improvement in her tremors. Patient also reports that she has been prescribed primidone per neurology for her tremors. Patient request to trial Cymbalta has taken in the past and unsure of benefit. Requesting to discontinue Wellbutrin. Patient currently enrolled in a lung cancer support group. Declines any consultation with psychiatry at this time. Denies any homicidal suicidal ideations. Review of Systems   Constitutional: Negative for activity change, appetite change, chills, fatigue and fever. Eyes: Negative for visual disturbance. Respiratory: Negative for cough, chest tightness, shortness of breath and wheezing. Cardiovascular: Negative for chest pain, palpitations and leg swelling. Gastrointestinal: Negative for abdominal distention and abdominal pain. Genitourinary: Negative for dysuria. Musculoskeletal: Negative for arthralgias, back pain and neck pain. Skin: Negative. Negative for rash. Neurological: Positive for tremors. Negative for dizziness, light-headedness and headaches. Hematological: Negative for adenopathy.    Psychiatric/Behavioral: Positive for decreased concentration, dysphoric mood and sleep disturbance. Negative for hallucinations and suicidal ideas. The patient is nervous/anxious. All other systems reviewed and are negative. Past Medical History:   Diagnosis Date    Kidney stone     2005 with pregnancy    Kidney stones     PONV (postoperative nausea and vomiting)     Prolonged emergence from general anesthesia     \"During Hysterectomy- bradycardia- meds to increase heart rate. \"      Past Surgical History:   Procedure Laterality Date    CERVICAL FUSION N/A 12/8/2021    ACDF C4-C7-T1 performed by Usama Machado MD at [facility]  03/25/2016    Dr. Joesph Flores 9/13/2017    DILATATION AND CURETTAGE HYSTEROSCOPY, POSSIBLE MYOSURE performed by Ross Garner DO at [place] [address]  04/18/2018    HERNIA REPAIR      HYSTERECTOMY ABDOMINAL N/A 3/13/2019    ROBOTIC TOTAL LAPAROSCOPIC HYSTERECTOMY WITH BILATERAL SALPINGECTOMY performed by Ross Garner DO at [address] [place] [address] HYSTEROSCOPY  04/18/2018    CA HYSTEROSCOPY,W/ENDOMETRIAL ABLATION N/A 4/18/2018    HYSTEROSCOPY ENDOMETRIAL ABLATION performed by Ross Garner DO at [address] [place] [address]       Family History   Problem Relation Age of Onset    Heart Disease Father     High Blood Pressure Father     High Cholesterol Father     Diabetes Father     Breast Cancer Mother 64    No Known Problems Sister     No Known Problems Brother     No Known Problems Sister     No Known Problems Sister     No Known Problems Brother     No Known Problems Brother     Asthma Neg Hx      Social History     Tobacco Use    Smoking status: Never Smoker    Smokeless tobacco: Never Used   Substance Use Topics    Alcohol use: No     Alcohol/week: 0.0 standard drinks      Current Outpatient Medications   Medication Sig Dispense Refill    diazePAM (VALIUM) 2 MG tablet Take 2 mg by mouth every 8 hours as needed for Anxiety.  DULoxetine (CYMBALTA) 30 MG extended release capsule Take 1 capsule by mouth daily 30 capsule 2    primidone (MYSOLINE) 50 MG tablet Take 1 tablet by mouth nightly 30 tablet 2    ibuprofen (ADVIL;MOTRIN) 400 MG tablet Take 400 mg by mouth every 6 hours as needed for Pain      METAMUCIL FIBER PO Take 1 capsule by mouth daily 3 in 1 fiber      topiramate (TOPAMAX) 50 MG tablet Take Topamax 25 mg tablet daily for one week then 50 mg daily there after. Take with plenty of fluids. (Patient not taking: Reported on 6/13/2022) 30 tablet 3    cyclobenzaprine (FLEXERIL) 10 MG tablet TAKE 1 TABLET BY MOUTH THREE TIMES DAILY (Patient not taking: Reported on 6/13/2022)      HYDROcodone-acetaminophen (NORCO) 5-325 MG per tablet TAKE 1 TABLET BY MOUTH EVERY 4 HOURS FOR 7 DAYS (Patient not taking: Reported on 6/13/2022)      traZODone (DESYREL) 100 MG tablet Take 1 tablet by mouth nightly (Patient not taking: Reported on 6/13/2022) 90 tablet 1     No current facility-administered medications for this visit. Allergies   Allergen Reactions    Amoxicillin Hives     Health Maintenance   Topic Date Due    Varicella vaccine (1 of 2 - 2-dose childhood series) Never done    COVID-19 Vaccine (1) Never done    HIV screen  Never done    Hepatitis C screen  Never done    DTaP/Tdap/Td vaccine (1 - Tdap) Never done    Lipids  06/02/2021    Breast cancer screen  Never done    Flu vaccine (Season Ended) 09/01/2022    Depression Monitoring  02/23/2023    Hepatitis A vaccine  Aged Out    Hepatitis B vaccine  Aged Out    Hib vaccine  Aged Out    Meningococcal (ACWY) vaccine  Aged Out    Pneumococcal 0-64 years Vaccine  Aged Out         Objective:     Physical Exam  Vitals and nursing note reviewed. Constitutional:       Appearance: Normal appearance. She is well-developed and normal weight. HENT:      Head: Normocephalic.       Right Ear: Tympanic membrane and ear canal normal.      Left Ear: Tympanic membrane and ear canal normal.      Nose: Nose normal.      Mouth/Throat:      Pharynx: Oropharynx is clear. Eyes:      Extraocular Movements: Extraocular movements intact. Conjunctiva/sclera: Conjunctivae normal.   Cardiovascular:      Rate and Rhythm: Normal rate and regular rhythm. Pulses: Normal pulses. Heart sounds: Normal heart sounds. Pulmonary:      Effort: Pulmonary effort is normal.      Breath sounds: Normal breath sounds. Abdominal:      General: Bowel sounds are normal.      Palpations: Abdomen is soft. Musculoskeletal:         General: Normal range of motion. Cervical back: Neck supple. Skin:     General: Skin is warm and dry. Neurological:      General: No focal deficit present. Mental Status: She is alert and oriented to person, place, and time. Motor: Tremor present. Psychiatric:         Attention and Perception: Attention normal.         Mood and Affect: Mood is anxious and depressed. Speech: Speech normal.         Behavior: Behavior normal. Behavior is cooperative. Thought Content: Thought content normal. Thought content is not paranoid. Thought content does not include homicidal or suicidal ideation. Cognition and Memory: Cognition normal.         Judgment: Judgment normal.       /86   Pulse 76   Resp 16   Ht 5' 3\" (1.6 m)   Wt 125 lb (56.7 kg)   LMP 03/06/2019   BMI 22.14 kg/m²       Impression/Plan:  1. Situational anxiety    2. Adjustment disorder with mixed anxiety and depressed mood    3. Tremors of nervous system      Requested Prescriptions     Signed Prescriptions Disp Refills    DULoxetine (CYMBALTA) 30 MG extended release capsule 30 capsule 2     Sig: Take 1 capsule by mouth daily     No orders of the defined types were placed in this encounter. Patient giveneducational materials - see patient instructions. Discussed use, benefit, and side effects of prescribed medications. All patient questions answered.   Pt voiced understanding. Reviewed health maintenance. Patient agreedwith treatment plan. Follow up as directed. Hold Wellbutrin. Cymbalta 30 mg p.o. daily. Declines psychiatry consultation at this time. Continue to support group counseling. Follow-up with me in 1 month. Continue Valium as needed.        Electronically signed by GERALDINE Mansfield CNP on 6/13/2022 at 9:10 AM

## 2022-06-13 NOTE — TELEPHONE ENCOUNTER
Spoke with patient, she is not taken the Primidone 50 mg nightly, she has went back to the Topamax 50 mg once daily, states does not need refills at this time, has a follow up appointment 07/11/22 at 12:30pm.

## 2022-07-11 ENCOUNTER — OFFICE VISIT (OUTPATIENT)
Dept: NEUROLOGY | Age: 42
End: 2022-07-11
Payer: COMMERCIAL

## 2022-07-11 VITALS
OXYGEN SATURATION: 98 % | SYSTOLIC BLOOD PRESSURE: 112 MMHG | WEIGHT: 123 LBS | DIASTOLIC BLOOD PRESSURE: 80 MMHG | HEIGHT: 62 IN | HEART RATE: 70 BPM | BODY MASS INDEX: 22.63 KG/M2

## 2022-07-11 DIAGNOSIS — G25.2 ACTION TREMOR: Primary | ICD-10-CM

## 2022-07-11 PROCEDURE — G8427 DOCREV CUR MEDS BY ELIG CLIN: HCPCS | Performed by: NURSE PRACTITIONER

## 2022-07-11 PROCEDURE — G8420 CALC BMI NORM PARAMETERS: HCPCS | Performed by: NURSE PRACTITIONER

## 2022-07-11 PROCEDURE — 99213 OFFICE O/P EST LOW 20 MIN: CPT | Performed by: NURSE PRACTITIONER

## 2022-07-11 PROCEDURE — 1036F TOBACCO NON-USER: CPT | Performed by: NURSE PRACTITIONER

## 2022-07-11 RX ORDER — TOPIRAMATE 50 MG/1
50 TABLET, FILM COATED ORAL DAILY
Qty: 30 TABLET | Refills: 3 | Status: SHIPPED | OUTPATIENT
Start: 2022-07-11 | End: 2022-09-13 | Stop reason: ALTCHOICE

## 2022-07-11 NOTE — PROGRESS NOTES
NEUROLOGY OUT PATIENT FOLLOW UP NOTE:  7/11/202212:30 PM    Marisa Trinidad is here for follow up for tremor. ROS:  Respiratory : no cough, no shortness of breath  Cardiac: no chest pain. No palpitations. Renal : no flank pain, no hematuria    Skin: no rash      Allergies   Allergen Reactions    Amoxicillin Hives       Current Outpatient Medications:     diazePAM (VALIUM) 2 MG tablet, Take 2 mg by mouth every 8 hours as needed for Anxiety. , Disp: , Rfl:     DULoxetine (CYMBALTA) 30 MG extended release capsule, Take 1 capsule by mouth daily, Disp: 30 capsule, Rfl: 2    ibuprofen (ADVIL;MOTRIN) 400 MG tablet, Take 400 mg by mouth every 6 hours as needed for Pain, Disp: , Rfl:     topiramate (TOPAMAX) 50 MG tablet, Take Topamax 25 mg tablet daily for one week then 50 mg daily there after. Take with plenty of fluids. (Patient taking differently: 50 mg daily Take Topamax 25 mg tablet daily for one week then 50 mg daily there after. Take with plenty of fluids.), Disp: 30 tablet, Rfl: 3    cyclobenzaprine (FLEXERIL) 10 MG tablet, TAKE 1 TABLET BY MOUTH THREE TIMES DAILY, Disp: , Rfl:     HYDROcodone-acetaminophen (NORCO) 5-325 MG per tablet, TAKE 1 TABLET BY MOUTH EVERY 4 HOURS FOR 7 DAYS, Disp: , Rfl:     METAMUCIL FIBER PO, Take 1 capsule by mouth daily 3 in 1 fiber, Disp: , Rfl:     traZODone (DESYREL) 100 MG tablet, Take 1 tablet by mouth nightly (Patient not taking: Reported on 7/11/2022), Disp: 90 tablet, Rfl: 1    I reviewed the past medical history, allergies, medications, social history and family history. PE:   Vitals:    07/11/22 1225   BP: 112/80   Site: Right Upper Arm   Position: Sitting   Cuff Size: Medium Adult   Pulse: 70   SpO2: 98%   Weight: 123 lb (55.8 kg)   Height: 5' 2\" (1.575 m)        General Appearance:  awake, alert, oriented, in no acute distress  Gen: NAD, Language is Intact.  Skin: no rash, lesion, dry  to touch. warm  Head: no rash, no icterus  Neck: There is no carotid bruits. The Neck is supple. There is no neck lymphadenopathy. Neuro: CN 2-12 grossly intact with no focal deficits. Power 5/5 Throughout symmetric, Reflexes are +2 symmetric. Long tracts are intact. Cerebellar exam is Intact. Sensory exam is intact to light touch. Gait is intact. There is mild symmetric action tremor noted in bilateral hands  Musculoskeletal:  Has no hand arthritis, no limitation of ROM in any of the four extremities.   Lower extremities no edema          DATA:      Results for orders placed or performed in visit on 02/28/22   TSH   Result Value Ref Range    TSH 3.840 0.400 - 4.200 uIU/mL   T4, Free   Result Value Ref Range    T4 Free 0.93 0.93 - 1.76 ng/dL   Basic Metabolic Panel   Result Value Ref Range    Sodium 140 135 - 145 meq/L    Potassium 4.8 3.5 - 5.2 meq/L    Chloride 105 98 - 111 meq/L    CO2 26 23 - 33 meq/L    Glucose 83 70 - 108 mg/dL    BUN 10 7 - 22 mg/dL    CREATININE 0.7 0.4 - 1.2 mg/dL    Calcium 9.4 8.5 - 10.5 mg/dL   CBC with Auto Differential   Result Value Ref Range    WBC 4.6 (L) 4.8 - 10.8 thou/mm3    RBC 4.43 4.20 - 5.40 mill/mm3    Hemoglobin 13.8 12.0 - 16.0 gm/dl    Hematocrit 41.5 37.0 - 47.0 %    MCV 93.7 81.0 - 99.0 fL    MCH 31.2 26.0 - 33.0 pg    MCHC 33.3 32.2 - 35.5 gm/dl    RDW-CV 12.6 11.5 - 14.5 %    RDW-SD 43.4 35.0 - 45.0 fL    Platelets 913 719 - 528 thou/mm3    MPV 10.3 9.4 - 12.4 fL    Seg Neutrophils 63.6 %    Lymphocytes 22.8 %    Monocytes 11.5 %    Eosinophils 1.5 %    Basophils 0.4 %    Immature Granulocytes 0.2 %    Segs Absolute 2.9 1.8 - 7.7 thou/mm3    Lymphocytes Absolute 1.0 1.0 - 4.8 thou/mm3    Monocytes Absolute 0.5 0.4 - 1.3 thou/mm3    Eosinophils Absolute 0.1 0.0 - 0.4 thou/mm3    Basophils Absolute 0.0 0.0 - 0.1 thou/mm3    Immature Grans (Abs) 0.01 0.00 - 0.07 thou/mm3    nRBC 0 /100 wbc   Anion Gap   Result Value Ref Range    Anion Gap 9.0 8.0 - 16.0 meq/L   Glomerular Filtration Rate, Estimated   Result Value Ref Range    Est, Glom Filt Rate >90 ml/min/1.73m2          Results for orders placed during the hospital encounter of 04/27/22    CT HEAD WO CONTRAST    Narrative  PROCEDURE: CT HEAD WO CONTRAST    CLINICAL INFORMATION: Tremors of nervous system, Hallucinations, visual, Submandibular gland mass. COMPARISON: CT scan of the brain dated 16 October 2008. Janice Shepard TECHNIQUE: Noncontrast 5 mm axial images were obtained through the brain. Sagittal and coronal reconstructions were obtained. All CT scans at this facility use dose modulation, iterative reconstruction, and/or weight-based dosing when appropriate to reduce radiation dose to as low as reasonably achievable. FINDINGS:    There is an 8 x 4 mm area of fat adjacent to the pineal gland, and pineal calcification unchanged since previous study dated 16th of October 2008. There is no hemorrhage. There are no intra-or extra-axial collections. There is no hydrocephalus, midline shift or mass effect. The gray-white matter differentiation is preserved. The paranasal sinuses and mastoid air cells are normally aerated. There is no suspicious calvarial abnormality. Impression  1. Stable CT scan of the brain, no interval change since previous study dated 16th of October 2008. Janice Shepard **This report has been created using voice recognition software. It may contain minor errors which are inherent in voice recognition technology. **    Final report electronically signed by DR Taina Loyd on 4/27/2022 4:40 PM         Assessment:     Diagnosis Orders   1. Action tremor          She reports her tremor is some better. She is on Topamax and is tolerating the mediacation well. She had CT head done 4/27/22 that was stable. She is under a lot of stress as her  has cancer. She is completely off the Wellbutrin and had it changed to Cymbalta. She feels this change has helped improve her tremor. Overall, she is pleased with how she is doing.  After detailed discussion with patient we agreed on the following plan. Plan:  1. Continue with Topamax 50 mg daily. Take with plenty of fluids. Refills given  2. Follow up in 12 months or sooner if needed. 3. Call if any questions or concerns.     Total time 26 min    GERALDINE Gonsalves - CNP

## 2022-07-11 NOTE — PATIENT INSTRUCTIONS
1. Continue with Topamax 50 mg daily. Take with plenty of fluids. Refills given  2. Follow up in 12 months or sooner if needed. 3. Call if any questions or concerns.

## 2022-07-11 NOTE — LETTER
[address]  [address]  [facility] [address]  Phone: [phone]  Fax: [phone]    GERALDINE Forrester CNP        September 17, 2020     Patient: Joshua Fan   YOB: 1980   Date of Visit: 9/17/2020       To Whom it May Concern:    Jackelyn Rodgers was seen in my clinic on 9/17/2020. She may return to work on September 21, 2020. If you have any questions or concerns, please don't hesitate to call.     Sincerely,     Electronically signed by GERALDINE Forrester CNP on 9/17/2020 at 12:59 PM
11-Jul-2022 11:53

## 2022-07-18 ENCOUNTER — OFFICE VISIT (OUTPATIENT)
Dept: FAMILY MEDICINE CLINIC | Age: 42
End: 2022-07-18
Payer: COMMERCIAL

## 2022-07-18 VITALS
HEART RATE: 68 BPM | RESPIRATION RATE: 12 BRPM | DIASTOLIC BLOOD PRESSURE: 72 MMHG | BODY MASS INDEX: 22.32 KG/M2 | WEIGHT: 126 LBS | SYSTOLIC BLOOD PRESSURE: 100 MMHG | HEIGHT: 63 IN

## 2022-07-18 DIAGNOSIS — F41.8 SITUATIONAL ANXIETY: Primary | ICD-10-CM

## 2022-07-18 DIAGNOSIS — G44.209 TENSION HEADACHE: ICD-10-CM

## 2022-07-18 DIAGNOSIS — F43.23 ADJUSTMENT DISORDER WITH MIXED ANXIETY AND DEPRESSED MOOD: ICD-10-CM

## 2022-07-18 DIAGNOSIS — K21.9 GASTROESOPHAGEAL REFLUX DISEASE, UNSPECIFIED WHETHER ESOPHAGITIS PRESENT: ICD-10-CM

## 2022-07-18 PROCEDURE — G8427 DOCREV CUR MEDS BY ELIG CLIN: HCPCS | Performed by: NURSE PRACTITIONER

## 2022-07-18 PROCEDURE — G8420 CALC BMI NORM PARAMETERS: HCPCS | Performed by: NURSE PRACTITIONER

## 2022-07-18 PROCEDURE — 1036F TOBACCO NON-USER: CPT | Performed by: NURSE PRACTITIONER

## 2022-07-18 PROCEDURE — 99213 OFFICE O/P EST LOW 20 MIN: CPT | Performed by: NURSE PRACTITIONER

## 2022-07-18 RX ORDER — PANTOPRAZOLE SODIUM 40 MG/1
40 TABLET, DELAYED RELEASE ORAL
Qty: 30 TABLET | Refills: 2 | Status: SHIPPED | OUTPATIENT
Start: 2022-07-18

## 2022-07-20 RX ORDER — BUTALBITAL, ACETAMINOPHEN AND CAFFEINE 50; 325; 40 MG/1; MG/1; MG/1
1 TABLET ORAL EVERY 6 HOURS PRN
Qty: 60 TABLET | Refills: 0 | Status: SHIPPED | OUTPATIENT
Start: 2022-07-20

## 2022-07-20 ASSESSMENT — ENCOUNTER SYMPTOMS
SHORTNESS OF BREATH: 0
BACK PAIN: 0
WHEEZING: 0
ABDOMINAL DISTENTION: 0
CHEST TIGHTNESS: 0
COUGH: 0
ABDOMINAL PAIN: 0

## 2022-07-20 NOTE — PROGRESS NOTES
[address] [place] [facility] [address]  Dept: [phone]  Dept Fax: [phone]  Loc: [phone]  PROGRESS NOTE      VisitDate: 7/18/2022    Manjula Rutherford is a 40 y.o. female who presents today for:     Chief Complaint   Patient presents with    Follow-up     One month recheck on cymbalta. She actually stopped the Cymbalta and feels better. She was tired all the time. Subjective:  Patient presents for 1 month follow-up regarding a start of Cymbalta. Patient reports that she did discontinue the medication due to excessive fatigue. Patient reports that her mood is stable and she actually feels better off of medication. Reports continued home and family stressors due to ill spouse as well as father. Patient reports she is coping well. Does report a history of stress eating and is concerned about weight gain. Denies any homicidal or suicidal ideations. History kidney stones, cervical fusion, anxiety/depression, tension headaches, GERD. Patient currently receiving outpatient counseling. Patient does complain of some intermittent heartburn. Review of Systems   Constitutional:  Negative for activity change, appetite change, chills, fatigue and fever. Eyes:  Negative for visual disturbance. Respiratory:  Negative for cough, chest tightness, shortness of breath and wheezing. Cardiovascular:  Negative for chest pain, palpitations and leg swelling. Gastrointestinal:  Negative for abdominal distention and abdominal pain. Genitourinary:  Negative for dysuria. Musculoskeletal:  Positive for neck stiffness. Negative for arthralgias, back pain and neck pain. Skin: Negative. Negative for rash. Neurological:  Negative for dizziness, light-headedness and headaches. Hematological:  Negative for adenopathy. Psychiatric/Behavioral:  Positive for decreased concentration and dysphoric mood.  Negative for hallucinations. The patient is nervous/anxious. All other systems reviewed and are negative. Past Medical History:   Diagnosis Date    Kidney stone     2005 with pregnancy    Kidney stones     PONV (postoperative nausea and vomiting)     Prolonged emergence from general anesthesia     \"During Hysterectomy- bradycardia- meds to increase heart rate. \"      Past Surgical History:   Procedure Laterality Date    CERVICAL FUSION N/A 12/8/2021    ACDF C4-C7-T1 performed by Waqas Cason MD at [address] [facility]  03/25/2016    Dr. Jerry Jensen 9/13/2017    Stephanie Gautam HYSTEROSCOPY, POSSIBLE MYOSURE performed by Caren Sainz DO at [place] [address]  04/18/2018    HERNIA REPAIR      HYSTERECTOMY ABDOMINAL N/A 3/13/2019    ROBOTIC TOTAL LAPAROSCOPIC HYSTERECTOMY WITH BILATERAL SALPINGECTOMY performed by Caren Sainz DO at [address]  04/18/2018    MD HYSTEROSCOPY,W/ENDOMETRIAL ABLATION N/A 4/18/2018    HYSTEROSCOPY ENDOMETRIAL ABLATION performed by Caren Sainz DO at [address] [facility]       Family History   Problem Relation Age of Onset    Heart Disease Father     High Blood Pressure Father     High Cholesterol Father     Diabetes Father     Breast Cancer Mother 64    No Known Problems Sister     No Known Problems Brother     No Known Problems Sister     No Known Problems Sister     No Known Problems Brother     No Known Problems Brother     Asthma Neg Hx      Social History     Tobacco Use    Smoking status: Never    Smokeless tobacco: Never   Substance Use Topics    Alcohol use: No     Alcohol/week: 0.0 standard drinks      Current Outpatient Medications   Medication Sig Dispense Refill    pantoprazole (PROTONIX) 40 MG tablet Take 1 tablet by mouth every morning (before breakfast) 30 tablet 2    topiramate (TOPAMAX) 50 MG tablet Take 1 tablet by mouth daily 30 tablet 3    diazePAM (VALIUM) 2 MG tablet Take 2 mg by mouth every 8 hours as needed for Anxiety. ibuprofen (ADVIL;MOTRIN) 400 MG tablet Take 400 mg by mouth every 6 hours as needed for Pain      cyclobenzaprine (FLEXERIL) 10 MG tablet TAKE 1 TABLET BY MOUTH THREE TIMES DAILY      HYDROcodone-acetaminophen (NORCO) 5-325 MG per tablet TAKE 1 TABLET BY MOUTH EVERY 4 HOURS FOR 7 DAYS      METAMUCIL FIBER PO Take 1 capsule by mouth daily 3 in 1 fiber       No current facility-administered medications for this visit. Allergies   Allergen Reactions    Amoxicillin Hives     Health Maintenance   Topic Date Due    COVID-19 Vaccine (1) Never done    Varicella vaccine (1 of 2 - 2-dose childhood series) Never done    HIV screen  Never done    Hepatitis C screen  Never done    DTaP/Tdap/Td vaccine (1 - Tdap) Never done    Lipids  06/02/2021    Breast cancer screen  Never done    Flu vaccine (1) 09/01/2022    Depression Monitoring  02/23/2023    Hepatitis A vaccine  Aged Out    Hepatitis B vaccine  Aged Out    Hib vaccine  Aged Out    Meningococcal (ACWY) vaccine  Aged Out    Pneumococcal 0-64 years Vaccine  Aged Out         Objective:     Physical Exam  Vitals and nursing note reviewed. Constitutional:       Appearance: Normal appearance. She is well-developed and normal weight. HENT:      Head: Normocephalic. Right Ear: Tympanic membrane and ear canal normal.      Left Ear: Tympanic membrane and ear canal normal.      Nose: Nose normal.      Mouth/Throat:      Pharynx: Oropharynx is clear. Eyes:      Extraocular Movements: Extraocular movements intact. Conjunctiva/sclera: Conjunctivae normal.   Cardiovascular:      Rate and Rhythm: Normal rate and regular rhythm. Pulses: Normal pulses. Heart sounds: Normal heart sounds. Pulmonary:      Effort: Pulmonary effort is normal.      Breath sounds: Normal breath sounds. Abdominal:      General: Bowel sounds are normal.      Palpations: Abdomen is soft.    Musculoskeletal: General: Normal range of motion. Cervical back: Neck supple. Skin:     General: Skin is warm and dry. Neurological:      General: No focal deficit present. Mental Status: She is alert and oriented to person, place, and time. Psychiatric:         Attention and Perception: Attention normal.         Mood and Affect: Mood is anxious. Speech: Speech normal.         Behavior: Behavior is hyperactive. Behavior is cooperative. Thought Content: Thought content normal.         Cognition and Memory: Cognition normal.         Judgment: Judgment normal.     /72   Pulse 68   Resp 12   Ht 5' 3\" (1.6 m)   Wt 126 lb (57.2 kg)   LMP 03/06/2019   BMI 22.32 kg/m²       Impression/Plan:  1. Situational anxiety    2. Gastroesophageal reflux disease, unspecified whether esophagitis present    3. Tension headache    4. Adjustment disorder with mixed anxiety and depressed mood      Requested Prescriptions     Signed Prescriptions Disp Refills    pantoprazole (PROTONIX) 40 MG tablet 30 tablet 2     Sig: Take 1 tablet by mouth every morning (before breakfast)     No orders of the defined types were placed in this encounter. Patient giveneducational materials - see patient instructions. Discussed use, benefit, and side effects of prescribed medications. All patient questions answered. Pt voiced understanding. Reviewed health maintenance. Patient agreedwith treatment plan. Follow up as directed. Continue medications as prescribed. Educational information on above diagnosis  provided per AVS.  Protonix 40 mg 1 p.o. daily. Continue other current medications. Follow-up as needed. Continue counseling as scheduled per patient. Recommend the patient remain stocked with healthy snacks. Increase fiber intake. Low-carb diet. Also recommend that the patient develop a routine exercise regimen to include walking daily.        Electronically signed by GERALDINE Nolasco CNP on 7/20/2022 at 7:56 AM

## 2022-08-02 ENCOUNTER — APPOINTMENT (OUTPATIENT)
Dept: GENERAL RADIOLOGY | Age: 42
End: 2022-08-02
Payer: COMMERCIAL

## 2022-08-02 ENCOUNTER — HOSPITAL ENCOUNTER (EMERGENCY)
Age: 42
Discharge: HOME | End: 2022-08-02
Payer: COMMERCIAL

## 2022-08-02 VITALS
DIASTOLIC BLOOD PRESSURE: 69 MMHG | RESPIRATION RATE: 16 BRPM | HEIGHT: 62 IN | WEIGHT: 125 LBS | BODY MASS INDEX: 23 KG/M2 | OXYGEN SATURATION: 100 % | TEMPERATURE: 97.5 F | HEART RATE: 81 BPM | SYSTOLIC BLOOD PRESSURE: 106 MMHG

## 2022-08-02 DIAGNOSIS — G89.29 OTHER CHRONIC PAIN: ICD-10-CM

## 2022-08-02 DIAGNOSIS — M62.838 CERVICAL PARASPINOUS MUSCLE SPASM: Primary | ICD-10-CM

## 2022-08-02 PROCEDURE — 73030 X-RAY EXAM OF SHOULDER: CPT

## 2022-08-02 PROCEDURE — 99213 OFFICE O/P EST LOW 20 MIN: CPT

## 2022-08-02 PROCEDURE — 99213 OFFICE O/P EST LOW 20 MIN: CPT | Performed by: NURSE PRACTITIONER

## 2022-08-02 RX ORDER — CYCLOBENZAPRINE HCL 10 MG
10 TABLET ORAL NIGHTLY PRN
Qty: 7 TABLET | Refills: 0 | Status: SHIPPED | OUTPATIENT
Start: 2022-08-02

## 2022-08-02 ASSESSMENT — PAIN SCALES - GENERAL: PAINLEVEL_OUTOF10: 7

## 2022-08-02 ASSESSMENT — PAIN - FUNCTIONAL ASSESSMENT: PAIN_FUNCTIONAL_ASSESSMENT: 0-10

## 2022-08-02 ASSESSMENT — PAIN DESCRIPTION - LOCATION: LOCATION: SHOULDER

## 2022-08-02 NOTE — ED PROVIDER NOTES
[address]       Chief Complaint   Patient presents with    Other     Right scapular pain down right arm x 2 years worse now for the last 3 days  sees Dr Coni Ram at [facility]   Denies injury   starts PT next month    Medication Refill     flexeril       Nurses Notes reviewed and I agree except as noted in the HPI. HISTORY OF PRESENT ILLNESS   Star Obrien is a 40 y.o. female who presents for evaluation of chronic right shoulder pain for 2 years. She has a history of cervical spine surgery 12/2021. She has been on Valium, muscle relaxers, Norco, and ibuprofen. in the past.   She has been evaluated by orthopedics and her PCP in the past. Has been in PT in the past and is due to begin again next week. She states that her  has a recent cancer diagnosis and so the focus has been on him. She is requesting Flexeril and a right shoulder x-ray today. There has been no injury or trauma. The patient/patient representative has no other acute complaints at this time. REVIEW OF SYSTEMS     Review of Systems   Musculoskeletal:  Positive for arthralgias and myalgias. PAST MEDICAL HISTORY         Diagnosis Date    Kidney stone     2005 with pregnancy    Kidney stones     PONV (postoperative nausea and vomiting)     Prolonged emergence from general anesthesia     \"During Hysterectomy- bradycardia- meds to increase heart rate. \"       SURGICAL HISTORY     Patient  has a past surgical history that includes hernia repair; Tubal ligation; Colonoscopy (03/25/2016); Dilation and curettage of uterus (N/A, 9/13/2017); hysteroscopy (04/18/2018); Endometrial ablation (04/18/2018); pr hysteroscopy,w/endometrial ablation (N/A, 4/18/2018); HYSTERECTOMY ABDOMINAL (N/A, 3/13/2019); and cervical fusion (N/A, 12/8/2021).     CURRENT MEDICATIONS       Previous Medications    BUTALBITAL-ACETAMINOPHEN-CAFFEINE (FIORICET, ESGIC) -40 MG PER TABLET    Take 1 tablet by mouth every 6 hours as needed for Headaches    DIAZEPAM (VALIUM) 2 MG TABLET    Take 2 mg by mouth every 8 hours as needed for Anxiety. HYDROCODONE-ACETAMINOPHEN (NORCO) 5-325 MG PER TABLET    TAKE 1 TABLET BY MOUTH EVERY 4 HOURS FOR 7 DAYS    IBUPROFEN (ADVIL;MOTRIN) 400 MG TABLET    Take 400 mg by mouth every 6 hours as needed for Pain    METAMUCIL FIBER PO    Take 1 capsule by mouth daily 3 in 1 fiber    PANTOPRAZOLE (PROTONIX) 40 MG TABLET    Take 1 tablet by mouth every morning (before breakfast)    TOPIRAMATE (TOPAMAX) 50 MG TABLET    Take 1 tablet by mouth daily       ALLERGIES     Patient is is allergic to amoxicillin. FAMILY HISTORY     Patient'sfamily history includes Breast Cancer (age of onset: 64) in her mother; Diabetes in her father; Heart Disease in her father; High Blood Pressure in her father; High Cholesterol in her father; No Known Problems in her brother, brother, brother, sister, sister, and sister. SOCIAL HISTORY     Patient  reports that she has never smoked. She has never used smokeless tobacco. She reports that she does not drink alcohol and does not use drugs. PHYSICAL EXAM     ED TRIAGE VITALS  BP: 106/69, Temp: 97.5 °F (36.4 °C), Heart Rate: 81, Resp: 16, SpO2: 100 %  Physical Exam  Vitals and nursing note reviewed. Constitutional:       General: She is not in acute distress. Appearance: Normal appearance. She is well-developed and well-groomed. HENT:      Head: Normocephalic and atraumatic. Right Ear: External ear normal.      Left Ear: External ear normal.   Eyes:      Conjunctiva/sclera: Conjunctivae normal.      Right eye: Right conjunctiva is not injected. Left eye: Left conjunctiva is not injected. Pupils: Pupils are equal.   Cardiovascular:      Rate and Rhythm: Normal rate. Pulmonary:      Effort: Pulmonary effort is normal. No respiratory distress. Musculoskeletal:      Right shoulder: Normal.      Cervical back: Normal range of motion. Muscular tenderness (right) present. No spinous process tenderness. Skin:     General: Skin is warm and dry. Findings: No rash (on exposed surfaces). Neurological:      Mental Status: She is alert and oriented to person, place, and time. Gait: Gait is intact. Psychiatric:         Mood and Affect: Mood normal.         Speech: Speech normal.         Behavior: Behavior is cooperative. DIAGNOSTIC RESULTS   Labs:  Abnormal Labs Reviewed - No data to display     IMAGING:  XR SHOULDER RIGHT (MIN 2 VIEWS)   Final Result   Stable appearance of the right shoulder no acute findings      **This report has been created using voice recognition software. It may contain minor errors which are inherent in voice recognition technology. **      Final report electronically signed by Dr. Shanice Chen on 8/2/2022 1:08 PM        URGENT CARE COURSE:     Vitals:    08/02/22 1226   BP: 106/69   Pulse: 81   Resp: 16   Temp: 97.5 °F (36.4 °C)   SpO2: 100%   Weight: 125 lb (56.7 kg)   Height: 5' 2\" (1.575 m)       Medications - No data to display  PROCEDURES:  FINALIMPRESSION      1. Cervical paraspinous muscle spasm    2. Other chronic pain        DISPOSITION/PLAN   DISPOSITION Decision To Discharge 08/02/2022 01:22:31 PM    Xray unremarkable per radiologist read. Advised patient to discuss with PCP pain management referral.       Problem List Items Addressed This Visit    None  Visit Diagnoses       Cervical paraspinous muscle spasm    -  Primary    Relevant Medications    cyclobenzaprine (FLEXERIL) 10 MG tablet    Other chronic pain        Relevant Medications    cyclobenzaprine (FLEXERIL) 10 MG tablet            Physical assessment findings, diagnostic testing(s) if applicable, and vital signs reviewed with patient/patient representative. Differential diagnosis(s) discussed with patient/patient representative. Prescription medications and/or over-the-counter medications for symptom management discussed.   Patient is to

## 2022-08-12 ENCOUNTER — OFFICE VISIT (OUTPATIENT)
Dept: FAMILY MEDICINE CLINIC | Age: 42
End: 2022-08-12
Payer: COMMERCIAL

## 2022-08-12 VITALS
HEIGHT: 63 IN | OXYGEN SATURATION: 95 % | HEART RATE: 70 BPM | TEMPERATURE: 97.6 F | SYSTOLIC BLOOD PRESSURE: 118 MMHG | DIASTOLIC BLOOD PRESSURE: 64 MMHG | RESPIRATION RATE: 16 BRPM | BODY MASS INDEX: 22.79 KG/M2 | WEIGHT: 128.6 LBS

## 2022-08-12 DIAGNOSIS — M25.511 CHRONIC RIGHT SHOULDER PAIN: ICD-10-CM

## 2022-08-12 DIAGNOSIS — G89.29 CHRONIC RIGHT SHOULDER PAIN: ICD-10-CM

## 2022-08-12 DIAGNOSIS — S43.432D LABRAL TEAR OF SHOULDER, LEFT, SUBSEQUENT ENCOUNTER: ICD-10-CM

## 2022-08-12 DIAGNOSIS — Z12.31 ENCOUNTER FOR SCREENING MAMMOGRAM FOR MALIGNANT NEOPLASM OF BREAST: ICD-10-CM

## 2022-08-12 DIAGNOSIS — Z00.00 WELL ADULT EXAM: Primary | ICD-10-CM

## 2022-08-12 PROCEDURE — 99396 PREV VISIT EST AGE 40-64: CPT | Performed by: NURSE PRACTITIONER

## 2022-08-12 SDOH — ECONOMIC STABILITY: FOOD INSECURITY: WITHIN THE PAST 12 MONTHS, YOU WORRIED THAT YOUR FOOD WOULD RUN OUT BEFORE YOU GOT MONEY TO BUY MORE.: NEVER TRUE

## 2022-08-12 SDOH — ECONOMIC STABILITY: FOOD INSECURITY: WITHIN THE PAST 12 MONTHS, THE FOOD YOU BOUGHT JUST DIDN'T LAST AND YOU DIDN'T HAVE MONEY TO GET MORE.: NEVER TRUE

## 2022-08-12 ASSESSMENT — SOCIAL DETERMINANTS OF HEALTH (SDOH): HOW HARD IS IT FOR YOU TO PAY FOR THE VERY BASICS LIKE FOOD, HOUSING, MEDICAL CARE, AND HEATING?: NOT HARD AT ALL

## 2022-08-16 RX ORDER — ESCITALOPRAM OXALATE 5 MG/1
5 TABLET ORAL DAILY
Qty: 30 TABLET | Refills: 2 | Status: SHIPPED | OUTPATIENT
Start: 2022-08-16 | End: 2022-10-03 | Stop reason: SDUPTHER

## 2022-08-16 ASSESSMENT — ENCOUNTER SYMPTOMS
SHORTNESS OF BREATH: 0
WHEEZING: 0
ABDOMINAL DISTENTION: 0
COUGH: 0
CHEST TIGHTNESS: 0
BACK PAIN: 0
ABDOMINAL PAIN: 0

## 2022-08-16 NOTE — PROGRESS NOTES
[address] Vikram Mendez [facility] [address]  Dept: [phone]  Dept Fax: [phone]  Loc: [phone]  PROGRESS NOTE      VisitDate: 8/12/2022    Bre Stephens is a 40 y.o. female who presents today for:     Chief Complaint   Patient presents with    Annual Exam     C/o right side shoulder/upper back pain, can sometimes feel it into her arm and hand, seen at [facility] 8/2/22-xray done         Subjective:  Patient presents with complaint of continued posterior right shoulder pain with radiation into neck and upper back. Currently consulting with Ortho which recommended physical therapy. Abnormal MRI indicating labral tear. She reports that Ortho does not believe her pain is originating from her shoulder. She requesting second opinion. Also presents for wellness exam.        Review of Systems   Constitutional:  Negative for activity change, appetite change, chills, fatigue and fever. Eyes:  Negative for visual disturbance. Respiratory:  Negative for cough, chest tightness, shortness of breath and wheezing. Cardiovascular:  Negative for chest pain, palpitations and leg swelling. Gastrointestinal:  Negative for abdominal distention and abdominal pain. Genitourinary:  Negative for dysuria. Musculoskeletal:  Positive for arthralgias. Negative for back pain and neck pain. Skin: Negative. Negative for rash. Neurological:  Negative for dizziness, light-headedness and headaches. Hematological:  Negative for adenopathy. Psychiatric/Behavioral:  Positive for decreased concentration and dysphoric mood. The patient is nervous/anxious. All other systems reviewed and are negative. Past Medical History:   Diagnosis Date    Kidney stone     2005 with pregnancy    Kidney stones     PONV (postoperative nausea and vomiting)     Prolonged emergence from general anesthesia     \"During Hysterectomy- bradycardia- meds to increase heart rate. \" Past Surgical History:   Procedure Laterality Date    CERVICAL FUSION N/A 12/8/2021    ACDF C4-C7-T1 performed by Inez Colbert MD at [address]  03/25/2016    Dr. Nika Banerjee 9/13/2017    DILATATION AND CURETTAGE HYSTEROSCOPY, POSSIBLE MYOSURE performed by Pamela Musa DO at [facility]  04/18/2018    HERNIA REPAIR      HYSTERECTOMY ABDOMINAL N/A 3/13/2019    ROBOTIC TOTAL LAPAROSCOPIC HYSTERECTOMY WITH BILATERAL SALPINGECTOMY performed by Pamela Musa DO at [address] [facility]  04/18/2018    VA HYSTEROSCOPY,W/ENDOMETRIAL ABLATION N/A 4/18/2018    HYSTEROSCOPY ENDOMETRIAL ABLATION performed by Pamela Musa DO at [address]       Family History   Problem Relation Age of Onset    Heart Disease Father     High Blood Pressure Father     High Cholesterol Father     Diabetes Father     Breast Cancer Mother 64    No Known Problems Sister     No Known Problems Brother     No Known Problems Sister     No Known Problems Sister     No Known Problems Brother     No Known Problems Brother     Asthma Neg Hx      Social History     Tobacco Use    Smoking status: Never    Smokeless tobacco: Never   Substance Use Topics    Alcohol use: No     Alcohol/week: 0.0 standard drinks      Current Outpatient Medications   Medication Sig Dispense Refill    cyclobenzaprine (FLEXERIL) 10 MG tablet Take 1 tablet by mouth nightly as needed for Muscle spasms 7 tablet 0    butalbital-acetaminophen-caffeine (FIORICET, ESGIC) -40 MG per tablet Take 1 tablet by mouth every 6 hours as needed for Headaches 60 tablet 0    pantoprazole (PROTONIX) 40 MG tablet Take 1 tablet by mouth every morning (before breakfast) 30 tablet 2    topiramate (TOPAMAX) 50 MG tablet Take 1 tablet by mouth daily 30 tablet 3    diazePAM (VALIUM) 2 MG tablet Take 2 mg by mouth every 8 hours as needed for Anxiety.       ibuprofen (ADVIL;MOTRIN) 400 MG tablet Take 400 mg by mouth every 6 hours as needed for Pain      HYDROcodone-acetaminophen (NORCO) 5-325 MG per tablet TAKE 1 TABLET BY MOUTH EVERY 4 HOURS FOR 7 DAYS      METAMUCIL FIBER PO Take 1 capsule by mouth daily 3 in 1 fiber       No current facility-administered medications for this visit. Allergies   Allergen Reactions    Amoxicillin Hives     Health Maintenance   Topic Date Due    COVID-19 Vaccine (1) Never done    Varicella vaccine (1 of 2 - 2-dose childhood series) Never done    HIV screen  Never done    Hepatitis C screen  Never done    DTaP/Tdap/Td vaccine (1 - Tdap) Never done    Lipids  06/02/2021    Breast cancer screen  Never done    Flu vaccine (1) 09/01/2022    Depression Monitoring  02/23/2023    Hepatitis A vaccine  Aged Out    Hepatitis B vaccine  Aged Out    Hib vaccine  Aged Out    Meningococcal (ACWY) vaccine  Aged Out    Pneumococcal 0-64 years Vaccine  Aged Out         Objective:     Physical Exam  Vitals and nursing note reviewed. Constitutional:       Appearance: Normal appearance. She is well-developed and normal weight. HENT:      Head: Normocephalic. Right Ear: Tympanic membrane and ear canal normal.      Left Ear: Tympanic membrane and ear canal normal.      Nose: Nose normal.      Mouth/Throat:      Pharynx: Oropharynx is clear. Eyes:      Extraocular Movements: Extraocular movements intact. Conjunctiva/sclera: Conjunctivae normal.   Cardiovascular:      Rate and Rhythm: Normal rate and regular rhythm. Pulses: Normal pulses. Heart sounds: Normal heart sounds. Pulmonary:      Effort: Pulmonary effort is normal.      Breath sounds: Normal breath sounds. Abdominal:      General: Bowel sounds are normal.      Palpations: Abdomen is soft. Musculoskeletal:      Right shoulder: Tenderness and bony tenderness present. No deformity, effusion or crepitus. Decreased range of motion. Decreased strength. Normal pulse. Cervical back: Neck supple. Skin:     General: Skin is warm and dry. Neurological:      General: No focal deficit present. Mental Status: She is alert and oriented to person, place, and time. Psychiatric:         Mood and Affect: Mood normal.         Thought Content: Thought content normal.         Judgment: Judgment normal.     /64 (Site: Right Upper Arm)   Pulse 70   Temp 97.6 °F (36.4 °C) (Oral)   Resp 16   Ht 5' 2.5\" (1.588 m)   Wt 128 lb 9.6 oz (58.3 kg)   LMP 03/06/2019   SpO2 95%   BMI 23.15 kg/m²       Impression/Plan:  1. Well adult exam    2. Chronic right shoulder pain    3. Labral tear of shoulder, left, subsequent encounter    4. Encounter for screening mammogram for malignant neoplasm of breast      Requested Prescriptions      No prescriptions requested or ordered in this encounter     Orders Placed This Encounter   Procedures    MARGUERITE DIGITAL SCREEN W OR WO CAD BILATERAL     Standing Status:   Future     Standing Expiration Date:   10/12/2023    External Referral To Orthopedic Surgery     Referral Priority:   Routine     Referral Type:   Eval and Treat     Referral Reason:   Specialty Services Required     Requested Specialty:   Orthopedic Surgery     Number of Visits Requested:   1              MRI of the shoulder indicates erosion of the humeral head, thickening of the shoulder capsule abnormal signals of the anterior and inferior labrum. No evidence to indicate full-thickness rotator cuff tear. I would like patient toconsult with shoulder specialist soon as possible due to her increasing pain.                 MRI SHOULDER RIGHT WO CONTRAST: Patient Communication     Add Comments   Seen       Orders Requiring a Screening Form    Procedure Order Status Form Status    MRI SHOULDER RIGHT WO CONTRAST Completed Created       Radiation Dose Estimates    No radiation information found for this patient  Narrative   PROCEDURE: MRI SHOULDER RIGHT WO CONTRAST       CLINICAL INFORMATION Chronic right shoulder signed by DR Tonya Lynn on 8/19/2021 8:21 AM         Order History    Open Order Details     PACS Images     Show images for MRI SHOULDER RIGHT WO CONTRAST    Results History Report    View Report     Associated Diagnoses    Chronic right shoulder pain         External Result Report    External Result Report     Existing Charges    Charge Line Charge Code Status Charge Trigger Charge Type   [id number] Emeterio Girard Cont [[id number]] [address] [facility] Billing Imaging end exam Technical   [id number] Mri, Joint Upper Extrem [address] (STR)  Imaging result study Professional     Order Report     Order Details    Implants       Implant     Graft Bne Sub 1cc Demin Bne Mtrx Gel Grfton - Implanted    Inventory item: GRAFT BNE SUB 1CC DEMIN BNE MTRX GEL GRFTON Model/Cat number: [id number]   : [employer]       As of 12/8/2021    Status: Implanted          Bone Graft Fibrex Demin Bone Fibers - Implanted    Inventory item: BONE GRAFT FIBREX DEMIN BONE FIBERS Model/Cat number: [id number]   : [employer]       As of 12/8/2021    Status: Implanted          [employer]- Elemax Cortical Spacer, Parallel-Small 6mm         Patient giveneducational materials - see patient instructions. Discussed use, benefit, and side effects of prescribed medications. All patient questions answered. Pt voiced understanding. Reviewed health maintenance. Patient agreedwith treatment plan. Follow up as directed. Seen today for wellness visit. Discussed the importance of a healthy life style. Balanced diet, nutrition, physical activity,and injury prevention. Also discussed the importance of up to date immunizations and annual screenings. Consult with Ortho placed. Mammogram ordered.        Electronically signed by GERALDINE Nolasco CNP on 8/16/2022 at 8:54 AM

## 2022-08-30 ENCOUNTER — HOSPITAL ENCOUNTER (OUTPATIENT)
Dept: WOMENS IMAGING | Age: 42
Discharge: HOME | End: 2022-08-30
Payer: COMMERCIAL

## 2022-08-30 DIAGNOSIS — Z12.31 ENCOUNTER FOR SCREENING MAMMOGRAM FOR MALIGNANT NEOPLASM OF BREAST: ICD-10-CM

## 2022-08-30 PROCEDURE — 77067 SCR MAMMO BI INCL CAD: CPT

## 2022-09-01 ENCOUNTER — HOSPITAL ENCOUNTER (OUTPATIENT)
Dept: WOMENS IMAGING | Age: 42
Discharge: HOME | End: 2022-09-01
Payer: COMMERCIAL

## 2022-09-01 DIAGNOSIS — R92.2 BREAST DENSITY: ICD-10-CM

## 2022-09-01 PROCEDURE — G0279 TOMOSYNTHESIS, MAMMO: HCPCS

## 2022-09-01 PROCEDURE — 76642 ULTRASOUND BREAST LIMITED: CPT

## 2022-09-07 ENCOUNTER — HOSPITAL ENCOUNTER (OUTPATIENT)
Dept: WOMENS IMAGING | Age: 42
Discharge: HOME | End: 2022-09-07
Payer: COMMERCIAL

## 2022-09-07 DIAGNOSIS — N60.02 BREAST CYST, LEFT: ICD-10-CM

## 2022-09-07 DIAGNOSIS — N63.21 MASS OF UPPER OUTER QUADRANT OF LEFT BREAST: ICD-10-CM

## 2022-09-07 PROCEDURE — C1894 INTRO/SHEATH, NON-LASER: HCPCS

## 2022-09-07 PROCEDURE — 88360 TUMOR IMMUNOHISTOCHEM/MANUAL: CPT

## 2022-09-07 PROCEDURE — G0279 TOMOSYNTHESIS, MAMMO: HCPCS

## 2022-09-07 PROCEDURE — 88342 IMHCHEM/IMCYTCHM 1ST ANTB: CPT

## 2022-09-07 PROCEDURE — 88341 IMHCHEM/IMCYTCHM EA ADD ANTB: CPT

## 2022-09-07 PROCEDURE — 88305 TISSUE EXAM BY PATHOLOGIST: CPT

## 2022-09-07 NOTE — PROGRESS NOTES
[facility] [address]  Pre-Biopsy Assessment      Patient Education    Written information about procedure Yes  left   Procedural steps explained Yes Ultrasound Biopsy   Post-op potential: bruising, hematoma, pain Yes    Self-care: activity, care of dressing Yes    Patient verbalized understanding Yes    Consent signed and witnessed Yes      Hormone Therapy Status: none    Recent Medication: N/A Last Dose: none                                     Hormone Replacement Therapy: no    Previous Breast Biopsy: no    Previous Diagnosis Cancer: no    Hysterectomy:yes - one ovary remains, she had dysfunctional uterine bleeding after ablation at age 46    Emotional Status: Nervous    Language or Physical Barriers: none    Comments: her  is currently undergoing chemo for a type of sarcoma diagnosed in [facility]. Her  sees Dr. Shira Ramos for chemo. He did his radiation in [facility] [employer].        Electronically signed by Chico Gilbert on 9/7/2022 at 8:43 AM

## 2022-09-07 NOTE — PROGRESS NOTES
Breast Biopsy Flowsheet/Post-Operative Care    Date of Procedure: 9/7/2022  Physician: Dr. Terence Brenner  Technologist: Rob Young guided breast biopsy  Lesion type: Palpable  Breast: left    Clock face position: Site #1: UOQ - 6 mm area, non palpable, would not aspirate     Site #2: UOQ palpable area  1.4 cm        Primary Method of Detection: Palpation      Microcalcification's: no   Distribution: N/A    Asymmetry: asymmetric    Biopsy Method:   Sertera:    Site # 1    Gauge: 14    # of Passes: 4     Clip: Sara Garcia:    Site # 2    Gauge: 14    # of Passes: 5     Clip:  Wisam       Pre-Op Assessment: (BI-RADS)   4. Suspicious Abnormality    Patient Tolerated Procedure: good  Complications: none  Comments: none    Post Operative Care  Steri strips: Yes  Dressing: Gauze, Tape   Ice Applied to Site:  Yes  Evidence of Bleeding:  No    Pain Verbalized: No      Written Discharge Instructions: Yes  Condition at Discharge: good  Time of Discharge: [address]    Electronically signed by Trish Campoverde on 9/7/2022 at 8:48 AM

## 2022-09-08 ENCOUNTER — CLINICAL DOCUMENTATION (OUTPATIENT)
Dept: WOMENS IMAGING | Age: 42
End: 2022-09-08

## 2022-09-08 NOTE — PROGRESS NOTES
[facility]    Diagnosis:  Invasive ductal carcinoma    Home Disposition: Lives with     Contact Information: Arrived with  and mother for biopsy results. Dr. Jana Beck discussed pathology and recommended integrated breast clinic. Encouraged Breast Clinic attendance. All cards given for surgeons. Wants to see Dr(s): Undecided. Patient Expresses Need(s) For: Wanting to get things started as soon as possible. Requests Referral to Doctor(s) with appointment(s): n/a    Additional Referral(s): Vangie Givens/Courtney Millan: [facility]     Integrated breast cancer clinic appointment: 9-9-22 @ 7:50    Biopsy site status: okay    Teaching Sheets provided: Cancer Type: Invasive Ductal Carcinoma, What is Breast Cancer, Tumor Markers, Needle Localization, Wireless Localization, [place] Lymph Node Surgery, Lumpectomy/Mastectomy Comparison, Radiation Therapy, Breast Cancer Navigation Packet, Nurse Navigation contact information, Breast Clinic appointment and map. Currently on HRT: no    If yes, was patient instructed to stop immediately: N/A     Notes: Explained terms doctor and navigators will discuss at next appointments. Questions addressed and encouraged patient to ask [facility]. Will see Bridget Hung at clinic in the morning. Referral emailed to Navigation.     Answered yes on Genetic Risk Assessment: yes     Additional information: n/a    Results Faxed to:  Dr. Carlos Germain and Dr. Ismael Swift contact number [phone]

## 2022-09-09 ENCOUNTER — CLINICAL DOCUMENTATION (OUTPATIENT)
Dept: CASE MANAGEMENT | Age: 42
End: 2022-09-09

## 2022-09-09 DIAGNOSIS — C50.412 MALIGNANT NEOPLASM OF UPPER-OUTER QUADRANT OF LEFT BREAST IN FEMALE, ESTROGEN RECEPTOR POSITIVE (HCC): Primary | ICD-10-CM

## 2022-09-09 DIAGNOSIS — Z17.0 MALIGNANT NEOPLASM OF UPPER-OUTER QUADRANT OF LEFT BREAST IN FEMALE, ESTROGEN RECEPTOR POSITIVE (HCC): Primary | ICD-10-CM

## 2022-09-09 NOTE — PROGRESS NOTES
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 He will be starting chemo soon but patient's mother able to help as needed. Patient needs and barriers to care: Coordination of Care, Knowledge deficit, and Emotional Issues/ Fear/ Anxiety     Referral Source: Outside Provider    Receptive to Advanced Care Planning/ Palliative Care:  NA, clinical stage IA    Interventions-   General Interventions: Arrived with mother to meet with breast team about recommendations for treatment. Brought teaching packet. 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 Genetics/Family History: Meets criteria for testing. Wants to get blood drawn today. Mother already tested negative with [employer]-28 gene myRisk panel. All genes related to breast were included so [employer] 81 gene panel ordered for patient per Dr. Edwin Boyle. Bilateral breast cancer Mother 61, 64 (opposite side)   Lung Cancer Father     Leukemia Maternal Grandmother     Colon Cancer Paternal Uncle  62s   Lung Cancer Paternal Uncle  >50   Breast Cancer Paternal Aunt  >50   Lung Cancer Maternal Aunt  >50      Education/Screenings:  yes - Breast Cancer Information Packet, Navigation packet, Pre-op: Lymphedema, exercises after breast surgery, pre-hab, genetic evaluation and testing, breast MRI, sentinel node. Deferred needle localization and magseed to surgeon who determines which is ordered. Breast Clinic Recommendations reviewed, copy given *see media     Currently on HRT: no, has one ovary from hysterectomy age 46. Thinks may be perimenopause, has night sweats.      Referrals: Dr. Edwin Boyle:  230 pm-notified Casper Lewis needs MRI and Onco DX, arrive 10 mins early paperwork, oncology rehab encouraged-deferred to surgeon to order, spiritual care-per protocol, genetic referral-faxed to [facility], patient to self-schedule GC session-instructions given. Continuum of Care: Diagnosis/Active Treatment    Notes: Teaching completed and verbalizes understanding. Questions addressed. Emotional support offered. States glad she came today, feels better about everything. Encouraged to call anytime, has contact numbers. Escorted upstairs to lab for genetic sample. Patient signed lab requisition.  collected blood with [employer]. Labeled and packaged per protocol, pedigree and insurance information included. Scheduling instructions, brochure, and questionnaire given. Scheduled [employer] pickup for today, tracking number in media. Will follow.       Electronically signed by Enma Cr RN on 9/9/2022 at 6:25 AM

## 2022-09-13 ENCOUNTER — OFFICE VISIT (OUTPATIENT)
Dept: SURGERY | Age: 42
End: 2022-09-13
Payer: COMMERCIAL

## 2022-09-13 ENCOUNTER — TELEPHONE (OUTPATIENT)
Dept: SURGERY | Age: 42
End: 2022-09-13

## 2022-09-13 VITALS
HEIGHT: 63 IN | BODY MASS INDEX: 22.77 KG/M2 | SYSTOLIC BLOOD PRESSURE: 110 MMHG | OXYGEN SATURATION: 99 % | DIASTOLIC BLOOD PRESSURE: 60 MMHG | TEMPERATURE: 96.6 F | HEART RATE: 85 BPM | WEIGHT: 128.5 LBS | RESPIRATION RATE: 18 BRPM

## 2022-09-13 DIAGNOSIS — C50.412 MALIGNANT NEOPLASM OF UPPER-OUTER QUADRANT OF LEFT BREAST IN FEMALE, ESTROGEN RECEPTOR POSITIVE (HCC): ICD-10-CM

## 2022-09-13 DIAGNOSIS — C50.412 MALIGNANT NEOPLASM OF UPPER-OUTER QUADRANT OF LEFT BREAST IN FEMALE, ESTROGEN RECEPTOR POSITIVE (HCC): Primary | ICD-10-CM

## 2022-09-13 DIAGNOSIS — Z17.0 MALIGNANT NEOPLASM OF UPPER-OUTER QUADRANT OF LEFT BREAST IN FEMALE, ESTROGEN RECEPTOR POSITIVE (HCC): Primary | ICD-10-CM

## 2022-09-13 DIAGNOSIS — R59.0 ENLARGED LYMPH NODE IN NECK: Primary | ICD-10-CM

## 2022-09-13 DIAGNOSIS — Z17.0 MALIGNANT NEOPLASM OF UPPER-OUTER QUADRANT OF LEFT BREAST IN FEMALE, ESTROGEN RECEPTOR POSITIVE (HCC): ICD-10-CM

## 2022-09-13 PROCEDURE — 76536 US EXAM OF HEAD AND NECK: CPT | Performed by: SURGERY

## 2022-09-13 PROCEDURE — G8420 CALC BMI NORM PARAMETERS: HCPCS | Performed by: SURGERY

## 2022-09-13 PROCEDURE — 99205 OFFICE O/P NEW HI 60 MIN: CPT | Performed by: SURGERY

## 2022-09-13 PROCEDURE — G8427 DOCREV CUR MEDS BY ELIG CLIN: HCPCS | Performed by: SURGERY

## 2022-09-13 PROCEDURE — 1036F TOBACCO NON-USER: CPT | Performed by: SURGERY

## 2022-09-13 NOTE — PROGRESS NOTES
Bean Delgadillo MD MD    General Surgery  New Patient Evaluation in Office  Pt Name: Andre Fitzpatrick  Date of Birth 1980   Today's Date: 9/19/2022  Medical Record Number: [id number]  Referring Provider: No ref. provider found  Primary Care Provider: Negar Queen MD  Chief Complaint   Patient presents with    Surgical Consult     New patient-referred by [facility] - Moderately differentiated invasive ductal carcinoma left breast     ASSESSMENT      Problem List Items Addressed This Visit          Other    Malignant neoplasm of upper-outer quadrant of breast in female, estrogen receptor positive ([address]    Relevant Orders    Hemoglobin and Hematocrit (Completed)    US, HEAD/NECK [facility] SOFT TISSUE THYROID (Completed)     Other Visit Diagnoses       Enlarged lymph node in neck    -  Primary    Relevant Orders    Hemoglobin and Hematocrit (Completed)    US, HEAD/NECK TISSUES,REAL TIME    US HEAD NECK SOFT TISSUE THYROID (Completed)          Past Medical History:   Diagnosis Date    Invasive ductal carcinoma of breast, female, left ([address] 09/07/2022    with lobular features    Kidney stone     2005 with pregnancy    PONV (postoperative nausea and vomiting)     Prolonged emergence from general anesthesia     \"During Hysterectomy- bradycardia- meds to increase heart rate. \"          Cancer Staging  Malignant neoplasm of upper-outer quadrant of breast in female, estrogen receptor positive ([address]  Staging form: Breast, AJCC 8th Edition  - Clinical stage from 9/13/2022: Stage IA (cT1c, cN0, cM0, G2, ER+, PA+, HER2-) - Unsigned    PLANS      Schedule Dina Roberts for   Left lumpectomy with SLN biopsy   MRI bilateral breast will hold on OR if any new findings  Genetics - would not change what surgery she has  Neck US for enlarged submanbidular node  In the office, I had a discussion with the patient regarding the treatment options for invasive breast cancer. We discussed lumpectomy and radiation versus mastectomy. We discussed the fact that there is no statistical difference in long term survival or recurrence between the two options. Candidate for Lumpectomy YES/NO: Yes  Candidate for omission of sln bx YES/NO: No  Candidate for potential omission of radiation YES/NO: No  We had a long discussion in the office regarding treatment options for breast cancer. We discussed lumpectomy and radiation versus simple mastectomy with or without reconstruction. For lumpectomy, we discussed the conduct of the operation, types of anesthesia available, possible use of needle localization preop and expected postop recovery and cosmetic outcome. We also discussed the risk of recurrence after lumpectomy and radiation estimated to be 9-11% of which 50% of the recurrence is non-invasive breast cancer and the other 50% is invasive cancer. We covered radiation therapy, its typical course and average number of treatments required, as well as possible side effects. In addition, we discussed treatment if recurrence developed which is mastectomy. Finally we discussed the results of NSABP B-24 which showed a 50% reduction in local recurrence with the use of adjuvant tamoxifen taken for 5 years after radiation. We also covered simple mastectomy. The conduct of the operation, use of general anesthesia, the expected postop recovery and cosmetic outcome with and without reconstruction. The recurrence rate of 1% was estimated as the risk of recurrence. After this discussion, the patient's questions were answered and has decided to proceed with surgical intervention. For lumpectomy, we covered the conduct of the operation, the possible use of needle localization preoperatively, the anesthetic options, the typical post op course and cosmetic outcome, and the complications including bleeding, infection, seroma, and reoperation for positive margins.   For mastectomy, we covered the conduct of the operation, the use of general anesthesia, the typical post op course and cosmetic outcome. We also covered reconstruction options both immediate and delayed and referral was made if the patient desired reconstruction, or the use of a bra prosthesis. We also covered the possible complications including bleeding, infection, skin slough, seroma formation and others. We also discussed staging and the importance of lymph node sampling. We discussed the use of sentinel lymph node biopsy versus standard axillary lymph node dissection. We discussed the complications of axillary lymph node dissection and how the information is used in treatment decisions and prognosis. We discussed how the sentinel lymph node is identified and its significance. We discussed the possibility of not finding the sentinel node as well as a 5% false negative rate. We discussed the ACOSOG  Z011 trial where even if sln is positive, no benefit for further axillary surgery has been shown in lumpectomy in patients getting radiation. We also covered adjuvant chemotherapy and radiation therapy if they would be required. After these discussions, the patient's questions were answered and has elected to proceed with surgery. Her need for genetic testing referral was calculated by questions asked during her mammograms and she was is deemed to be a candidate for testing. For those who are deemed appropriate, referral to the genetic counseling service at [address] [place] [address] was made. Luis Eduardo Foy meets the following criteria for further genetic risk evaluation based on NCCN guidelines:  Breast Cancer diagnosed age 48 years or younger. Triple-negative (ER-, DC-, HER2-) breast cancer diagnosed age 61 years or younger.   Two breast cancer primaries  Breast cancer at any age and   1 or more close blood relatives with:  Breast cancer diagnosis at age 48 years or younger; or  Invasive ovarian cancer; or  Male breast cancer; or  Pancreatic cancer; or  High-grade (Vandana score greater than or equal to 7) or metastatic prostate cancer. 2 or more close blood relatives with breast cancer at any age. Lobular breast cancer. Diffuse gastric cancer  Breast cancer, gastrointestinal cancer, or hamartomatous polyps, ovarian sex chord tumors, pancreatic cancer, testicular sertoli cell tumors, or childhood skin pigmentation. Status: outpatient  Planned anesthesia: MAC  She will undergo pre-operative clearance per anesthesia guidelines with risk factors listed under the past medical history diagnosis & problem list.  13.  Perioperative discontinuation of        none. Continuation of 81 mg Aspirin is acceptable. 14.  Perioperative medical clearance is not         Orders Placed This Encounter:  Orders Placed This Encounter   Procedures    US HEAD NECK SOFT TISSUE THYROID     This procedure can be scheduled via [facility]. Access your [facility] account by visiting [email or web address]. Standing Status:   Future     Number of Occurrences:   1     Standing Expiration Date:   9/13/2023    Hemoglobin and Hematocrit     Standing Status:   Future     Number of Occurrences:   1     Standing Expiration Date:   9/13/2023    US, HEAD/NECK Abdoul De La O is a 40 y.o.  female seen in the office for evaluation of breast cancer. She was referred for evaluation and discussion of treatment options for carcinoma of the left UOQ. she is had core biopsy, left demonstrating an intermediate grade invasive ductal cancer of the left breast. Estrogen receptor status is positive. Progesterone receptor status is positive. HER-2/billy receptors negative . Prior to the biopsy she complained of no breast symptoms and denied galactorrhea and new or changing breast lumps. Risk assessment: family hx on mother's side. She has not been on previous estrogen therapy.   Past Medical History  Past Medical History:   Diagnosis Date    Invasive ductal carcinoma of breast, female, left ([facility] 09/07/2022    with lobular features    Kidney stone     2005 with pregnancy    PONV (postoperative nausea and vomiting)     Prolonged emergence from general anesthesia     \"During Hysterectomy- bradycardia- meds to increase heart rate. \"       Past Surgical History  Past Surgical History:   Procedure Laterality Date    CERVICAL FUSION N/A 12/08/2021    ACDF C4-C7-T1 performed by Michael Mejia MD at [address] [place] [address]  03/25/2016    Dr. Naye Samson 09/13/2017    DILATATION AND CURETTAGE HYSTEROSCOPY, POSSIBLE MYOSURE performed by Luba Darnell DO at [facility]  04/18/2018    [address]    umbilical hernia    HYSTERECTOMY (CERVIX STATUS UNKNOWN)  03/13/2019    partial    HYSTERECTOMY ABDOMINAL N/A 03/13/2019    ROBOTIC TOTAL LAPAROSCOPIC HYSTERECTOMY WITH BILATERAL SALPINGECTOMY performed by Luba Darnell DO at [address]  04/18/2018    [address] LEFT Left 09/07/2022    [address] LEFT 9/7/2022 Sultana Eagle MD [facility]      one ovary remains    [place] HYSTEROSCOPY,W/ENDOMETRIAL ABLATION N/A 04/18/2018    HYSTEROSCOPY ENDOMETRIAL ABLATION performed by Luba Darnell DO at [address] [facility] [address] ADDITIONAL LEFT Left 09/07/2022    US BREAST BIOPSY NEEDLE ADDITIONAL LEFT 9/7/2022 Sultana Eagle MD [facility]      Family History  Family History   Problem Relation Age of Onset    Breast Cancer Mother 61    BRCA 1 Negative Mother 64    BRCA 2 Negative Mother 64    Bilateral breast cancer Mother 64        opposite side    Heart Disease Father     High Blood Pressure Father     High Cholesterol Father     Diabetes Father     Lung Cancer Father     No Known Problems Sister     No Known Problems Sister     No Known Problems Sister     No Known Problems Brother     No Known Problems Brother     No Known Problems Brother     Leukemia Maternal Grandmother     Colon Cancer Paternal Uncle         age 62s    Lung Cancer Paternal Uncle         age >47    Breast Cancer Paternal Aunt         age >47    Lung Cancer Maternal Aunt         age >47    Asthma Neg Hx      Cancer-related family history includes Breast Cancer in her paternal aunt; Breast Cancer (age of onset: 61) in her mother; Colon Cancer in her paternal uncle; Lung Cancer in her father, maternal aunt, and paternal uncle. Medications  Outpatient Encounter Medications as of 9/13/2022   Medication Sig Dispense Refill    escitalopram (LEXAPRO) 5 MG tablet Take 1 tablet by mouth in the morning. 30 tablet 2    cyclobenzaprine (FLEXERIL) 10 MG tablet Take 1 tablet by mouth nightly as needed for Muscle spasms 7 tablet 0    butalbital-acetaminophen-caffeine (FIORICET, ESGIC) -40 MG per tablet Take 1 tablet by mouth every 6 hours as needed for Headaches 60 tablet 0    pantoprazole (PROTONIX) 40 MG tablet Take 1 tablet by mouth every morning (before breakfast) 30 tablet 2    diazePAM (VALIUM) 2 MG tablet Take 2 mg by mouth every 8 hours as needed for Anxiety. ibuprofen (ADVIL;MOTRIN) 400 MG tablet Take 400 mg by mouth every 6 hours as needed for Pain      HYDROcodone-acetaminophen (NORCO) 5-325 MG per tablet TAKE 1 TABLET BY MOUTH EVERY 4 HOURS FOR 7 DAYS      METAMUCIL FIBER PO Take 1 capsule by mouth daily 3 in 1 fiber      [DISCONTINUED] topiramate (TOPAMAX) 50 MG tablet Take 1 tablet by mouth daily (Patient not taking: Reported on 9/13/2022) 30 tablet 3     No facility-administered encounter medications on file as of 9/13/2022. No current outpatient medications on file. No current facility-administered medications for this visit.      Allergies  Allergies   Allergen Reactions    Amoxicillin Hives       Social History  Social History     Socioeconomic History    Marital status: Single     Spouse name: Not on file    Number of children: Not on file    Years of education: Not on file Highest education level: Not on file   Occupational History    Not on file   Tobacco Use    Smoking status: Never    Smokeless tobacco: Never   Vaping Use    Vaping Use: Never used   Substance and Sexual Activity    Alcohol use: No     Alcohol/week: 0.0 standard drinks    Drug use: No    Sexual activity: Yes     Partners: Male   Other Topics Concern    Not on file   Social History Narrative    Not on file     Social Determinants of Health     Financial Resource Strain: Low Risk     Difficulty of Paying Living Expenses: Not hard at all   Food Insecurity: No Food Insecurity    Worried About Running Out of Food in the Last Year: Never true    Ran Out of Food in the Last Year: Never true   Transportation Needs: Not on file   Physical Activity: Not on file   Stress: Not on file   Social Connections: Not on file   Intimate Partner Violence: Not on file   Housing Stability: Not on [employer] Maintenance   Topic Date Due    COVID-19 Vaccine (1) Never done    Varicella vaccine (1 of 2 - 2-dose childhood series) Never done    HIV screen  Never done    Hepatitis C screen  Never done    DTaP/Tdap/Td vaccine (1 - Tdap) Never done    Lipids  06/02/2021    Flu vaccine (1) Never done    Depression Monitoring  02/23/2023    Breast cancer screen  09/07/2023    Hepatitis A vaccine  Aged Out    Hepatitis B vaccine  Aged Out    Hib vaccine  Aged Out    Meningococcal (ACWY) vaccine  Aged Out    Pneumococcal 0-64 years Vaccine  Aged Out       Review of Systems  Review of Systems   Constitutional:  Negative for appetite change, fatigue and fever. HENT:  Negative for ear pain, sore throat and trouble swallowing. Respiratory:  Negative for cough, chest tightness and shortness of breath. Cardiovascular:  Negative for chest pain, palpitations and leg swelling. Gastrointestinal:  Negative for abdominal pain, blood in stool, constipation, diarrhea, nausea and vomiting.    Endocrine: Negative for cold intolerance. Genitourinary:  Negative for difficulty urinating and urgency. Musculoskeletal:  Negative for back pain and joint swelling. Skin:  Negative for rash and wound. Allergic/Immunologic: Negative for immunocompromised state. Neurological:  Negative for seizures and headaches. Psychiatric/Behavioral:  Negative for hallucinations and suicidal ideas. The patient is not nervous/anxious. OBJECTIVE    VITALS:  height is 5' 2.5\" (1.588 m) and weight is 128 lb 8 oz (58.3 kg). Her temporal temperature is 96.6 °F (35.9 °C) (abnormal). Her blood pressure is 110/60 and her pulse is 85. Her respiration is 18 and oxygen saturation is 99%. Physical Exam  Constitutional:       Appearance: Normal appearance. She is well-developed. She is not ill-appearing. HENT:      Head: Normocephalic and atraumatic. Comments: Enalrged submandibular lymphnode      Mouth/Throat:      Mouth: Mucous membranes are moist.      Pharynx: No oropharyngeal exudate. Eyes:      General: No scleral icterus. Extraocular Movements: Extraocular movements intact. Pupils: Pupils are equal, round, and reactive to light. Neck:      Thyroid: No thyromegaly. Trachea: No tracheal deviation. Cardiovascular:      Rate and Rhythm: Normal rate. Pulses: Normal pulses. Pulmonary:      Effort: Pulmonary effort is normal. No respiratory distress. Chest:   Breasts:     Right: Normal. No swelling, bleeding, inverted nipple, mass, nipple discharge or skin change. Left: Mass and tenderness present. No swelling or bleeding. Comments: Bruising in left upper quadrant  Palpable mass in left upper quadrant just outside the nipple areolar junction   Abdominal:      Palpations: Abdomen is soft. Tenderness: There is no abdominal tenderness. There is no guarding. Hernia: No hernia is present. Musculoskeletal:         General: No deformity. Normal range of motion.       Cervical back: Normal range of motion and neck supple. Skin:     General: Skin is warm. Findings: No rash. Neurological:      General: No focal deficit present. Mental Status: She is alert and oriented to person, place, and time. Psychiatric:         Mood and Affect: Mood normal.         Speech: Speech normal.         Behavior: Behavior normal.         Thought Content: Thought content normal.              Electronically signed by Gregor Malagon MD on 9/19/2022 at 12:02 PM    The patients records and films were reviewed independently. Time was given for questions . All questions were answered to the patients satisfaction. A total time of 90 minutes  was spent with at least 51% related to counseling and coordination of care.

## 2022-09-13 NOTE — TELEPHONE ENCOUNTER
[address]    Phone * [phone]   Surgical Scheduling Direct line Phone * [phone]  Fax * [phone]      [facility]      1980    female    [address] [place] [address] [address]   Marital Status:         Home Phone: [phone]   Cell Phone:   Telephone Information:   Mobile [phone]              Surgeon: Dr. Coni Ram  Surgery Date:09-   Time: KENZIE Forrest     Procedure: Left lumpectomy with sentinel lymph node biopsy   Outpatient     Diagnosis: Left breast cancer    Important Medical History: In Epic    Special Inst/Equip:     CPT Codes: 21347, 91413    Latex Allergy:   no Cardiac Device:  no    Anesthesia Type: General    Case Location:  Main OR     Preadmission Testing: Phone Call      PAT Date and Time: ________________________________    PAT Confirmation #: _________________________________    Post Op Visit:  ______________________________________    Need Preop Cardiac Clearance:   no    Does patient have Cardiologist/physician?  No      Surgery Conformation #:  ______________________________________________    : __________________________________ Date:____________________        [employer] Name:  Medical Chappell/ Kathy

## 2022-09-13 NOTE — TELEPHONE ENCOUNTER
Patient scheduled for surgery with Dr. Jewel Arzola on 09- at 2:00pm with an arrival of 11:00am in [address] [place] [address]. Preop orders and instructions given to patient. Surgery consent signed. Antibacterial soap given.

## 2022-09-14 ENCOUNTER — TELEPHONE (OUTPATIENT)
Dept: SURGERY | Age: 42
End: 2022-09-14

## 2022-09-14 ENCOUNTER — HOSPITAL ENCOUNTER (OUTPATIENT)
Age: 42
Discharge: HOME | End: 2022-09-14
Payer: COMMERCIAL

## 2022-09-14 ENCOUNTER — HOSPITAL ENCOUNTER (OUTPATIENT)
Dept: MRI IMAGING | Age: 42
Discharge: HOME | End: 2022-09-14
Payer: COMMERCIAL

## 2022-09-14 DIAGNOSIS — C50.412 MALIGNANT NEOPLASM OF UPPER-OUTER QUADRANT OF LEFT BREAST IN FEMALE, ESTROGEN RECEPTOR POSITIVE (HCC): ICD-10-CM

## 2022-09-14 DIAGNOSIS — Z17.0 MALIGNANT NEOPLASM OF UPPER-OUTER QUADRANT OF LEFT BREAST IN FEMALE, ESTROGEN RECEPTOR POSITIVE (HCC): ICD-10-CM

## 2022-09-14 DIAGNOSIS — R59.0 ENLARGED LYMPH NODE IN NECK: ICD-10-CM

## 2022-09-14 LAB
HCT VFR BLD CALC: 43.9 % (ref 37–47)
HEMOGLOBIN: 14.2 GM/DL (ref 12–16)

## 2022-09-14 PROCEDURE — 85014 HEMATOCRIT: CPT

## 2022-09-14 PROCEDURE — 85018 HEMOGLOBIN: CPT

## 2022-09-14 PROCEDURE — C8908 MRI W/O FOL W/CONT, BREAST,: HCPCS

## 2022-09-14 PROCEDURE — 36415 COLL VENOUS BLD VENIPUNCTURE: CPT

## 2022-09-14 PROCEDURE — A9579 GAD-BASE MR CONTRAST NOS,1ML: HCPCS | Performed by: SURGERY

## 2022-09-14 PROCEDURE — 6360000004 HC RX CONTRAST MEDICATION: Performed by: SURGERY

## 2022-09-14 RX ADMIN — GADOTERIDOL 15 ML: 279.3 INJECTION, SOLUTION INTRAVENOUS at 12:21

## 2022-09-16 ENCOUNTER — HOSPITAL ENCOUNTER (OUTPATIENT)
Dept: ULTRASOUND IMAGING | Age: 42
Discharge: HOME | End: 2022-09-16
Payer: COMMERCIAL

## 2022-09-16 ENCOUNTER — PREP FOR PROCEDURE (OUTPATIENT)
Dept: SURGERY | Age: 42
End: 2022-09-16

## 2022-09-16 DIAGNOSIS — R59.0 ENLARGED LYMPH NODE IN NECK: ICD-10-CM

## 2022-09-16 DIAGNOSIS — C50.412 MALIGNANT NEOPLASM OF UPPER-OUTER QUADRANT OF LEFT BREAST IN FEMALE, ESTROGEN RECEPTOR POSITIVE (HCC): ICD-10-CM

## 2022-09-16 DIAGNOSIS — Z17.0 MALIGNANT NEOPLASM OF UPPER-OUTER QUADRANT OF LEFT BREAST IN FEMALE, ESTROGEN RECEPTOR POSITIVE (HCC): ICD-10-CM

## 2022-09-16 PROCEDURE — 76536 US EXAM OF HEAD AND NECK: CPT

## 2022-09-16 RX ORDER — SODIUM CHLORIDE 9 MG/ML
INJECTION, SOLUTION INTRAVENOUS CONTINUOUS
Status: CANCELLED | OUTPATIENT
Start: 2022-09-16

## 2022-09-19 ENCOUNTER — HOSPITAL ENCOUNTER (OUTPATIENT)
Dept: NUCLEAR MEDICINE | Age: 42
Discharge: HOME | End: 2022-09-19
Payer: COMMERCIAL

## 2022-09-19 ENCOUNTER — ANESTHESIA (OUTPATIENT)
Dept: OPERATING ROOM | Age: 42
End: 2022-09-19
Payer: COMMERCIAL

## 2022-09-19 ENCOUNTER — ANESTHESIA EVENT (OUTPATIENT)
Dept: OPERATING ROOM | Age: 42
End: 2022-09-19
Payer: COMMERCIAL

## 2022-09-19 ENCOUNTER — HOSPITAL ENCOUNTER (OUTPATIENT)
Age: 42
Setting detail: OUTPATIENT SURGERY
Discharge: HOME | End: 2022-09-19
Attending: SURGERY | Admitting: SURGERY
Payer: COMMERCIAL

## 2022-09-19 ENCOUNTER — APPOINTMENT (OUTPATIENT)
Dept: WOMENS IMAGING | Age: 42
End: 2022-09-19
Attending: SURGERY
Payer: COMMERCIAL

## 2022-09-19 VITALS
SYSTOLIC BLOOD PRESSURE: 113 MMHG | TEMPERATURE: 96.9 F | HEART RATE: 91 BPM | RESPIRATION RATE: 16 BRPM | OXYGEN SATURATION: 92 % | DIASTOLIC BLOOD PRESSURE: 78 MMHG

## 2022-09-19 DIAGNOSIS — Z85.3 HISTORY OF LEFT BREAST CANCER: ICD-10-CM

## 2022-09-19 DIAGNOSIS — C50.412 MALIGNANT NEOPLASM OF UPPER-OUTER QUADRANT OF LEFT BREAST IN FEMALE, ESTROGEN RECEPTOR POSITIVE (HCC): ICD-10-CM

## 2022-09-19 DIAGNOSIS — C50.912 MALIGNANT NEOPLASM OF LEFT FEMALE BREAST, UNSPECIFIED ESTROGEN RECEPTOR STATUS, UNSPECIFIED SITE OF BREAST (HCC): ICD-10-CM

## 2022-09-19 DIAGNOSIS — Z17.0 MALIGNANT NEOPLASM OF UPPER-OUTER QUADRANT OF LEFT BREAST IN FEMALE, ESTROGEN RECEPTOR POSITIVE (HCC): Primary | ICD-10-CM

## 2022-09-19 DIAGNOSIS — Z17.0 MALIGNANT NEOPLASM OF UPPER-OUTER QUADRANT OF LEFT BREAST IN FEMALE, ESTROGEN RECEPTOR POSITIVE (HCC): ICD-10-CM

## 2022-09-19 DIAGNOSIS — C50.412 MALIGNANT NEOPLASM OF UPPER-OUTER QUADRANT OF LEFT BREAST IN FEMALE, ESTROGEN RECEPTOR POSITIVE (HCC): Primary | ICD-10-CM

## 2022-09-19 PROBLEM — C50.419 MALIGNANT NEOPLASM OF UPPER-OUTER QUADRANT OF BREAST IN FEMALE, ESTROGEN RECEPTOR POSITIVE (HCC): Status: ACTIVE | Noted: 2022-09-19

## 2022-09-19 PROCEDURE — 3600000012 HC SURGERY LEVEL 2 ADDTL 15MIN: Performed by: SURGERY

## 2022-09-19 PROCEDURE — 7100000010 HC PHASE II RECOVERY - FIRST 15 MIN: Performed by: SURGERY

## 2022-09-19 PROCEDURE — 76098 X-RAY EXAM SURGICAL SPECIMEN: CPT

## 2022-09-19 PROCEDURE — 6370000000 HC RX 637 (ALT 250 FOR IP): Performed by: ANESTHESIOLOGY

## 2022-09-19 PROCEDURE — 88342 IMHCHEM/IMCYTCHM 1ST ANTB: CPT

## 2022-09-19 PROCEDURE — 6360000002 HC RX W HCPCS

## 2022-09-19 PROCEDURE — 7100000000 HC PACU RECOVERY - FIRST 15 MIN: Performed by: SURGERY

## 2022-09-19 PROCEDURE — 3700000001 HC ADD 15 MINUTES (ANESTHESIA): Performed by: SURGERY

## 2022-09-19 PROCEDURE — 6360000002 HC RX W HCPCS: Performed by: NURSE ANESTHETIST, CERTIFIED REGISTERED

## 2022-09-19 PROCEDURE — 6370000000 HC RX 637 (ALT 250 FOR IP): Performed by: SURGERY

## 2022-09-19 PROCEDURE — 38792 RA TRACER ID OF SENTINL NODE: CPT

## 2022-09-19 PROCEDURE — 2580000003 HC RX 258

## 2022-09-19 PROCEDURE — 7100000001 HC PACU RECOVERY - ADDTL 15 MIN: Performed by: SURGERY

## 2022-09-19 PROCEDURE — 88307 TISSUE EXAM BY PATHOLOGIST: CPT

## 2022-09-19 PROCEDURE — 3430000000 HC RX DIAGNOSTIC RADIOPHARMACEUTICAL: Performed by: SURGERY

## 2022-09-19 PROCEDURE — 7100000011 HC PHASE II RECOVERY - ADDTL 15 MIN: Performed by: SURGERY

## 2022-09-19 PROCEDURE — A9541 TC99M SULFUR COLLOID: HCPCS | Performed by: SURGERY

## 2022-09-19 PROCEDURE — 6360000002 HC RX W HCPCS: Performed by: ANESTHESIOLOGY

## 2022-09-19 PROCEDURE — 3700000000 HC ANESTHESIA ATTENDED CARE: Performed by: SURGERY

## 2022-09-19 PROCEDURE — 2500000003 HC RX 250 WO HCPCS: Performed by: SURGERY

## 2022-09-19 PROCEDURE — 2709999900 HC NON-CHARGEABLE SUPPLY: Performed by: SURGERY

## 2022-09-19 PROCEDURE — 2580000003 HC RX 258: Performed by: NURSE ANESTHETIST, CERTIFIED REGISTERED

## 2022-09-19 PROCEDURE — 3600000002 HC SURGERY LEVEL 2 BASE: Performed by: SURGERY

## 2022-09-19 PROCEDURE — 2500000003 HC RX 250 WO HCPCS: Performed by: NURSE ANESTHETIST, CERTIFIED REGISTERED

## 2022-09-19 RX ORDER — SODIUM CHLORIDE 0.9 % (FLUSH) 0.9 %
5-40 SYRINGE (ML) INJECTION PRN
Status: DISCONTINUED | OUTPATIENT
Start: 2022-09-19 | End: 2022-09-19 | Stop reason: HOSPADM

## 2022-09-19 RX ORDER — HYDRALAZINE HYDROCHLORIDE 20 MG/ML
10 INJECTION INTRAMUSCULAR; INTRAVENOUS
Status: DISCONTINUED | OUTPATIENT
Start: 2022-09-19 | End: 2022-09-19 | Stop reason: HOSPADM

## 2022-09-19 RX ORDER — SODIUM CHLORIDE 9 MG/ML
INJECTION, SOLUTION INTRAVENOUS PRN
Status: DISCONTINUED | OUTPATIENT
Start: 2022-09-19 | End: 2022-09-19 | Stop reason: HOSPADM

## 2022-09-19 RX ORDER — ONDANSETRON 2 MG/ML
INJECTION INTRAMUSCULAR; INTRAVENOUS PRN
Status: DISCONTINUED | OUTPATIENT
Start: 2022-09-19 | End: 2022-09-19 | Stop reason: SDUPTHER

## 2022-09-19 RX ORDER — SCOLOPAMINE TRANSDERMAL SYSTEM 1 MG/1
1 PATCH, EXTENDED RELEASE TRANSDERMAL ONCE
Status: DISCONTINUED | OUTPATIENT
Start: 2022-09-19 | End: 2022-09-19 | Stop reason: HOSPADM

## 2022-09-19 RX ORDER — PROPOFOL 10 MG/ML
INJECTION, EMULSION INTRAVENOUS PRN
Status: DISCONTINUED | OUTPATIENT
Start: 2022-09-19 | End: 2022-09-19 | Stop reason: SDUPTHER

## 2022-09-19 RX ORDER — FENTANYL CITRATE 50 UG/ML
INJECTION, SOLUTION INTRAMUSCULAR; INTRAVENOUS PRN
Status: DISCONTINUED | OUTPATIENT
Start: 2022-09-19 | End: 2022-09-19 | Stop reason: SDUPTHER

## 2022-09-19 RX ORDER — SODIUM CHLORIDE 0.9 % (FLUSH) 0.9 %
5-40 SYRINGE (ML) INJECTION EVERY 12 HOURS SCHEDULED
Status: DISCONTINUED | OUTPATIENT
Start: 2022-09-19 | End: 2022-09-19 | Stop reason: HOSPADM

## 2022-09-19 RX ORDER — LIDOCAINE HYDROCHLORIDE 20 MG/ML
INJECTION, SOLUTION EPIDURAL; INFILTRATION; INTRACAUDAL; PERINEURAL PRN
Status: DISCONTINUED | OUTPATIENT
Start: 2022-09-19 | End: 2022-09-19 | Stop reason: SDUPTHER

## 2022-09-19 RX ORDER — LABETALOL 20 MG/4 ML (5 MG/ML) INTRAVENOUS SYRINGE
10
Status: DISCONTINUED | OUTPATIENT
Start: 2022-09-19 | End: 2022-09-19 | Stop reason: HOSPADM

## 2022-09-19 RX ORDER — MIDAZOLAM HYDROCHLORIDE 1 MG/ML
INJECTION INTRAMUSCULAR; INTRAVENOUS PRN
Status: DISCONTINUED | OUTPATIENT
Start: 2022-09-19 | End: 2022-09-19 | Stop reason: SDUPTHER

## 2022-09-19 RX ORDER — HYDROCODONE BITARTRATE AND ACETAMINOPHEN 5; 325 MG/1; MG/1
1 TABLET ORAL EVERY 4 HOURS PRN
Qty: 18 TABLET | Refills: 0 | Status: SHIPPED | OUTPATIENT
Start: 2022-09-19 | End: 2022-09-22

## 2022-09-19 RX ORDER — SODIUM CHLORIDE 9 MG/ML
INJECTION, SOLUTION INTRAVENOUS CONTINUOUS PRN
Status: DISCONTINUED | OUTPATIENT
Start: 2022-09-19 | End: 2022-09-19 | Stop reason: SDUPTHER

## 2022-09-19 RX ORDER — HYDROCODONE BITARTRATE AND ACETAMINOPHEN 5; 325 MG/1; MG/1
1 TABLET ORAL ONCE
Status: COMPLETED | OUTPATIENT
Start: 2022-09-19 | End: 2022-09-19

## 2022-09-19 RX ORDER — SODIUM CHLORIDE 9 MG/ML
INJECTION, SOLUTION INTRAVENOUS CONTINUOUS
Status: DISCONTINUED | OUTPATIENT
Start: 2022-09-19 | End: 2022-09-19 | Stop reason: HOSPADM

## 2022-09-19 RX ORDER — DEXAMETHASONE SODIUM PHOSPHATE 10 MG/ML
INJECTION, EMULSION INTRAMUSCULAR; INTRAVENOUS PRN
Status: DISCONTINUED | OUTPATIENT
Start: 2022-09-19 | End: 2022-09-19 | Stop reason: SDUPTHER

## 2022-09-19 RX ORDER — BUPIVACAINE HYDROCHLORIDE AND EPINEPHRINE 5; 5 MG/ML; UG/ML
INJECTION, SOLUTION EPIDURAL; INTRACAUDAL; PERINEURAL PRN
Status: DISCONTINUED | OUTPATIENT
Start: 2022-09-19 | End: 2022-09-19 | Stop reason: ALTCHOICE

## 2022-09-19 RX ORDER — LIDOCAINE HYDROCHLORIDE 10 MG/ML
INJECTION, SOLUTION EPIDURAL; INFILTRATION; INTRACAUDAL; PERINEURAL PRN
Status: DISCONTINUED | OUTPATIENT
Start: 2022-09-19 | End: 2022-09-19 | Stop reason: ALTCHOICE

## 2022-09-19 RX ORDER — ONDANSETRON 2 MG/ML
4 INJECTION INTRAMUSCULAR; INTRAVENOUS
Status: DISCONTINUED | OUTPATIENT
Start: 2022-09-19 | End: 2022-09-19 | Stop reason: HOSPADM

## 2022-09-19 RX ADMIN — LIDOCAINE HYDROCHLORIDE 100 MG: 20 INJECTION, SOLUTION EPIDURAL; INFILTRATION; INTRACAUDAL; PERINEURAL at 15:23

## 2022-09-19 RX ADMIN — HYDROMORPHONE HYDROCHLORIDE 0.5 MG: 1 INJECTION, SOLUTION INTRAMUSCULAR; INTRAVENOUS; SUBCUTANEOUS at 17:22

## 2022-09-19 RX ADMIN — SODIUM CHLORIDE: 9 INJECTION, SOLUTION INTRAVENOUS at 13:17

## 2022-09-19 RX ADMIN — SODIUM CHLORIDE: 9 INJECTION, SOLUTION INTRAVENOUS at 15:18

## 2022-09-19 RX ADMIN — HYDROMORPHONE HYDROCHLORIDE 0.5 MG: 1 INJECTION, SOLUTION INTRAMUSCULAR; INTRAVENOUS; SUBCUTANEOUS at 16:57

## 2022-09-19 RX ADMIN — FENTANYL CITRATE 25 MCG: 50 INJECTION, SOLUTION INTRAMUSCULAR; INTRAVENOUS at 15:31

## 2022-09-19 RX ADMIN — HYDROMORPHONE HYDROCHLORIDE 0.5 MG: 1 INJECTION, SOLUTION INTRAMUSCULAR; INTRAVENOUS; SUBCUTANEOUS at 17:17

## 2022-09-19 RX ADMIN — DEXAMETHASONE SODIUM PHOSPHATE 8 MG: 10 INJECTION, EMULSION INTRAMUSCULAR; INTRAVENOUS at 15:31

## 2022-09-19 RX ADMIN — PROPOFOL 200 MG: 10 INJECTION, EMULSION INTRAVENOUS at 15:23

## 2022-09-19 RX ADMIN — ONDANSETRON 4 MG: 2 INJECTION INTRAMUSCULAR; INTRAVENOUS at 16:37

## 2022-09-19 RX ADMIN — HYDROCODONE BITARTRATE AND ACETAMINOPHEN 1 TABLET: 5; 325 TABLET ORAL at 18:26

## 2022-09-19 RX ADMIN — MIDAZOLAM 2 MG: 1 INJECTION INTRAMUSCULAR; INTRAVENOUS at 15:18

## 2022-09-19 RX ADMIN — Medication 1 MILLICURIE: at 11:37

## 2022-09-19 RX ADMIN — FENTANYL CITRATE 50 MCG: 50 INJECTION, SOLUTION INTRAMUSCULAR; INTRAVENOUS at 16:09

## 2022-09-19 RX ADMIN — CEFAZOLIN 2000 MG: 10 INJECTION, POWDER, FOR SOLUTION INTRAVENOUS at 15:29

## 2022-09-19 RX ADMIN — HYDROMORPHONE HYDROCHLORIDE 0.5 MG: 1 INJECTION, SOLUTION INTRAMUSCULAR; INTRAVENOUS; SUBCUTANEOUS at 17:02

## 2022-09-19 RX ADMIN — FENTANYL CITRATE 25 MCG: 50 INJECTION, SOLUTION INTRAMUSCULAR; INTRAVENOUS at 15:47

## 2022-09-19 ASSESSMENT — PAIN DESCRIPTION - FREQUENCY: FREQUENCY: CONTINUOUS

## 2022-09-19 ASSESSMENT — ENCOUNTER SYMPTOMS
BACK PAIN: 0
NAUSEA: 0
VOMITING: 0
TROUBLE SWALLOWING: 0
COUGH: 0
TROUBLE SWALLOWING: 0
BLOOD IN STOOL: 0
ABDOMINAL PAIN: 0
VOMITING: 0
DIARRHEA: 0
SORE THROAT: 0
CONSTIPATION: 0
BLOOD IN STOOL: 0
NAUSEA: 0
BACK PAIN: 0
CHEST TIGHTNESS: 0
COUGH: 0
DIARRHEA: 0
SHORTNESS OF BREATH: 0
CHEST TIGHTNESS: 0
CONSTIPATION: 0
ABDOMINAL PAIN: 0
SORE THROAT: 0
SHORTNESS OF BREATH: 0

## 2022-09-19 ASSESSMENT — PAIN DESCRIPTION - ORIENTATION
ORIENTATION: LEFT

## 2022-09-19 ASSESSMENT — PAIN SCALES - GENERAL
PAINLEVEL_OUTOF10: 7
PAINLEVEL_OUTOF10: 1
PAINLEVEL_OUTOF10: 3
PAINLEVEL_OUTOF10: 4
PAINLEVEL_OUTOF10: 5
PAINLEVEL_OUTOF10: 7
PAINLEVEL_OUTOF10: 5
PAINLEVEL_OUTOF10: 0
PAINLEVEL_OUTOF10: 5
PAINLEVEL_OUTOF10: 10

## 2022-09-19 ASSESSMENT — PAIN DESCRIPTION - LOCATION
LOCATION: BREAST

## 2022-09-19 ASSESSMENT — PAIN DESCRIPTION - DESCRIPTORS
DESCRIPTORS: ACHING
DESCRIPTORS: ACHING

## 2022-09-19 ASSESSMENT — PAIN DESCRIPTION - ONSET: ONSET: ON-GOING

## 2022-09-19 ASSESSMENT — PAIN DESCRIPTION - PAIN TYPE: TYPE: SURGICAL PAIN

## 2022-09-19 NOTE — PROGRESS NOTES
1653 pt arrived to PACU, awakens to voice. VSS  1657 c/o pain 10/10, medicated with 0.5 mg dilaudid  1702 no change in pain status, medicated with 0.5 mg dilaudid  1707 pt states pain 5/10 and tolerable. VSS  1717 c/o pain 7/10, medicated with 0.5 mg dilaudid  1722 no change in pain status, medicated with 0.5 mg dilaudid  1727 pt states pain 4/10 and tolerable. VSS  1742 pt states pain tolerable.  Meets criteria for discharge from PACU, transported to [facility]

## 2022-09-19 NOTE — PROGRESS NOTES
Admitted to Same Day Surgery and oriented to the unit. Patient verbalized approval for first name, last initial and physician name on the unit white board. Fall and allergy band on patient.  Viv Jerry at bedside.

## 2022-09-19 NOTE — ANESTHESIA PRE PROCEDURE
Department of Anesthesiology  Preprocedure Note       Name:  Lucy Bailey   Age:  40 y.o.  :  1980                                          MRN:  [id number]         Date:  2022      Surgeon: Franco Robison): Gila Fletcher MD    Procedure: Procedure(s):  LEFT BREAST LUMPECTOMY WITH SENTINELYMPH NODE BX    Medications prior to admission:   Prior to Admission medications    Medication Sig Start Date End Date Taking? Authorizing Provider   escitalopram (LEXAPRO) 5 MG tablet Take 1 tablet by mouth in the morning. 22   GERALDINE Mccollum CNP   cyclobenzaprine (FLEXERIL) 10 MG tablet Take 1 tablet by mouth nightly as needed for Muscle spasms 22   GERALDINE Hidalgo CNP   butalbital-acetaminophen-caffeine (FIORICET, ESGIC) -45 MG per tablet Take 1 tablet by mouth every 6 hours as needed for Headaches 22   GERALDINE Mccollum CNP   pantoprazole (PROTONIX) 40 MG tablet Take 1 tablet by mouth every morning (before breakfast) 22   GERALDINE Mccollum CNP   diazePAM (VALIUM) 2 MG tablet Take 2 mg by mouth every 8 hours as needed for Anxiety. Historical Provider, MD   ibuprofen (ADVIL;MOTRIN) 400 MG tablet Take 400 mg by mouth every 6 hours as needed for Pain    Historical Provider, MD   HYDROcodone-acetaminophen (NORCO) 5-325 MG per tablet TAKE 1 TABLET BY MOUTH EVERY 4 HOURS FOR 7 DAYS 22   Historical Provider, MD   METAMUCIL FIBER PO Take 1 capsule by mouth daily 3 in 1 fiber    Historical Provider, MD       Current medications:    No current facility-administered medications for this visit. No current outpatient medications on file.      Facility-Administered Medications Ordered in Other Visits   Medication Dose Route Frequency Provider Last Rate Last Admin    0.9 % sodium chloride infusion   IntraVENous Continuous Kaitlin Sumner  mL/hr at 07 1317 New Bag at 22 1317    ceFAZolin (ANCEF) 2000 mg in dextrose 5 % 50 mL IVPB  2,000 mg IntraVENous 30 Min Pre-Op Kaitlin Sumner LPN        scopolamine (TRANSDERM-SCOP) transdermal patch 1 patch  1 patch TransDERmal Once Danny Cristina MD           Allergies: Allergies   Allergen Reactions    Amoxicillin Hives       Problem List:    Patient Active Problem List   Diagnosis Code    Menorrhagia with regular cycle N92.0    Dysmenorrhea N94.6    History of robot-assisted laparoscopic hysterectomy Z90.710    Adjustment disorder with depressed mood F43.21    Cervical spinal stenosis M48.02    Malignant neoplasm of 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 \"       Past Surgical History:        Procedure Laterality Date    CERVICAL FUSION N/A 12/08/2021    ACDF C4-C7-T1 performed by Yelitza Brand MD at [place] [id number]  03/25/2016    Dr. Agnes Maria N/A 09/13/2017    DILATATION AND CURETTAGE HYSTEROSCOPY, POSSIBLE MYOSURE performed by Mirna Messer DO at [place] [id number]  04/18/2018   [place]    umbilical hernia    HYSTERECTOMY (CERVIX STATUS UNKNOWN)  03/13/2019    partial    HYSTERECTOMY ABDOMINAL N/A 03/13/2019    ROBOTIC TOTAL LAPAROSCOPIC HYSTERECTOMY WITH BILATERAL SALPINGECTOMY performed by Mirna Messer DO at [address] [place] [address] HYSTEROSCOPY  04/18/2018    MARGUERITE [address] LEFT Left 09/07/2022    MARGUERITE [address] LEFT 9/7/2022 Priscilla Braxton MD [facility]    OVARY REMOVAL      one ovary remains    [place] HYSTEROSCOPY,W/ENDOMETRIAL ABLATION N/A 04/18/2018    HYSTEROSCOPY ENDOMETRIAL ABLATION performed by Mirna Messer DO at [address] BIOPSY NEEDLE ADDITIONAL LEFT Left 09/07/2022    US BREAST BIOPSY NEEDLE ADDITIONAL LEFT 9/7/2022 [facility]       Social History:    Social History     Tobacco Use    Smoking status: Never    Smokeless tobacco: Never   Substance Use Topics    Alcohol use: No     Alcohol/week: 0.0 standard drinks                                Counseling given: Not Answered      Vital Signs (Current): There were no vitals filed for this visit. BP Readings from Last 3 Encounters:   09/19/22 115/75   09/13/22 110/60   08/12/22 118/64       NPO Status:                                                                                 BMI:   Wt Readings from Last 3 Encounters:   09/13/22 128 lb 8 oz (58.3 kg)   09/14/22 128 lb (58.1 kg)   08/12/22 128 lb 9.6 oz (58.3 kg)     There is no height or weight on file to calculate BMI.    CBC:   Lab Results   Component Value Date/Time    WBC 5.5 04/25/2022 11:14 PM    RBC 4.57 04/25/2022 11:14 PM    HGB 14.2 09/14/2022 10:39 AM    HCT 43.9 09/14/2022 10:39 AM    MCV 88.6 04/25/2022 11:14 PM    RDW 13.6 04/25/2022 11:14 PM     04/25/2022 11:14 PM       CMP:   Lab Results   Component Value Date/Time     04/25/2022 11:14 PM    K 3.5 04/25/2022 11:14 PM     04/25/2022 11:14 PM    CO2 28 04/25/2022 11:14 PM    BUN 12 04/25/2022 11:14 PM    CREATININE 0.74 04/25/2022 11:14 PM    LABGLOM >90 02/28/2022 08:00 AM    GLUCOSE 101 04/25/2022 11:14 PM    PROT 6.7 04/11/2021 03:09 PM    CALCIUM 9.30 04/25/2022 11:14 PM    BILITOT 0.3 04/11/2021 03:09 PM    ALKPHOS 58 04/11/2021 03:09 PM    AST 14 04/11/2021 03:09 PM    ALT 10 04/11/2021 03:09 PM       POC Tests: No results for input(s): POCGLU, POCNA, POCK, POCCL, POCBUN, POCHEMO, POCHCT in the last 72 hours.     Coags:   Lab Results   Component Value Date/Time    INR 0.97 04/11/2021 03:09 PM    APTT 32.2 04/11/2021 03:09 PM       HCG (If Applicable):   Lab Results   Component Value Date    PREGTESTUR NEGATIVE 03/13/2019        ABGs: No results found for: PHART, PO2ART, ULE2RBP, WOY5GKT, BEART, [id number]     Type & Screen (If Applicable):  Lab Results   Component Value Date    LABRH POS 12/08/2021       Drug/Infectious Status (If Applicable):  No results found for: HIV, HEPCAB    COVID-19 Screening (If Applicable):   Lab Results   Component Value Date/Time    COVID19 NOT DETECTED 08/09/2021 08:54 AM           Anesthesia Evaluation  Patient summary reviewed   history of anesthetic complications: PONV. Airway: Mallampati: I  TM distance: >3 FB   Neck ROM: full  Mouth opening: > = 3 FB   Dental: normal exam         Pulmonary:normal exam                               Cardiovascular:  Exercise tolerance: good (>4 METS),                     Neuro/Psych:   (+) psychiatric history:            GI/Hepatic/Renal:   (+) renal disease: kidney stones,           Endo/Other:              Pt had no PAT visit       Abdominal:             Vascular: Other Findings:             Anesthesia Plan      general     ASA 2       Induction: intravenous. MIPS: Postoperative opioids intended and Prophylactic antiemetics administered. Anesthetic plan and risks discussed with patient.       Plan discussed with CRNA and surgical team.                    Blake Pichardo MD   9/19/2022

## 2022-09-19 NOTE — DISCHARGE INSTRUCTIONS
DR. Raul Gamboa DISCHARGE INSTRUCTIONS    Pt Name: Jeovanny Mendoza  Medical Record Number: [id number]  Today's Date: 9/19/2022  GENERAL ANESTHESIA OR SEDATION   1. Do not drive or operate hazardous machinery for 24 hours. 2. Do not make important business or personal decisions for 24 hours. 3. Do not drink alcoholic beverages or use tobacco for 24 hours. ACTIVITY INSTRUCTIONS   Rest today. Resume light to normal activity tomorrow. You may resume normal activity tomorrow. Do not engage in strenuous activity that may place stress on your incision. Do not drive for 3-5 days and avoid heavy lifting, tugging, pullings greater than 10-20 lbs until seen in the office. DIET INSTRUCTIONS   Begin with clear liquids. If not nauseated, may increase to a low-fat diet when you desire. Greasy and spicy foods are not advised. Regular diet as tolerated. MEDICATIONS   Prescription written for Norco. Take as directed. Do not drink alcohol or drive while taking pain medications. You may experience dizziness or drowsiness with these medications. You may also experience constipation which can be relieved with stool softners or laxatives. You may resume your daily prescription medication schedule unless otherwise specified. Do not take 325mg Aspirin or other blood thinners such as Coumadin or Plavix for 5 days. WOUND & DRESSING INSTRUCTIONS   Always ensure you and your care giver clean hands before and after caring for the wound. Keep dressing clean and dry for 48 hours. Change when soiled or wet. Allow steri-strips to fall off on their own if present, allow surgical glue to peel off on its own  Ice operative site for 20 minutes 4 times a day. May wash over incision in shower in 48 hours, but do not soak in a bath. BREAST PROCEDURES   Following breast procedures it is important to use supportive garments  It is also normal with sentinel lymph node biopsy for the urine to have a bluish color.   ABDOMINAL & LAPAROSCOPIC PROCEDURES   1. You are encouraged to get up and move around as this helps with the circulation and speeds up the healing process. 2. Breath deeply and cough from time to time. This helps to clear your lungs and helps prevent pneumonia. 3. Supporting your incision with a pillow or your hand helps to minimize discomfort and pain. 4. Laparoscopic patients may develop shoulder pain in the first 48 hours from the gas used during the procedure. FOLLOW UP CARE, SPECIFICALLY WATCH FOR:    Fever over 101 degrees by mouth   Increased redness, warmth, hardness at operative site. Blood soaked dressing (small amounts of oozing may be normal.)   Increased or progressive drainage from the surgical area   Inability to urinate or blood in the urine   Pain not relieved by the medications ordered   Persistent nausea and/or vomiting, unable to retain fluids. FOLLOW-UP APPOINTMENT in 2 weeks    Call my office if you have any problem that concerns you, [phone]. After hours, you can reach the answering service via the office phone number. IF YOU NEED IMMEDIATE ATTENTION, GO TO THE EMERGENCY ROOM AND YOUR DOCTOR WILL BE CONTACTED. Josafat Ramirez MD  [phone] [facility], [address]  Electronically signed by Chao Rodriguez MD on 9/19/2022 at 4:59 PM

## 2022-09-19 NOTE — H&P
Cesar Escobar MD MD    General Surgery  New Patient Evaluation in Office  Pt Name: Alonso Davis  Date of Birth 1980   Today's Date: 9/19/2022  Medical Record Number: [id number]  Referring Provider: No ref. provider found  Primary Care Provider: Bria Mello MD  Chief Complaint   Patient presents with    Surgical Consult     New patient-referred by [facility] - Moderately differentiated invasive ductal carcinoma left breast     ASSESSMENT      Problem List Items Addressed This Visit          Other    Malignant neoplasm of upper-outer quadrant of breast in female, estrogen receptor positive ([address]    Relevant Orders    Hemoglobin and Hematocrit (Completed)    US, HEAD[facility]ECK [facility] SOFT T[facility]ROID (Completed)     Other Visit Diagnoses       Enlarged lymph node in neck    -  Primary    Relevant Orders    Hemoglobin and Hematocrit (Completed)    US, HEAD/NECK TISSUES,REAL TIME    US HEAD NECK SOFT TISSUE THYROID (Completed)          Past Medical History:   Diagnosis Date    Invasive ductal carcinoma of breast, female, left ([address] 09/07/2022    with lobular features    Kidney stone     2005 with pregnancy    PONV (postoperative nausea and vomiting)     Prolonged emergence from general anesthesia     \"During Hysterectomy- bradycardia- meds to increase heart rate. \"          Cancer Staging  Malignant neoplasm of upper-outer quadrant of breast in female, estrogen receptor positive ([address]  Staging form: Breast, AJCC 8th Edition  - Clinical stage from 9/13/2022: Stage IA (cT1c, cN0, cM0, G2, ER+, MT+, HER2-) - Unsigned    PLANS      Schedule Neha Rowe for   Left lumpectomy with SLN biopsy   MRI bilateral breast will hold on OR if any new findings  Genetics - would not change what surgery she has  Neck US for enlarged submanbidular node  In the office, I had a discussion with the patient regarding the treatment options for invasive breast cancer. We discussed lumpectomy and radiation versus mastectomy. We discussed the fact that there is no statistical difference in long term survival or recurrence between the two options. Candidate for Lumpectomy YES/NO: Yes  Candidate for omission of sln bx YES/NO: No  Candidate for potential omission of radiation YES/NO: No  We had a long discussion in the office regarding treatment options for breast cancer. We discussed lumpectomy and radiation versus simple mastectomy with or without reconstruction. For lumpectomy, we discussed the conduct of the operation, types of anesthesia available, possible use of needle localization preop and expected postop recovery and cosmetic outcome. We also discussed the risk of recurrence after lumpectomy and radiation estimated to be 9-11% of which 50% of the recurrence is non-invasive breast cancer and the other 50% is invasive cancer. We covered radiation therapy, its typical course and average number of treatments required, as well as possible side effects. In addition, we discussed treatment if recurrence developed which is mastectomy. Finally we discussed the results of NSABP B-24 which showed a 50% reduction in local recurrence with the use of adjuvant tamoxifen taken for 5 years after radiation. We also covered simple mastectomy. The conduct of the operation, use of general anesthesia, the expected postop recovery and cosmetic outcome with and without reconstruction. The recurrence rate of 1% was estimated as the risk of recurrence. After this discussion, the patient's questions were answered and has decided to proceed with surgical intervention. For lumpectomy, we covered the conduct of the operation, the possible use of needle localization preoperatively, the anesthetic options, the typical post op course and cosmetic outcome, and the complications including bleeding, infection, seroma, and reoperation for positive margins.   For mastectomy, we covered the conduct of the operation, the use of general anesthesia, the typical post op course and cosmetic outcome. We also covered reconstruction options both immediate and delayed and referral was made if the patient desired reconstruction, or the use of a bra prosthesis. We also covered the possible complications including bleeding, infection, skin slough, seroma formation and others. We also discussed staging and the importance of lymph node sampling. We discussed the use of sentinel lymph node biopsy versus standard axillary lymph node dissection. We discussed the complications of axillary lymph node dissection and how the information is used in treatment decisions and prognosis. We discussed how the sentinel lymph node is identified and its significance. We discussed the possibility of not finding the sentinel node as well as a 5% false negative rate. We discussed the ACOSOG  Z011 trial where even if sln is positive, no benefit for further axillary surgery has been shown in lumpectomy in patients getting radiation. We also covered adjuvant chemotherapy and radiation therapy if they would be required. After these discussions, the patient's questions were answered and has elected to proceed with surgery. Her need for genetic testing referral was calculated by questions asked during her mammograms and she was is deemed to be a candidate for testing. For those who are deemed appropriate, referral to the genetic counseling service at [address] [place] [address] was made. Lucy Bailey meets the following criteria for further genetic risk evaluation based on NCCN guidelines:  Breast Cancer diagnosed age 48 years or younger. Triple-negative (ER-, HI-, HER2-) breast cancer diagnosed age 61 years or younger.   Two breast cancer primaries  Breast cancer at any age and   1 or more close blood relatives with:  Breast cancer diagnosis at age 48 years or younger; or  Invasive ovarian cancer; or  Male breast cancer; or  Pancreatic cancer; or  High-grade (Vandana score greater than or equal to 7) or metastatic prostate cancer. 2 or more close blood relatives with breast cancer at any age. Lobular breast cancer. Diffuse gastric cancer  Breast cancer, gastrointestinal cancer, or hamartomatous polyps, ovarian sex chord tumors, pancreatic cancer, testicular sertoli cell tumors, or childhood skin pigmentation. Status: outpatient  Planned anesthesia: MAC  She will undergo pre-operative clearance per anesthesia guidelines with risk factors listed under the past medical history diagnosis & problem list.  13.  Perioperative discontinuation of        none. Continuation of 81 mg Aspirin is acceptable. 14.  Perioperative medical clearance is not         Orders Placed This Encounter:  Orders Placed This Encounter   Procedures    US HEAD NECK SOFT TISSUE THYROID     This procedure can be scheduled via [employer]. Access your [employer] account by visiting [email or web address]. Standing Status:   Future     Number of Occurrences:   1     Standing Expiration Date:   9/13/2023    Hemoglobin and Hematocrit     Standing Status:   Future     Number of Occurrences:   1     Standing Expiration Date:   9/13/2023    US, HEAD/NECK Philly Skaggs is a 40 y.o.  female seen in the office for evaluation of breast cancer. She was referred for evaluation and discussion of treatment options for carcinoma of the left UOQ. she is had core biopsy, left demonstrating an intermediate grade invasive ductal cancer of the left breast. Estrogen receptor status is positive. Progesterone receptor status is positive. HER-2/billy receptors negative . Prior to the biopsy she complained of no breast symptoms and denied galactorrhea and new or changing breast lumps. Risk assessment: family hx on mother's side. She has not been on previous estrogen therapy.   Past Medical History  Past Medical History:   Diagnosis Date    Invasive ductal carcinoma of breast, female, left ([facility] 09/07/2022    with lobular Grandmother     Colon Cancer Paternal Uncle         age 62s    Lung Cancer Paternal Uncle         age >47    Breast Cancer Paternal Aunt         age >47    Lung Cancer Maternal Aunt         age >47    Asthma Neg Hx      Cancer-related family history includes Breast Cancer in her paternal aunt; Breast Cancer (age of onset: 61) in her mother; Colon Cancer in her paternal uncle; Lung Cancer in her father, maternal aunt, and paternal uncle. Medications  Outpatient Encounter Medications as of 9/13/2022   Medication Sig Dispense Refill    escitalopram (LEXAPRO) 5 MG tablet Take 1 tablet by mouth in the morning. 30 tablet 2    cyclobenzaprine (FLEXERIL) 10 MG tablet Take 1 tablet by mouth nightly as needed for Muscle spasms 7 tablet 0    butalbital-acetaminophen-caffeine (FIORICET, ESGIC) -40 MG per tablet Take 1 tablet by mouth every 6 hours as needed for Headaches 60 tablet 0    pantoprazole (PROTONIX) 40 MG tablet Take 1 tablet by mouth every morning (before breakfast) 30 tablet 2    diazePAM (VALIUM) 2 MG tablet Take 2 mg by mouth every 8 hours as needed for Anxiety. ibuprofen (ADVIL;MOTRIN) 400 MG tablet Take 400 mg by mouth every 6 hours as needed for Pain      HYDROcodone-acetaminophen (NORCO) 5-325 MG per tablet TAKE 1 TABLET BY MOUTH EVERY 4 HOURS FOR 7 DAYS      METAMUCIL FIBER PO Take 1 capsule by mouth daily 3 in 1 fiber      [DISCONTINUED] topiramate (TOPAMAX) 50 MG tablet Take 1 tablet by mouth daily (Patient not taking: Reported on 9/13/2022) 30 tablet 3     No facility-administered encounter medications on file as of 9/13/2022. No current outpatient medications on file. No current facility-administered medications for this visit.      Allergies  Allergies   Allergen Reactions    Amoxicillin Hives       Social History  Social History     Socioeconomic History    Marital status: Single     Spouse name: Not on file    Number of children: Not on file    Years of education: Not on file Highest education level: Not on file   Occupational History    Not on file   Tobacco Use    Smoking status: Never    Smokeless tobacco: Never   Vaping Use    Vaping Use: Never used   Substance and Sexual Activity    Alcohol use: No     Alcohol/week: 0.0 standard drinks    Drug use: No    Sexual activity: Yes     Partners: Male   Other Topics Concern    Not on file   Social History Narrative    Not on file     Social Determinants of Health     Financial Resource Strain: Low Risk     Difficulty of Paying Living Expenses: Not hard at all   Food Insecurity: No Food Insecurity    Worried About Running Out of Food in the Last Year: Never true    Ran Out of Food in the Last Year: Never true   Transportation Needs: Not on file   Physical Activity: Not on file   Stress: Not on file   Social Connections: Not on file   Intimate Partner Violence: Not on file   Housing Stability: Not on fav.or.it Maintenance   Topic Date Due    COVID-19 Vaccine (1) Never done    Varicella vaccine (1 of 2 - 2-dose childhood series) Never done    HIV screen  Never done    Hepatitis C screen  Never done    DTaP/Tdap/Td vaccine (1 - Tdap) Never done    Lipids  06/02/2021    Flu vaccine (1) Never done    Depression Monitoring  02/23/2023    Breast cancer screen  09/07/2023    Hepatitis A vaccine  Aged Out    Hepatitis B vaccine  Aged Out    Hib vaccine  Aged Out    Meningococcal (ACWY) vaccine  Aged Out    Pneumococcal 0-64 years Vaccine  Aged Out       Review of Systems  Review of Systems   Constitutional:  Negative for appetite change, fatigue and fever. HENT:  Negative for ear pain, sore throat and trouble swallowing. Respiratory:  Negative for cough, chest tightness and shortness of breath. Cardiovascular:  Negative for chest pain, palpitations and leg swelling. Gastrointestinal:  Negative for abdominal pain, blood in stool, constipation, diarrhea, nausea and vomiting.    Endocrine: Negative for cold intolerance. Genitourinary:  Negative for difficulty urinating and urgency. Musculoskeletal:  Negative for back pain and joint swelling. Skin:  Negative for rash and wound. Allergic/Immunologic: Negative for immunocompromised state. Neurological:  Negative for seizures and headaches. Psychiatric/Behavioral:  Negative for hallucinations and suicidal ideas. The patient is not nervous/anxious. OBJECTIVE    VITALS:  height is 5' 2.5\" (1.588 m) and weight is 128 lb 8 oz (58.3 kg). Her temporal temperature is 96.6 °F (35.9 °C) (abnormal). Her blood pressure is 110/60 and her pulse is 85. Her respiration is 18 and oxygen saturation is 99%. Physical Exam  Constitutional:       Appearance: Normal appearance. She is well-developed. She is not ill-appearing. HENT:      Head: Normocephalic and atraumatic. Comments: Enalrged submandibular lymphnode      Mouth/Throat:      Mouth: Mucous membranes are moist.      Pharynx: No oropharyngeal exudate. Eyes:      General: No scleral icterus. Extraocular Movements: Extraocular movements intact. Pupils: Pupils are equal, round, and reactive to light. Neck:      Thyroid: No thyromegaly. Trachea: No tracheal deviation. Cardiovascular:      Rate and Rhythm: Normal rate. Pulses: Normal pulses. Pulmonary:      Effort: Pulmonary effort is normal. No respiratory distress. Chest:   Breasts:     Right: Normal. No swelling, bleeding, inverted nipple, mass, nipple discharge or skin change. Left: Mass and tenderness present. No swelling or bleeding. Comments: Bruising in left upper quadrant  Palpable mass in left upper quadrant just outside the nipple areolar junction   Abdominal:      Palpations: Abdomen is soft. Tenderness: There is no abdominal tenderness. There is no guarding. Hernia: No hernia is present. Musculoskeletal:         General: No deformity. Normal range of motion.       Cervical back: Normal range of motion and neck supple. Skin:     General: Skin is warm. Findings: No rash. Neurological:      General: No focal deficit present. Mental Status: She is alert and oriented to person, place, and time. Psychiatric:         Mood and Affect: Mood normal.         Speech: Speech normal.         Behavior: Behavior normal.         Thought Content: Thought content normal.              Electronically signed by Gregor Malagon MD on 9/19/2022 at 12:02 PM    The patients records and films were reviewed independently. Time was given for questions . All questions were answered to the patients satisfaction. A total time of 90 minutes  was spent with at least 51% related to counseling and coordination of care.

## 2022-09-19 NOTE — PROGRESS NOTES

## 2022-09-19 NOTE — PROCEDURES
[facility]  Operative Report    PATIENT NAME: El Rodulevard NO. [id number]  SURGEON: Demetrio Obrien MD MD [facility]  Primary Care Physician: Charlene Blanco MD  Date: 9/19/2022, 12:10 PM     PROCEDURE PERFORMED: Left  [place] Lymph node Injection  PREOPERATIVE DIAGNOSIS: Left Breast Cancer  POSTOPERATIVE DIAGNOSIS: Same, path pending  SURGEON:  Demetrio Obrien MD MD   ANESTHESIA:  None  ESTIMATED BLOOD LOSS:  None  COMPLICATIONS:  None; patient tolerated the procedure well. CONDITION: stable          Procedure Details     The patient was seen in Nuclear Medicine in radiology. The risks, benefits, complications, treatment options, and expected outcomes were previously discussed with the patient. The possibilities of reaction to medication were discussed with the patient. The patient concurred with the proposed plan, giving informed consent. The site of surgery properly noted/marked. The patient was taken to the nuclear medicine room and identified  and the procedure verified as [place] Lymphnode injection . A Time Out was held and the above information confirmed. The left breast was identified and the site for injection periareaolarly was identified. The breast was prepped with 1% alcohol. 0.5 cc 's of unfiltered technetium sulfur colloid was injected into the dermis of the skin at the site. A sterile dressing was placed over the injection site. The patient tolerated the procedure well.              Demetrio Obrien MD, MD    Electronically signed 9/19/2022 at 12:10 PM

## 2022-09-19 NOTE — PROGRESS NOTES
Pt returned to [facility] room 20. Vitals and assessment as charted. 0.9 infusing, @700ml to count from PACU. Pt has applesauce and water. Family at the bedside. Pt and family verbalized understanding of discharge criteria and call light use. Call light in reach.

## 2022-09-19 NOTE — ANESTHESIA POSTPROCEDURE EVALUATION
Department of Anesthesiology  Postprocedure Note    Patient: Ira Starkey  MRN: [id number]  YOB: 1980  Date of evaluation: 9/19/2022      Procedure Summary     Date: 09/19/22 Room / Location: [address]    Anesthesia Start: 1518 Anesthesia Stop: 2490    Procedure: LEFT BREAST LUMPECTOMY WITH SENTINELYMPH NODE BX (Left: Breast) Diagnosis:       History of left breast cancer      (History of left breast cancer [Z85.3])    Surgeons: Demetrio Obrien MD Responsible Provider: Mei Dougherty DO    Anesthesia Type: general ASA Status: 2          Anesthesia Type: No value filed.     Ami Phase I: Ami Score: 9    Ami Phase II:        Anesthesia Post Evaluation    Patient location during evaluation: PACU  Patient participation: complete - patient participated  Level of consciousness: awake  Airway patency: patent  Nausea & Vomiting: no nausea  Complications: no  Cardiovascular status: hemodynamically stable  Respiratory status: acceptable  Hydration status: stable

## 2022-09-20 ENCOUNTER — TELEPHONE (OUTPATIENT)
Dept: SURGERY | Age: 42
End: 2022-09-20

## 2022-09-20 NOTE — OP NOTE
Operative Note      Patient: Jos Kwong  YOB: 1980  MRN: [id number]    Date of Procedure: 9/19/2022    Pre-Op Diagnosis: History of left breast cancer [Z85.3], Cancer Staging  Malignant neoplasm of upper-outer quadrant of breast in female, estrogen receptor positive ([facility] [address]  Staging form: Breast, AJCC 8th Edition  - Clinical stage from 9/13/2022: Stage IA (cT1c, cN0, cM0, G2, ER+, MO+, HER2-) - Unsigned      Post-Op Diagnosis: same       Procedure(s):  LEFT BREAST LUMPECTOMY WITH SENTINELYMPH NODE BX    Surgeon(s): Adrian Mendes MD    Assistant:   * No surgical staff found *    Anesthesia: General    Estimated Blood Loss (mL): 44MT      Complications: None    Specimens:   ID Type Source Tests Collected by Time Destination   A : LEFT BREAST LUMBECTOMY  Tissue Breast SURGICAL PATHOLOGY Adrian Mendes MD 9/19/2022 1547    B : LEFT BREAST SENTINEL LYMPH NODE  Tissue Lymph Node SURGICAL PATHOLOGY Adrian Mendes MD 9/19/2022 1547    C : ADDITIONAL MEDIAL MARGIN  Tissue Breast SURGICAL PATHOLOGY Adrian Mendes MD 9/19/2022 1622    D : ADDITIONAL CAUDAL MARGIN  Tissue Breast SURGICAL PATHOLOGY Adrian Mendes MD 9/19/2022 1632        Implants:  * No implants in log *      Drains: * No LDAs found *    Findings:   Palpable mass at 1 o clock psotion- speciment without clip on mammogram  Additional medial margin without clip in specimen  Additional caudal margin for perminnant  SLN with 2600 count   Detailed Description of Procedure:   She is seen in the preoperative holding room after sentinel lymph node injection had been performed. She was taken the operating placed operative table spine position after adequate anesthesia formed was performed she is prepped and draped in normal sterile fashion. Patient had significant bruising to her upper outer quadrant of her breast.  Palpable mass at the 1 o'clock position approximately 1 cm from the nipple. This was consistent with imaging findings.   Local woke from anesthesia and transferred the PACU in stable condition. All sponge and needle counts were correct    Operation performed with curative intent: Yes  Tracer(s) used to identify sentinel nodes in the upfront surgery (nonneoadjuvant) setting (select all that apply) : Radioactive tracer  Tracer(s) used to identify sentinel nodes in the neoadjuvant setting (select all that apply): Not Applicable  All nodes (colored or noncolored) present at the end of a dye-filled lymphatic channel were removed: Not Applicable  All significantly radioactive nodes were removed:  Yes  All palpably suspicious nodes were removed: Yes  Biopsy-proven positive nodes marked with clips prior to chemotherapy were identified and removed: Not Applicable   Electronically signed by Aparna Almonte MD on 9/20/2022 at 12:07 PM

## 2022-09-27 ENCOUNTER — TELEPHONE (OUTPATIENT)
Dept: SURGERY | Age: 42
End: 2022-09-27

## 2022-09-27 ENCOUNTER — OFFICE VISIT (OUTPATIENT)
Dept: SURGERY | Age: 42
End: 2022-09-27

## 2022-09-27 ENCOUNTER — TELEPHONE (OUTPATIENT)
Dept: CASE MANAGEMENT | Age: 42
End: 2022-09-27

## 2022-09-27 VITALS
RESPIRATION RATE: 18 BRPM | HEART RATE: 78 BPM | TEMPERATURE: 97.8 F | OXYGEN SATURATION: 100 % | DIASTOLIC BLOOD PRESSURE: 62 MMHG | BODY MASS INDEX: 22.68 KG/M2 | WEIGHT: 128 LBS | SYSTOLIC BLOOD PRESSURE: 122 MMHG | HEIGHT: 63 IN

## 2022-09-27 DIAGNOSIS — Z09 POSTOPERATIVE EXAMINATION: Primary | ICD-10-CM

## 2022-09-27 DIAGNOSIS — C50.412 MALIGNANT NEOPLASM OF UPPER-OUTER QUADRANT OF LEFT BREAST IN FEMALE, ESTROGEN RECEPTOR POSITIVE (HCC): ICD-10-CM

## 2022-09-27 DIAGNOSIS — Z17.0 MALIGNANT NEOPLASM OF UPPER-OUTER QUADRANT OF LEFT BREAST IN FEMALE, ESTROGEN RECEPTOR POSITIVE (HCC): ICD-10-CM

## 2022-09-27 PROCEDURE — 99024 POSTOP FOLLOW-UP VISIT: CPT | Performed by: SURGERY

## 2022-09-27 NOTE — TELEPHONE ENCOUNTER
Per [employer]- Notification or Prior Authorization is not required for the requested services    This [employer] Medicaid members plan does not currently require a prior authorization for these services. If you have general questions about the prior authorization requirements, please call us at [phone] or visit [employer] and select the state where you practice. The number above acknowledges your notification. Please write this number down for future reference. Notification or Prior Authorization does not confirm benefit coverage and is not a guarantee of coverage or payment.     Decision ID #:[id number]

## 2022-09-27 NOTE — PROGRESS NOTES
MD [address] Post op Note    Pt Name: Marisa Trinidad  Medical Record Number: [id number]  Date of Birth 1980   Today's Date: 9/27/2022    ASSESSMENT       ICD-10-CM    1. Postoperative examination  Z09       2. Malignant neoplasm of upper-outer quadrant of left breast in female, estrogen receptor positive [facility])  C50.412 Aleksandra Andres MD, Hematology & Oncology, [facility] II.[place]    Z17.0            PLAN   Positive for LCIS in the deep margin. Discussed treatment options with the patient. We will plan for reexcision of deep margin is the patient's earliest convenience. Otherwise she is doing well. I did review with her again that the clip was not seen in the specimen however the cancer was removed based on pathology. Very well may have gotten dislodged. Referral for oncology [facility] is doing well, pain is controlled. .         CURRENT MEDICATIONS     Current Outpatient Medications on File Prior to Visit   Medication Sig Dispense Refill    escitalopram (LEXAPRO) 5 MG tablet Take 1 tablet by mouth in the morning. 30 tablet 2    cyclobenzaprine (FLEXERIL) 10 MG tablet Take 1 tablet by mouth nightly as needed for Muscle spasms 7 tablet 0    butalbital-acetaminophen-caffeine (FIORICET, ESGIC) -40 MG per tablet Take 1 tablet by mouth every 6 hours as needed for Headaches 60 tablet 0    pantoprazole (PROTONIX) 40 MG tablet Take 1 tablet by mouth every morning (before breakfast) 30 tablet 2    diazePAM (VALIUM) 2 MG tablet Take 2 mg by mouth every 8 hours as needed for Anxiety. ibuprofen (ADVIL;MOTRIN) 400 MG tablet Take 400 mg by mouth every 6 hours as needed for Pain      HYDROcodone-acetaminophen (NORCO) 5-325 MG per tablet TAKE 1 TABLET BY MOUTH EVERY 4 HOURS FOR 7 DAYS      METAMUCIL FIBER PO Take 1 capsule by mouth daily 3 in 1 fiber       No current facility-administered medications on file prior to visit.        OBJECTIVE CURRENT VITALS:  height is 5' 3\" (1.6 m) and weight is 128 lb (58.1 kg). Her temporal temperature is 97.8 °F (36.6 °C). Her blood pressure is 122/62 and her pulse is 78. Her respiration is 18 and oxygen saturation is 100%. Alert oriented appropriate no acute distress  Incision is clean dry and intact  In no seroma. LABS and Pathology   INAL DIAGNOSIS:   A. 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 Closest margin to invasive tumor = 4mm to posterior. Pleomorphic lobular carcinoma in situ at deep margin. Previous biopsy site changes. pT1c pN0 sn     B. [place] lymph nodes (2), left, resection:    Negative for malignancy (0/2). C.  Additional medial margin, lumpectomy:    Negative for malignancy. D.  Additional caudal margin, lumpectomy:    Negative for malignancy.      RADIOLOGY   na    Electronically signed by Edgardo Cannon MD on 9/27/2022 at 10:26 AM

## 2022-09-27 NOTE — TELEPHONE ENCOUNTER
Patient scheduled for surgery with Dr. Bryan Arboleda on 09- at 2:45pm with an arrival of 1:00pm.  Preop instructions and surgery consent signed.

## 2022-09-28 ENCOUNTER — PREP FOR PROCEDURE (OUTPATIENT)
Dept: SURGERY | Age: 42
End: 2022-09-28

## 2022-09-28 PROBLEM — C50.919 BREAST CANCER IN FEMALE (HCC): Status: ACTIVE | Noted: 2022-09-28

## 2022-09-28 RX ORDER — SODIUM CHLORIDE 9 MG/ML
INJECTION, SOLUTION INTRAVENOUS CONTINUOUS
Status: CANCELLED | OUTPATIENT
Start: 2022-09-28

## 2022-09-28 NOTE — TELEPHONE ENCOUNTER
Count includes the [facility] approval for surgery on 09- for CPT code 84803.   Auth# [id number] Letter scan into Media

## 2022-09-29 ENCOUNTER — HOSPITAL ENCOUNTER (OUTPATIENT)
Age: 42
Setting detail: OUTPATIENT SURGERY
Discharge: HOME | End: 2022-09-29
Attending: SURGERY | Admitting: SURGERY
Payer: COMMERCIAL

## 2022-09-29 ENCOUNTER — ANESTHESIA EVENT (OUTPATIENT)
Dept: OPERATING ROOM | Age: 42
End: 2022-09-29
Payer: COMMERCIAL

## 2022-09-29 ENCOUNTER — ANESTHESIA (OUTPATIENT)
Dept: OPERATING ROOM | Age: 42
End: 2022-09-29
Payer: COMMERCIAL

## 2022-09-29 VITALS
SYSTOLIC BLOOD PRESSURE: 133 MMHG | TEMPERATURE: 97.1 F | OXYGEN SATURATION: 96 % | HEIGHT: 62 IN | RESPIRATION RATE: 16 BRPM | BODY MASS INDEX: 23.37 KG/M2 | HEART RATE: 83 BPM | WEIGHT: 127 LBS | DIASTOLIC BLOOD PRESSURE: 79 MMHG

## 2022-09-29 DIAGNOSIS — Z17.1 MALIGNANT NEOPLASM OF NIPPLE OF LEFT BREAST IN FEMALE, ESTROGEN RECEPTOR NEGATIVE (HCC): Primary | ICD-10-CM

## 2022-09-29 DIAGNOSIS — C50.912 MALIGNANT NEOPLASM OF LEFT FEMALE BREAST, UNSPECIFIED ESTROGEN RECEPTOR STATUS, UNSPECIFIED SITE OF BREAST (HCC): ICD-10-CM

## 2022-09-29 DIAGNOSIS — C50.012 MALIGNANT NEOPLASM OF NIPPLE OF LEFT BREAST IN FEMALE, ESTROGEN RECEPTOR NEGATIVE (HCC): Primary | ICD-10-CM

## 2022-09-29 PROCEDURE — 2500000003 HC RX 250 WO HCPCS: Performed by: STUDENT IN AN ORGANIZED HEALTH CARE EDUCATION/TRAINING PROGRAM

## 2022-09-29 PROCEDURE — 6370000000 HC RX 637 (ALT 250 FOR IP): Performed by: ANESTHESIOLOGY

## 2022-09-29 PROCEDURE — 3600000012 HC SURGERY LEVEL 2 ADDTL 15MIN: Performed by: SURGERY

## 2022-09-29 PROCEDURE — 7100000000 HC PACU RECOVERY - FIRST 15 MIN: Performed by: SURGERY

## 2022-09-29 PROCEDURE — 7100000011 HC PHASE II RECOVERY - ADDTL 15 MIN: Performed by: SURGERY

## 2022-09-29 PROCEDURE — 3700000000 HC ANESTHESIA ATTENDED CARE: Performed by: SURGERY

## 2022-09-29 PROCEDURE — 7100000010 HC PHASE II RECOVERY - FIRST 15 MIN: Performed by: SURGERY

## 2022-09-29 PROCEDURE — 6360000002 HC RX W HCPCS: Performed by: STUDENT IN AN ORGANIZED HEALTH CARE EDUCATION/TRAINING PROGRAM

## 2022-09-29 PROCEDURE — 6370000000 HC RX 637 (ALT 250 FOR IP): Performed by: SURGERY

## 2022-09-29 PROCEDURE — 2580000003 HC RX 258: Performed by: SURGERY

## 2022-09-29 PROCEDURE — 3700000001 HC ADD 15 MINUTES (ANESTHESIA): Performed by: SURGERY

## 2022-09-29 PROCEDURE — 2709999900 HC NON-CHARGEABLE SUPPLY: Performed by: SURGERY

## 2022-09-29 PROCEDURE — 7100000001 HC PACU RECOVERY - ADDTL 15 MIN: Performed by: SURGERY

## 2022-09-29 PROCEDURE — 3600000002 HC SURGERY LEVEL 2 BASE: Performed by: SURGERY

## 2022-09-29 PROCEDURE — 2500000003 HC RX 250 WO HCPCS: Performed by: SURGERY

## 2022-09-29 PROCEDURE — 6360000002 HC RX W HCPCS: Performed by: SURGERY

## 2022-09-29 PROCEDURE — 19301 PARTIAL MASTECTOMY: CPT | Performed by: SURGERY

## 2022-09-29 PROCEDURE — 88307 TISSUE EXAM BY PATHOLOGIST: CPT

## 2022-09-29 RX ORDER — ONDANSETRON 2 MG/ML
INJECTION INTRAMUSCULAR; INTRAVENOUS PRN
Status: DISCONTINUED | OUTPATIENT
Start: 2022-09-29 | End: 2022-09-29 | Stop reason: SDUPTHER

## 2022-09-29 RX ORDER — HYDRALAZINE HYDROCHLORIDE 20 MG/ML
10 INJECTION INTRAMUSCULAR; INTRAVENOUS
Status: DISCONTINUED | OUTPATIENT
Start: 2022-09-29 | End: 2022-09-29 | Stop reason: HOSPADM

## 2022-09-29 RX ORDER — BUPIVACAINE HYDROCHLORIDE 5 MG/ML
INJECTION, SOLUTION EPIDURAL; INTRACAUDAL PRN
Status: DISCONTINUED | OUTPATIENT
Start: 2022-09-29 | End: 2022-09-29 | Stop reason: ALTCHOICE

## 2022-09-29 RX ORDER — HYDROCODONE BITARTRATE AND ACETAMINOPHEN 5; 325 MG/1; MG/1
1 TABLET ORAL EVERY 6 HOURS PRN
Qty: 12 TABLET | Refills: 0 | Status: SHIPPED | OUTPATIENT
Start: 2022-09-29 | End: 2022-10-02

## 2022-09-29 RX ORDER — HYDROCODONE BITARTRATE AND ACETAMINOPHEN 5; 325 MG/1; MG/1
1 TABLET ORAL ONCE
Status: COMPLETED | OUTPATIENT
Start: 2022-09-29 | End: 2022-09-29

## 2022-09-29 RX ORDER — FENTANYL CITRATE 50 UG/ML
INJECTION, SOLUTION INTRAMUSCULAR; INTRAVENOUS PRN
Status: DISCONTINUED | OUTPATIENT
Start: 2022-09-29 | End: 2022-09-29 | Stop reason: SDUPTHER

## 2022-09-29 RX ORDER — MIDAZOLAM HYDROCHLORIDE 1 MG/ML
INJECTION INTRAMUSCULAR; INTRAVENOUS PRN
Status: DISCONTINUED | OUTPATIENT
Start: 2022-09-29 | End: 2022-09-29 | Stop reason: SDUPTHER

## 2022-09-29 RX ORDER — MORPHINE SULFATE 2 MG/ML
2 INJECTION, SOLUTION INTRAMUSCULAR; INTRAVENOUS EVERY 5 MIN PRN
Status: DISCONTINUED | OUTPATIENT
Start: 2022-09-29 | End: 2022-09-29 | Stop reason: HOSPADM

## 2022-09-29 RX ORDER — SODIUM CHLORIDE 0.9 % (FLUSH) 0.9 %
5-40 SYRINGE (ML) INJECTION EVERY 12 HOURS SCHEDULED
Status: DISCONTINUED | OUTPATIENT
Start: 2022-09-29 | End: 2022-09-29 | Stop reason: HOSPADM

## 2022-09-29 RX ORDER — PROPOFOL 10 MG/ML
INJECTION, EMULSION INTRAVENOUS PRN
Status: DISCONTINUED | OUTPATIENT
Start: 2022-09-29 | End: 2022-09-29 | Stop reason: SDUPTHER

## 2022-09-29 RX ORDER — SODIUM CHLORIDE 9 MG/ML
INJECTION, SOLUTION INTRAVENOUS CONTINUOUS
Status: DISCONTINUED | OUTPATIENT
Start: 2022-09-29 | End: 2022-09-29 | Stop reason: HOSPADM

## 2022-09-29 RX ORDER — FENTANYL CITRATE 50 UG/ML
50 INJECTION, SOLUTION INTRAMUSCULAR; INTRAVENOUS EVERY 5 MIN PRN
Status: DISCONTINUED | OUTPATIENT
Start: 2022-09-29 | End: 2022-09-29 | Stop reason: HOSPADM

## 2022-09-29 RX ORDER — SODIUM CHLORIDE 9 MG/ML
INJECTION, SOLUTION INTRAVENOUS PRN
Status: DISCONTINUED | OUTPATIENT
Start: 2022-09-29 | End: 2022-09-29 | Stop reason: HOSPADM

## 2022-09-29 RX ORDER — LIDOCAINE HYDROCHLORIDE 20 MG/ML
INJECTION, SOLUTION EPIDURAL; INFILTRATION; INTRACAUDAL; PERINEURAL PRN
Status: DISCONTINUED | OUTPATIENT
Start: 2022-09-29 | End: 2022-09-29 | Stop reason: SDUPTHER

## 2022-09-29 RX ORDER — SCOLOPAMINE TRANSDERMAL SYSTEM 1 MG/1
1 PATCH, EXTENDED RELEASE TRANSDERMAL
Status: DISCONTINUED | OUTPATIENT
Start: 2022-09-29 | End: 2022-09-29 | Stop reason: HOSPADM

## 2022-09-29 RX ORDER — SODIUM CHLORIDE 0.9 % (FLUSH) 0.9 %
5-40 SYRINGE (ML) INJECTION PRN
Status: DISCONTINUED | OUTPATIENT
Start: 2022-09-29 | End: 2022-09-29 | Stop reason: HOSPADM

## 2022-09-29 RX ORDER — GLYCOPYRROLATE 1 MG/5 ML
SYRINGE (ML) INTRAVENOUS PRN
Status: DISCONTINUED | OUTPATIENT
Start: 2022-09-29 | End: 2022-09-29 | Stop reason: SDUPTHER

## 2022-09-29 RX ADMIN — SODIUM CHLORIDE: 9 INJECTION, SOLUTION INTRAVENOUS at 11:49

## 2022-09-29 RX ADMIN — Medication 0.2 MG: at 12:31

## 2022-09-29 RX ADMIN — Medication 80 MG: at 12:21

## 2022-09-29 RX ADMIN — FENTANYL CITRATE 50 MCG: 50 INJECTION, SOLUTION INTRAMUSCULAR; INTRAVENOUS at 13:20

## 2022-09-29 RX ADMIN — PROPOFOL 150 MG: 10 INJECTION, EMULSION INTRAVENOUS at 12:21

## 2022-09-29 RX ADMIN — HYDROCODONE BITARTRATE AND ACETAMINOPHEN 1 TABLET: 5; 325 TABLET ORAL at 14:32

## 2022-09-29 RX ADMIN — ONDANSETRON 4 MG: 2 INJECTION INTRAMUSCULAR; INTRAVENOUS at 13:05

## 2022-09-29 RX ADMIN — FENTANYL CITRATE 50 MCG: 50 INJECTION, SOLUTION INTRAMUSCULAR; INTRAVENOUS at 12:21

## 2022-09-29 RX ADMIN — MIDAZOLAM 2 MG: 1 INJECTION INTRAMUSCULAR; INTRAVENOUS at 12:21

## 2022-09-29 RX ADMIN — CEFAZOLIN 2000 MG: 10 INJECTION, POWDER, FOR SOLUTION INTRAVENOUS at 12:25

## 2022-09-29 RX ADMIN — Medication 0.2 MG: at 12:36

## 2022-09-29 ASSESSMENT — PAIN DESCRIPTION - LOCATION
LOCATION: BREAST

## 2022-09-29 ASSESSMENT — PAIN DESCRIPTION - DESCRIPTORS
DESCRIPTORS: ACHING
DESCRIPTORS: ACHING

## 2022-09-29 ASSESSMENT — PAIN SCALES - GENERAL
PAINLEVEL_OUTOF10: 8
PAINLEVEL_OUTOF10: 9

## 2022-09-29 ASSESSMENT — PAIN DESCRIPTION - ORIENTATION
ORIENTATION: LEFT

## 2022-09-29 ASSESSMENT — PAIN DESCRIPTION - PAIN TYPE: TYPE: SURGICAL PAIN

## 2022-09-29 ASSESSMENT — PAIN - FUNCTIONAL ASSESSMENT: PAIN_FUNCTIONAL_ASSESSMENT: 0-10

## 2022-09-29 NOTE — PROGRESS NOTES
1313  pt. Awake and oriented on arrival to pacu. Pt. Complains of pain 9 out of 10. Pt. Medicated per Dr. Bren Porter. Left breast dressing intact and dry. 1325  pt. Medicated for pain. Dilaudid 0.5mg IV given. 1335  dilaudid 0.5mg IV given for pain. 1340  pt. Resting quietly with eyes closed. 1350  pt. Responds easily to name. Pt. States pain is better. 1357  pacu criteria met. Transfer to [facility].

## 2022-09-29 NOTE — PROGRESS NOTES
Pt admitted to [facility] room 4 and oriented to unit. SCD sleeves applied. Nares swabbed. Pt verbalized permission for first name, last initial and physicians name on white board. SDS board and discharge criteria explained, pt and family verbalized understanding. Pt denies thoughts of harming self or others. Call light in reach. Family at the bedside.

## 2022-09-29 NOTE — ANESTHESIA POSTPROCEDURE EVALUATION
Department of Anesthesiology  Postprocedure Note    Patient: Liya Nicolas  MRN: [id number]  YOB: 1980  Date of evaluation: 9/29/2022      Procedure Summary     Date: 09/29/22 Room / Location: [facility] OR 05 / [place] [facility]    Anesthesia Start: 9503 Anesthesia Stop: 6692    Procedure: RE-EXCISION LEFT BREAST DEEP MARGINS (Left: Breast) Diagnosis:       Malignant neoplasm of left female breast, unspecified estrogen receptor status, unspecified site of breast ([facility]      (Malignant neoplasm of left female breast, unspecified estrogen receptor status, unspecified site of breast ([facility] Conner Menendez)    Surgeons: Haritha Mann MD Responsible Provider: Jeff Thomason DO    Anesthesia Type: general ASA Status: 2          Anesthesia Type: No value filed.     Ami Phase I: Ami Score: 10    Ami Phase II: Ami Score: 10      Anesthesia Post Evaluation    Patient location during evaluation: PACU  Patient participation: complete - patient participated  Level of consciousness: awake and alert  Airway patency: patent  Nausea & Vomiting: no vomiting and no nausea  Complications: no  Cardiovascular status: hemodynamically stable  Respiratory status: acceptable  Hydration status: stable

## 2022-09-29 NOTE — ANESTHESIA PRE PROCEDURE
Department of Anesthesiology  Preprocedure Note       Name:  Last Salter   Age:  40 y.o.  :  1980                                          MRN:  [id number]         Date:  2022      Surgeon: Pedro Eduardo): Kristie Gomez MD    Procedure: Procedure(s):  RE-EXCISION LEFT BREAST DEEP MARGINS    Medications prior to admission:   Prior to Admission medications    Medication Sig Start Date End Date Taking? Authorizing Provider   escitalopram (LEXAPRO) 5 MG tablet Take 1 tablet by mouth in the morning. 22   GERALDINE Graves CNP   cyclobenzaprine (FLEXERIL) 10 MG tablet Take 1 tablet by mouth nightly as needed for Muscle spasms 22   OtGERALDINE Matthews CNP   butalbital-acetaminophen-caffeine (FIORICET, ESGIC) -07 MG per tablet Take 1 tablet by mouth every 6 hours as needed for Headaches 22   GERALDINE Graves CNP   pantoprazole (PROTONIX) 40 MG tablet Take 1 tablet by mouth every morning (before breakfast) 22   GERALDINE Graves CNP   diazePAM (VALIUM) 2 MG tablet Take 2 mg by mouth every 8 hours as needed for Anxiety. Historical Provider, MD   ibuprofen (ADVIL;MOTRIN) 400 MG tablet Take 400 mg by mouth every 6 hours as needed for Pain    Historical Provider, MD   HYDROcodone-acetaminophen (NORCO) 5-325 MG per tablet TAKE 1 TABLET BY MOUTH EVERY 4 HOURS FOR 7 DAYS 22   Historical Provider, MD   METAMUCIL FIBER PO Take 1 capsule by mouth daily 3 in 1 fiber    Historical Provider, MD       Current medications:    Current Facility-Administered Medications   Medication Dose Route Frequency Provider Last Rate Last Admin    0.9 % sodium chloride infusion   IntraVENous Continuous Kristie Gomez MD        ceFAZolin (ANCEF) 2000 mg in dextrose 5 % 50 mL IVPB  2,000 mg IntraVENous 30 Kuldeep Wilde MD        scopolamine (TRANSDERM-SCOP) transdermal patch 1 patch  1 patch TransDERmal Vasile Loera MD           Allergies:     Allergies   Allergen Reactions    Amoxicillin Hives       Problem List:    Patient Active Problem List   Diagnosis Code    Menorrhagia with regular cycle N92.0    Dysmenorrhea N94.6    History of robot-assisted laparoscopic hysterectomy Z90.710    Adjustment disorder with depressed mood F43.21    Cervical spinal stenosis M48.02    Malignant neoplasm of upper-outer quadrant of breast in female, estrogen receptor positive ([facility] C50.419, Z17.0    Breast cancer in female [facility]) C50.919       Past Medical History:        Diagnosis Date    Invasive ductal carcinoma of breast, female, left ([facility] 09/07/2022    with lobular features    Kidney stone     2005 with pregnancy    PONV (postoperative nausea and vomiting)     Prolonged emergence from general anesthesia     \"During Hysterectomy- bradycardia- meds to increase heart rate. \"       Past Surgical History:        Procedure Laterality Date    BREAST LUMPECTOMY Left 9/19/2022    LEFT BREAST LUMPECTOMY WITH SENTINELYMPH NODE BX performed by Bean Delgadillo MD at [address] [place] [address] 12/08/2021    ACDF C4-C7-T1 performed by Oliver Sewell MD at [place] [address]  03/25/2016    Dr. Roth [facility] 09/13/2017    DILATATION AND CURETTAGE HYSTEROSCOPY, POSSIBLE MYOSURE performed by Liya Kidd DO at [place] [address]  04/18/2018   [facility]    umbilical hernia    HYSTERECTOMY (CERVIX STATUS UNKNOWN)  03/13/2019    partial    HYSTERECTOMY ABDOMINAL N/A 03/13/2019    ROBOTIC TOTAL LAPAROSCOPIC HYSTERECTOMY WITH BILATERAL SALPINGECTOMY performed by Liya Kidd DO at [address] [place] [address] HYSTEROSCOPY  04/18/2018    MARGUERITE [address] LEFT Left 09/07/2022    MARGUERITE [address] LEFT 9/7/2022 Kallie Lora MD [facility]    OVARY REMOVAL      one ovary remains    MA HYSTEROSCOPY,W/ENDOMETRIAL ABLATION N/A 04/18/2018    HYSTEROSCOPY ENDOMETRIAL ABLATION performed by Liya Kidd DO at [place] [facility]    TUBAL LIGATION      US BREAST BIOPSY NEEDLE ADDITIONAL LEFT Left 09/07/2022    US BREAST BIOPSY NEEDLE ADDITIONAL LEFT 9/7/2022 Colette Rasheed MD [facility]       Social History:    Social History     Tobacco Use    Smoking status: Never    Smokeless tobacco: Never   Substance Use Topics    Alcohol use: No     Alcohol/week: 0.0 standard drinks                                Counseling given: Not Answered      Vital Signs (Current):   Vitals:    09/29/22 1109 09/29/22 1113   BP: 119/70    Pulse: 70    Resp: 16    Temp: 97.7 °F (36.5 °C)    TempSrc: Temporal    SpO2: 100%    Weight:  127 lb (57.6 kg)   Height:  5' 2\" (1.575 m)                                              BP Readings from Last 3 Encounters:   09/29/22 119/70   09/28/22 125/68   09/27/22 122/62       NPO Status: Time of last liquid consumption: 1900                        Time of last solid consumption: 1900                        Date of last liquid consumption: 09/28/22                        Date of last solid food consumption: 09/28/22    BMI:   Wt Readings from Last 3 Encounters:   09/29/22 127 lb (57.6 kg)   09/28/22 126 lb (57.2 kg)   09/27/22 128 lb (58.1 kg)     Body mass index is 23.23 kg/m².     CBC:   Lab Results   Component Value Date/Time    WBC 5.5 04/25/2022 11:14 PM    RBC 4.57 04/25/2022 11:14 PM    HGB 14.2 09/14/2022 10:39 AM    HCT 43.9 09/14/2022 10:39 AM    MCV 88.6 04/25/2022 11:14 PM    RDW 13.6 04/25/2022 11:14 PM     04/25/2022 11:14 PM       CMP:   Lab Results   Component Value Date/Time     04/25/2022 11:14 PM    K 3.5 04/25/2022 11:14 PM     04/25/2022 11:14 PM    CO2 28 04/25/2022 11:14 PM    BUN 12 04/25/2022 11:14 PM    CREATININE 0.74 04/25/2022 11:14 PM    LABGLOM >90 02/28/2022 08:00 AM    GLUCOSE 101 04/25/2022 11:14 PM    PROT 6.7 04/11/2021 03:09 PM    CALCIUM 9.30 04/25/2022 11:14 PM    BILITOT 0.3 04/11/2021 03:09 PM    ALKPHOS 58 04/11/2021 03:09 PM AST 14 04/11/2021 03:09 PM    ALT 10 04/11/2021 03:09 PM       POC Tests: No results for input(s): POCGLU, POCNA, POCK, POCCL, POCBUN, POCHEMO, POCHCT in the last 72 hours. Coags:   Lab Results   Component Value Date/Time    INR 0.97 04/11/2021 03:09 PM    APTT 32.2 04/11/2021 03:09 PM       HCG (If Applicable):   Lab Results   Component Value Date    PREGTESTUR NEGATIVE 03/13/2019        ABGs: No results found for: PHART, PO2ART, STN3DOV, VRS8USB, BEART, R6AXGIXV     Type & Screen (If Applicable):  Lab Results   Component Value Date    LABRH POS 12/08/2021       Drug/Infectious Status (If Applicable):  No results found for: HIV, HEPCAB    COVID-19 Screening (If Applicable):   Lab Results   Component Value Date/Time    COVID19 NOT DETECTED 08/09/2021 08:54 AM           Anesthesia Evaluation     history of anesthetic complications: PONV. Airway: Mallampati: II  TM distance: >3 FB   Neck ROM: full  Mouth opening: > = 3 FB   Dental:          Pulmonary: breath sounds clear to auscultation                             Cardiovascular:            Rhythm: regular                      Neuro/Psych:   (+) psychiatric history:            GI/Hepatic/Renal:             Endo/Other:                     Abdominal:             Vascular: Other Findings:           Anesthesia Plan      general     ASA 2       Induction: intravenous. MIPS: Postoperative opioids intended and Prophylactic antiemetics administered. Anesthetic plan and risks discussed with patient and spouse. Plan discussed with CRNA.                     Tonya Caro MD   9/29/2022

## 2022-09-29 NOTE — DISCHARGE INSTRUCTIONS
SPECIFICALLY WATCH FOR:    Fever over 101 degrees by mouth   Increased redness, warmth, hardness at operative site. Blood soaked dressing (small amounts of oozing may be normal.)   Increased or progressive drainage from the surgical area   Inability to urinate or blood in the urine   Pain not relieved by the medications ordered   Persistent nausea and/or vomiting, unable to retain fluids. FOLLOW-UP APPOINTMENT in 2 weeks    Call my office if you have any problem that concerns you, [phone]. After hours, you can reach the answering service via the office phone number. IF YOU NEED IMMEDIATE ATTENTION, GO TO THE EMERGENCY ROOM AND YOUR DOCTOR WILL BE CONTACTED. Yanelis Gomez MD  [address]  [place], [address]  Electronically signed by Alyssia Gonzáles MD on 9/29/2022 at 1:44 PM

## 2022-09-29 NOTE — H&P
MD [address] Post op Note    Pt Name: Yaritza Mckeon  Medical Record Number: [id number]  Date of Birth 1980   Today's Date: 9/29/2022    ASSESSMENT       ICD-10-CM    1. Postoperative examination  Z09       2. Malignant neoplasm of upper-outer quadrant of left breast in female, estrogen receptor positive [facility])  C50.412 Yanet Jara MD, Hematology & Oncology, [facility]    Z17.0            PLAN   Positive for LCIS in the deep margin. Discussed treatment options with the patient. We will plan for reexcision of deep margin is the patient's earliest convenience. Otherwise she is doing well. I did review with her again that the clip was not seen in the specimen however the cancer was removed based on pathology. Very well may have gotten dislodged. Referral for oncology placed  Boomelissa Hayes is doing well, pain is controlled. .         CURRENT MEDICATIONS     No current facility-administered medications on file prior to encounter. Current Outpatient Medications on File Prior to Encounter   Medication Sig Dispense Refill    escitalopram (LEXAPRO) 5 MG tablet Take 1 tablet by mouth in the morning. 30 tablet 2    cyclobenzaprine (FLEXERIL) 10 MG tablet Take 1 tablet by mouth nightly as needed for Muscle spasms 7 tablet 0    butalbital-acetaminophen-caffeine (FIORICET, ESGIC) -40 MG per tablet Take 1 tablet by mouth every 6 hours as needed for Headaches 60 tablet 0    pantoprazole (PROTONIX) 40 MG tablet Take 1 tablet by mouth every morning (before breakfast) 30 tablet 2    diazePAM (VALIUM) 2 MG tablet Take 2 mg by mouth every 8 hours as needed for Anxiety.       ibuprofen (ADVIL;MOTRIN) 400 MG tablet Take 400 mg by mouth every 6 hours as needed for Pain      HYDROcodone-acetaminophen (NORCO) 5-325 MG per tablet TAKE 1 TABLET BY MOUTH EVERY 4 HOURS FOR 7 DAYS      METAMUCIL FIBER PO Take 1 capsule by mouth daily 3 in 1 fiber OBJECTIVE   CURRENT VITALS:  height is 5' 2\" (1.575 m) and weight is 127 lb (57.6 kg). Her temporal temperature is 97.7 °F (36.5 °C). Her blood pressure is 119/70 and her pulse is 70. Her respiration is 16 and oxygen saturation is 100%. Alert oriented appropriate no acute distress  Incision is clean dry and intact  In no seroma. LABS and Pathology   INAL DIAGNOSIS:   A. 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 Pleomorphic lobular carcinoma in situ, solid, nuclear grade 2. Closest margin to invasive tumor = 4mm to posterior. Pleomorphic lobular carcinoma in situ at deep margin. Previous biopsy site changes. pT1c pN0 sn     B. [place] lymph nodes (2), left, resection:    Negative for malignancy (0/2). C.  Additional medial margin, lumpectomy:    Negative for malignancy. D.  Additional caudal margin, lumpectomy:    Negative for malignancy.      RADIOLOGY   na    Electronically signed by Josephine Guajardo MD on 9/29/2022 at 11:42 AM

## 2022-09-29 NOTE — PROGRESS NOTES
Pt returned to [facility] room 4. Vitals and assessment as charted. 0.9 infusing, @950ml to count from PACU. Pt has ice cream and pop. Family at the bedside. Pt and family verbalized understanding of discharge criteria and call light use. Call light in reach.

## 2022-10-03 ENCOUNTER — OFFICE VISIT (OUTPATIENT)
Dept: FAMILY MEDICINE CLINIC | Age: 42
End: 2022-10-03
Payer: COMMERCIAL

## 2022-10-03 VITALS
OXYGEN SATURATION: 98 % | SYSTOLIC BLOOD PRESSURE: 90 MMHG | WEIGHT: 130 LBS | RESPIRATION RATE: 16 BRPM | DIASTOLIC BLOOD PRESSURE: 62 MMHG | HEART RATE: 67 BPM | BODY MASS INDEX: 23.78 KG/M2

## 2022-10-03 DIAGNOSIS — F41.8 SITUATIONAL ANXIETY: Primary | ICD-10-CM

## 2022-10-03 DIAGNOSIS — C50.919 MALIGNANT NEOPLASM OF BREAST IN FEMALE, ESTROGEN RECEPTOR POSITIVE, UNSPECIFIED LATERALITY, UNSPECIFIED SITE OF BREAST (HCC): ICD-10-CM

## 2022-10-03 DIAGNOSIS — Z17.0 MALIGNANT NEOPLASM OF BREAST IN FEMALE, ESTROGEN RECEPTOR POSITIVE, UNSPECIFIED LATERALITY, UNSPECIFIED SITE OF BREAST (HCC): ICD-10-CM

## 2022-10-03 PROCEDURE — 99213 OFFICE O/P EST LOW 20 MIN: CPT | Performed by: NURSE PRACTITIONER

## 2022-10-03 PROCEDURE — G8427 DOCREV CUR MEDS BY ELIG CLIN: HCPCS | Performed by: NURSE PRACTITIONER

## 2022-10-03 PROCEDURE — G8484 FLU IMMUNIZE NO ADMIN: HCPCS | Performed by: NURSE PRACTITIONER

## 2022-10-03 PROCEDURE — 1036F TOBACCO NON-USER: CPT | Performed by: NURSE PRACTITIONER

## 2022-10-03 PROCEDURE — G8420 CALC BMI NORM PARAMETERS: HCPCS | Performed by: NURSE PRACTITIONER

## 2022-10-03 RX ORDER — ESCITALOPRAM OXALATE 10 MG/1
10 TABLET ORAL DAILY
Qty: 90 TABLET | Refills: 1 | Status: SHIPPED | OUTPATIENT
Start: 2022-10-03

## 2022-10-03 NOTE — OP NOTE
Operative Note      Patient: Alina Regan  YOB: 1980  MRN: [id number]    Date of Procedure: 9/29/2022    Pre-Op Diagnosis: Malignant neoplasm of left female breast, unspecified estrogen receptor status, unspecified site of breast [facility] [C50.912]    Post-Op Diagnosis: Same       Procedure(s):  RE-EXCISION LEFT BREAST DEEP MARGINS    Surgeon(s): Sanaz Parikh MD    Assistant:   * No surgical staff found *    Anesthesia: General    Estimated Blood Loss (mL): 15 ml    Complications: None    Specimens:   ID Type Source Tests Collected by Time Destination   A : left breast new deep margin Tissue Breast SURGICAL PATHOLOGY Sanaz Parikh MD 9/29/2022 1249        Implants:  * No implants in log *      Drains: * No LDAs found *    Findings:   Re excision of deep margin     Detailed Description of Procedure:   She was seen in the pre op holding area informed consent was reviewed. She was taken to the OR and placed on the table in supine postion and prepped and draped in the normal sterile fashion. A formal timeout was performed. The old incision was opened sharply and the expected seroma was removed. Skin hooks were used to elevated the medial aspect of the incision. Additional deep margin was then taken from a medial to a lateral fashion starting on the mid skin flap. This new margin was approximally 0.7 cm thick and was taken in all directions cover the prior surgical site. The specimen was removed and margin markers were placed. The site was inspected and any small amount of bleeding was controlled with cautery. Clips were placed in the surgical field. The incision was closed in layers with deep dermal interrupted vicryl and the skin was closed with a running 4-0 vicryl. Surgical glue was applied followed by a sterile compression dressing. She was awoken from anesthesia and tranported to the PACU in stable condition. All counts were correct.      Electronically signed by Sanaz Parikh MD on 10/3/2022 at 8:52 AM

## 2022-10-04 ENCOUNTER — CLINICAL DOCUMENTATION (OUTPATIENT)
Dept: SPIRITUAL SERVICES | Facility: CLINIC | Age: 42
End: 2022-10-04

## 2022-10-04 ENCOUNTER — OFFICE VISIT (OUTPATIENT)
Dept: SURGERY | Age: 42
End: 2022-10-04

## 2022-10-04 VITALS
OXYGEN SATURATION: 98 % | HEIGHT: 62 IN | BODY MASS INDEX: 23.92 KG/M2 | HEART RATE: 89 BPM | RESPIRATION RATE: 18 BRPM | SYSTOLIC BLOOD PRESSURE: 110 MMHG | WEIGHT: 130 LBS | TEMPERATURE: 98.9 F | DIASTOLIC BLOOD PRESSURE: 60 MMHG

## 2022-10-04 DIAGNOSIS — Z09 POSTOPERATIVE EXAMINATION: Primary | ICD-10-CM

## 2022-10-04 PROCEDURE — 99024 POSTOP FOLLOW-UP VISIT: CPT | Performed by: SURGERY

## 2022-10-04 NOTE — PROGRESS NOTES
[facility] PROGRESS NOTE      Patient: Shahram Gautam  Room #: Room/bed info not found            YOB: 1980  Age: 40 y.o. Gender: female            Admit Date & Time: No admission date for patient encounter. Assessment:  Bobby Ochoa is a 40year old female who is being referred to spiritual care by the nurse navigator at the oncology center. She answered the phone when I called and Bobby Ochoa answered the phone. She stated she is feeling fine. She is pleased that they cough the cancer early. Interventions: Words of encouragement given. This  explained the out patient services to her and asked if she is interested in follow up phone calls and spiritual support. Outcomes:  She is not needing this service at this time. She has my phone number and will call as needed. Plan:     Spiritual support is available to pt and her family as needed.      Electronically signed by Marisa Lyn on 10/4/2022 at 6:07 PM.  John Ball  [phone]

## 2022-10-04 NOTE — PROGRESS NOTES
[facility] 88 PROGRESS NOTE      Patient: Alina Regan  Room #: Room/bed info not found            YOB: 1980  Age: 40 y.o. Gender: female            Admit Date & Time: No admission date for patient encounter. Assessment:  Randa Malagon is a 40year old female who is being referred to spiritual care by the nurse navigator at the oncology center. She answered the phone when I called and Randa Malagon answered the phone. She stated she is feeling fine. She is pleased that they cough the cancer early. Interventions: Words of encouragement given. This  explained the out patient services to her and asked if she is interested in follow up phone calls and spiritual support. Outcomes:  She is not needing this service at this time. She has my phone number and will call as needed. Plan:     Spiritual support is available to pt and her family as needed.      Electronically signed by Romeo Norton on 10/4/2022 at 6:04 PM.  [facility]  [phone]

## 2022-10-04 NOTE — PROGRESS NOTES
Patient: Shweta Bermudez  Room #: Room/bed info not found            YOB: 1980  Age: 40 y.o. Gender: female                 Admit Date & Time: No admission date for patient encounter. Assessment:  Yasmeen Titus is a 40year old female who is being referred to spiritual care by the nurse navigator at the oncology center. She answered the phone when I called and Yasmeen Titus answered the phone. She stated she is feeling fine. She is pleased that they cough the cancer early. Interventions: Words of encouragement given. This  explained the out patient services to her and asked if she is interested in follow up phone calls and spiritual support. Outcomes:  She is not needing this service at this time. She has my phone number and will call as needed. Plan:      Spiritual support is available to pt and her family as needed.       Electronically signed by Ilene Murray on 10/4/2022 at 6:07 PM.  John Ball  [phone]

## 2022-10-04 NOTE — PROGRESS NOTES
Halie Cordova MD  [address] Post op Note    Pt Name: Bruno Boeck  Medical Record Number: [id number]  Date of Birth 1980   Today's Date: 10/4/2022    ASSESSMENT       ICD-10-CM    1. Postoperative examination  Z09            PLAN   No additional pleomorphic LCIS. Patient making a good recovery. She is to see oncology and radiation oncology. She will need to return  LCIS as well as recent malignancy is hormone responsive positive. Joseph Ramírez is doing well she is without complaints. Her pain is controlled. Cancer Staging  Malignant neoplasm of upper-outer quadrant of breast in female, estrogen receptor positive ([facility]  Staging form: Breast, AJCC 8th Edition  - Clinical stage from 9/13/2022: Stage IA (cT1c, cN0, cM0, G2, ER+, MN+, HER2-) - Unsigned  - Pathologic stage from 10/4/2022: Stage IA (pT1c, pN0, cM0, G2, ER+, MN+, HER2-) - Signed by Halie Cordova MD on 10/4/2022      CURRENT MEDICATIONS     Current Outpatient Medications on File Prior to Visit   Medication Sig Dispense Refill    escitalopram (LEXAPRO) 10 MG tablet Take 1 tablet by mouth daily 90 tablet 1    cyclobenzaprine (FLEXERIL) 10 MG tablet Take 1 tablet by mouth nightly as needed for Muscle spasms 7 tablet 0    butalbital-acetaminophen-caffeine (FIORICET, ESGIC) -40 MG per tablet Take 1 tablet by mouth every 6 hours as needed for Headaches 60 tablet 0    pantoprazole (PROTONIX) 40 MG tablet Take 1 tablet by mouth every morning (before breakfast) (Patient taking differently: Take 40 mg by mouth every morning (before breakfast) Indications: PRN) 30 tablet 2    diazePAM (VALIUM) 2 MG tablet Take 2 mg by mouth every 8 hours as needed for Anxiety.       ibuprofen (ADVIL;MOTRIN) 400 MG tablet Take 400 mg by mouth every 6 hours as needed for Pain      HYDROcodone-acetaminophen (NORCO) 5-325 MG per tablet TAKE 1 TABLET BY MOUTH EVERY 4 HOURS FOR 7 DAYS      METAMUCIL FIBER PO Take 1 capsule by mouth daily 3 in 1 fiber       No current facility-administered medications on file prior to visit. OBJECTIVE   CURRENT VITALS:  height is 5' 2\" (1.575 m) and weight is 130 lb (59 kg). Her temporal temperature is 98.9 °F (37.2 °C). Her blood pressure is 110/60 and her pulse is 89. Her respiration is 18 and oxygen saturation is 98%. Physical Exam     LABS and Pathology   FINAL DIAGNOSIS:   Breast, left, new deep margin, excision:     Biopsy site changes. No evidence of malignancy.      Specimen:   EXCISION OF BREAST, LEFT NEW DEEP MARGIN     RADIOLOGY   na    Electronically signed by Chang Lin MD on 10/4/2022 at 1:57 PM

## 2022-10-11 ASSESSMENT — ENCOUNTER SYMPTOMS
WHEEZING: 0
SHORTNESS OF BREATH: 0
ABDOMINAL PAIN: 0
CHEST TIGHTNESS: 0
COUGH: 0
BACK PAIN: 0
ABDOMINAL DISTENTION: 0

## 2022-10-11 NOTE — PROGRESS NOTES
[address] [place] [address]  Dept: [phone]  Dept Fax: [phone]  Loc: [phone]  PROGRESS NOTE      VisitDate: 10/3/2022    Daisha Millan is a 40 y.o. female who presents today for:     Chief Complaint   Patient presents with    Discuss Medications     Wants discuss increasing her lexapro    Flu Vaccine     declines         Subjective:  Patient presents with request to increase her Lexapro. Take medications as prescribed. Patient recently diagnosed with breast cancer/invasive ductal carcinoma. Spouse currently being treated for sarcoma of the lung. Patient has increased anxiety depressive mood. Denies any homicidal or suicidal ideations. Review of Systems   Constitutional:  Negative for activity change, appetite change, chills, fatigue and fever. Eyes:  Negative for visual disturbance. Respiratory:  Negative for cough, chest tightness, shortness of breath and wheezing. Cardiovascular:  Negative for chest pain, palpitations and leg swelling. Gastrointestinal:  Negative for abdominal distention and abdominal pain. Genitourinary:  Negative for dysuria. Musculoskeletal:  Negative for arthralgias, back pain and neck pain. Skin: Negative. Negative for rash. Neurological:  Negative for dizziness, light-headedness and headaches. Hematological:  Negative for adenopathy. Psychiatric/Behavioral:  Positive for decreased concentration and dysphoric mood. The patient is nervous/anxious. All other systems reviewed and are negative. Past Medical History:   Diagnosis Date    Invasive ductal carcinoma of breast, female, left ([address] 09/07/2022    with lobular features    Kidney stone     2005 with pregnancy    PONV (postoperative nausea and vomiting)     Prolonged emergence from general anesthesia     \"During Hysterectomy- bradycardia- meds to increase heart rate. \"      Past Surgical History:   Procedure Laterality Date    BREAST LUMPECTOMY Left 9/19/2022    LEFT BREAST LUMPECTOMY WITH SENTINELYMPH NODE BX performed by Adrienne Jones MD at [facility] LUMPECTOMY Left 9/29/2022    RE-EXCISION LEFT BREAST DEEP MARGINS performed by Adrienne Jones MD at [address] [facility] N/A 12/08/2021    ACDF C4-C7-T1 [facility]ue [facility] [address] [address] [place]  03/25/2016    Dr. Miguel Vilchis 09/13/2017    DILATATION AND CURETTAGE HYSTEROSCOPY, POSSIBLE MYOSURE performed by Be Skaggs DO at [facility]  04/18/2018    [address]    umbilical hernia    HYSTERECTOMY (CERVIX STATUS UNKNOWN)  03/13/2019    partial    HYSTERECTOMY ABDOMINAL N/A 03/13/2019    ROBOTIC TOTAL LAPAROSCOPIC HYSTERECTOMY WITH BILATERAL SALPINGECTOMY performed by Be Skaggs DO at [address] [facility]  04/18/2018    [address]ARG[address] LEFT Left 09/07/2022    MARGU[address]ITE [address] LEFT 9/7/2022 Palmer Dominguez MD [facility]      one ovary remains    WA HYSTEROSCOPY,W/ENDOMETRIAL ABLATION N/A 04/18/2018    HYSTEROSCOPY ENDOMETRIAL ABLATION performed by Be Skaggs DO at [address] [facility] [address] ADDITIONAL LEFT Left 09/07/2022     BREAST BIOPSY NEEDLE ADDITIONAL LEFT 9/7/2022 Palmer Dominguez MD [facility]     Family History   Problem Relation Age of Onset    Breast Cancer Mother 61    BRCA 1 Negative Mother 64    BRCA 2 Negative Mother 64    Bilateral breast cancer Mother 64        opposite side    Heart Disease Father     High Blood Pressure Father     High Cholesterol Father     Diabetes Father     Lung Cancer Father     No Known Problems Sister     No Known Problems Sister     No Known Problems Sister     No Known Problems Brother     No Known Problems Brother     No Known Problems Brother     Leukemia Maternal Grandmother     Colon Cancer Paternal Uncle age 62s    Lung Cancer Paternal Uncle         age >47    Breast Cancer Paternal Aunt         age >47    Lung Cancer Maternal Aunt         age >47    Asthma Neg Hx      Social History     Tobacco Use    Smoking status: Never    Smokeless tobacco: Never   Substance Use Topics    Alcohol use: No     Alcohol/week: 0.0 standard drinks      Current Outpatient Medications   Medication Sig Dispense Refill    escitalopram (LEXAPRO) 10 MG tablet Take 1 tablet by mouth daily 90 tablet 1    cyclobenzaprine (FLEXERIL) 10 MG tablet Take 1 tablet by mouth nightly as needed for Muscle spasms 7 tablet 0    butalbital-acetaminophen-caffeine (FIORICET, ESGIC) -40 MG per tablet Take 1 tablet by mouth every 6 hours as needed for Headaches 60 tablet 0    pantoprazole (PROTONIX) 40 MG tablet Take 1 tablet by mouth every morning (before breakfast) (Patient taking differently: Take 40 mg by mouth every morning (before breakfast) Indications: PRN) 30 tablet 2    diazePAM (VALIUM) 2 MG tablet Take 2 mg by mouth every 8 hours as needed for Anxiety. ibuprofen (ADVIL;MOTRIN) 400 MG tablet Take 400 mg by mouth every 6 hours as needed for Pain      HYDROcodone-acetaminophen (NORCO) 5-325 MG per tablet TAKE 1 TABLET BY MOUTH EVERY 4 HOURS FOR 7 DAYS      METAMUCIL FIBER PO Take 1 capsule by mouth daily 3 in 1 fiber       No current facility-administered medications for this visit.      Allergies   Allergen Reactions    Amoxicillin Hives     Health Maintenance   Topic Date Due    COVID-19 Vaccine (1) Never done    Varicella vaccine (1 of 2 - 2-dose childhood series) Never done    HIV screen  Never done    Hepatitis C screen  Never done    DTaP/Tdap/Td vaccine (1 - Tdap) Never done    Cervical cancer screen  Never done    Flu vaccine (1) Never done    Depression Monitoring  02/23/2023    Breast cancer screen  09/19/2023    Lipids  09/27/2027    Hepatitis A vaccine  Aged Out    Hib vaccine  Aged Out    Meningococcal (ACWY) vaccine  Aged Out    Pneumococcal 0-64 years Vaccine  Aged Out         Objective:     Physical Exam  Vitals and nursing note reviewed. Constitutional:       Appearance: Normal appearance. She is well-developed and normal weight. HENT:      Head: Normocephalic. Right Ear: Tympanic membrane and ear canal normal.      Left Ear: Tympanic membrane and ear canal normal.      Nose: Nose normal.      Mouth/Throat:      Pharynx: Oropharynx is clear. Eyes:      Extraocular Movements: Extraocular movements intact. Conjunctiva/sclera: Conjunctivae normal.   Cardiovascular:      Rate and Rhythm: Normal rate and regular rhythm. Pulses: Normal pulses. Heart sounds: Normal heart sounds. Pulmonary:      Effort: Pulmonary effort is normal.      Breath sounds: Normal breath sounds. Abdominal:      General: Bowel sounds are normal.      Palpations: Abdomen is soft. Musculoskeletal:         General: Normal range of motion. Cervical back: Neck supple. Skin:     General: Skin is warm and dry. Neurological:      General: No focal deficit present. Mental Status: She is alert and oriented to person, place, and time. Psychiatric:         Attention and Perception: Attention normal.         Mood and Affect: Mood is anxious and depressed. Speech: Speech normal.         Behavior: Behavior normal. Behavior is cooperative. Thought Content: Thought content normal.         Cognition and Memory: Cognition normal.         Judgment: Judgment normal.     BP 90/62   Pulse 67   Resp 16   Wt 130 lb (59 kg)   LMP 03/06/2019   SpO2 98%   BMI 23.78 kg/m²       Impression/Plan:  1. Situational anxiety    2.  Malignant neoplasm of breast in female, estrogen receptor positive, unspecified laterality, unspecified site of breast ([facility] [place]      Requested Prescriptions     Signed Prescriptions Disp Refills    escitalopram (LEXAPRO) 10 MG tablet 90 tablet 1     Sig: Take 1 tablet by mouth daily     No orders of the defined types were placed in this encounter. Patient giveneducational materials - see patient instructions. Discussed use, benefit, and side effects of prescribed medications. All patient questions answered. Pt voiced understanding. Reviewed health maintenance. Patient agreedwith treatment plan. Follow up as directed. Lexapro increased to 10 mg p.o. daily. Follow-up 1 month. Anxiety and depression information provided. Continue medications as prescribed.  Educational information on above diagnosis  provided per AVS.         Electronically signed by GERALDINE Corbett CNP on 10/11/2022 at 8:03 AM

## 2022-10-17 ENCOUNTER — CLINICAL DOCUMENTATION (OUTPATIENT)
Dept: CASE MANAGEMENT | Age: 42
End: 2022-10-17

## 2022-10-17 ENCOUNTER — OFFICE VISIT (OUTPATIENT)
Dept: ONCOLOGY | Age: 42
End: 2022-10-17
Payer: COMMERCIAL

## 2022-10-17 ENCOUNTER — TELEPHONE (OUTPATIENT)
Dept: ONCOLOGY | Age: 42
End: 2022-10-17

## 2022-10-17 ENCOUNTER — SOCIAL WORK (OUTPATIENT)
Dept: ONCOLOGY | Age: 42
End: 2022-10-17

## 2022-10-17 ENCOUNTER — HOSPITAL ENCOUNTER (OUTPATIENT)
Dept: INFUSION THERAPY | Age: 42
Discharge: HOME | End: 2022-10-17
Payer: COMMERCIAL

## 2022-10-17 VITALS
SYSTOLIC BLOOD PRESSURE: 119 MMHG | HEART RATE: 74 BPM | DIASTOLIC BLOOD PRESSURE: 77 MMHG | TEMPERATURE: 98.1 F | RESPIRATION RATE: 16 BRPM

## 2022-10-17 VITALS
OXYGEN SATURATION: 98 % | DIASTOLIC BLOOD PRESSURE: 77 MMHG | HEART RATE: 74 BPM | HEIGHT: 62 IN | SYSTOLIC BLOOD PRESSURE: 119 MMHG | TEMPERATURE: 98.1 F | BODY MASS INDEX: 24 KG/M2 | WEIGHT: 130.4 LBS | RESPIRATION RATE: 16 BRPM

## 2022-10-17 DIAGNOSIS — N92.0 MENORRHAGIA WITH REGULAR CYCLE: ICD-10-CM

## 2022-10-17 DIAGNOSIS — C50.412 MALIGNANT NEOPLASM OF UPPER-OUTER QUADRANT OF LEFT BREAST IN FEMALE, ESTROGEN RECEPTOR POSITIVE (HCC): Primary | ICD-10-CM

## 2022-10-17 DIAGNOSIS — N94.6 DYSMENORRHEA: ICD-10-CM

## 2022-10-17 DIAGNOSIS — Z17.0 MALIGNANT NEOPLASM OF UPPER-OUTER QUADRANT OF LEFT BREAST IN FEMALE, ESTROGEN RECEPTOR POSITIVE (HCC): Primary | ICD-10-CM

## 2022-10-17 LAB
PROLACTIN: 15.6 NG/ML
TSH SERPL DL<=0.05 MIU/L-ACNC: 2.23 UIU/ML (ref 0.4–4.2)

## 2022-10-17 PROCEDURE — 84443 ASSAY THYROID STIM HORMONE: CPT

## 2022-10-17 PROCEDURE — 99211 OFF/OP EST MAY X REQ PHY/QHP: CPT

## 2022-10-17 PROCEDURE — 99205 OFFICE O/P NEW HI 60 MIN: CPT | Performed by: INTERNAL MEDICINE

## 2022-10-17 PROCEDURE — 83002 ASSAY OF GONADOTROPIN (LH): CPT

## 2022-10-17 PROCEDURE — G8427 DOCREV CUR MEDS BY ELIG CLIN: HCPCS | Performed by: INTERNAL MEDICINE

## 2022-10-17 PROCEDURE — G8420 CALC BMI NORM PARAMETERS: HCPCS | Performed by: INTERNAL MEDICINE

## 2022-10-17 PROCEDURE — 36415 COLL VENOUS BLD VENIPUNCTURE: CPT

## 2022-10-17 PROCEDURE — 84146 ASSAY OF PROLACTIN: CPT

## 2022-10-17 PROCEDURE — 83001 ASSAY OF GONADOTROPIN (FSH): CPT

## 2022-10-17 PROCEDURE — G8484 FLU IMMUNIZE NO ADMIN: HCPCS | Performed by: INTERNAL MEDICINE

## 2022-10-17 PROCEDURE — 1036F TOBACCO NON-USER: CPT | Performed by: INTERNAL MEDICINE

## 2022-10-17 ASSESSMENT — ENCOUNTER SYMPTOMS
RHINORRHEA: 0
VOMITING: 0
TROUBLE SWALLOWING: 0
DIARRHEA: 0
BACK PAIN: 1
NAUSEA: 0
CONSTIPATION: 0
COUGH: 0
BLOOD IN STOOL: 0
SHORTNESS OF BREATH: 0

## 2022-10-17 NOTE — PROGRESS NOTES
[address] CANCER [address] [place] [address]  Dept: [phone]  Loc: [address]  1980   No ref. provider found   Yuliya Polanco MD       Reyna Lazaro is a 40 y.o. female with breast cancer    CHIEF COMPLAINT  She is here today to establish medical oncology care. HISTORY OF PRESENT ILLNESS    8/30/2022. Screening mammogram performed. Technologist felt a lump in the left upper outer breast.  This showed a focal 1 cm density near the area of the palpable concern. 9/1/2022. Further views confirmed a focal asymmetric density with possible spiculated margins. Ultrasound showed a hypoechoic mass measuring 1.4 x 1.1 x 1.1 cm in diameter in the upper outer left breast for which biopsy was recommended. There was an adjacent oval 6 mm possible complex cyst for which aspiration and or ultrasound-guided biopsy was recommended. The axilla was negative for abnormal findings. 9/7/2022. Biopsy showed moderately differentiated invasive ductal carcinoma with lobular features, grade 2 with intermediate grade ductal carcinoma in situ. An additional lesion was a fibroadenoma. Estrogen receptor was +100% strong, progesterone receptor was +100% strong, Ki-67 was 25% and HER2 was negative for over expression. 9/9/2022. Sampling for genetic analysis was sent and returned negative for pathogenic mutation. 9/13/2022. Seen by Dr. Yue Diamond in the office as a new patient for evaluation. The patient found enlarged submandibular lymph node on the rightfor which further testing was ordered including an ultrasound of the head neck. She noted that the patient had had no breast symptoms prior to the biopsy. Patient's family history on the mother side was positive for malignancy in that her mother had breast cancer at age 61. There was lung cancer in a maternal aunt.   On her father side there is breast cancer in her paternal aunt colon cancer and a paternal uncle lung cancer in her father and in her paternal uncle. 9/14/2022. MRI of the breasts showed an enhancing mass in the upper slightly outer left breast correlating with the known biopsy-proven malignancy. There is an associated biopsy clip demonstrated measuring 1.5 x 1.5 x 1 cm. Postbiopsy changes were seen at the site of the biopsy of the fibroadenoma. .  No other focal dominant lesions were seen. 9/19/2022. Ultrasound of the head neck soft tissues showed several prominent lymph nodes the largest of which measured 1.4 x 0.7 x 0.4 cm.    9/19/2022. 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 Deep margin had pleomorphic lobular carcinoma in situ present. [place] nodes were negative for malignancy. The tumor mass measured 1.1 x 1.3 x 1.5 cm. Overall grade was 2, lymphovascular invasion was not identified, lobular carcinoma in situ was present pleomorphic type, all regional lymph nodes were negative for tumor. Once again ER was positive NE was positive Ki-67 was 25% and HER2 was negative. This had previously been called a moderately differentiated ductal carcinoma with lobular features but now is being named invasive pleomorphic lobular carcinoma. 9/27/2022. Patient was seen in the office by Dr. Lydia Matthew in follow-up. She plan for reexcision of the deep margin due to it being positive. This was performed on 9/29/2022 and the margin was cleared. 10/4/2022. Saw Dr. Lydia Matthew in follow-up. She was then referred to medical oncology and radiation oncology.     Comorbidities include kidney stones, renal angiomyolipoma,  previous endometrial ablation, previous hysterectomy, anxiety and depression, COVID in January 2021 degenerative joint disease, previous cervical fusion C4-T1, action tremor    MONITORING PARAMETERS    Physical examination, mammography and ultrasonography. PAST MEDICAL HISTORY  Past Medical History:   Diagnosis Date    Invasive ductal carcinoma of breast, female, left ([facility] 09/07/2022    with lobular features    Kidney stone     2005 with pregnancy    PONV (postoperative nausea and vomiting)     Prolonged emergence from general anesthesia     \"During Hysterectomy- bradycardia- meds to increase heart rate. \"        REVIEW OF SYSTEMS  Review of Systems   Constitutional:  Positive for diaphoresis (some hot flashes) and fatigue. Negative for appetite change, chills and fever. HENT:  Positive for dental problem. Negative for mouth sores, nosebleeds, rhinorrhea and trouble swallowing. Eyes:  Negative for visual disturbance. Respiratory:  Negative for cough and shortness of breath. Cardiovascular:  Negative for palpitations and leg swelling. Gastrointestinal:  Negative for blood in stool, constipation, diarrhea, nausea and vomiting. Genitourinary:  Negative for dysuria, frequency, hematuria, menstrual problem and urgency. Musculoskeletal:  Positive for arthralgias, back pain, myalgias and neck pain. Skin:  Positive for pallor. Negative for rash and wound. Neurological:  Positive for weakness (right leg, had EMG order) and headaches (occasional). Negative for seizures and numbness. Hematological:  Negative for adenopathy. Does not bruise/bleed easily. Psychiatric/Behavioral:  Positive for dysphoric mood. Negative for self-injury, sleep disturbance and suicidal ideas. The patient is nervous/anxious.        FAMILY HISTORY  Family History   Problem Relation Age of Onset    Breast Cancer Mother 61    BRCA 1 Negative Mother 64    BRCA 2 Negative Mother 64    Bilateral breast cancer Mother 64        opposite side    Heart Disease Father     High Blood Pressure Father     High Cholesterol Father     Diabetes Father     Lung Cancer Father     No Known Problems Sister     No Known Problems Sister     No Known Problems Sister No Known Problems Brother     No Known Problems Brother     No Known Problems Brother     Leukemia Maternal Grandmother     Colon Cancer Paternal Uncle         age 62s    Lung Cancer Paternal Uncle         age >47    Breast Cancer Paternal Aunt         age >47    Lung Cancer Maternal Aunt         age >47    Asthma Neg Hx         SOCIAL HISTORY  Social History     Socioeconomic History    Marital status: Single     Spouse name: Not on file    Number of children: Not on file    Years of education: Not on file    Highest education level: Not on file   Occupational History    Not on file   Tobacco Use    Smoking status: Never    Smokeless tobacco: Never   Vaping Use    Vaping Use: Never used   Substance and Sexual Activity    Alcohol use: No     Alcohol/week: 0.0 standard drinks    Drug use: No    Sexual activity: Yes     Partners: Male   Other Topics Concern    Not on file   Social History Narrative    Not on file     Social Determinants of Health     Financial Resource Strain: Low Risk     Difficulty of Paying Living Expenses: Not hard at all   Food Insecurity: No Food Insecurity    Worried About Running Out of Food in the Last Year: Never true    Ran Out of Food in the Last Year: Never true   Transportation Needs: Not on file   Physical Activity: Not on file   Stress: Not on file   Social Connections: Not on file   Intimate Partner Violence: Not on file   Housing Stability: Not on file        CURRENT MEDICATIONS  Current Outpatient Medications   Medication Sig Dispense Refill    escitalopram (LEXAPRO) 10 MG tablet Take 1 tablet by mouth daily 90 tablet 1    cyclobenzaprine (FLEXERIL) 10 MG tablet Take 1 tablet by mouth nightly as needed for Muscle spasms 7 tablet 0    butalbital-acetaminophen-caffeine (FIORICET, ESGIC) -40 MG per tablet Take 1 tablet by mouth every 6 hours as needed for Headaches 60 tablet 0    pantoprazole (PROTONIX) 40 MG tablet Take 1 tablet by mouth every morning (before breakfast) (Patient taking differently: Take 40 mg by mouth every morning (before breakfast) Indications: PRN) 30 tablet 2    diazePAM (VALIUM) 2 MG tablet Take 2 mg by mouth every 8 hours as needed for Anxiety. ibuprofen (ADVIL;MOTRIN) 400 MG tablet Take 400 mg by mouth every 6 hours as needed for Pain      METAMUCIL FIBER PO Take 1 capsule by mouth daily 3 in 1 fiber      HYDROcodone-acetaminophen (NORCO) 5-325 MG per tablet TAKE 1 TABLET BY MOUTH EVERY 4 HOURS FOR 7 DAYS (Patient not taking: Reported on 10/17/2022)       No current facility-administered medications for this visit. OARRS    PDMP Monitoring: She has had prescriptions for Xanax, Tylenol 3, tramadol, migraine medication, Percocet, Norco, Valium, and recent hydrocodone. She has had multiple prescribers. There is only 1 pharmacy. Last PDMP [place] [facility] as Reviewed [facility]):  Review User Review Instant Review Result   Dior Moan 10/18/2022 12:06 AM Reviewed PDMP [1]     Last Controlled Substance Monitoring Documentation      [address] Office Visit from 4/6/2022 in [address] [facility]   Periodic Controlled Substance Monitoring Possible medication side effects, risk of tolerance/dependence & alternative treatments discussed., No signs of potential drug abuse or diversion identified. filed at 04/07/2022 0909          Urine Drug Screenings (1 yr)    No resulted procedures found. Medication Contract and Consent for Opioid Use Documents Filed        No documents found                     PHYSICAL EXAM    Physical Exam  Vitals and nursing note reviewed. Exam conducted with a chaperone present. Constitutional:       General: She is not in acute distress (nervous). Appearance: Normal appearance. She is normal weight. She is not ill-appearing, toxic-appearing or diaphoretic. Comments: Caity Mar is a well-developed well-nourished but thin  female who is in no acute distress. She is anxious.   She is here today with her mother. She looks younger than her stated age. HENT:      Head: Normocephalic and atraumatic. Nose: Nose normal.      Mouth/Throat:      Mouth: Mucous membranes are moist.      Pharynx: Oropharynx is clear. No oropharyngeal exudate or posterior oropharyngeal erythema. Comments: Significant farnaz  Eyes:      General: No scleral icterus. Extraocular Movements: Extraocular movements intact. Conjunctiva/sclera: Conjunctivae normal.      Pupils: Pupils are equal, round, and reactive to light. Cardiovascular:      Rate and Rhythm: Normal rate and regular rhythm. Pulses: Normal pulses. Heart sounds: Normal heart sounds. No murmur heard. No gallop. Pulmonary:      Effort: Pulmonary effort is normal. No respiratory distress. Breath sounds: Normal breath sounds. No stridor. No wheezing, rhonchi or rales. Chest:      Chest wall: Tenderness (related to her breast surgery) present. Breasts:     Left: Tenderness present. Comments: Left breast with nipple, well healed surgical scar from 12-5pm.  Well healed surgical scar in the left axilla approx 2 cm. Abdominal:      General: Abdomen is flat. Bowel sounds are normal. There is no distension. Palpations: Abdomen is soft. There is no mass. Tenderness: There is no abdominal tenderness. There is no guarding or rebound. Hernia: No hernia is present. Musculoskeletal:         General: No swelling or tenderness. Normal range of motion. Cervical back: Normal range of motion and neck supple. No rigidity or tenderness. Right lower leg: No edema. Left lower leg: No edema. Lymphadenopathy:      Head:      Right side of head: Submandibular (small almond size lymph node in salivary gland area) adenopathy present. No preauricular adenopathy. Left side of head: No submandibular or preauricular adenopathy. Cervical: No cervical adenopathy.       Right cervical: No superficial or deep cervical adenopathy. Left cervical: No superficial or deep cervical adenopathy. Upper Body:      Right upper body: No supraclavicular or axillary adenopathy. Left upper body: No supraclavicular or axillary adenopathy. Skin:     General: Skin is warm and dry. Coloration: Skin is not jaundiced or pale. Findings: No bruising or lesion. Comments: Multiple tattoos   Neurological:      General: No focal deficit present. Mental Status: She is alert and oriented to person, place, and time. Mental status is at baseline. Cranial Nerves: No cranial nerve deficit. Motor: No weakness. Gait: Gait normal.   Psychiatric:         Mood and Affect: Mood normal.         Behavior: Behavior normal.         Thought Content: Thought content normal.         Judgment: Judgment normal.   There are multiple small lymph nodes in the right submandibular region including the one noted above and several other smaller ones. There is no obvious intraoral abnormality to be causing this. LABS/IMAGING  NM INJ LYMPHOSCINTIGRAM    Result Date: 9/19/2022  Assistance for radiopharmaceutical injection. Final report electronically signed by Dr. Candice Jones on 9/19/2022 12:49 PM    PATHOLOGY/GENETICS    Invasive lobular carcinoma of the left breast, node-negative,Stage Ia, pT1c, pN0, centimeters 0, grade 2, ER positive, SC positive, HER2 negative. ASSESSMENT and PLAN   Invasive lobular carcinoma of the left breast, node-negative,Stage Ia, pT1c, pN0, centimeters 0, grade 2, ER positive, SC positive, HER2 negative. She is healing well from her surgery. Her recurrence score is 17. She does not need chemotherapy. It will not add much to her hormonal manipulation. We have made her an appointment to see radiation oncology.   After that she will need to have hormonal manipulation and they we have obtained samples of blood to check to see if she is indeed postmenopausal.  If she is premenopausal she will need ovarian suppression. 2.  Anxiety and depression. Patient has had a history of the above. She has had multiple medications for this. 3.  Degenerative joint disease. She has undergone cervical fusion. 4.  Right submandibular lymphadenopathy. If this does not subside she will need to have a biopsy. 5.  Multiple comorbidities. She will continue under the guidance of her primary care team and other physicians. She will return to the office in about 6 weeks time at which time we will examine her lymph nodes in her neck and make a decision about biopsy. He may need to have another ultrasound of her neck. She will call us in the interim for any questions concerns or change in her status.     Masha Price MD 10/18/2022 12:06 AM

## 2022-10-17 NOTE — ONCOLOGY
Oncology Social Work    Date: 10/17/2022  Time: 2:20PM  Name: Haley Ulrich  MRN: [id number]     Contact Type: Face-to-Face Med Onc    Note:    met with patient today before New Patient Appointment with Dr. Karin Vazquez. Patient's mother also present in the room. Patient given information on [facility] Outreach, [address] [place] [address] information, and a list of Breast Cancer Financial Resources. Patient provided general information of the  and how to locate  for any needs. Patient given social workers card. Patient encouraged to call with any questions or concerns that may arise during treatment.  will continue to follow up as needed.       XAVIER Webb, [place]  Oncology Social Worker

## 2022-10-17 NOTE — PROGRESS NOTES
Name: Anne Chaney  : 1980  MRN: [id number]    Oncology Navigation Follow-Up Note    Contact Type:  Medical Oncology    Subjective: Here with mother for new patient consult to discuss Onco DX results and antiestrogen therapy. Objective: Surgical pathology 2022 showed left breast pleomorphic ILC and pleomorphic LCIS (Core biopsy 2022 showed IDC with lobular features, DCIS), Onco DX: 17-no chemo recommended. Hormone levels to assess if pre or postmenopausal-1st set today. Needs radiation next then to start antiestrogen pill. Assistance Needed: Denies needs at this time. Receptive to Advanced Care Planning / Palliative Care:  NA, pathologic stage IA per Dr. Frankie White note. Referrals: Radiation Therapy-dept to call patient with appointment information    Education: Plan of care-AVS given at . [address]. Notes: Will follow through continuum of care.     Electronically signed by Holly Mars RN on 10/17/2022 at 3:03 PM

## 2022-10-17 NOTE — PATIENT INSTRUCTIONS
Reviewed recent medical history and pathology. Discussed the results of her Onco-type DX score of 17. Reviewed with her that she does not need chemotherapy. Will check to see if she has circulating estrogen. Orders placed for LH/FSH. Will check on referral for Radiation Oncology. Referral placed.   Return to see MD in 6 weeks

## 2022-10-18 ENCOUNTER — TELEPHONE (OUTPATIENT)
Dept: ONCOLOGY | Age: 42
End: 2022-10-18

## 2022-10-18 LAB
FOLLICLE STIMULATING HORMONE: 5.8 MIU/ML (ref 1.7–21.5)
LUTEINIZING HORMONE: 6.3 MIU/ML (ref 1–95.6)

## 2022-10-18 NOTE — TELEPHONE ENCOUNTER
Oncology Social Work    Date: 10/17/2022  Name: Brittany Toussaint  MRN: [id number]     Contact Type: Telephone    Note:    received phone call from the patient. Patient stated that she is interested in applying for the grants that  mentioned when  saw her earlier in the day. Brooklyn Fuller would like to be emailed the information that she needs to provide to [email or web address]  -----------------------------------------------------------------------------------------------------------------------------------------------------------------------------     emailed the information on 10/18/2022.  is waiting to receive the information back to continue with the grants.  will continue to follow up as needed.      XAVIER Alcala, [place]  Oncology Social Worker

## 2022-10-31 ENCOUNTER — HOSPITAL ENCOUNTER (OUTPATIENT)
Dept: RADIATION ONCOLOGY | Age: 42
Discharge: HOME | End: 2022-10-31
Payer: COMMERCIAL

## 2022-10-31 ENCOUNTER — TELEPHONE (OUTPATIENT)
Dept: RADIATION ONCOLOGY | Age: 42
End: 2022-10-31

## 2022-10-31 VITALS
BODY MASS INDEX: 24.4 KG/M2 | OXYGEN SATURATION: 97 % | TEMPERATURE: 98 F | SYSTOLIC BLOOD PRESSURE: 119 MMHG | DIASTOLIC BLOOD PRESSURE: 76 MMHG | RESPIRATION RATE: 16 BRPM | HEIGHT: 62 IN | WEIGHT: 132.6 LBS | HEART RATE: 78 BPM

## 2022-10-31 DIAGNOSIS — Z17.0 MALIGNANT NEOPLASM OF UPPER-OUTER QUADRANT OF LEFT BREAST IN FEMALE, ESTROGEN RECEPTOR POSITIVE (HCC): Primary | ICD-10-CM

## 2022-10-31 DIAGNOSIS — C50.412 MALIGNANT NEOPLASM OF UPPER-OUTER QUADRANT OF LEFT BREAST IN FEMALE, ESTROGEN RECEPTOR POSITIVE (HCC): Primary | ICD-10-CM

## 2022-10-31 PROCEDURE — 99204 OFFICE O/P NEW MOD 45 MIN: CPT | Performed by: RADIOLOGY

## 2022-10-31 PROCEDURE — 99202 OFFICE O/P NEW SF 15 MIN: CPT | Performed by: RADIOLOGY

## 2022-10-31 ASSESSMENT — ENCOUNTER SYMPTOMS
BLOOD IN STOOL: 0
RECTAL PAIN: 0
BACK PAIN: 0
NAUSEA: 0
DIARRHEA: 0
TROUBLE SWALLOWING: 0
COUGH: 0
ABDOMINAL PAIN: 0
SHORTNESS OF BREATH: 0

## 2022-10-31 NOTE — TELEPHONE ENCOUNTER
GRANTS:     [employer] - $436.68 ([address] [place] - $400.89 ([employer] Bill) $154 (Electric Bill) - PENDING   [employer] - $1,500 HOUSING ASSISTANCE FUND - PENDING   [employer] - $154 (ELECTRIC BILL) - PENDING   [address] [place] F[place] OUT REQUIRED PORTION ON APPLICATION                 (Information gathered via email with the patient)  (SHARON had face to face contact with the patient on 10/31/22 to gather signature for pending jostin applications)               will continue to follow up as needed.      XAVIER Flores, [place]  Oncology Social Worker

## 2022-10-31 NOTE — PROGRESS NOTES
[address] [facility] [address], [address]        [place],  [facility]        Ignacia Macias: [phone]        F: [phone]       [email or web address]            INITIAL CONSULTATION    Date of Service: 10/31/2022  Patient ID: Terrence Nascimento   : 1980  MRN: [id number]   Acct Number: [de-identified]         Requesting Provider: Dr. Catalina Stoll [facility])  Reason for request: Evaluation for the potential role of adjuvant radiation therapy. CONSULTANT: Sunil Carmona MD MS    CHIEF COMPLAINT: Evaluation for the potential role of adjuvant radiation therapy. 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 Possible long-term side effects discussed included change in the cosmetic appearance of the left breast (change in shape, size, and texture of the breast), hyper/hypo-pigmentation of the skin, scarring of the lung (causing shortness of breath and cough), damage to the heart, swelling of the left arm i.e. lymphedema (causing pain), damage to the nerves (causing numbness, weakness, or paralysis) and rib fracture. Terrence Nascimento presents today for consultation for her left breast cancer.  She had never felt a mass or noticed any changes to her breast but states that her provider was able to palpate a mass after her mammogram. The patient underwent biopsy, followed by lumpectomy and sentinel lymph node biopsy, followed by re-excision on 9/29/22. The patient states she is healing well from surgery. The patient, however, has noted a \"lump\" in her right submandibular area over the past few months. She endorses some cough and trouble swallowing but she has attributed these to her cervical spine surgery. The patient had undergone an ultrasound head neck soft tissue thyroid in September which revealed several prominent lymph nodes. We will order a CT soft tissue neck w con to further evaluate this. We would plan for radiation therapy to the left breast over roughly 3-4 weeks. The patient is agreeable to proceed. She continues to follow with Dr. Rylie Ricardo of medical oncology and Dr. Rakel Herr of general surgery. The patient states she is s/p hysterectomy. We discussed the risks, benefits, and rationale for proceeding with radiation therapy and all of their questions and concerns were answered and addressed to their satisfaction. Consent was signed at today's visit with CT simulation for treatment planning to be performed in the next 1-2 weeks. They have our clinic number to call with any questions or concerns if they were to have any prior to next visit. Thank you for allowing my assistance in the care of your patient. HISTORY OF PRESENT ILLNESS:  Oncology History Overview Note   Roxanne Cheatham is a 40 y.o. female      HISTORY:    10/17/22 As per Dr. Rylie Ricardo (medical oncology) note,    8/30/2022. Screening mammogram performed. Technologist felt a lump in the left upper outer breast.  This showed a focal 1 cm density near the area of the palpable concern. 9/1/2022. Further views confirmed a focal asymmetric density with possible spiculated margins. Ultrasound showed a hypoechoic mass measuring 1.4 x 1.1 x 1.1 cm in diameter in the upper outer left breast for which biopsy was recommended. There was an adjacent oval 6 mm possible complex cyst for which aspiration and or ultrasound-guided biopsy was recommended.   The axilla was negative for abnormal findings. 9/7/2022. Biopsy showed moderately differentiated invasive ductal carcinoma with lobular features, grade 2 with intermediate grade ductal carcinoma in situ. An additional lesion was a fibroadenoma. Estrogen receptor was +100% strong, progesterone receptor was +100% strong, Ki-67 was 25% and HER2 was negative for over expression. 9/9/2022. Sampling for genetic analysis was sent and returned negative for pathogenic mutation. 9/13/2022. Seen by Dr. Mabel Sandhoff in the office as a new patient for evaluation. The patient found enlarged submandibular lymph node on the rightfor which further testing was ordered including an ultrasound of the head neck. She noted that the patient had had no breast symptoms prior to the biopsy. Patient's family history on the mother side was positive for malignancy in that her mother had breast cancer at age 61. There was lung cancer in a maternal aunt. On her father side there is breast cancer in her paternal aunt colon cancer and a paternal uncle lung cancer in her father and in her paternal uncle. 9/14/2022. MRI of the breasts showed an enhancing mass in the upper slightly outer left breast correlating with the known biopsy-proven malignancy. There is an associated biopsy clip demonstrated measuring 1.5 x 1.5 x 1 cm. Postbiopsy changes were seen at the site of the biopsy of the fibroadenoma. .  No other focal dominant lesions were seen. 9/19/2022. Ultrasound of the head neck soft tissues showed several prominent lymph nodes the largest of which measured 1.4 x 0.7 x 0.4 cm.    9/19/2022. 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 Deep margin had pleomorphic lobular carcinoma in situ present. [place] nodes were negative for malignancy. The tumor mass measured 1.1 x 1.3 x 1.5 cm.   Overall grade was 2, lymphovascular invasion was not identified, lobular carcinoma in situ was present pleomorphic type, all regional lymph nodes were negative for tumor. Once again ER was positive SC was positive Ki-67 was 25% and HER2 was negative. This had previously been called a moderately differentiated ductal carcinoma with lobular features but now is being named invasive pleomorphic lobular carcinoma. 2022. Patient was seen in the office by Dr. Marisol Huff in follow-up. She plan for reexcision of the deep margin due to it being positive. This was performed on 2022 and the margin was cleared. 10/4/2022. Saw Dr. Marisol Huff in follow-up. She was then referred to medical oncology and radiation oncology. Comorbidities include kidney stones, renal angiomyolipoma,  previous endometrial ablation, previous hysterectomy, anxiety and depression, COVID in 2021 degenerative joint disease, previous cervical fusion C4-T1, action tremor    Her recurrence score is 17. She does not need chemotherapy. It will not add much to her hormonal manipulation. We have made her an appointment to see radiation oncology. After that she will need to have hormonal manipulation and they we have obtained samples of blood to check to see if she is indeed postmenopausal.  If she is premenopausal she will need ovarian suppression. LABS:    10/4/22 ONCOTYPE DX  Recurrence Score: 17      10/18/22 GENETICS BY [employer]          IMAGIN/30/22 [facility] SCREEN B/L  Impression   Possible density in the upper outer left breast at the area of palpable    concern. Spot compression views, and LM view and an ultrasound are    recommended. The patient will be contacted by our department per protocol regarding    additional imaging and scheduling. Sallyanne Chain            BI-RADS CATEGORY 0: INCOMPLETE -NEED ADDITIONAL IMAGING EVALUATION AND/OR    PRIOR MAMMOGRAMS FOR COMPARISON.     22 [facility] DIAGNOSTIC LEFT  U/S BREAST LEFT  Impression   Nodular density in the upper outer left breast. A targeted left breast    ultrasound is recommended. IMPRESSION:       1. Hypoechoic mass measuring 1.4 cm in the upper outer left breast near    the nipple. An ultrasound-guided biopsy is recommended. 2. Adjacent oval 6 mm lesion may represent a complex cyst. A diagnostic    aspiration/ultrasound guided biopsy is recommended. An ultrasound-guided biopsy is recommended. 9/14/22 MRI BREAST B/L W WO CON  Impression   1. There is an enhancing mass demonstrated within the upper slightly outer    left breast middle to anterior depth which correlates with known biopsy    proven malignancy. There is an associated biopsy clip demonstrated. This    is seen on axial image 108 series    10. This measures approximately 1.5 x 1 x 1.5 cm.       2. Postbiopsy changes are also noted within the upper outer left breast    middle to anterior depth as seen on axial image 97 series 10. This    correlates with previously biopsied fibroadenoma on 9/7/2022.       3. There is moderate background enhancement with plateau kinetics likely    representing background parenchymal enhancement. This may be related to    patient's stage of menstrual cycle. No focal dominant lesion is seen,    however. Recommend 6 month follow-up    breast MRI to document stability. A six-month follow-up MRI recommended. Otherwise, follow-up according to    ACR/ACS guidelines. The patient was notified and a result letter will be sent to the patient. BI-RADS CATEGORY 6: Known biopsy proven malignancy. 9/16/22 US HEAD NECK SOFT TISSUE THYROID  Impression   FINDINGS/IMPRESSION:   There are several prominent lymph nodes at the region of interest. The largest measures 1.4 x 0.7 x 0.4 cm.     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 [address]   9/7/2022 Surgery    Clinical Information: LEFT BREAST MASS     FINAL DIAGNOSIS:   A.   Breast, left, upper outer quadrant, tribell clip, core needle   biopsies:     Fibroadenoma. B.  Breast, left, upper outer quadrant, barbell clip, core needle   biopsies: Moderately differentiated invasive ductal carcinoma with lobular   features,     [place] score 2 of 3. Intermediate grade ductal carcinoma in situ. BREAST BIOMARKERS*   Estrogen Receptor: (Clone SP1), [employer]       ( x ) Positive 100% of cells (>10% of cells)   Intensity of Staining:       ( x ) Strong     Progesterone Receptor: (Clone 1E2), [employer]       ( x ) Positive 100% of cells   Intensity of Staining:       ( x ) Strong     Ki-67 (clone 30-9)       Percentage of positive nuclei: 25%               Favorable <10%               Borderline 10-20%               Unfavorable >20%     HER2 Immunohistochemistry (Clone 4B5, [employer])       ( x ) Negative (1+) - Incomplete faint/barely perceptible membrane       staining in >10% of invasive tumor      9/19/2022 Surgery    ADDENDUM REPORT (9/27/2022, 10/5/2022): FINAL DIAGNOSIS:   A. 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 Closest margin to invasive tumor = 4mm to posterior. Pleomorphic lobular carcinoma in situ at deep margin. Previous biopsy site changes. pT1c pN0 sn     B. [place] lymph nodes (2), left, resection:    Negative for malignancy (0/2). C.  Additional medial margin, lumpectomy:    Negative for malignancy. D.  Additional caudal margin, lumpectomy:    Negative for malignancy. COMMENT 9/27/2022: Selected slides and pathology report were reviewed   by Dr. Yung Catherine and archival tissue was selected for the   following testing: Oncotype DX (ordered by Dr. Robyn Preciado). ADDENDUM REPORT 10/5/2022:    Oncotype DX Breast Cancer Assay (RT-PCR) performed by [facility]   Breast Cancer Recurrence Score: 17     Excision (less than total mastectomy)     Specimen Laterality    Left     TUMOR   +Tumor Site   Upper outer quadrant, per prior biopsy. Histologic Type   Invasive lobular carcinoma, pleomorphic type      +Histologic Type Comment: The tumor was previously tested for   E-cadherin on the biopsy. The tumor architecture and atypical nuclear   morphology demonstrates extensive single cell filing infiltration as   well as in situ neoplasia showing cancerization of lobules. These   findings are most concerning for a pleomorphic lobular neoplasia. Repeat E-cadherin immunostain* is performed on block 5 and 10 and   attenuated e-cadherin immunostaining* (aberrant) compared to internal   control, consistent with lobular carcinoma. The control is adequate. Histologic Grade (Susana Histologic Score)    [place] Score    Glandular (Acinar) / Tubular Differentiation     Score 3 (less than 10% of tumor area forming glandular / tubular   structures)    Nuclear Pleomorphism     Score 2 (Cells larger than normal with open vesicular nuclei, visible    nucleoli, and moderate    variability in both size and shape)    Mitotic Rate    See Table 1 in CAP Protocol.      Score 2     Overall Grade     Grade 2 (scores of 6 or 7)     Tumor Size     Greatest dimension of largest invasive focus greater than 1 mm   (specify exact measurement in Millimeters (mm)): 13 mm   +Tumor Focality   Single focus of invasive carcinoma     Ductal Carcinoma In Situ (DCIS)    Not identified     +Lobular Carcinoma In Situ (LCIS)   Present, pleomorphic type     Tumor Extent    Tumor Extent (required only if skin, nipple, or skeletal muscle are   present and involved)     Not applicable (skin, nipple, and skeletal muscle are absent OR are   uninvolved)     Lymphovascular Invasion   Not identified     Treatment Effect in the Breast   .No known presurgical therapy     Treatment Effect in the Lymph Nodes    Not applicable MARGINS   Margin Status for Invasive Carcinoma   All margins negative for invasive carcinoma    Distance from Invasive Carcinoma to Closest Margin    Specify in Millimeters (mm)    Exact distance: 4mm     +Closest Margin(s) to Invasive Carcinoma    Posterior     REGIONAL LYMPH NODES   Regional Lymph Node Status   Regional lymph nodes present     All regional lymph nodes negative for tumor      Total Number of Lymph Nodes Examined (sentinel and non-sentinel)    Exact number (specify): 2    number of Evans Nodes Examined (if applicable)    Exact number (specify): 2     DISTANT METASTASIS   Distant Site(s) Involved, if applicable   Not applicable     PATHOLOGIC STAGE CLASSIFICATION (pTNM, AJCC 8th Edition)   TNM Descriptors    Not applicable     pT Category    pT1c: Tumor greater than 10 mm but less than or equal to 20 mm in   greatest dimension   Regional Lymph Nodes Modifier    (sn): Evans node(s) evaluated. If 6 or more nodes (sentinel or   nonsentinel) are removed, this modifier should not be used.    pN Category    pN0: No regional lymph node metastasis identified or ITCs only#   pM Category (required only if confirmed pathologically)    Not applicable - pM cannot be determined from the submitted   specimen(s)     SPECIAL STUDIES   Previously performed on -[id number], date of service 9/7/2022   Estrogen Receptor: (Clone SP1), [employer]       ( x ) Positive 100% of cells (>10% of cells)   Intensity of Staining:       ( x ) Strong     Progesterone Receptor: (Clone 1E2), [employer]       ( x ) Positive 100% of cells   Intensity of Staining:       ( x ) Strong     Ki-67 (clone 30-9)       Percentage of positive nuclei: 25%               Favorable <10%               Borderline 10-20%               Unfavorable >20%       HER2 Immunohistochemistry (Clone 4B5, [employer])       ( x ) Negative (1+) - Incomplete faint/barely perceptible membrane       staining in >10% of invasive tumor      9/29/2022 Surgery    Clinical Information: MALIGNANT NEOPLASM OF LEFT FEMALE BREAST,   UNSPECIFIED ESTROGEN RECEPTOR STATUS, UNSPECIFIED SITE OF BREAST     FINAL DIAGNOSIS:   Breast, left, new deep margin, excision:     Biopsy site changes. No evidence of malignancy. Specimen:   EXCISION OF BREAST, LEFT NEW DEEP MARGIN      10/4/2022 -  Cancer Staged    Staging form: Breast, AJCC 8th Edition  - Pathologic stage from 10/4/2022: Stage IA (pT1c, pN0, cM0, G2, ER+, MD+, HER2-)           Ms. Susy Goel is a 40 y.o. female with above mentioned oncologic history presenting today for initial consultation regarding the potential role for radiation therapy. Review of Systems   Constitutional:  Negative for activity change, appetite change, diaphoresis, fatigue, fever and unexpected weight change. HENT:  Negative for ear pain and trouble swallowing. Respiratory:  Negative for cough and shortness of breath. Cardiovascular:  Negative for chest pain and leg swelling. Gastrointestinal:  Negative for abdominal pain, blood in stool, diarrhea, nausea and rectal pain. Genitourinary:  Negative for dysuria, frequency, hematuria and urgency. Musculoskeletal:  Negative for arthralgias, back pain and myalgias. Skin:  Negative for rash and wound. Neurological:  Negative for dizziness, weakness, light-headedness, numbness and headaches. Psychiatric/Behavioral:  Negative for confusion. A complete review of systems was performed and found to be negative except as presented above. Advance Directives       Power of  Living Will ACP-Advance Directive ACP-Power of     Not on File Not on File Not on File Not on File            Chaperone: No    Mediport: no    Pacemaker/ICD: no    Previous XRT: no    PAIN: 0/10    ADDITIONAL COMMENTS: Working with MSW already.        PHYSICAL EXAMINATION:     VITAL SIGNS: /76   Pulse 78   Temp 98 °F (36.7 °C) (Infrared)   Resp 16  5' 2.01\" (1.575 m)   Wt 132 lb 9.6 oz (60.1 kg)   LMP 2019   SpO2 97%   BMI 24.25 kg/m²     ECO - Asymptomatic (Fully active, able to carry on all pre-disease activities without restriction)    Physical Exam  Constitutional:       General: She is not in acute distress. Appearance: Normal appearance. HENT:      Head: Normocephalic and atraumatic. Pulmonary:      Effort: Pulmonary effort is normal. No respiratory distress. Abdominal:      General: Abdomen is flat. Musculoskeletal:      Comments: Full ROM of the left shoulder   Lymphadenopathy:      Head:      Right side of head: No preauricular or posterior auricular adenopathy. Left side of head: No submandibular, preauricular or posterior auricular adenopathy. Cervical: No cervical adenopathy. Upper Body:      Right upper body: No supraclavicular adenopathy. Left upper body: No supraclavicular adenopathy. Comments: Enlargement of the area of right submandibular gland, nontender   Neurological:      Mental Status: She is alert and oriented to person, place, and time. Past Medical History:   Diagnosis Date    Invasive ductal carcinoma of breast, female, left ([address] 2022    with lobular features    Kidney stone      with pregnancy    PONV (postoperative nausea and vomiting)     Prolonged emergence from general anesthesia     \"During Hysterectomy- bradycardia- meds to increase heart rate. \"       Past Surgical History:   Procedure Laterality Date    BREAST LUMPECTOMY Left 2022    LEFT BREAST LUMPECTOMY WITH SENTINELYMPH NODE BX performed by Rakel Hinson MD at [facility] LUMPECTOMY Left 2022    RE-EXCISION LEFT BREAST DEEP MARGINS performed by Rakel Hinson MD at [facility] 2021    ACDF C4-C7-T1 performed by Stacie Dunlap MD at [place] [address]  2016    Dr. Janace Bumpers 2017    DILATATION AND CURETTAGE HYSTEROSCOPY, POSSIBLE MYOSURE performed by Mariam Funk DO at [facility] [address]  04/18/2018    [address]    umbilical hernia    HYSTERECTOMY (CERVIX STATUS UNKNOWN)  03/13/2019    partial    HYSTERECTOMY ABDOMINAL N/A 03/13/2019    ROBOTIC TOTAL LAPAROSCOPIC HYSTERECTOMY WITH BILATERAL SALPINGECTOMY performed by Mariam Funk DO at [address]  04/18/2018    MARGUERITE [address] BIOPSY LEFT Left 09/07/2022    MARGUERITE [address] BIOPSY LEFT 9/7/2022 Herbie Morse MD [facility]      one ovary remains    IA HYSTEROSCOPY,W/ENDOMETRIAL ABLATION N/A 04/18/2018    HYSTEROSCOPY ENDOMETRIAL ABLATION performed by Mariam Funk DO at [address] [facility] ADDITIONAL LEFT Left 09/07/2022    US BREAST BIOPSY NEEDLE ADDITIONAL LEFT 9/7/2022 Herbie Morse MD [facility]       Family History   Problem Relation Age of Onset    Breast Cancer Mother 61    BRCA 1 Negative Mother 64    BRCA 2 Negative Mother 64    Bilateral breast cancer Mother 64        opposite side    Heart Disease Father     High Blood Pressure Father     High Cholesterol Father     Diabetes Father     Lung Cancer Father     No Known Problems Sister     No Known Problems Sister     No Known Problems Sister     No Known Problems Brother     No Known Problems Brother     No Known Problems Brother     Leukemia Maternal Grandmother     Colon Cancer Paternal Uncle         age 62s    Lung Cancer Paternal Uncle         age >47    Breast Cancer Paternal Aunt         age >47    Lung Cancer Maternal Aunt         age >47    Asthma Neg Hx        Social History     Socioeconomic History    Marital status:      Spouse name: Jon Zepeda    Number of children: 3    Years of education: Not on file    Highest education level: Not on file   Occupational History    Not on file   Tobacco Use    Smoking status: Never    Smokeless tobacco: Never Vaping Use    Vaping Use: Never used   Substance and Sexual Activity    Alcohol use: No     Alcohol/week: 0.0 standard drinks    Drug use: No    Sexual activity: Yes     Partners: Male   Other Topics Concern    Not on file   Social History Narrative    Not on file     Social Determinants of Health     Financial Resource Strain: Low Risk     Difficulty of Paying Living Expenses: Not hard at all   Food Insecurity: No Food Insecurity    Worried About Running Out of Food in the Last Year: Never true    Ran Out of Food in the Last Year: Never true   Transportation Needs: Not on file   Physical Activity: Not on file   Stress: Not on file   Social Connections: Not on file   Intimate Partner Violence: Not on file   Housing Stability: Not on file       Allergies   Allergen Reactions    Amoxicillin Hives        Current Outpatient Medications   Medication Sig Dispense Refill    escitalopram (LEXAPRO) 10 MG tablet Take 1 tablet by mouth daily 90 tablet 1    cyclobenzaprine (FLEXERIL) 10 MG tablet Take 1 tablet by mouth nightly as needed for Muscle spasms 7 tablet 0    butalbital-acetaminophen-caffeine (FIORICET, ESGIC) -40 MG per tablet Take 1 tablet by mouth every 6 hours as needed for Headaches 60 tablet 0    pantoprazole (PROTONIX) 40 MG tablet Take 1 tablet by mouth every morning (before breakfast) (Patient taking differently: Take 40 mg by mouth every morning (before breakfast) Indications: PRN) 30 tablet 2    diazePAM (VALIUM) 2 MG tablet Take 2 mg by mouth every 8 hours as needed for Anxiety. ibuprofen (ADVIL;MOTRIN) 400 MG tablet Take 400 mg by mouth every 6 hours as needed for Pain      HYDROcodone-acetaminophen (NORCO) 5-325 MG per tablet TAKE 1 TABLET BY MOUTH EVERY 4 HOURS FOR 7 DAYS (Patient not taking: Reported on 10/17/2022)      METAMUCIL FIBER PO Take 1 capsule by mouth daily 3 in 1 fiber       No current facility-administered medications for this encounter.        No outpatient medications have been marked as taking for the 10/31/22 encounter [facility] Encounter) with Sydnee Workman MD.       LABORATORY STUDIES:   Onc labs: No results found for: PSA, CEA, LDH, AFP    Lab Results   Component Value Date    CREATININE 0.74 04/25/2022     Lab Results   Component Value Date    BUN 12 04/25/2022       PATHOLOGY: As per HPI above. RADIOLOGIC STUDIES: As per HPI above. Electronically signed by Christian Richardson MD MS on 10/31/22 at 8:39 AM EDT     ATTESTATION: 45 minutes ([id number]) minutes were spent with the patient at today's visit. reviewing pertinent information related to their oncologic diagnosis, including any recent labs, imaging, follow ups and plan of care going forward.     CC:Dr. Valdez [facility]) Dr. Og Farley (Surgery) Dr. Elvis Davidson (PCP)    ACC:[facility] [facility]

## 2022-11-01 ENCOUNTER — PROCEDURE VISIT (OUTPATIENT)
Dept: NEUROLOGY | Age: 42
End: 2022-11-01
Payer: COMMERCIAL

## 2022-11-01 ENCOUNTER — APPOINTMENT (OUTPATIENT)
Dept: RADIATION ONCOLOGY | Age: 42
End: 2022-11-01
Payer: COMMERCIAL

## 2022-11-01 DIAGNOSIS — M79.604 RIGHT LEG PAIN: Primary | ICD-10-CM

## 2022-11-01 PROCEDURE — 95886 MUSC TEST DONE W/N TEST COMP: CPT | Performed by: PSYCHIATRY & NEUROLOGY

## 2022-11-01 PROCEDURE — 95908 NRV CNDJ TST 3-4 STUDIES: CPT | Performed by: PSYCHIATRY & NEUROLOGY

## 2022-11-04 ENCOUNTER — APPOINTMENT (OUTPATIENT)
Dept: RADIATION ONCOLOGY | Age: 42
End: 2022-11-04
Payer: COMMERCIAL

## 2022-11-07 ENCOUNTER — OFFICE VISIT (OUTPATIENT)
Dept: FAMILY MEDICINE CLINIC | Age: 42
End: 2022-11-07
Payer: COMMERCIAL

## 2022-11-07 VITALS
SYSTOLIC BLOOD PRESSURE: 96 MMHG | BODY MASS INDEX: 24.5 KG/M2 | DIASTOLIC BLOOD PRESSURE: 76 MMHG | OXYGEN SATURATION: 98 % | WEIGHT: 134 LBS | RESPIRATION RATE: 12 BRPM | HEART RATE: 76 BPM | TEMPERATURE: 98.3 F

## 2022-11-07 DIAGNOSIS — C50.919 MALIGNANT NEOPLASM OF BREAST IN FEMALE, ESTROGEN RECEPTOR POSITIVE, UNSPECIFIED LATERALITY, UNSPECIFIED SITE OF BREAST (HCC): ICD-10-CM

## 2022-11-07 DIAGNOSIS — Z17.0 MALIGNANT NEOPLASM OF BREAST IN FEMALE, ESTROGEN RECEPTOR POSITIVE, UNSPECIFIED LATERALITY, UNSPECIFIED SITE OF BREAST (HCC): ICD-10-CM

## 2022-11-07 DIAGNOSIS — F41.8 SITUATIONAL ANXIETY: Primary | ICD-10-CM

## 2022-11-07 PROCEDURE — 1036F TOBACCO NON-USER: CPT | Performed by: NURSE PRACTITIONER

## 2022-11-07 PROCEDURE — G8420 CALC BMI NORM PARAMETERS: HCPCS | Performed by: NURSE PRACTITIONER

## 2022-11-07 PROCEDURE — G8427 DOCREV CUR MEDS BY ELIG CLIN: HCPCS | Performed by: NURSE PRACTITIONER

## 2022-11-07 PROCEDURE — G8484 FLU IMMUNIZE NO ADMIN: HCPCS | Performed by: NURSE PRACTITIONER

## 2022-11-07 PROCEDURE — 99213 OFFICE O/P EST LOW 20 MIN: CPT | Performed by: NURSE PRACTITIONER

## 2022-11-07 RX ORDER — FLUOXETINE HYDROCHLORIDE 40 MG/1
40 CAPSULE ORAL DAILY
Qty: 30 CAPSULE | Refills: 3 | Status: SHIPPED | OUTPATIENT
Start: 2022-11-07

## 2022-11-07 RX ORDER — FLUOXETINE HYDROCHLORIDE 20 MG/1
20 CAPSULE ORAL DAILY
Qty: 15 CAPSULE | Refills: 0 | Status: SHIPPED | OUTPATIENT
Start: 2022-11-07

## 2022-11-08 ASSESSMENT — ENCOUNTER SYMPTOMS
ABDOMINAL PAIN: 0
SHORTNESS OF BREATH: 0
WHEEZING: 0
COUGH: 0
ABDOMINAL DISTENTION: 0
BACK PAIN: 0
CHEST TIGHTNESS: 0

## 2022-11-08 NOTE — PROGRESS NOTES
LUMPECTOMY Left 9/19/2022    LEFT BREAST LUMPECTOMY WITH SENTINELYMPH NODE BX performed by Jessie Sahu MD at [address] LUMPECTOMY Left 9/29/2022    RE-EXCISION LEFT BREAST DEEP MARGINS performed by Jessie Sahu MD at [address] [facility] N/A 12/08/2021    ACDF C4-C7-T1 performed by Ras Fountain MD at [address]  03/25/2016    Dr. Luis Montes N/A 09/13/2017    DILATATION AND CURETTAGE HYSTEROSCOPY, POSSIBLE MYOSURE performed by Georgean Goodpasture, DO at [facility]  04/18/2018    [address]    umbilical hernia    HYSTERECTOMY (CERVIX STATUS UNKNOWN)  03/13/2019    partial    HYSTERECTOMY ABDOMINAL N/A 03/13/2019    ROBOTIC TOTAL LAPAROSCOPIC HYSTERECTOMY WITH BILATERAL SALPINGECTOMY performed by Georgean Goodpasture, DO at [address] [facility]  04/18/2018    [address] LEFT Left 09/07/2022    [address] LEFT 9/7/2022 Wallace Marcos MD [facility]      one ovary remains    MN HYSTEROSCOPY,W/ENDOMETRIAL ABLATION N/A 04/18/2018    HYSTEROSCOPY ENDOMETRIAL ABLATION performed by Georgean Goodpasture, DO at [address] [facility] [address] ADDITIONAL LEFT Left 09/07/2022    US BREAST BIOPSY NEEDLE ADDITIONAL LEFT 9/7/2022 Wallace Mracos MD [facility]     Family History   Problem Relation Age of Onset    Breast Cancer Mother 61    BRCA 1 Negative Mother 64    BRCA 2 Negative Mother 64    Bilateral breast cancer Mother 64        opposite side    Heart Disease Father     High Blood Pressure Father     High Cholesterol Father     Diabetes Father     Lung Cancer Father     No Known Problems Sister     No Known Problems Sister     No Known Problems Sister     No Known Problems Brother     No Known Problems Brother     No Known Problems Brother     Leukemia Maternal Grandmother     Colon Cancer Paternal Uncle         age 62s    Lung Cancer Paternal Uncle         age >47    Breast Cancer Paternal Aunt         age >47    Lung Cancer Maternal Aunt         age >47    Asthma Neg Hx      Social History     Tobacco Use    Smoking status: Never    Smokeless tobacco: Never   Substance Use Topics    Alcohol use: No     Alcohol/week: 0.0 standard drinks      Current Outpatient Medications   Medication Sig Dispense Refill    FLUoxetine (PROZAC) 20 MG capsule Take 1 capsule by mouth daily 15 capsule 0    FLUoxetine (PROZAC) 40 MG capsule Take 1 capsule by mouth daily 30 capsule 3    butalbital-acetaminophen-caffeine (FIORICET, ESGIC) -40 MG per tablet Take 1 tablet by mouth every 6 hours as needed for Headaches 60 tablet 0    pantoprazole (PROTONIX) 40 MG tablet Take 1 tablet by mouth every morning (before breakfast) (Patient taking differently: Take 40 mg by mouth every morning (before breakfast) Indications: PRN) 30 tablet 2    diazePAM (VALIUM) 2 MG tablet Take 2 mg by mouth every 8 hours as needed for Anxiety. ibuprofen (ADVIL;MOTRIN) 400 MG tablet Take 400 mg by mouth every 6 hours as needed for Pain      METAMUCIL FIBER PO Take 1 capsule by mouth daily 3 in 1 fiber       No current facility-administered medications for this visit. Allergies   Allergen Reactions    Amoxicillin Hives     Health Maintenance   Topic Date Due    COVID-19 Vaccine (1) Never done    Varicella vaccine (1 of 2 - 2-dose childhood series) Never done    HIV screen  Never done    Hepatitis C screen  Never done    DTaP/Tdap/Td vaccine (1 - Tdap) Never done    Flu vaccine (1) Never done    Depression Monitoring  02/23/2023    Breast cancer screen  09/19/2023    Lipids  09/27/2027    Hepatitis A vaccine  Aged Out    Hib vaccine  Aged Out    Meningococcal (ACWY) vaccine  Aged Out    Pneumococcal 0-64 years Vaccine  Aged Out         Objective:     Physical Exam  Vitals and nursing note reviewed. Constitutional:       Appearance: Normal appearance.  She is well-developed and normal weight. HENT:      Head: Normocephalic. Right Ear: Tympanic membrane and ear canal normal.      Left Ear: Tympanic membrane and ear canal normal.      Nose: Nose normal.      Mouth/Throat:      Pharynx: Oropharynx is clear. Eyes:      Extraocular Movements: Extraocular movements intact. Conjunctiva/sclera: Conjunctivae normal.   Cardiovascular:      Rate and Rhythm: Normal rate and regular rhythm. Pulses: Normal pulses. Heart sounds: Normal heart sounds. Pulmonary:      Effort: Pulmonary effort is normal.      Breath sounds: Normal breath sounds. Abdominal:      General: Bowel sounds are normal.      Palpations: Abdomen is soft. Musculoskeletal:         General: Normal range of motion. Cervical back: Neck supple. Skin:     General: Skin is warm and dry. Neurological:      General: No focal deficit present. Mental Status: She is alert and oriented to person, place, and time. Psychiatric:         Attention and Perception: Attention normal.         Mood and Affect: Mood is anxious. Speech: Speech normal.         Behavior: Behavior normal. Behavior is cooperative. Thought Content: Thought content normal.         Cognition and Memory: Cognition normal.         Judgment: Judgment normal.     BP 96/76   Pulse 76   Temp 98.3 °F (36.8 °C) (Oral)   Resp 12   Wt 134 lb (60.8 kg)   LMP 03/06/2019   SpO2 98%   BMI 24.50 kg/m²       Impression/Plan:  1. Situational anxiety    2. Malignant neoplasm of breast in female, estrogen receptor positive, unspecified laterality, unspecified site [facility]      Requested Prescriptions     Signed Prescriptions Disp Refills    FLUoxetine (PROZAC) 20 MG capsule 15 capsule 0     Sig: Take 1 capsule by mouth daily    FLUoxetine (PROZAC) 40 MG capsule 30 capsule 3     Sig: Take 1 capsule by mouth daily     No orders of the defined types were placed in this encounter.   Hold Lexapro start Prozac 20 mg p.o. daily 10 days then increase to 40 mg p.o. daily. Valium as needed as prescribed. Follow-up 1 month    Patient giveneducational materials - see patient instructions. Discussed use, benefit, and side effects of prescribed medications. All patient questions answered. Pt voiced understanding. Reviewed health maintenance. Patient agreedwith treatment plan. Follow up as directed.           Electronically signed by GERALDINE Piedra CNP on 11/8/2022 at 10:14 AM

## 2022-11-09 ENCOUNTER — HOSPITAL ENCOUNTER (OUTPATIENT)
Dept: CT IMAGING | Age: 42
Discharge: HOME | End: 2022-11-09

## 2022-11-09 ENCOUNTER — HOSPITAL ENCOUNTER (OUTPATIENT)
Dept: RADIATION ONCOLOGY | Age: 42
Discharge: HOME | End: 2022-11-09
Payer: COMMERCIAL

## 2022-11-09 DIAGNOSIS — C50.412 MALIGNANT NEOPLASM OF UPPER-OUTER QUADRANT OF LEFT BREAST IN FEMALE, ESTROGEN RECEPTOR POSITIVE (HCC): ICD-10-CM

## 2022-11-09 DIAGNOSIS — Z17.0 MALIGNANT NEOPLASM OF UPPER-OUTER QUADRANT OF LEFT BREAST IN FEMALE, ESTROGEN RECEPTOR POSITIVE (HCC): ICD-10-CM

## 2022-11-09 PROCEDURE — 77334 RADIATION TREATMENT AID(S): CPT | Performed by: RADIOLOGY

## 2022-11-09 PROCEDURE — 77263 THER RADIOLOGY TX PLNG CPLX: CPT | Performed by: RADIOLOGY

## 2022-11-09 PROCEDURE — 77290 THER RAD SIMULAJ FIELD CPLX: CPT | Performed by: RADIOLOGY

## 2022-11-09 PROCEDURE — 3209999900 CT GUIDE RADIATION THERAPY NO CHARGE

## 2022-11-16 PROCEDURE — 77300 RADIATION THERAPY DOSE PLAN: CPT | Performed by: RADIOLOGY

## 2022-11-16 PROCEDURE — 77334 RADIATION TREATMENT AID(S): CPT | Performed by: RADIOLOGY

## 2022-11-16 PROCEDURE — 77295 3-D RADIOTHERAPY PLAN: CPT | Performed by: RADIOLOGY

## 2022-11-17 ENCOUNTER — HOSPITAL ENCOUNTER (OUTPATIENT)
Dept: RADIATION ONCOLOGY | Age: 42
Discharge: HOME | End: 2022-11-17
Payer: COMMERCIAL

## 2022-11-17 VITALS
HEART RATE: 82 BPM | WEIGHT: 135.58 LBS | RESPIRATION RATE: 16 BRPM | TEMPERATURE: 98.6 F | BODY MASS INDEX: 24.79 KG/M2 | SYSTOLIC BLOOD PRESSURE: 111 MMHG | OXYGEN SATURATION: 97 % | DIASTOLIC BLOOD PRESSURE: 72 MMHG

## 2022-11-17 DIAGNOSIS — C50.412 MALIGNANT NEOPLASM OF UPPER-OUTER QUADRANT OF LEFT BREAST IN FEMALE, ESTROGEN RECEPTOR POSITIVE (HCC): Primary | ICD-10-CM

## 2022-11-17 DIAGNOSIS — Z17.0 MALIGNANT NEOPLASM OF UPPER-OUTER QUADRANT OF LEFT BREAST IN FEMALE, ESTROGEN RECEPTOR POSITIVE (HCC): Primary | ICD-10-CM

## 2022-11-17 PROCEDURE — 77280 THER RAD SIMULAJ FIELD SMPL: CPT | Performed by: RADIOLOGY

## 2022-11-17 PROCEDURE — 77334 RADIATION TREATMENT AID(S): CPT | Performed by: RADIOLOGY

## 2022-11-17 PROCEDURE — 77412 RADIATION TX DELIVERY LVL 3: CPT | Performed by: RADIOLOGY

## 2022-11-17 NOTE — PROGRESS NOTES
[address] [facility] [address], [address]        ANNELISE RITTERBAKARI SANTOS[address] [facility]        Dev Clarity: [phone]        F: [phone]       [facility]            Dr. Mehul Arellano MD MS          Dr. Ulises Stevens MD PhD    ON TREATMENT VISIT (OTV) NOTE     Date of Service: 2022  Patient ID: Nuria Ramos   : 1980  MRN: [id number]   Acct Number: [de-identified]     RADIATION ONCOLOGY ATTENDING:  Usha Arnold MD MS    DIAGNOSIS:  Cancer Staging  Malignant neoplasm of upper-outer quadrant of breast in female, estrogen receptor positive ([facility]  Staging form: Breast, AJCC 8th Edition  - Clinical stage from 2022: Stage IA (cT1c, cN0, cM0, G2, ER+, MO+, HER2-) - Unsigned  - Pathologic stage from 10/4/2022: Stage IA (pT1c, pN0, cM0, G2, ER+, MO+, HER2-) - Signed by Neda Elizabeth MD on 10/4/2022      Treatment Area: Breast     Current Total Dose(cGy): 266  Current Fraction:   Final/Cumulative Rx. Dose (cGy): 5256    Patient was seen today for weekly visit.      Wt Readings from Last 3 Encounters:   22 135 lb 9.3 oz (61.5 kg)   22 134 lb (60.8 kg)   10/31/22 132 lb 9.6 oz (60.1 kg)       /72   Pulse 82   Temp 98.6 °F (37 °C) (Infrared)   Resp 16   Wt 135 lb 9.3 oz (61.5 kg)   LMP 2019   SpO2 97%   BMI 24.79 kg/m²     Lab Results   Component Value Date    WBC 5.5 2022     2022       Comfort Alteration  Fatigue:None, able to perform daily activities    Pain Location: n/a  Pain Intensity (Current): 0 No Pain  Pain Treatment: No treatment scheduled  Pain Relief: n/a  Hot Flashes/Flushes: Mild or no more than 1 per day    Emotional Alteration:   Coping: effective    Nutritional Alteration  Anorexia: none   Nausea: No nausea noted  Vomiting: No vomiting   Dyspepsia/Heartburn: None    Skin Alteration   Skin reaction: No changes noted    Ventilation Alteration  Cough: None  Hemoptysis: None  Dyspnea: Normal  Mucous Quantity/Quality: n/a    Additional Comments: Teaching completed today. MEDICATIONS:     Current Outpatient Medications   Medication Sig Dispense Refill    FLUoxetine (PROZAC) 20 MG capsule Take 1 capsule by mouth daily 15 capsule 0    FLUoxetine (PROZAC) 40 MG capsule Take 1 capsule by mouth daily 30 capsule 3    butalbital-acetaminophen-caffeine (FIORICET, ESGIC) -40 MG per tablet Take 1 tablet by mouth every 6 hours as needed for Headaches 60 tablet 0    pantoprazole (PROTONIX) 40 MG tablet Take 1 tablet by mouth every morning (before breakfast) (Patient taking differently: Take 40 mg by mouth every morning (before breakfast) Indications: PRN) 30 tablet 2    diazePAM (VALIUM) 2 MG tablet Take 2 mg by mouth every 8 hours as needed for Anxiety. ibuprofen (ADVIL;MOTRIN) 400 MG tablet Take 400 mg by mouth every 6 hours as needed for Pain      METAMUCIL FIBER PO Take 1 capsule by mouth daily 3 in 1 fiber       No current facility-administered medications for this encounter. PHYSICAL EXAM:       ECO - Asymptomatic (Fully active, able to carry on all pre-disease activities without restriction)    General: NAD, AO x 3, Mentation is clear with appropriate affect. HEENT: Normocephalic, atraumatic  Thorax:  Unlabored  Breasts:  Radiation treatment area of the left breast without any skin reaction  Abdomen:  Non-distended  Neuro:  Cranial nerves grossly intact; no focal deficits  Skin - treatment portal: SEE above. Chemotherapy Update: Adjuvant hormonal therapy to follow Radiation therapy    Treatment Imaging: Kv Pair, Port Film, and All imaging reviewed and approved by Dr. Dory Malone: No significant radiation side effects. Responding appropriately to symptomatic management.     New medications, diagnostic results: Continue treatment as planned, Prescribed betamethasone valerate 0.1% cream to be applied to radiation treatment area twice daily    PLAN: Again

## 2022-11-17 NOTE — PROGRESS NOTES
[address] [facility]  [facility], [address]  Phone: [phone]   Toll Free: [phone]   Fax: [phone]    RADIATION ONCOLOGY EDUCATION    CHIEF COMPLAINT: Dennis Roberto presents to radiation oncology today for education regarding treatment to the left breast.      PLAN:   Expected and potential side effects were discussed in detail, along with written handouts. Skin care and moisturization instructions were discussed in detail. Patient was  contacted about physical therapy and just needs to reach out and reschedule. Informed her if she has any issues with that process we can assist.  Patient was informed of dietician that is available weekly and as needed. Educated on weekly on treatment visit to meet with Physician to monitor side effects and treatment course. Informed patient that Physician is available at any time to discuss radiation side effects/concerns through out treatment course. Dennis Roberto had the opportunity to ask questions, and indicated that all questions were satisfactorily addressed.

## 2022-11-18 ENCOUNTER — HOSPITAL ENCOUNTER (OUTPATIENT)
Dept: RADIATION ONCOLOGY | Age: 42
Discharge: HOME | End: 2022-11-18
Payer: COMMERCIAL

## 2022-11-18 PROCEDURE — 77387 GUIDANCE FOR RADJ TX DLVR: CPT | Performed by: RADIOLOGY

## 2022-11-18 PROCEDURE — 77412 RADIATION TX DELIVERY LVL 3: CPT | Performed by: RADIOLOGY

## 2022-11-18 PROCEDURE — G6002 STEREOSCOPIC X-RAY GUIDANCE: HCPCS | Performed by: RADIOLOGY

## 2022-11-20 ENCOUNTER — HOSPITAL ENCOUNTER (OUTPATIENT)
Dept: RADIATION ONCOLOGY | Age: 42
Discharge: HOME | End: 2022-11-20
Payer: COMMERCIAL

## 2022-11-20 PROCEDURE — G6002 STEREOSCOPIC X-RAY GUIDANCE: HCPCS | Performed by: RADIOLOGY

## 2022-11-20 PROCEDURE — 77336 RADIATION PHYSICS CONSULT: CPT | Performed by: RADIOLOGY

## 2022-11-20 PROCEDURE — 77387 GUIDANCE FOR RADJ TX DLVR: CPT | Performed by: RADIOLOGY

## 2022-11-20 PROCEDURE — 77412 RADIATION TX DELIVERY LVL 3: CPT | Performed by: RADIOLOGY

## 2022-11-21 ENCOUNTER — HOSPITAL ENCOUNTER (OUTPATIENT)
Dept: RADIATION ONCOLOGY | Age: 42
Discharge: HOME | End: 2022-11-21
Payer: COMMERCIAL

## 2022-11-21 PROCEDURE — 77387 GUIDANCE FOR RADJ TX DLVR: CPT | Performed by: RADIOLOGY

## 2022-11-21 PROCEDURE — G6002 STEREOSCOPIC X-RAY GUIDANCE: HCPCS | Performed by: RADIOLOGY

## 2022-11-21 PROCEDURE — 77412 RADIATION TX DELIVERY LVL 3: CPT | Performed by: RADIOLOGY

## 2022-11-22 ENCOUNTER — HOSPITAL ENCOUNTER (OUTPATIENT)
Dept: RADIATION ONCOLOGY | Age: 42
Discharge: HOME | End: 2022-11-22
Payer: COMMERCIAL

## 2022-11-22 PROCEDURE — 77387 GUIDANCE FOR RADJ TX DLVR: CPT | Performed by: RADIOLOGY

## 2022-11-22 PROCEDURE — 77412 RADIATION TX DELIVERY LVL 3: CPT | Performed by: RADIOLOGY

## 2022-11-22 PROCEDURE — G6002 STEREOSCOPIC X-RAY GUIDANCE: HCPCS | Performed by: RADIOLOGY

## 2022-11-23 ENCOUNTER — HOSPITAL ENCOUNTER (OUTPATIENT)
Dept: RADIATION ONCOLOGY | Age: 42
Discharge: HOME | End: 2022-11-23
Payer: COMMERCIAL

## 2022-11-23 VITALS
SYSTOLIC BLOOD PRESSURE: 110 MMHG | TEMPERATURE: 98.4 F | RESPIRATION RATE: 18 BRPM | WEIGHT: 134.92 LBS | HEART RATE: 90 BPM | DIASTOLIC BLOOD PRESSURE: 69 MMHG | OXYGEN SATURATION: 99 % | BODY MASS INDEX: 24.67 KG/M2

## 2022-11-23 PROCEDURE — 77412 RADIATION TX DELIVERY LVL 3: CPT | Performed by: RADIOLOGY

## 2022-11-23 PROCEDURE — G6002 STEREOSCOPIC X-RAY GUIDANCE: HCPCS | Performed by: RADIOLOGY

## 2022-11-23 PROCEDURE — 77387 GUIDANCE FOR RADJ TX DLVR: CPT | Performed by: RADIOLOGY

## 2022-11-23 ASSESSMENT — PAIN DESCRIPTION - ORIENTATION: ORIENTATION: LEFT

## 2022-11-23 ASSESSMENT — PAIN DESCRIPTION - DESCRIPTORS: DESCRIPTORS: SHOOTING

## 2022-11-23 ASSESSMENT — PAIN DESCRIPTION - LOCATION: LOCATION: BREAST

## 2022-11-23 ASSESSMENT — PAIN SCALES - GENERAL: PAINLEVEL_OUTOF10: 2

## 2022-11-23 NOTE — PROGRESS NOTES
[address] [facility] [address], [address]        ANNELISE HENRYOMAIRA VINSON II.VIERT[place],  [facility]        Prabha [phone]        F: [phone]       [facility]            Dr. Ebonie Montero MD MS          Dr. Lakeisha Naik MD PhD    ON TREATMENT VISIT (OTV) NOTE     Date of Service: 2022  Patient ID: Carolee Garcia   : 1980  MRN: [id number]   Acct Number: [de-identified]     RADIATION ONCOLOGY ATTENDING:  Stacia Baker MD MS    DIAGNOSIS:  Cancer Staging  Malignant neoplasm of upper-outer quadrant of breast in female, estrogen receptor positive ([facility] [address]  Staging form: Breast, AJCC 8th Edition  - Clinical stage from 2022: Stage IA (cT1c, cN0, cM0, G2, ER+, ID+, HER2-) - Unsigned  - Pathologic stage from 10/4/2022: Stage IA (pT1c, pN0, cM0, G2, ER+, ID+, HER2-) - Signed by Hilda Lam MD on 10/4/2022      Treatment Area: Breast     Current Total Dose(cGy): 1596  Current Fraction:   Final/Cumulative Rx. Dose (cGy): 5256    Patient was seen today for weekly visit.      Wt Readings from Last 3 Encounters:   22 134 lb 14.7 oz (61.2 kg)   22 135 lb 9.3 oz (61.5 kg)   22 134 lb (60.8 kg)       /69   Pulse 90   Temp 98.4 °F (36.9 °C)   Resp 18   Wt 134 lb 14.7 oz (61.2 kg)   LMP 2019   SpO2 99%   BMI 24.67 kg/m²     Lab Results   Component Value Date    WBC 5.5 2022     2022       Comfort Alteration  Fatigue:None, able to perform daily activities    Pain Location: Left Breast  Pain Intensity (Current): 2 Mild pain, shooting pains  Pain Treatment: No treatment scheduled  Pain Relief: n/a  Hot Flashes/Flushes: Mild or no more than 1 per day    Emotional Alteration:   Coping: effective    Nutritional Alteration  Anorexia: none   Nausea: No nausea noted  Vomiting: No vomiting   Dyspepsia/Heartburn: None    Skin Alteration   Skin reaction: No changes noted    Ventilation Alteration  Cough: None  Hemoptysis: None  Dyspnea: Normal  Mucous Quantity/Quality: n/a    Additional Comments: Using Betamethasone & CeraVe twice per day and Aquaphor at night. MEDICATIONS:     Current Outpatient Medications   Medication Sig Dispense Refill    betamethasone valerate (VALISONE) 0.1 % cream Apply topically 2 times daily. 45 g 1    FLUoxetine (PROZAC) 20 MG capsule Take 1 capsule by mouth daily 15 capsule 0    FLUoxetine (PROZAC) 40 MG capsule Take 1 capsule by mouth daily 30 capsule 3    butalbital-acetaminophen-caffeine (FIORICET, ESGIC) -40 MG per tablet Take 1 tablet by mouth every 6 hours as needed for Headaches 60 tablet 0    pantoprazole (PROTONIX) 40 MG tablet Take 1 tablet by mouth every morning (before breakfast) (Patient taking differently: Take 40 mg by mouth every morning (before breakfast) Indications: PRN) 30 tablet 2    diazePAM (VALIUM) 2 MG tablet Take 2 mg by mouth every 8 hours as needed for Anxiety. ibuprofen (ADVIL;MOTRIN) 400 MG tablet Take 400 mg by mouth every 6 hours as needed for Pain      METAMUCIL FIBER PO Take 1 capsule by mouth daily 3 in 1 fiber       No current facility-administered medications for this encounter. PHYSICAL EXAM:       ECO - Asymptomatic (Fully active, able to carry on all pre-disease activities without restriction)    General: NAD, AO x 3, Mentation is clear with appropriate affect. HEENT: Normocephalic, atraumatic  Thorax:  Unlabored  Breasts:  Radiation treatment area of the left breast with very faint erythema; skin otherwise intact  Abdomen:  Non-distended  Neuro:  Cranial nerves grossly intact; no focal deficits  Skin - treatment portal: SEE above. Chemotherapy Update: Adjuvant hormonal therapy to follow Radiation therapy    Treatment Imaging: Kv Pair, Port Film, and All imaging reviewed and approved by Dr. Shelly Perez: No significant radiation side effects. Responding appropriately to symptomatic management.     New medications, diagnostic results: Continue treatment as planned    PLAN: Again reviewed potential side effects of radiation for the patient's treatment. Continue local/topical care. Await CT ST neck results 12/7/22. Continue current radiation course as prescribed.

## 2022-11-28 ENCOUNTER — APPOINTMENT (OUTPATIENT)
Dept: RADIATION ONCOLOGY | Age: 42
End: 2022-11-28
Payer: COMMERCIAL

## 2022-11-28 ENCOUNTER — HOSPITAL ENCOUNTER (EMERGENCY)
Age: 42
Discharge: HOME | End: 2022-11-28
Payer: COMMERCIAL

## 2022-11-28 VITALS
RESPIRATION RATE: 20 BRPM | SYSTOLIC BLOOD PRESSURE: 118 MMHG | TEMPERATURE: 97 F | OXYGEN SATURATION: 100 % | DIASTOLIC BLOOD PRESSURE: 68 MMHG | HEART RATE: 98 BPM

## 2022-11-28 DIAGNOSIS — B97.89 ACUTE VIRAL SINUSITIS: Primary | ICD-10-CM

## 2022-11-28 DIAGNOSIS — J01.90 ACUTE VIRAL SINUSITIS: Primary | ICD-10-CM

## 2022-11-28 LAB
FLU A ANTIGEN: NEGATIVE
INFLUENZA B AG, EIA: NEGATIVE
SARS-COV-2, NAA: NOT  DETECTED

## 2022-11-28 PROCEDURE — 87635 SARS-COV-2 COVID-19 AMP PRB: CPT

## 2022-11-28 PROCEDURE — 77336 RADIATION PHYSICS CONSULT: CPT | Performed by: RADIOLOGY

## 2022-11-28 PROCEDURE — 99213 OFFICE O/P EST LOW 20 MIN: CPT

## 2022-11-28 PROCEDURE — 87804 INFLUENZA ASSAY W/OPTIC: CPT

## 2022-11-28 PROCEDURE — 99212 OFFICE O/P EST SF 10 MIN: CPT | Performed by: NURSE PRACTITIONER

## 2022-11-28 RX ORDER — DEXTROMETHORPHAN HYDROBROMIDE AND PROMETHAZINE HYDROCHLORIDE 15; 6.25 MG/5ML; MG/5ML
5 SYRUP ORAL 4 TIMES DAILY PRN
Qty: 100 ML | Refills: 0 | Status: SHIPPED | OUTPATIENT
Start: 2022-11-28 | End: 2022-12-03

## 2022-11-28 RX ORDER — PREDNISONE 20 MG/1
20 TABLET ORAL 2 TIMES DAILY
Qty: 10 TABLET | Refills: 0 | Status: SHIPPED | OUTPATIENT
Start: 2022-11-28 | End: 2022-12-03

## 2022-11-28 RX ORDER — AZELASTINE 1 MG/ML
1 SPRAY, METERED NASAL 2 TIMES DAILY
Qty: 30 ML | Refills: 0 | Status: SHIPPED | OUTPATIENT
Start: 2022-11-28

## 2022-11-28 ASSESSMENT — ENCOUNTER SYMPTOMS
WHEEZING: 0
VOMITING: 0
COUGH: 1
CHOKING: 0
RHINORRHEA: 1
NAUSEA: 0
SHORTNESS OF BREATH: 0
SORE THROAT: 0
FLU SYMPTOMS: 1
CHEST TIGHTNESS: 0
DIARRHEA: 0
VOICE CHANGE: 1
APNEA: 0
STRIDOR: 0

## 2022-11-28 NOTE — Clinical Note
Carine Frazier was seen and treated in our emergency department on 11/28/2022. She may return to work on . If you have any questions or concerns, please don't hesitate to call.       Yuliet Vieira, APRN - CNP

## 2022-11-28 NOTE — ED PROVIDER NOTES
[place] [address]  Urgent Care Encounter      CHIEF COMPLAINT       Chief Complaint   Patient presents with    Generalized Body Aches    Nasal Congestion    Cough       Nurses Notes reviewed and I agree except as noted in the HPI. HISTORY OFPRESENT ILLNESS   Rafael Campoverde is a 40 y.o. The history is provided by the patient. No  was used. Influenza  Presenting symptoms: cough, fatigue, headache, myalgias and rhinorrhea    Presenting symptoms: no diarrhea, no fever, no nausea, no shortness of breath, no sore throat and no vomiting    Severity:  Moderate  Onset quality:  Sudden  Duration:  3 days  Progression:  Worsening  Chronicity:  New  Relieved by:  Nothing  Worsened by:  Certain positions  Ineffective treatments:  OTC medications  Associated symptoms: chills, decreased appetite, decreased physical activity, ear pain and nasal congestion    Associated symptoms: no mental status change, no neck stiffness and no syncope    Risk factors: sick contacts    Risk factors: not elderly, no diabetes problem, no heart disease, no immunocompromised state, no kidney disease, no liver disease and not pregnant      REVIEW OF SYSTEMS     Review of Systems   Constitutional:  Positive for activity change, appetite change, chills, decreased appetite and fatigue. Negative for diaphoresis and fever. HENT:  Positive for congestion, ear pain, rhinorrhea and voice change. Negative for sore throat. Respiratory:  Positive for cough. Negative for apnea, choking, chest tightness, shortness of breath, wheezing and stridor. Cardiovascular:  Negative for chest pain, palpitations and leg swelling. Gastrointestinal:  Negative for diarrhea, nausea and vomiting. Musculoskeletal:  Positive for myalgias. Negative for neck stiffness. Neurological:  Positive for headaches. Negative for dizziness and light-headedness.      PAST MEDICAL HISTORY         Diagnosis Date    Invasive ductal carcinoma of breast, female, left ([facility] [address] 09/07/2022    with lobular features    Kidney stone     2005 with pregnancy    PONV (postoperative nausea and vomiting)     Prolonged emergence from general anesthesia     \"During Hysterectomy- bradycardia- meds to increase heart rate. \"       SURGICAL HISTORY     Patient  has a past surgical history that includes hernia repair (1998); Tubal ligation; Colonoscopy (03/25/2016); Dilation and curettage of uterus (N/A, 09/13/2017); hysteroscopy (04/18/2018); Endometrial ablation (04/18/2018); pr hysteroscopy,w/endometrial ablation (N/A, 04/18/2018); HYSTERECTOMY ABDOMINAL (N/A, 03/13/2019); cervical fusion (N/A, 12/08/2021); Hysterectomy (03/13/2019); Ovary removal; US BREAST BIOPSY W LOC DEVICE EACH ADDL LESION LEFT (Left, 09/07/2022); [facility] US GUIDED BREAST BIOPSY W LOC DEVICE 1ST LESION LEFT (Left, 09/07/2022); Breast lumpectomy (Left, 9/19/2022); and Breast lumpectomy (Left, 9/29/2022). CURRENT MEDICATIONS       Discharge Medication List as of 11/28/2022 11:35 AM        CONTINUE these medications which have NOT CHANGED    Details   betamethasone valerate (VALISONE) 0.1 % cream Apply topically 2 times daily. , Disp-45 g, R-1, Normal      !! FLUoxetine (PROZAC) 20 MG capsule Take 1 capsule by mouth daily, Disp-15 capsule, R-0Normal      !! FLUoxetine (PROZAC) 40 MG capsule Take 1 capsule by mouth daily, Disp-30 capsule, R-3Normal      butalbital-acetaminophen-caffeine (FIORICET, ESGIC) -40 MG per tablet Take 1 tablet by mouth every 6 hours as needed for Headaches, Disp-60 tablet, R-0Normal      pantoprazole (PROTONIX) 40 MG tablet Take 1 tablet by mouth every morning (before breakfast), Disp-30 tablet, R-2Normal      diazePAM (VALIUM) 2 MG tablet Take 2 mg by mouth every 8 hours as needed for Anxiety. Historical Med      ibuprofen (ADVIL;MOTRIN) 400 MG tablet Take 400 mg by mouth every 6 hours as needed for PainHistorical Med      METAMUCIL FIBER PO Take 1 capsule by mouth daily 3 in 1 fiberHistorical Med       !! - Potential duplicate medications found. Please discuss with provider. ALLERGIES     Patient is is allergic to amoxicillin. FAMILY HISTORY     Patient's family history includes BRCA 1 Negative (age of onset: 64) in her mother; BRCA 2 Negative (age of onset: 64) in her mother; Bilateral breast cancer (age of onset: 64) in her mother; Breast Cancer in her paternal aunt; Breast Cancer (age of onset: 61) in her mother; Colon Cancer in her paternal uncle; Diabetes in her father; Heart Disease in her father; High Blood Pressure in her father; High Cholesterol in her father; Leukemia in her maternal grandmother; Talamantes Baars in her father, maternal aunt, and paternal uncle; No Known Problems in her brother, brother, brother, sister, sister, and sister. SOCIAL HISTORY     Patient  reports that she has never smoked. She has never used smokeless tobacco. She reports that she does not drink alcohol and does not use drugs. PHYSICAL EXAM     ED TRIAGE VITALS  BP: 118/68, Temp: 97 °F (36.1 °C), Heart Rate: 98, Resp: 20, SpO2: 100 %  Physical Exam  Vitals and nursing note reviewed. Constitutional:       General: She is not in acute distress. Appearance: Normal appearance. She is normal weight. She is not ill-appearing, toxic-appearing or diaphoretic. HENT:      Head: Normocephalic and atraumatic. Right Ear: Ear canal and external ear normal.      Left Ear: Tympanic membrane, ear canal and external ear normal.      Nose: Congestion and rhinorrhea present. Mouth/Throat:      Mouth: Mucous membranes are moist.      Pharynx: No oropharyngeal exudate or posterior oropharyngeal erythema. Eyes:      Extraocular Movements: Extraocular movements intact. Conjunctiva/sclera: Conjunctivae normal.   Pulmonary:      Effort: Pulmonary effort is normal. No respiratory distress. Breath sounds: Normal breath sounds. No stridor. No wheezing, rhonchi or rales.    Chest: Chest wall: No tenderness. Musculoskeletal:         General: Normal range of motion. Cervical back: Normal range of motion. Skin:     General: Skin is warm. Neurological:      General: No focal deficit present. Mental Status: She is alert and oriented to person, place, and time. Psychiatric:         Mood and Affect: Mood normal.         Behavior: Behavior normal.         Thought Content: Thought content normal.         Judgment: Judgment normal.       DIAGNOSTIC RESULTS   Labs:  Results for orders placed or performed during the hospital encounter of 11/28/22   COVID-19, Rapid   Result Value Ref Range    SARS-CoV-2, MOHSEN NOT  DETECTED NOT DETECTED   Rapid influenza A/B antigens   Result Value Ref Range    Flu A Antigen Negative NEGATIVE    Influenza B Ag, EIA Negative NEGATIVE       IMAGING:  No orders to display     URGENT CARE COURSE:     Vitals:    11/28/22 1103   BP: 118/68   Pulse: 98   Resp: 20   Temp: 97 °F (36.1 °C)   SpO2: 100%       Medications - No data to display  PROCEDURES:  None  FINAL IMPRESSION      1. Acute viral sinusitis        DISPOSITION/PLAN   Decision To Discharge    Drink lots of fluids  Take Motrin or Tylenol for headache  Humidification of air  Use nasal spray as directed  Take a nasal decongestant as directed  Monitor for any fever increased and sinus pain or pressure  Follow-up see her primary care provider in 48 hours  Or go to the emergency department for any changes or concerns.      PATIENT REFERRED TO:  Aniceto Mathews MD  [address] [place] [address]  [phone]    Schedule an appointment as soon as possible for a visit     DISCHARGE MEDICATIONS:  Discharge Medication List as of 11/28/2022 11:35 AM        START taking these medications    Details   azelastine (ASTELIN) 0.1 % nasal spray 1 spray by Nasal route 2 times daily Use in each nostril as directed, Disp-30 mL, R-0Normal      promethazine-dextromethorphan (PROMETHAZINE-DM) 6.25-15 MG/5ML syrup Take 5 mLs by mouth 4 times daily as needed for Cough, Disp-100 mL, R-0Normal      predniSONE (DELTASONE) 20 MG tablet Take 1 tablet by mouth 2 times daily for 5 days, Disp-10 tablet, R-0Normal           Discharge Medication List as of 11/28/2022 11:35 AM          Favio Mohamud, APRN - JAVED Mohamud, APRN - CNP  11/28/22 6027

## 2022-11-29 ENCOUNTER — APPOINTMENT (OUTPATIENT)
Dept: RADIATION ONCOLOGY | Age: 42
End: 2022-11-29
Payer: COMMERCIAL

## 2022-11-30 ENCOUNTER — HOSPITAL ENCOUNTER (OUTPATIENT)
Dept: RADIATION ONCOLOGY | Age: 42
Discharge: HOME | End: 2022-11-30
Payer: COMMERCIAL

## 2022-11-30 PROCEDURE — 77412 RADIATION TX DELIVERY LVL 3: CPT | Performed by: RADIOLOGY

## 2022-11-30 PROCEDURE — 77387 GUIDANCE FOR RADJ TX DLVR: CPT | Performed by: RADIOLOGY

## 2022-12-01 ENCOUNTER — HOSPITAL ENCOUNTER (OUTPATIENT)
Dept: RADIATION ONCOLOGY | Age: 42
Discharge: HOME | End: 2022-12-01
Payer: COMMERCIAL

## 2022-12-01 VITALS
TEMPERATURE: 97.7 F | WEIGHT: 136.24 LBS | RESPIRATION RATE: 18 BRPM | OXYGEN SATURATION: 99 % | HEART RATE: 84 BPM | BODY MASS INDEX: 24.91 KG/M2 | SYSTOLIC BLOOD PRESSURE: 106 MMHG | DIASTOLIC BLOOD PRESSURE: 71 MMHG

## 2022-12-01 PROCEDURE — 77387 GUIDANCE FOR RADJ TX DLVR: CPT | Performed by: RADIOLOGY

## 2022-12-01 PROCEDURE — 77412 RADIATION TX DELIVERY LVL 3: CPT | Performed by: RADIOLOGY

## 2022-12-01 NOTE — PROGRESS NOTES
[address] [facility] [address], [address]        [place],  [facility]        Ignacia Macias: [phone]        F: [phone]       [email or web address]            Dr. Linnea Reyes MD MS          Dr. 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 Dose (cGy): 5256    Patient was seen today for weekly visit.      Wt Readings from Last 3 Encounters:   22 136 lb 3.9 oz (61.8 kg)   22 134 lb 14.7 oz (61.2 kg)   22 135 lb 9.3 oz (61.5 kg)       /71   Pulse 84   Temp 97.7 °F (36.5 °C) (Infrared)   Resp 18   Wt 136 lb 3.9 oz (61.8 kg)   LMP 2019   SpO2 99%   BMI 24.91 kg/m²     Lab Results   Component Value Date    WBC 5.5 2022     2022       Comfort Alteration  Fatigue:None, able to perform daily activities    Pain Location: Left Breast  Pain Intensity (Current): 0 No Pain  Pain Treatment: No treatment scheduled  Pain Relief: n/a  Hot Flashes/Flushes: Mild or no more than 1 per day    Emotional Alteration:   Coping: effective    Nutritional Alteration  Anorexia: none   Nausea: No nausea noted  Vomiting: No vomiting   Dyspepsia/Heartburn: None    Skin Alteration   Skin reaction: No changes noted    Ventilation Alteration  Cough: Productive  Hemoptysis: None  Dyspnea: Normal  Mucous Quantity/Quality: Yellow    Additional Comments: Using Betamethasone & CeraVe twice per day and Aquaphor at night. MEDICATIONS:     Current Outpatient Medications   Medication Sig Dispense Refill    azelastine (ASTELIN) 0.1 % nasal spray 1 spray by Nasal route 2 times daily Use in each nostril as directed 30 mL 0    promethazine-dextromethorphan (PROMETHAZINE-DM) 6.25-15 MG/5ML syrup Take 5 mLs by mouth 4 times daily as needed for Cough 100 mL 0    predniSONE (DELTASONE) 20 MG tablet Take 1 tablet by mouth 2 times daily for 5 days 10 tablet 0    betamethasone valerate (VALISONE) 0.1 % cream Apply topically 2 times daily. 45 g 1    FLUoxetine (PROZAC) 20 MG capsule Take 1 capsule by mouth daily 15 capsule 0    FLUoxetine (PROZAC) 40 MG capsule Take 1 capsule by mouth daily 30 capsule 3    butalbital-acetaminophen-caffeine (FIORICET, ESGIC) -40 MG per tablet Take 1 tablet by mouth every 6 hours as needed for Headaches 60 tablet 0    pantoprazole (PROTONIX) 40 MG tablet Take 1 tablet by mouth every morning (before breakfast) (Patient taking differently: Take 40 mg by mouth every morning (before breakfast) Indications: PRN) 30 tablet 2    diazePAM (VALIUM) 2 MG tablet Take 2 mg by mouth every 8 hours as needed for Anxiety. ibuprofen (ADVIL;MOTRIN) 400 MG tablet Take 400 mg by mouth every 6 hours as needed for Pain      METAMUCIL FIBER PO Take 1 capsule by mouth daily 3 in 1 fiber       No current facility-administered medications for this encounter. PHYSICAL EXAM:       ECO - Asymptomatic (Fully active, able to carry on all pre-disease activities without restriction)    General: NAD, AO x 3, Mentation is clear with appropriate affect.   HEENT: Normocephalic, atraumatic  Thorax:  Unlabored  Breasts:  Radiation treatment area of the left breast with faint erythema; skin otherwise intact  Abdomen:  Non-distended  Neuro:  Cranial nerves grossly intact; no focal deficits  Skin - treatment portal: SEE above. Mild erythema in RT area, stable will continue to follow. Chemotherapy Update: Adjuvant hormonal therapy to follow Radiation therapy    Treatment Imaging: [employer], [employer], and All imaging reviewed and approved by Dr. Tommy Miranda: No significant radiation side effects. Responding appropriately to symptomatic management. Patient missed radiation treatments earlier this week due to upper respiratory infection - reinforced importance of continuing daily treatments    New medications, diagnostic results: Continue treatment as planned    PLAN: Again reviewed potential side effects of radiation for the patient's treatment. Continue local/topical care. Await CT ST neck results 12/7/22. Continue current radiation course as prescribed.

## 2022-12-02 ENCOUNTER — HOSPITAL ENCOUNTER (OUTPATIENT)
Dept: RADIATION ONCOLOGY | Age: 42
Discharge: HOME | End: 2022-12-02
Payer: COMMERCIAL

## 2022-12-02 PROCEDURE — 77412 RADIATION TX DELIVERY LVL 3: CPT | Performed by: RADIOLOGY

## 2022-12-02 PROCEDURE — 77387 GUIDANCE FOR RADJ TX DLVR: CPT | Performed by: RADIOLOGY

## 2022-12-05 ENCOUNTER — HOSPITAL ENCOUNTER (OUTPATIENT)
Dept: INFUSION THERAPY | Age: 42
Discharge: HOME | End: 2022-12-05
Payer: COMMERCIAL

## 2022-12-05 ENCOUNTER — OFFICE VISIT (OUTPATIENT)
Dept: ONCOLOGY | Age: 42
End: 2022-12-05
Payer: COMMERCIAL

## 2022-12-05 ENCOUNTER — HOSPITAL ENCOUNTER (OUTPATIENT)
Dept: RADIATION ONCOLOGY | Age: 42
Discharge: HOME | End: 2022-12-05
Payer: COMMERCIAL

## 2022-12-05 VITALS
HEART RATE: 80 BPM | RESPIRATION RATE: 16 BRPM | SYSTOLIC BLOOD PRESSURE: 118 MMHG | WEIGHT: 136.2 LBS | HEIGHT: 62 IN | TEMPERATURE: 97.7 F | OXYGEN SATURATION: 97 % | BODY MASS INDEX: 25.06 KG/M2 | DIASTOLIC BLOOD PRESSURE: 72 MMHG

## 2022-12-05 VITALS
SYSTOLIC BLOOD PRESSURE: 118 MMHG | RESPIRATION RATE: 16 BRPM | DIASTOLIC BLOOD PRESSURE: 72 MMHG | TEMPERATURE: 97.7 F | HEART RATE: 80 BPM

## 2022-12-05 DIAGNOSIS — C50.412 MALIGNANT NEOPLASM OF UPPER-OUTER QUADRANT OF LEFT BREAST IN FEMALE, ESTROGEN RECEPTOR POSITIVE (HCC): Primary | ICD-10-CM

## 2022-12-05 DIAGNOSIS — Z17.0 MALIGNANT NEOPLASM OF UPPER-OUTER QUADRANT OF LEFT BREAST IN FEMALE, ESTROGEN RECEPTOR POSITIVE (HCC): Primary | ICD-10-CM

## 2022-12-05 PROCEDURE — 99211 OFF/OP EST MAY X REQ PHY/QHP: CPT

## 2022-12-05 PROCEDURE — G8427 DOCREV CUR MEDS BY ELIG CLIN: HCPCS | Performed by: INTERNAL MEDICINE

## 2022-12-05 PROCEDURE — G8484 FLU IMMUNIZE NO ADMIN: HCPCS | Performed by: INTERNAL MEDICINE

## 2022-12-05 PROCEDURE — G6002 STEREOSCOPIC X-RAY GUIDANCE: HCPCS | Performed by: RADIOLOGY

## 2022-12-05 PROCEDURE — 1036F TOBACCO NON-USER: CPT | Performed by: INTERNAL MEDICINE

## 2022-12-05 PROCEDURE — 77387 GUIDANCE FOR RADJ TX DLVR: CPT | Performed by: RADIOLOGY

## 2022-12-05 PROCEDURE — G8420 CALC BMI NORM PARAMETERS: HCPCS | Performed by: INTERNAL MEDICINE

## 2022-12-05 PROCEDURE — 99213 OFFICE O/P EST LOW 20 MIN: CPT | Performed by: INTERNAL MEDICINE

## 2022-12-05 PROCEDURE — 77412 RADIATION TX DELIVERY LVL 3: CPT | Performed by: RADIOLOGY

## 2022-12-05 PROCEDURE — 77336 RADIATION PHYSICS CONSULT: CPT | Performed by: RADIOLOGY

## 2022-12-05 ASSESSMENT — ENCOUNTER SYMPTOMS
ABDOMINAL PAIN: 0
BACK PAIN: 1
NAUSEA: 0
SORE THROAT: 0
COUGH: 0
CONSTIPATION: 0
DIARRHEA: 0
RHINORRHEA: 0
SHORTNESS OF BREATH: 0
TROUBLE SWALLOWING: 0
VOMITING: 0

## 2022-12-05 NOTE — PATIENT INSTRUCTIONS
Reviewed labs and recent medical history. Discussed the use of Tamoxifen as well as the use of Arimidex with Ovarian suppression. Reviewed side effects of both of these medications. Information provided in wrap up. Return to see MD in 1 month after completing radiation.

## 2022-12-05 NOTE — PROGRESS NOTES
[address] CANCER [address] [place] [address]  Dept: [phone]  Loc: [address] Paulette Pratt  1980   No ref. provider found   José Luis Amaya MD       Sachi Escobar is a 40 y.o.  female with breast cancer    CHIEF COMPLAINT  Chief Complaint   Patient presents with    Follow-up     Malignant neoplasm of upper-outer quadrant of left breast in female, estrogen receptor positive          HISTORY OF PRESENT ILLNESS    8/30/2022. Screening mammogram performed. Technologist felt a lump in the left upper outer breast.  This showed a focal 1 cm density near the area of the palpable concern. 9/1/2022. Further views confirmed a focal asymmetric density with possible spiculated margins. Ultrasound showed a hypoechoic mass measuring 1.4 x 1.1 x 1.1 cm in diameter in the upper outer left breast for which biopsy was recommended. There was an adjacent oval 6 mm possible complex cyst for which aspiration and or ultrasound-guided biopsy was recommended. The axilla was negative for abnormal findings. 9/7/2022. Biopsy showed moderately differentiated invasive ductal carcinoma with lobular features, grade 2 with intermediate grade ductal carcinoma in situ. An additional lesion was a fibroadenoma. Estrogen receptor was +100% strong, progesterone receptor was +100% strong, Ki-67 was 25% and HER2 was negative for over expression. 9/9/2022. Sampling for genetic analysis was sent and returned negative for pathogenic mutation. 9/13/2022. Seen by Dr. Mindy Tripathi in the office as a new patient for evaluation. The patient found enlarged submandibular lymph node on the rightfor which further testing was ordered including an ultrasound of the head neck. She noted that the patient had had no breast symptoms prior to the biopsy.   Patient's family history on the mother side was positive for malignancy in that her mother had breast cancer at age 61. There was lung cancer in a maternal aunt. On her father side there is breast cancer in her paternal aunt colon cancer and a paternal uncle lung cancer in her father and in her paternal uncle. 9/14/2022. MRI of the breasts showed an enhancing mass in the upper slightly outer left breast correlating with the known biopsy-proven malignancy. There is an associated biopsy clip demonstrated measuring 1.5 x 1.5 x 1 cm. Postbiopsy changes were seen at the site of the biopsy of the fibroadenoma. .  No other focal dominant lesions were seen. 9/19/2022. Ultrasound of the head neck soft tissues showed several prominent lymph nodes the largest of which measured 1.4 x 0.7 x 0.4 cm.    9/19/2022. 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 Deep margin had pleomorphic lobular carcinoma in situ present. [place]wood nodes were negative for malignancy. The tumor mass measured 1.1 x 1.3 x 1.5 cm. Overall grade was 2, lymphovascular invasion was not identified, lobular carcinoma in situ was present pleomorphic type, all regional lymph nodes were negative for tumor. Once again ER was positive ID was positive Ki-67 was 25% and HER2 was negative. This had previously been called a moderately differentiated ductal carcinoma with lobular features but now is being named invasive pleomorphic lobular carcinoma. 9/27/2022. Patient was seen in the office by Dr. Anthony Tijerina in follow-up. She plan for reexcision of the deep margin due to it being positive. This was performed on 9/29/2022 and the margin was cleared. 10/4/2022. Saw Dr. Anthony Tijerina in follow-up. She was then referred to medical oncology and radiation oncology.      Comorbidities include kidney stones, renal angiomyolipoma,  previous endometrial ablation, previous hysterectomy, anxiety and depression, COVID in January 2021 degenerative joint disease, previous cervical fusion C4-T1, action tremor    INTERVAL NOTE    12/5/2022. Mrs. Milli Mann turns to the office today during her radiation therapy. She has had testing which shows that she is not postmenopausal.  Various options for treatment were discussed with her today. She has had a previous hysterectomy and had 1 ovary removed. She asks if she can have the other ovary removed. Was told her that that would be a viable option. Her other options are ovarian suppression with Arimidex or tamoxifen. We talked about the various side effects of the usual regimens. She asked questions which were answered to her satisfaction. She was here today with her . She knows that we would like to start her on hormonal manipulation approximately 2 weeks after she completes her course of radiation therapy. MONITORING PARAMETERS    Physical examination, mammography and ultrasonography. PAST MEDICAL HISTORY  Past Medical History:   Diagnosis Date    Invasive ductal carcinoma of breast, female, left ([address] 09/07/2022    with lobular features    Kidney stone     2005 with pregnancy    PONV (postoperative nausea and vomiting)     Prolonged emergence from general anesthesia     \"During Hysterectomy- bradycardia- meds to increase heart rate. \"        REVIEW OF SYSTEMS  Review of Systems   Constitutional:  Positive for fatigue. Negative for appetite change, chills, diaphoresis and fever. HENT:  Negative for dental problem, mouth sores, rhinorrhea, sore throat and trouble swallowing. Eyes:  Negative for visual disturbance. Respiratory:  Negative for cough and shortness of breath. Cardiovascular:  Negative for leg swelling. Gastrointestinal:  Negative for abdominal pain, constipation, diarrhea, nausea and vomiting. Endocrine: Negative for polyuria. Genitourinary:  Negative for dysuria, frequency and urgency. Musculoskeletal:  Positive for back pain and myalgias (generalized). Skin:  Positive for pallor. Neurological:  Positive for headaches (occasional, stress). Negative for tremors, seizures and numbness. Hematological:  Does not bruise/bleed easily. Psychiatric/Behavioral:  Negative for self-injury, sleep disturbance and suicidal ideas. The patient is nervous/anxious.        FAMILY HISTORY  Family History   Problem Relation Age of Onset    Breast Cancer Mother 61    BRCA 1 Negative Mother 64    BRCA 2 Negative Mother 64    Bilateral breast cancer Mother 64        opposite side    Heart Disease Father     High Blood Pressure Father     High Cholesterol Father     Diabetes Father     Lung Cancer Father     No Known Problems Sister     No Known Problems Sister     No Known Problems Sister     No Known Problems Brother     No Known Problems Brother     No Known Problems Brother     Leukemia Maternal Grandmother     Colon Cancer Paternal Uncle         age 62s    Lung Cancer Paternal Uncle         age >47    Breast Cancer Paternal Aunt         age >47    Lung Cancer Maternal Aunt         age >47    Asthma Neg Hx         SOCIAL HISTORY  Social History     Socioeconomic History    Marital status:      Spouse name: Ana Vasquez    Number of children: 3    Years of education: Not on file    Highest education level: Not on file   Occupational History    Not on file   Tobacco Use    Smoking status: Never    Smokeless tobacco: Never   Vaping Use    Vaping Use: Never used   Substance and Sexual Activity    Alcohol use: No     Alcohol/week: 0.0 standard drinks    Drug use: No    Sexual activity: Yes     Partners: Male   Other Topics Concern    Not on file   Social History Narrative    Not on file     Social Determinants of Health     Financial Resource Strain: Low Risk     Difficulty of Paying Living Expenses: Not hard at all   Food Insecurity: No Food Insecurity    Worried About Running Out of Food in the Last Year: Never true    Ran Out of Food in the Last Year: Never true   Transportation Needs: Not on file   Physical Activity: Not on file   Stress: Not on file   Social Connections: Not on file   Intimate Partner Violence: Not on file   Housing Stability: Not on file        CURRENT MEDICATIONS  Current Outpatient Medications   Medication Sig Dispense Refill    azelastine (ASTELIN) 0.1 % nasal spray 1 spray by Nasal route 2 times daily Use in each nostril as directed 30 mL 0    betamethasone valerate (VALISONE) 0.1 % cream Apply topically 2 times daily. 45 g 1    FLUoxetine (PROZAC) 40 MG capsule Take 1 capsule by mouth daily 30 capsule 3    butalbital-acetaminophen-caffeine (FIORICET, ESGIC) -40 MG per tablet Take 1 tablet by mouth every 6 hours as needed for Headaches 60 tablet 0    pantoprazole (PROTONIX) 40 MG tablet Take 1 tablet by mouth every morning (before breakfast) (Patient taking differently: Take 40 mg by mouth every morning (before breakfast) Indications: PRN) 30 tablet 2    diazePAM (VALIUM) 2 MG tablet Take 2 mg by mouth every 8 hours as needed for Anxiety. ibuprofen (ADVIL;MOTRIN) 400 MG tablet Take 400 mg by mouth every 6 hours as needed for Pain      METAMUCIL FIBER PO Take 1 capsule by mouth daily 3 in 1 fiber (Patient not taking: Reported on 12/5/2022)       No current facility-administered medications for this visit. OARRS    PDMP Monitoring:    Last PDMP Mouna Sin as Reviewed [facility]):  Review User Review Instant Review Result   Anel Ortiz 10/18/2022 12:06 AM Reviewed PDMP [1]     Last Controlled Substance Monitoring Documentation      [address] Office Visit from 4/6/2022 in [facility]   Periodic Controlled Substance Monitoring Possible medication side effects, risk of tolerance/dependence & alternative treatments discussed., No signs of potential drug abuse or diversion identified. filed at 04/07/2022 0909          Urine Drug Screenings (1 yr)    No resulted procedures found.        Medication Contract and Consent for Opioid Use Documents Filed        No documents found                     PHYSICAL EXAM    Physical Exam  Vitals and nursing note reviewed. Exam conducted with a chaperone present. Constitutional:       General: She is not in acute distress. Appearance: Normal appearance. She is not ill-appearing, toxic-appearing or diaphoretic. HENT:      Head: Normocephalic and atraumatic. Nose: Nose normal.      Mouth/Throat:      Mouth: Mucous membranes are moist.      Pharynx: Oropharynx is clear. Eyes:      General: No scleral icterus. Extraocular Movements: Extraocular movements intact. Pupils: Pupils are equal, round, and reactive to light. Pulmonary:      Effort: Pulmonary effort is normal.      Breath sounds: No wheezing, rhonchi or rales. Musculoskeletal:         General: Normal range of motion. Right lower leg: No edema. Left lower leg: No edema. Skin:     General: Skin is warm and dry. Findings: No bruising, erythema or lesion. Neurological:      General: No focal deficit present. Mental Status: She is alert and oriented to person, place, and time. Mental status is at baseline. Motor: No weakness. Gait: Gait normal.   Psychiatric:         Mood and Affect: Mood normal.         Behavior: Behavior normal.         Thought Content: Thought content normal.         Judgment: Judgment normal.        LABS/IMAGING    FSH and LH showing that she is not postmenopausal      PATHOLOGY/GENETICS    Invasive lobular carcinoma of the left breast.    ASSESSMENT and PLAN    1. Invasive lobular carcinoma left breast, node-negative, stage Ia, pT1c, PN 0, grade 2, ER positive, HI positive, HER2 negative. She is continuing with her radiation therapy. Laboratory testing shows that she is not postmenopausal.  Today we discussed options for manipulation including tamoxifen, variant suppression with aromatase inhibitor and also removing her remaining ovary. She seems to be leaning toward this. She hates shots.   He has no history of deep vein thrombosis. Her uterus has been removed previously. She will see us in about a month, after completes her radiation therapy and has her 2-week break. We will decide about her hormonal manipulation at that time. 2.  Anxiety and depression. She is on multiple medications for this. 3.  Degenerative joint disease. She has previously undergone cervical fusion. 4.  Right submandibular lymphadenopathy. 5.  Multiple comorbidities. She will continue on her current regimen and see her usual providers. We will see her again in about a month. She knows to call if she has any questions concerns or change in her status.     Hailee Morrison MD 12/5/2022 9:09 AM

## 2022-12-06 ENCOUNTER — TELEPHONE (OUTPATIENT)
Dept: CASE MANAGEMENT | Age: 42
End: 2022-12-06

## 2022-12-06 ENCOUNTER — HOSPITAL ENCOUNTER (OUTPATIENT)
Dept: RADIATION ONCOLOGY | Age: 42
Discharge: HOME | End: 2022-12-06
Payer: COMMERCIAL

## 2022-12-06 PROCEDURE — G6002 STEREOSCOPIC X-RAY GUIDANCE: HCPCS | Performed by: RADIOLOGY

## 2022-12-06 PROCEDURE — 77387 GUIDANCE FOR RADJ TX DLVR: CPT | Performed by: RADIOLOGY

## 2022-12-06 PROCEDURE — 77412 RADIATION TX DELIVERY LVL 3: CPT | Performed by: RADIOLOGY

## 2022-12-06 NOTE — TELEPHONE ENCOUNTER
Name: Nuria Ramos  : 1980  MRN: [id number]    Oncology Navigation Follow-Up Note    Notes: As directed by Dr. Melissa Wray, I called patient to discuss removal of remaining ovary. Patient has an appointment with Dr. Jayson Fay on 22. If patient decides to have ovary removed, she will call oncology office to reschedule follow up appointment with Dr. Melissa Wray.      Electronically signed by Augusta Peng RN on 2022 at 9:30 AM

## 2022-12-07 ENCOUNTER — HOSPITAL ENCOUNTER (OUTPATIENT)
Dept: CT IMAGING | Age: 42
Discharge: HOME | End: 2022-12-07
Payer: COMMERCIAL

## 2022-12-07 ENCOUNTER — HOSPITAL ENCOUNTER (OUTPATIENT)
Dept: RADIATION ONCOLOGY | Age: 42
Discharge: HOME | End: 2022-12-07
Payer: COMMERCIAL

## 2022-12-07 DIAGNOSIS — C50.412 MALIGNANT NEOPLASM OF UPPER-OUTER QUADRANT OF LEFT BREAST IN FEMALE, ESTROGEN RECEPTOR POSITIVE (HCC): ICD-10-CM

## 2022-12-07 DIAGNOSIS — Z17.0 MALIGNANT NEOPLASM OF UPPER-OUTER QUADRANT OF LEFT BREAST IN FEMALE, ESTROGEN RECEPTOR POSITIVE (HCC): ICD-10-CM

## 2022-12-07 PROCEDURE — 77412 RADIATION TX DELIVERY LVL 3: CPT | Performed by: RADIOLOGY

## 2022-12-07 PROCEDURE — 70491 CT SOFT TISSUE NECK W/DYE: CPT

## 2022-12-07 PROCEDURE — 77387 GUIDANCE FOR RADJ TX DLVR: CPT | Performed by: RADIOLOGY

## 2022-12-07 PROCEDURE — 6360000004 HC RX CONTRAST MEDICATION

## 2022-12-07 PROCEDURE — G6002 STEREOSCOPIC X-RAY GUIDANCE: HCPCS | Performed by: RADIOLOGY

## 2022-12-07 RX ADMIN — IOPAMIDOL 80 ML: 755 INJECTION, SOLUTION INTRAVENOUS at 14:13

## 2022-12-08 ENCOUNTER — TELEPHONE (OUTPATIENT)
Dept: ONCOLOGY | Age: 42
End: 2022-12-08

## 2022-12-08 ENCOUNTER — HOSPITAL ENCOUNTER (OUTPATIENT)
Dept: RADIATION ONCOLOGY | Age: 42
Discharge: HOME | End: 2022-12-08
Payer: COMMERCIAL

## 2022-12-08 VITALS
OXYGEN SATURATION: 98 % | BODY MASS INDEX: 24.35 KG/M2 | WEIGHT: 133.16 LBS | HEART RATE: 96 BPM | SYSTOLIC BLOOD PRESSURE: 116 MMHG | RESPIRATION RATE: 16 BRPM | TEMPERATURE: 98.3 F | DIASTOLIC BLOOD PRESSURE: 77 MMHG

## 2022-12-08 PROCEDURE — G6002 STEREOSCOPIC X-RAY GUIDANCE: HCPCS | Performed by: RADIOLOGY

## 2022-12-08 PROCEDURE — 77387 GUIDANCE FOR RADJ TX DLVR: CPT | Performed by: RADIOLOGY

## 2022-12-08 PROCEDURE — 77412 RADIATION TX DELIVERY LVL 3: CPT | Performed by: RADIOLOGY

## 2022-12-08 NOTE — PROGRESS NOTES
[address] [facility] [address], [address]        [facility], [address] [facility]        Prabha [phone]        F: [phone]       [employer]            Dr. Ebonie Montero MD MS          Dr. 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 Dose (cGy): 5256    Patient was seen today for weekly visit. Wt Readings from Last 3 Encounters:   22 133 lb 2.5 oz (60.4 kg)   22 136 lb 3.2 oz (61.8 kg)   22 136 lb 3.9 oz (61.8 kg)       /77   Pulse 96   Temp 98.3 °F (36.8 °C) (Infrared)   Resp 16   Wt 133 lb 2.5 oz (60.4 kg)   LMP 2019   SpO2 98%   BMI 24.35 kg/m²     Lab Results   Component Value Date    WBC 5.5 2022     2022       Comfort Alteration  Fatigue:Able to perform daily activities with rest periods    Pain Location: Denies  Pain Intensity (Current): 0 No Pain  Pain Treatment: N/A  Pain Relief: n/a  Hot Flashes/Flushes: Mild or no more than 1 per day    Emotional Alteration:   Coping: effective    Nutritional Alteration  Anorexia: none   Nausea: 1-2 episodes of nausea in 24 hours, occasional not every day.   Vomiting: No vomiting   Dyspepsia/Heartburn: None    Skin Alteration   Skin reaction: Faint or dull erythema; follicular reaction    Ventilation Alteration  Cough: Productive  Hemoptysis: None  Dyspnea: Normal  Mucous Quantity/Quality: Yellow    Additional Comments: Using Betamethasone & CeraVe twice per day and Aquaphor at night. MEDICATIONS:     Current Outpatient Medications   Medication Sig Dispense Refill    azelastine (ASTELIN) 0.1 % nasal spray 1 spray by Nasal route 2 times daily Use in each nostril as directed 30 mL 0    betamethasone valerate (VALISONE) 0.1 % cream Apply topically 2 times daily. 45 g 1    FLUoxetine (PROZAC) 40 MG capsule Take 1 capsule by mouth daily 30 capsule 3    butalbital-acetaminophen-caffeine (FIORICET, ESGIC) -40 MG per tablet Take 1 tablet by mouth every 6 hours as needed for Headaches 60 tablet 0    pantoprazole (PROTONIX) 40 MG tablet Take 1 tablet by mouth every morning (before breakfast) (Patient taking differently: Take 40 mg by mouth every morning (before breakfast) Indications: PRN) 30 tablet 2    diazePAM (VALIUM) 2 MG tablet Take 2 mg by mouth every 8 hours as needed for Anxiety. ibuprofen (ADVIL;MOTRIN) 400 MG tablet Take 400 mg by mouth every 6 hours as needed for Pain      METAMUCIL FIBER PO Take 1 capsule by mouth daily 3 in 1 fiber (Patient not taking: Reported on 2022)       No current facility-administered medications for this encounter. PHYSICAL EXAM:       ECO - Asymptomatic (Fully active, able to carry on all pre-disease activities without restriction)    General: NAD, AO x 3, Mentation is clear with appropriate affect. HEENT: Normocephalic, atraumatic  Thorax:  Unlabored  Breasts:  Radiation treatment area of the left breast with faint erythema; skin otherwise intact  Abdomen:  Non-distended  Neuro:  Cranial nerves grossly intact; no focal deficits  Skin - treatment portal: SEE above. Mild erythema in RT area, stable will continue to follow.     Chemotherapy Update: Adjuvant hormonal therapy to follow Radiation therapy    Treatment Imaging: [employer], [employer], and All imaging reviewed and approved by Dr. Nelson Silence: Mild to moderate radiation side effects of the skin in the treatment area. Responding appropriately to symptomatic management. Patient missed radiation treatments earlier this week due to upper respiratory infection - reinforced importance of continuing daily treatments. New medications, diagnostic results: Continue treatment as planned    PLAN: Again reviewed potential side effects of radiation for the patient's treatment. Continue local/topical care. Await CT ST neck results 12/7/22. Continue current radiation course as prescribed.

## 2022-12-08 NOTE — TELEPHONE ENCOUNTER
----- Message from J Luis Sow sent at 12/8/2022 11:49 AM EST -----  Regarding: Non urgent   Did my ct scan come back ok

## 2022-12-09 ENCOUNTER — HOSPITAL ENCOUNTER (OUTPATIENT)
Dept: RADIATION ONCOLOGY | Age: 42
Discharge: HOME | End: 2022-12-09
Payer: COMMERCIAL

## 2022-12-09 PROCEDURE — 77387 GUIDANCE FOR RADJ TX DLVR: CPT | Performed by: RADIOLOGY

## 2022-12-09 PROCEDURE — 77412 RADIATION TX DELIVERY LVL 3: CPT | Performed by: RADIOLOGY

## 2022-12-09 PROCEDURE — 77321 SPECIAL TELETX PORT PLAN: CPT | Performed by: RADIOLOGY

## 2022-12-09 PROCEDURE — 77334 RADIATION TREATMENT AID(S): CPT | Performed by: RADIOLOGY

## 2022-12-12 ENCOUNTER — HOSPITAL ENCOUNTER (OUTPATIENT)
Dept: RADIATION ONCOLOGY | Age: 42
Discharge: HOME | End: 2022-12-12
Payer: COMMERCIAL

## 2022-12-12 PROCEDURE — G6002 STEREOSCOPIC X-RAY GUIDANCE: HCPCS | Performed by: RADIOLOGY

## 2022-12-12 PROCEDURE — 77387 GUIDANCE FOR RADJ TX DLVR: CPT | Performed by: RADIOLOGY

## 2022-12-12 PROCEDURE — 77412 RADIATION TX DELIVERY LVL 3: CPT | Performed by: RADIOLOGY

## 2022-12-13 ENCOUNTER — HOSPITAL ENCOUNTER (OUTPATIENT)
Dept: RADIATION ONCOLOGY | Age: 42
Discharge: HOME | End: 2022-12-13
Payer: COMMERCIAL

## 2022-12-13 ENCOUNTER — TELEPHONE (OUTPATIENT)
Dept: SPIRITUAL SERVICES | Facility: CLINIC | Age: 42
End: 2022-12-13

## 2022-12-13 PROCEDURE — G6002 STEREOSCOPIC X-RAY GUIDANCE: HCPCS | Performed by: RADIOLOGY

## 2022-12-13 PROCEDURE — 77412 RADIATION TX DELIVERY LVL 3: CPT | Performed by: RADIOLOGY

## 2022-12-13 PROCEDURE — 77387 GUIDANCE FOR RADJ TX DLVR: CPT | Performed by: RADIOLOGY

## 2022-12-13 NOTE — TELEPHONE ENCOUNTER
This  had talked with Dennis Roberto by phone on October 4th. At that time, Dennis Roberto stated she did not need spiritual care. This  attempted to call Dennis Roberto again and was unable to leave a message on her telephone. She was being referred as part of the September screening. She has our phone number and will call us as the need arises.

## 2022-12-14 ENCOUNTER — HOSPITAL ENCOUNTER (OUTPATIENT)
Dept: RADIATION ONCOLOGY | Age: 42
Discharge: HOME | End: 2022-12-14
Payer: COMMERCIAL

## 2022-12-14 PROCEDURE — 77280 THER RAD SIMULAJ FIELD SMPL: CPT | Performed by: RADIOLOGY

## 2022-12-14 PROCEDURE — 77412 RADIATION TX DELIVERY LVL 3: CPT | Performed by: RADIOLOGY

## 2022-12-15 ENCOUNTER — HOSPITAL ENCOUNTER (OUTPATIENT)
Dept: RADIATION ONCOLOGY | Age: 42
Discharge: HOME | End: 2022-12-15
Payer: COMMERCIAL

## 2022-12-15 VITALS
BODY MASS INDEX: 24.76 KG/M2 | TEMPERATURE: 98.1 F | HEART RATE: 75 BPM | SYSTOLIC BLOOD PRESSURE: 112 MMHG | OXYGEN SATURATION: 100 % | WEIGHT: 135.36 LBS | DIASTOLIC BLOOD PRESSURE: 79 MMHG | RESPIRATION RATE: 16 BRPM

## 2022-12-15 PROCEDURE — 77412 RADIATION TX DELIVERY LVL 3: CPT | Performed by: RADIOLOGY

## 2022-12-15 NOTE — PROGRESS NOTES
[address] [facility] [address], [address]        [place],  [facility] [place]: [phone]        F: [phone]       [employer] [place]            Dr. Desiree Lees MD MS          Dr. 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 Dose (cGy): 5256    Patient was seen today for weekly visit. Wt Readings from Last 3 Encounters:   12/15/22 135 lb 5.8 oz (61.4 kg)   22 133 lb 2.5 oz (60.4 kg)   22 136 lb 3.2 oz (61.8 kg)       /79   Pulse 75   Temp 98.1 °F (36.7 °C) (Infrared)   Resp 16   Wt 135 lb 5.8 oz (61.4 kg)   LMP 2019   SpO2 100%   BMI 24.76 kg/m²     Lab Results   Component Value Date    WBC 5.5 2022     2022       Comfort Alteration  Fatigue:Able to perform daily activities with rest periods    Pain Location: Denies  Pain Intensity (Current): 0 No Pain  Pain Treatment: N/A  Pain Relief: n/a  Hot Flashes/Flushes: Mild or no more than 1 per day    Emotional Alteration:   Coping: effective    Nutritional Alteration  Anorexia: Loss of appetite but able to eat normal portions of food  Nausea: 1-2 episodes of nausea in 24 hours, occasional not every day.   Vomiting: No vomiting   Dyspepsia/Heartburn: None    Skin Alteration Skin reaction: Faint or dull erythema; follicular reaction    Ventilation Alteration  Cough: Productive  Hemoptysis: None  Dyspnea: Normal  Mucous Quantity/Quality: Yellow    Additional Comments: Using Betamethasone & CeraVe twice per day and Aquaphor at night. MEDICATIONS:     Current Outpatient Medications   Medication Sig Dispense Refill    azelastine (ASTELIN) 0.1 % nasal spray 1 spray by Nasal route 2 times daily Use in each nostril as directed 30 mL 0    betamethasone valerate (VALISONE) 0.1 % cream Apply topically 2 times daily. 45 g 1    FLUoxetine (PROZAC) 40 MG capsule Take 1 capsule by mouth daily 30 capsule 3    butalbital-acetaminophen-caffeine (FIORICET, ESGIC) -40 MG per tablet Take 1 tablet by mouth every 6 hours as needed for Headaches 60 tablet 0    pantoprazole (PROTONIX) 40 MG tablet Take 1 tablet by mouth every morning (before breakfast) (Patient taking differently: Take 40 mg by mouth every morning (before breakfast) Indications: PRN) 30 tablet 2    diazePAM (VALIUM) 2 MG tablet Take 2 mg by mouth every 8 hours as needed for Anxiety. ibuprofen (ADVIL;MOTRIN) 400 MG tablet Take 400 mg by mouth every 6 hours as needed for Pain      METAMUCIL FIBER PO Take 1 capsule by mouth daily 3 in 1 fiber (Patient not taking: Reported on 2022)       No current facility-administered medications for this encounter. PHYSICAL EXAM:       ECO - Asymptomatic (Fully active, able to carry on all pre-disease activities without restriction)    General: NAD, AO x 3, Mentation is clear with appropriate affect. HEENT: Normocephalic, atraumatic  Thorax:  Unlabored  Breasts:  Radiation treatment area of the left breast with faint erythema; skin otherwise intact. Small area of glandular feeling tissue approximately 2cm that is nontender to palpation between the two incision of the left breast and axilla, but not clinically suspicious.   Abdomen:  Non-distended  Neuro:  Cranial nerves grossly intact; no focal deficits  Skin - treatment portal: SEE above. Mild erythema in RT area, stable will continue to follow. Chemotherapy Update: Adjuvant hormonal therapy to follow Radiation therapy    Treatment Imaging: kv pair & port film imaging reviewed/approved by Ray Hernandez/Adam/Alonso    ASSESSMENT: Mild to moderate radiation side effects of the skin in the treatment area. Responding appropriately to symptomatic management. Patient missed radiation treatments earlier this week due to upper respiratory infection - reinforced importance of continuing daily treatments. Non-suspicious 2cm area of glandular feeling tissue between the two incision of the left breast and axilla, nontender on palpation. New medications, diagnostic results: Continue treatment as planned    PLAN: Again reviewed potential side effects of radiation for the patient's treatment. Continue local/topical care. Continue current radiation course as prescribed.

## 2022-12-16 ENCOUNTER — HOSPITAL ENCOUNTER (OUTPATIENT)
Dept: RADIATION ONCOLOGY | Age: 42
Discharge: HOME | End: 2022-12-16
Payer: COMMERCIAL

## 2022-12-16 ENCOUNTER — APPOINTMENT (OUTPATIENT)
Dept: RADIATION ONCOLOGY | Age: 42
End: 2022-12-16
Payer: COMMERCIAL

## 2022-12-16 PROCEDURE — 77412 RADIATION TX DELIVERY LVL 3: CPT | Performed by: RADIOLOGY

## 2022-12-19 ENCOUNTER — HOSPITAL ENCOUNTER (OUTPATIENT)
Dept: RADIATION ONCOLOGY | Age: 42
Discharge: HOME | End: 2022-12-19
Payer: COMMERCIAL

## 2022-12-19 DIAGNOSIS — R59.9 ENLARGED LYMPH NODES: Primary | ICD-10-CM

## 2022-12-19 PROCEDURE — 77412 RADIATION TX DELIVERY LVL 3: CPT | Performed by: RADIOLOGY

## 2022-12-19 PROCEDURE — 77336 RADIATION PHYSICS CONSULT: CPT | Performed by: RADIOLOGY

## 2022-12-19 NOTE — DISCHARGE INSTRUCTIONS
PATIENT DISCHARGE INSTRUCTIONS    Remember that side effects present at the end of your treatments will improve within a few weeks after the last treatment. Eat well balanced meals even though your treatments are finished. This will help speed the healing process. Continue any special diets prescribed to control side effects until these side effects have been resolved. Get plenty of rest.  If you have experienced fatigue and/or weakness, this may continue for several weeks after your last treatment. Continue with your daily activities according to the way you feel. Continue to be gentle with your skin. Follow your present skin care instructions until your follow-up visit. IF YOU DEVELOP ANY CHANGES IN YOUR SKIN IN THE AREA TREATED WITH RADIATION, PLEASE CALL THE RADIATION ONCOLOGY NURSE -[phone]. Protect your skin from any injury and avoid direct sun exposure in the treatment area. The skin in the treated area may always be more sensitive than the rest of your skin. Always use SPF 27 or higher sun block if you will be in the sun and cannot avoid exposure. Please contact your referring physician for a follow-up appointment in addition to your Radiation Oncology appointment. Presence of pain: No  Medication Taper: No    See Instructions Dated: n/a  Follow up orders:  Will be discussed at follow up

## 2022-12-28 NOTE — PROGRESS NOTES
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 female      HISTORY:    10/17/22 As per Dr. Karl Barreto (medical oncology) note,    2022. Screening mammogram performed. Technologist felt a lump in the left upper outer breast.  This showed a focal 1 cm density near the area of the palpable concern. 2022. Further views confirmed a focal asymmetric density with possible spiculated margins. Ultrasound showed a hypoechoic mass measuring 1.4 x 1.1 x 1.1 cm in diameter in the upper outer left breast for which biopsy was recommended. There was an adjacent oval 6 mm possible complex cyst for which aspiration and or ultrasound-guided biopsy was recommended. The axilla was negative for abnormal findings. 2022. Biopsy showed moderately differentiated invasive ductal carcinoma with lobular features, grade 2 with intermediate grade ductal carcinoma in situ. An additional lesion was a fibroadenoma. Estrogen receptor was +100% strong, progesterone receptor was +100% strong, Ki-67 was 25% and HER2 was negative for over expression. 2022.   Sampling for genetic analysis was sent and returned negative for pathogenic mutation. 9/13/2022. Seen by Dr. Mabel Sandhoff in the office as a new patient for evaluation. The patient found enlarged submandibular lymph node on the rightfor which further testing was ordered including an ultrasound of the head neck. She noted that the patient had had no breast symptoms prior to the biopsy. Patient's family history on the mother side was positive for malignancy in that her mother had breast cancer at age 61. There was lung cancer in a maternal aunt. On her father side there is breast cancer in her paternal aunt colon cancer and a paternal uncle lung cancer in her father and in her paternal uncle. 9/14/2022. MRI of the breasts showed an enhancing mass in the upper slightly outer left breast correlating with the known biopsy-proven malignancy. There is an associated biopsy clip demonstrated measuring 1.5 x 1.5 x 1 cm. Postbiopsy changes were seen at the site of the biopsy of the fibroadenoma. .  No other focal dominant lesions were seen. 9/19/2022. Ultrasound of the head neck soft tissues showed several prominent lymph nodes the largest of which measured 1.4 x 0.7 x 0.4 cm.    9/19/2022. 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 Deep margin had pleomorphic lobular carcinoma in situ present. [place] nodes were negative for malignancy. The tumor mass measured 1.1 x 1.3 x 1.5 cm. Overall grade was 2, lymphovascular invasion was not identified, lobular carcinoma in situ was present pleomorphic type, all regional lymph nodes were negative for tumor. Once again ER was positive [place] was positive Ki-67 was 25% and HER2 was negative. This had previously been called a moderately differentiated ductal carcinoma with lobular features but now is being named invasive pleomorphic lobular carcinoma. 9/27/2022. Patient was seen in the office by Dr. Mabel Sandhoff in follow-up.   She plan for reexcision of the deep margin due to it being positive. This was performed on 2022 and the margin was cleared. 10/4/2022. Saw Dr. Kiana Garay in follow-up. She was then referred to medical oncology and radiation oncology. Comorbidities include kidney stones, renal angiomyolipoma,  previous endometrial ablation, previous hysterectomy, anxiety and depression, COVID in 2021 degenerative joint disease, previous cervical fusion C4-T1, action tremor    Her recurrence score is 17. She does not need chemotherapy. It will not add much to her hormonal manipulation. We have made her an appointment to see radiation oncology. After that she will need to have hormonal manipulation and they we have obtained samples of blood to check to see if she is indeed postmenopausal.  If she is premenopausal she will need ovarian suppression. LABS:    10/4/22 ONCOTYPE DX  Recurrence Score: 17      10/18/22 GENETICS BY [employer]          IMAGIN/30/22 MARGUERITE SCREEN B/L  Impression   Possible density in the upper outer left breast at the area of palpable    concern. Spot compression views, and LM view and an ultrasound are    recommended. The patient will be contacted by our department per protocol regarding    additional imaging and scheduling. Amaris Wolfe BI-RADS CATEGORY 0: INCOMPLETE -NEED ADDITIONAL IMAGING EVALUATION AND/OR    PRIOR MAMMOGRAMS FOR COMPARISON.     22 MARGUERITE DIAGNOSTIC LEFT  U/S BREAST LEFT  Impression   Nodular density in the upper outer left breast. A targeted left breast    ultrasound is recommended. IMPRESSION:       1. Hypoechoic mass measuring 1.4 cm in the upper outer left breast near    the nipple. An ultrasound-guided biopsy is recommended. 2. Adjacent oval 6 mm lesion may represent a complex cyst. A diagnostic    aspiration/ultrasound guided biopsy is recommended. An ultrasound-guided biopsy is recommended. 22 MRI BREAST B/L W WO CON  Impression   1. There is an enhancing mass demonstrated within the upper slightly outer    left breast middle to anterior depth which correlates with known biopsy    proven malignancy. There is an associated biopsy clip demonstrated. This    is seen on axial image 108 series    10. This measures approximately 1.5 x 1 x 1.5 cm.       2. Postbiopsy changes are also noted within the upper outer left breast    middle to anterior depth as seen on axial image 97 series 10. This    correlates with previously biopsied fibroadenoma on 9/7/2022.       3. There is moderate background enhancement with plateau kinetics likely    representing background parenchymal enhancement. This may be related to    patient's stage of menstrual cycle. No focal dominant lesion is seen,    however. Recommend 6 month follow-up    breast MRI to document stability. A six-month follow-up MRI recommended. Otherwise, follow-up according to    ACR/ACS guidelines. The patient was notified and a result letter will be sent to the patient. BI-RADS CATEGORY 6: Known biopsy proven malignancy. 9/16/22 US HEAD NECK SOFT TISSUE THYROID  Impression   FINDINGS/IMPRESSION:   There are several prominent lymph nodes at the region of interest. The largest measures 1.4 x 0.7 x 0.4 cm.     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 Breast, left, upper outer quadrant, tribell clip, core needle   biopsies:     Fibroadenoma. B.  Breast, left, upper outer quadrant, barbell clip, core needle   biopsies: Moderately differentiated invasive ductal carcinoma with lobular   features,     Christine score 2 of 3. Intermediate grade ductal carcinoma in situ.      BREAST BIOMARKERS*   Estrogen Receptor: (Clone SP1), [employer]       ( x ) Positive 100% of cells (>10% of cells)   Intensity of Staining:       ( x ) Strong     Progesterone Receptor: (Clone 1E2), [employer]       ( x ) Positive 100% of cells   Intensity of Staining:       ( x ) Strong     Ki-67 (clone 30-9)       Percentage of positive nuclei: 25%               Favorable <10%               Borderline 10-20%               Unfavorable >20%     HER2 Immunohistochemistry (Clone 4B5, [employer])       ( x ) Negative (1+) - Incomplete faint/barely perceptible membrane       staining in >10% of invasive tumor      9/19/2022 Surgery    ADDENDUM REPORT (9/27/2022, 10/5/2022): FINAL DIAGNOSIS:   A. 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 Closest margin to invasive tumor = 4mm to posterior. Pleomorphic lobular carcinoma in situ at deep margin. Previous biopsy site changes. pT1c pN0 sn     B. [place] lymph nodes (2), left, resection:    Negative for malignancy (0/2). C.  Additional medial margin, lumpectomy:    Negative for malignancy. D.  Additional caudal margin, lumpectomy:    Negative for malignancy. COMMENT 9/27/2022: Selected slides and pathology report were reviewed   by Dr. Gregorio Simental and archival tissue was selected for the   following testing: Oncotype DX (ordered by Dr. Tereza Duque). ADDENDUM REPORT 10/5/2022: Oncotype DX Breast Cancer Assay (RT-PCR) performed by [employer]   Breast Cancer Recurrence Score: 17     Excision (less than total mastectomy)     Specimen Laterality    Left     TUMOR   +Tumor Site   Upper outer quadrant, per prior biopsy. Histologic Type   Invasive lobular carcinoma, pleomorphic type      +Histologic Type Comment: The tumor was previously tested for   E-cadherin on the biopsy.   The tumor architecture and atypical nuclear   morphology demonstrates extensive single cell filing infiltration as   well as in situ neoplasia showing cancerization of lobules. These   findings are most concerning for a pleomorphic lobular neoplasia. Repeat E-cadherin immunostain* is performed on block 5 and 10 and   attenuated e-cadherin immunostaining* (aberrant) compared to internal   control, consistent with lobular carcinoma. The control is adequate. Histologic Grade (Susana Histologic Score)    Susana Score    Glandular (Acinar) / Tubular Differentiation     Score 3 (less than 10% of tumor area forming glandular / tubular   structures)    Nuclear Pleomorphism     Score 2 (Cells larger than normal with open vesicular nuclei, visible    nucleoli, and moderate    variability in both size and shape)    Mitotic Rate    See Table 1 in CAP Protocol.      Score 2     Overall Grade     Grade 2 (scores of 6 or 7)     Tumor Size     Greatest dimension of largest invasive focus greater than 1 mm   (specify exact measurement in Millimeters (mm)): 13 mm   +Tumor Focality   Single focus of invasive carcinoma     Ductal Carcinoma In Situ (DCIS)    Not identified     +Lobular Carcinoma In Situ (LCIS)   Present, pleomorphic type     Tumor Extent    Tumor Extent (required only if skin, nipple, or skeletal muscle are   present and involved)     Not applicable (skin, nipple, and skeletal muscle are absent OR are   uninvolved)     Lymphovascular Invasion   Not identified     Treatment Effect in the Breast   .No known presurgical therapy     Treatment Effect in the Lymph Nodes    Not applicable     MARGINS   Margin Status for Invasive Carcinoma   All margins negative for invasive carcinoma    Distance from Invasive Carcinoma to Closest Margin    Specify in Millimeters (mm)    Exact distance: 4mm     +Closest Margin(s) to Invasive Carcinoma    Posterior     REGIONAL LYMPH NODES   Regional Lymph Node Status   Regional lymph nodes present     All regional lymph nodes negative for tumor      Total Number of Lymph Nodes Examined (sentinel and non-sentinel)    Exact cM0, G2, ER+, SC+, HER2-)       2022 - 2022 Radiation    Treatment Course Number: 1    Treatment Site (s) Modality Dose (cGy) Fractions Elapsed Days   Left Whole Breast 3DRT 4256 16 26   Left Tumor Bed Boost Electrons 1000 4 5   Cumulative Dose: 5256 cGy    Radiation therapy delivered under the care of Dr. Bartolo Arora MD MS ([facility])           Treatment Tolerance/Response:     Comfort Alteration  Fatigue:Able to perform daily activities with rest periods    Pain Location: Denies  Pain Intensity (Current): 0 No Pain  Pain Treatment: N/A  Pain Relief: n/a  Hot Flashes/Flushes: Mild or no more than 1 per day    Emotional Alteration:   Coping: effective    Nutritional Alteration  Anorexia: Loss of appetite but able to eat normal portions of food  Nausea: 1-2 episodes of nausea in 24 hours, occasional not every day. Vomiting: No vomiting   Dyspepsia/Heartburn: None    Skin Alteration   Skin reaction: Faint or dull erythema; follicular reaction    Ventilation Alteration  Cough: Productive  Hemoptysis: None  Dyspnea: Normal  Mucous Quantity/Quality: Yellow    Additional Comments: Using Betamethasone & CeraVe twice per day and Aquaphor at night. ECO - Symptomatic but completely ambulatory (Restricted in physically strenuous activity but ambulatory and able to carry out work of a light or sedentary nature. For example, light housework, office work)    Reyna Lazaro tolerated radiation therapy well with only mild treatment related symptoms as detailed above. Radiation therapy was completed as planned and prescribed however there was a 1 week treatment break early in her course of therapy due to illness, unrelated to radiation therapy. Any treatment effects experienced at completion of therapy should improve or resolve in the next 2-3 weeks. Continue symptom management, if required, as instructed on the last day of treatment.     Systemic Therapy: Adjuvant hormonal therapy to follow Radiation therapy      Follow Up Plan: Patient tolerated radiation therapy well overall with only mild side effects noted above. Continue regular follow up with all other treating physicians. The patient will return to clinic in 1 month or sooner if clinically indicated. They have our clinic number to call with any questions or concerns if needed. Thank you for allowing us to be a part of their care.     Electronically signed by Geni Low MD on 12/28/2022 at 9:40 AM  Radiation Oncology    CC:  [facility] Dr. Tereza Duque (Surgery)   [facility]: Tumor Registry: [address] [facility]

## 2023-01-10 ENCOUNTER — OFFICE VISIT (OUTPATIENT)
Dept: ENT CLINIC | Age: 43
End: 2023-01-10
Payer: COMMERCIAL

## 2023-01-10 VITALS
HEART RATE: 79 BPM | OXYGEN SATURATION: 98 % | DIASTOLIC BLOOD PRESSURE: 76 MMHG | BODY MASS INDEX: 25.54 KG/M2 | TEMPERATURE: 97.9 F | RESPIRATION RATE: 14 BRPM | HEIGHT: 62 IN | SYSTOLIC BLOOD PRESSURE: 118 MMHG | WEIGHT: 138.8 LBS

## 2023-01-10 DIAGNOSIS — K11.23 CHRONIC SCLEROSING SIALADENITIS: Primary | ICD-10-CM

## 2023-01-10 PROCEDURE — G8419 CALC BMI OUT NRM PARAM NOF/U: HCPCS | Performed by: OTOLARYNGOLOGY

## 2023-01-10 PROCEDURE — 99203 OFFICE O/P NEW LOW 30 MIN: CPT | Performed by: OTOLARYNGOLOGY

## 2023-01-10 PROCEDURE — G8484 FLU IMMUNIZE NO ADMIN: HCPCS | Performed by: OTOLARYNGOLOGY

## 2023-01-10 PROCEDURE — G8427 DOCREV CUR MEDS BY ELIG CLIN: HCPCS | Performed by: OTOLARYNGOLOGY

## 2023-01-10 PROCEDURE — 1036F TOBACCO NON-USER: CPT | Performed by: OTOLARYNGOLOGY

## 2023-01-10 NOTE — PROGRESS NOTES
[facility] [place] [facility]  [facility] EAR, NOSE AND THROAT  [address] [place] [address] [place] [id number]  Dept: [phone]  Dept Fax: [phone]  Loc: [phone]    Susy Goel is a 40 y.o. female who was referred by Dandre Cazares PA-C for:  Chief Complaint   Patient presents with    New Patient     Patient is here for Enlarged lymph nodes. Referred by Bria Marin PA-C       HPI:     Susy Goel is a 40 y.o. female describes an almost 2-year history of noticing small lumps in her neck in the area of the mandible. She states that she began to notice them after she had 4 level discectomy and fusions of her cervical spine. She notices the one on the right more than on the left and it is mildly tender by her report. She has recent history of breast cancer and completed radiation therapy for this approximately 2 months ago. She describes her self as someone who recently started drinking more water but admits to running chronically somewhat dehydrated. She has no history of gland stones that she is aware of. She is concerned about lumps anywhere in her body and her concern bourne of her recent breast cancer diagnosis. She has had an ultrasound which read quite generally as being positive for \"lymph nodes noted in the area of concern\" without any other specificity or details. She has had no fine-needle aspiration evaluation but she has had a CT scan that demonstrated a single node in the region of both submandibular glands and some scattered smaller nodes in the posterior triangles both sides of the neck. No other lesions were noted by the radiologist.     History: Allergies   Allergen Reactions    Amoxicillin Hives     Current Outpatient Medications   Medication Sig Dispense Refill    FLUoxetine (PROZAC) 40 MG capsule Take 1 capsule by mouth daily 30 capsule 3    diazePAM (VALIUM) 2 MG tablet Take 2 mg by mouth every 8 hours as needed for Anxiety.       ibuprofen (ADVIL;MOTRIN) 400 MG tablet Take 400 mg by mouth every 6 hours as needed for Pain       No current facility-administered medications for this visit. Past Medical History:   Diagnosis Date    Invasive ductal carcinoma of breast, female, left ([address] 09/07/2022    with lobular features    Kidney stone     2005 with pregnancy    PONV (postoperative nausea and vomiting)     Prolonged emergence from general anesthesia     \"During Hysterectomy- bradycardia- meds to increase heart rate. \"      Past Surgical History:   Procedure Laterality Date    BREAST LUMPECTOMY Left 9/19/2022    LEFT BREAST LUMPECTOMY WITH SENTINELYMPH NODE BX performed by Aparna Trevino MD at [address] [facility] [address] LUMPECTOMY Left 9/29/2022    RE-EXCISION LEFT BREAST DEEP MARGINS [facility]ino [facility] [address] 12/08/2021    ACDF C4-C7-T1 performed by Devante Sanders MD at [address] [facility]  03/25/2016    Dr. Trish Askew 09/13/2017    DILATATION AND CURETTAGE HYSTEROSCOPY, POSSIBLE MYOSURE performed by Sampson Monday, DO at [facility] [address]  04/18/2018    [address]    umbilical hernia    HYSTERECTOMY (CERVIX STATUS UNKNOWN)  03/13/2019    partial    HYSTERECTOMY ABDOMINAL N/A 03/13/2019    ROBOTIC TOTAL LAPAROSCOPIC HYSTERECTOMY WITH BILATERAL SALPINGECTOMY performed by Sampson Monday, DO at [address] [facility]  04/18/2018    M[address] LEFT Left 09/07/2022    MARGU[address]ITE [address] LEFT 9/7/2022 Magnolia Redd MD [facility]      one ovary remains    IL HYSTEROSCOPY ENDOMETRIAL ABLATION N/A 04/18/2018    HYSTEROSCOPY ENDOMETRIAL ABLATION performed by Sampson Monday, DO at [address] [facility] LEFT Left 09/07/2022    US BREAST BIOPSY NEEDLE ADDITIONAL LEFT 9/7/2022 Magnolia Redd MD [facility]     Family History   Problem Relation Age of Onset    Breast Cancer Mother 61    BRCA 1 Negative Mother 64    BRCA 2 Negative Mother 64    Bilateral breast cancer Mother 64        opposite side    Heart Disease Father     High Blood Pressure Father     High Cholesterol Father     Diabetes Father     Lung Cancer Father     No Known Problems Sister     No Known Problems Sister     No Known Problems Sister     No Known Problems Brother     No Known Problems Brother     No Known Problems Brother     Leukemia Maternal Grandmother     Colon Cancer Paternal Uncle         age 62s    Lung Cancer Paternal Uncle         age >47    Breast Cancer Paternal Aunt         age >47    Lung Cancer Maternal Aunt         age >47    Asthma Neg Hx      Social History     Tobacco Use    Smoking status: Never    Smokeless tobacco: Never   Substance Use Topics    Alcohol use: No     Alcohol/week: 0.0 standard drinks        Subjective:      Review of Systems  Rest of review of systems are negative, except as noted in HPI. Objective:     /76 (Site: Left Upper Arm, Position: Sitting)   Pulse 79   Temp 97.9 °F (36.6 °C) (Infrared)   Resp 14   Ht 5' 2\" (1.575 m)   Wt 138 lb 12.8 oz (63 kg)   LMP 03/06/2019   SpO2 98%   BMI 25.39 kg/m²     Physical Exam       On general physical exam the patient is pleasant alert cooperative adult female in no acute distress. Her voice and her speech pattern are both within normal limits for her age and gender. I heard no throat clearing coughing or inspiratory stridor. Her facial nerve function and extraocular movements were grossly and symmetrically intact. Her neck was abnormal for the presence of prominent submandibular glands and a single node that was palpable on the right side with a vague impression of the node in the same region on the left side. She has a well-healed scar of her anterior cervical fusion visible to the left of midline that is curved and transverse.   On bimanual palpation the patient's right submandibular gland is both enlarged and very tender. Her sublingual gland is mildly tender. On the left side, her submandibular gland was also tender but much less so than the right side. No other lesions or masses were detected in her mouth. Vitals reviewed. No results found. Lab Results   Component Value Date/Time     04/25/2022 11:14 PM     02/28/2022 08:00 AM     11/16/2021 09:07 AM    K 3.5 04/25/2022 11:14 PM    K 4.8 02/28/2022 08:00 AM    K 4.7 11/16/2021 09:07 AM     04/25/2022 11:14 PM     02/28/2022 08:00 AM     11/16/2021 09:07 AM    CO2 28 04/25/2022 11:14 PM    CO2 26 02/28/2022 08:00 AM    CO2 27 11/16/2021 09:07 AM    BUN 12 04/25/2022 11:14 PM    BUN 10 02/28/2022 08:00 AM    BUN 7 11/16/2021 09:07 AM    CREATININE 0.74 04/25/2022 11:14 PM    CREATININE 0.7 02/28/2022 08:00 AM    CREATININE 0.7 11/16/2021 09:07 AM    CALCIUM 9.30 04/25/2022 11:14 PM    CALCIUM 9.4 02/28/2022 08:00 AM    CALCIUM 9.7 11/16/2021 09:07 AM    PROT 6.7 04/11/2021 03:09 PM    PROT 7.7 02/09/2017 11:13 AM    PROT 8.0 05/28/2015 05:50 PM    LABALBU 5.0 11/16/2021 09:07 AM    LABALBU 4.7 04/11/2021 03:09 PM    LABALBU 4.4 02/09/2017 11:13 AM    BILITOT 0.3 04/11/2021 03:09 PM    BILITOT 0.4 02/09/2017 11:13 AM    BILITOT 0.4 05/28/2015 05:50 PM    ALKPHOS 58 04/11/2021 03:09 PM    ALKPHOS 58 02/09/2017 11:13 AM    ALKPHOS 62 05/28/2015 05:50 PM    AST 14 04/11/2021 03:09 PM    AST 18 02/09/2017 11:13 AM    AST 16 05/28/2015 05:50 PM    ALT 10 04/11/2021 03:09 PM    ALT 8 02/09/2017 11:13 AM    ALT 8 05/28/2015 05:50 PM       All of the past medical history, past surgical history, family history,social history, allergies and current medications were reviewed with the patient. Assessment & Plan   Diagnoses and all orders for this visit:     Diagnosis Orders   1.  Chronic sclerosing sialadenitis  CTA NECK W WO CONTRAST    Not clear as to whether this is a \"sclerosing\" process but it is not acute and has been present for approximately 2 years therefore I considered \"chronic\". Based on patient's history and physical findings, the patient has chronic bilateral sialoadenitis of the submandibular glands likely is a manifestation of her modest fluid intake. I reviewed her ultrasound results and her CT scan images myself. They are consistent with this process. CT scan was with contrast but the flex of contrast within the gland may actually be small stones within both glands. The study was only performed with contrast now with and without. As such I recommended that she begin a program of sialagogues either with Real Lemon drops or with Lemon Heads candies that are also very tart. She is to take them every several hours during the day for the next several weeks and is likely to experience some pain with this measure at the start of her treatment. In addition she is to drink is close to 64 ounces of water a day as she can manage so that she is urinating roughly every 1-2 hours and needs to wake up at least once at night to urinate. She can also massage the area of both glands as I showed her to express the glands by pulling from posterior to anterior underneath the jaw. This will produce some pain again at the outset that should gradually get better. And then fourthly she is to use a heating pad underneath her chin particularly to the right side to increase blood flow to the region. Between these 4 measures her condition should significantly improve over the next few weeks and not require antibiotics. If instead of improving she worsens acutely with swelling and increased pain, she is to get into see me sooner than 6 months for follow-up evaluation and should request a prescription for Augmentin from her PCP to get started on antibiotic therapy as early as possible. I would recommend 875 twice daily for 14 days should that occur.   She should be able to get into see me within that time window and I will reevaluate her with a physical exam and repeat CT scan. If her PCP can get the CT scan ordered that would make her early follow-up visit were completed. Assuming that does not occur, I will see her back with a fresh CT scan of her glands that will be with and without contrast so we can see into the glans and determine if she has stones that are formed within the glands. Her symptoms have completely subsided given the presence of stones themselves would not be an indication for the removal of the glands. However a severe infection and certainly 1 that becomes an abscess would be that indication. I explained all this in detail to the patient to her satisfaction. I answered her questions and addressed her concerns. She reported being pleased with the outcome of the visit and being willing to proceed as such. Return in about 6 months (around 7/10/2023) for Follow-up evaluation and review of new CT scan. **This report has been created using voice recognition software. It may contain minor errors which are inherent in voice recognition technology. **

## 2023-01-17 ENCOUNTER — TELEPHONE (OUTPATIENT)
Dept: SPIRITUAL SERVICES | Facility: CLINIC | Age: 43
End: 2023-01-17

## 2023-01-17 NOTE — TELEPHONE ENCOUNTER
[facility]   PROGRESS NOTE      Patient: Joceline Khan          YOB: 1980  Age: 42 y.o.  Gender: female            Assessment:  Joceline is a 42 year old female who is being referred for spiritual care by the nurse navigator at the cancer center.  This  called her phone and she answered. She spoke of the stress she is feeling as her  and her dad also have cancer.  She Is scheduled to have surgery this Friday to remove her ovaries.  She said, then she will just have to take a pill.      Interventions:  Active listening, Words of encouragement given. This  thought I could meet with her before surgery, but she is scheduled to have it done at [facility].      Outcomes:  Our phone number is given to her to call us as she sees the need. This  will have a follow up phone call after her surgery.     Plan:    1.Care Plan:  Continue spiritual and emotional care for patient and family.  Including prayers.        Electronically signed by Chaplain Lavonne, on 1/17/2023 at 4:29 PM.  Spiritual Care Department  [facility]  [phone]    
Female

## 2023-01-20 ENCOUNTER — PATIENT MESSAGE (OUTPATIENT)
Dept: ENT CLINIC | Age: 43
End: 2023-01-20

## 2023-01-20 NOTE — TELEPHONE ENCOUNTER
From: Woo Dowell  To: Dr. Lynn Shape: 1/20/2023 7:27 AM EST  Subject: Non urgent     They scheduled a ct scan Jan 27 and I thought it was supposed to be close to my appointment in [place]

## 2023-01-23 RX ORDER — FLUOXETINE HYDROCHLORIDE 20 MG/1
20 CAPSULE ORAL DAILY
Qty: 30 CAPSULE | Refills: 3 | Status: SHIPPED | OUTPATIENT
Start: 2023-01-23

## 2023-02-23 ENCOUNTER — HOSPITAL ENCOUNTER (OUTPATIENT)
Dept: INFUSION THERAPY | Age: 43
Discharge: HOME | End: 2023-02-23
Payer: COMMERCIAL

## 2023-02-23 ENCOUNTER — OFFICE VISIT (OUTPATIENT)
Dept: ONCOLOGY | Age: 43
End: 2023-02-23
Payer: COMMERCIAL

## 2023-02-23 VITALS
HEART RATE: 81 BPM | HEIGHT: 62 IN | RESPIRATION RATE: 20 BRPM | TEMPERATURE: 97.9 F | OXYGEN SATURATION: 98 % | DIASTOLIC BLOOD PRESSURE: 75 MMHG | BODY MASS INDEX: 25.03 KG/M2 | SYSTOLIC BLOOD PRESSURE: 109 MMHG | WEIGHT: 136 LBS

## 2023-02-23 VITALS
OXYGEN SATURATION: 98 % | RESPIRATION RATE: 20 BRPM | DIASTOLIC BLOOD PRESSURE: 75 MMHG | HEIGHT: 62 IN | HEART RATE: 81 BPM | SYSTOLIC BLOOD PRESSURE: 109 MMHG | BODY MASS INDEX: 25.03 KG/M2 | WEIGHT: 136 LBS | TEMPERATURE: 97.9 F

## 2023-02-23 DIAGNOSIS — Z17.0 MALIGNANT NEOPLASM OF UPPER-OUTER QUADRANT OF LEFT BREAST IN FEMALE, ESTROGEN RECEPTOR POSITIVE (HCC): Primary | ICD-10-CM

## 2023-02-23 DIAGNOSIS — C50.412 MALIGNANT NEOPLASM OF UPPER-OUTER QUADRANT OF LEFT BREAST IN FEMALE, ESTROGEN RECEPTOR POSITIVE (HCC): Primary | ICD-10-CM

## 2023-02-23 PROCEDURE — 1036F TOBACCO NON-USER: CPT | Performed by: INTERNAL MEDICINE

## 2023-02-23 PROCEDURE — G8484 FLU IMMUNIZE NO ADMIN: HCPCS | Performed by: INTERNAL MEDICINE

## 2023-02-23 PROCEDURE — 99211 OFF/OP EST MAY X REQ PHY/QHP: CPT

## 2023-02-23 PROCEDURE — 99214 OFFICE O/P EST MOD 30 MIN: CPT | Performed by: INTERNAL MEDICINE

## 2023-02-23 PROCEDURE — G8427 DOCREV CUR MEDS BY ELIG CLIN: HCPCS | Performed by: INTERNAL MEDICINE

## 2023-02-23 PROCEDURE — G8420 CALC BMI NORM PARAMETERS: HCPCS | Performed by: INTERNAL MEDICINE

## 2023-02-23 RX ORDER — ANASTROZOLE 1 MG/1
1 TABLET ORAL DAILY
Qty: 30 TABLET | Refills: 3 | Status: SHIPPED | OUTPATIENT
Start: 2023-02-23

## 2023-02-23 RX ORDER — VENLAFAXINE HYDROCHLORIDE 37.5 MG/1
37.5 CAPSULE, EXTENDED RELEASE ORAL DAILY
Qty: 30 CAPSULE | Refills: 3 | Status: SHIPPED | OUTPATIENT
Start: 2023-02-23

## 2023-02-23 NOTE — PROGRESS NOTES
[address] CANCER [address] [place] [address]  Dept: [phone]  Loc: Latoya Sandoval [address]  1980   No ref. provider found   Judi Tam MD       Linda Swanson is a 40 y.o.  female with breast cancer    CHIEF COMPLAINT  Chief Complaint   Patient presents with    Follow-up     Malignant neoplasm of upper-outer quadrant of left breast in female, estrogen receptor positive ([address]          HISTORY OF PRESENT ILLNESS    8/30/2022. Screening mammogram performed. Technologist felt a lump in the left upper outer breast.  This showed a focal 1 cm density near the area of the palpable concern. 9/1/2022. Further views confirmed a focal asymmetric density with possible spiculated margins. Ultrasound showed a hypoechoic mass measuring 1.4 x 1.1 x 1.1 cm in diameter in the upper outer left breast for which biopsy was recommended. There was an adjacent oval 6 mm possible complex cyst for which aspiration and or ultrasound-guided biopsy was recommended. The axilla was negative for abnormal findings. 9/7/2022. Biopsy showed moderately differentiated invasive ductal carcinoma with lobular features, grade 2 with intermediate grade ductal carcinoma in situ. An additional lesion was a fibroadenoma. Estrogen receptor was +100% strong, progesterone receptor was +100% strong, Ki-67 was 25% and HER2 was negative for over expression. 9/9/2022. Sampling for genetic analysis was sent and returned negative for pathogenic mutation. 9/13/2022. Seen by Dr. Noni Warren in the office as a new patient for evaluation. The patient found enlarged submandibular lymph node on the rightfor which further testing was ordered including an ultrasound of the head neck. She noted that the patient had had no breast symptoms prior to the biopsy.   Patient's family history on the mother side was positive for malignancy in that her mother had breast cancer at age 61. There was lung cancer in a maternal aunt. On her father side there is breast cancer in her paternal aunt colon cancer and a paternal uncle lung cancer in her father and in her paternal uncle. 9/14/2022. MRI of the breasts showed an enhancing mass in the upper slightly outer left breast correlating with the known biopsy-proven malignancy. There is an associated biopsy clip demonstrated measuring 1.5 x 1.5 x 1 cm. Postbiopsy changes were seen at the site of the biopsy of the fibroadenoma. .  No other focal dominant lesions were seen. 9/19/2022. Ultrasound of the head neck soft tissues showed several prominent lymph nodes the largest of which measured 1.4 x 0.7 x 0.4 cm.    9/19/2022. 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 Deep margin had pleomorphic lobular carcinoma in situ present. Clarence nodes were negative for malignancy. The tumor mass measured 1.1 x 1.3 x 1.5 cm. Overall grade was 2, lymphovascular invasion was not identified, lobular carcinoma in situ was present pleomorphic type, all regional lymph nodes were negative for tumor. Once again ER was positive MD was positive Ki-67 was 25% and HER2 was negative. This had previously been called a moderately differentiated ductal carcinoma with lobular features but now is being named invasive pleomorphic lobular carcinoma. 9/27/2022. Patient was seen in the office by Dr. Sahra Mendes in follow-up. She plan for reexcision of the deep margin due to it being positive. This was performed on 9/29/2022 and the margin was cleared. 10/4/2022. Saw Dr. Sahra Mendes in follow-up. She was then referred to medical oncology and radiation oncology.      Comorbidities include kidney stones, renal angiomyolipoma,  previous endometrial ablation, previous hysterectomy, anxiety and depression, COVID in January 2021 degenerative joint disease, previous cervical fusion C4-T1, action tremor. 12/5/2022. Mrs. Tu Stoner turns to the office today during her radiation therapy. She has had testing which shows that she is not postmenopausal.  Various options for treatment were discussed with her today. She has had a previous hysterectomy and had 1 ovary removed. She asks if she can have the other ovary removed. Was told her that that would be a viable option. Her other options are ovarian suppression with Arimidex or tamoxifen. We talked about the various side effects of the usual regimens. She asked questions which were answered to her satisfaction. She was here today with her . She knows that we would like to start her on hormonal manipulation approximately 2 weeks after she completes her course of radiation therapy. INTERVAL NOTE    12/7/2022. CT scan of soft tissues of the neck with contrast showed there is a submandibular glands were normal.  There were small lymph nodes adjacent to the submandibular glands and in the posterior triangles of the neck. There are postoperative changes between C4 and T1.    1/10/2023. Seen in consultation by Dr. Jackelyn Ríos, referred by Comfort Coronado from radiation oncology because of her cervical adenopathy. He documented that she notices the one on the right side more than the 1 on the left. The right one is mildly tender by her report. She says that she usually does not drink very much water and she has been encouraging herself to drink more. She feels that she is probably chronically somewhat dehydrated. She has no history of salivary gland ductal stones. Physical examination was abnormal for the presence of prominent submandibular glands and a single node that was palpable on the right and a vague impression of the node in the same place on the left side. Her anterior cervical fusion scar is well-healed. The right-sided submandibular gland was somewhat tender to palpation.   She was encouraged to use sialagogues such as tart sour candies and increase her fluid intake. Mrs. Robles Pillai says that she has had menopausal symptoms with sweats and hot flashes. She denies fevers, chills, bleeding, nausea or vomiting and has had no constipation or diarrhea. She says that her appetite is good and her weight is stable. MONITORING PARAMETERS    Physical examinations, ultrasound and mammography and imaging studies as indicated by symptomatology. PAST MEDICAL HISTORY  Past Medical History:   Diagnosis Date    Invasive ductal carcinoma of breast, female, left ([facility] [address] 09/07/2022    with lobular features    Kidney stone     2005 with pregnancy    PONV (postoperative nausea and vomiting)     Prolonged emergence from general anesthesia     \"During Hysterectomy- bradycardia- meds to increase heart rate. \"        REVIEW OF SYSTEMS  Review of Systems   Constitutional:  Positive for diaphoresis (some hot flashes) and fatigue. Negative for appetite change, chills and fever. HENT:  Positive for dental problem. Negative for mouth sores, nosebleeds, rhinorrhea and trouble swallowing. Eyes:  Negative for visual disturbance. Respiratory:  Negative for cough and shortness of breath. Cardiovascular:  Negative for palpitations and leg swelling. Gastrointestinal:  Negative for blood in stool, constipation, diarrhea, nausea and vomiting. Genitourinary:  Negative for dysuria, frequency, hematuria, menstrual problem and urgency. Musculoskeletal:  Positive for arthralgias, back pain, myalgias and neck pain. Skin:  Negative for rash and wound. Neurological:  Positive for headaches (occasional). Negative for seizures and numbness. Hematological:  Negative for adenopathy. Does not bruise/bleed easily. Psychiatric/Behavioral:  Positive for dysphoric mood. Negative for self-injury, sleep disturbance and suicidal ideas.     FAMILY HISTORY  Family History   Problem Relation Age of Onset    Breast Cancer Mother 61 BRCA 1 Negative Mother 64    BRCA 2 Negative Mother 64    Bilateral breast cancer Mother 64        opposite side    Heart Disease Father     High Blood Pressure Father     High Cholesterol Father     Diabetes Father     Lung Cancer Father     No Known Problems Sister     No Known Problems Sister     No Known Problems Sister     No Known Problems Brother     No Known Problems Brother     No Known Problems Brother     Leukemia Maternal Grandmother     Colon Cancer Paternal Uncle         age 62s    Lung Cancer Paternal Uncle         age >47    Breast Cancer Paternal Aunt         age >47    Lung Cancer Maternal Aunt         age >47    Asthma Neg Hx         SOCIAL HISTORY  Social History     Socioeconomic History    Marital status:      Spouse name: Symone Dillon    Number of children: 3    Years of education: Not on file    Highest education level: Not on file   Occupational History    Not on file   Tobacco Use    Smoking status: Never    Smokeless tobacco: Never   Vaping Use    Vaping Use: Never used   Substance and Sexual Activity    Alcohol use: No     Alcohol/week: 0.0 standard drinks    Drug use: No    Sexual activity: Yes     Partners: Male   Other Topics Concern    Not on file   Social History Narrative    Not on file     Social Determinants of Health     Financial Resource Strain: Low Risk     Difficulty of Paying Living Expenses: Not hard at all   Food Insecurity: No Food Insecurity    Worried About Running Out of Food in the Last Year: Never true    Ran Out of Food in the Last Year: Never true   Transportation Needs: Not on file   Physical Activity: Not on file   Stress: Not on file   Social Connections: Not on file   Intimate Partner Violence: Not on file   Housing Stability: Not on file        CURRENT MEDICATIONS  Current Outpatient Medications   Medication Sig Dispense Refill    FLUoxetine (PROZAC) 20 MG capsule Take 1 capsule by mouth daily 30 capsule 3    FLUoxetine (PROZAC) 40 MG capsule Take 1 capsule by mouth daily 30 capsule 3    diazePAM (VALIUM) 2 MG tablet Take 2 mg by mouth every 8 hours as needed for Anxiety. ibuprofen (ADVIL;MOTRIN) 400 MG tablet Take 400 mg by mouth every 6 hours as needed for Pain       No current facility-administered medications for this visit. OARRS    PDMP Monitoring:    Last PDMP Sangeetha Reid as Reviewed [facility]):  Review User Review Instant Review Result   Nadya Crimes 10/18/2022 12:06 AM Reviewed PDMP [1]     Last Controlled Substance Monitoring Documentation      [address] Office Visit from 4/6/2022 in [facility]   Periodic Controlled Substance Monitoring Possible medication side effects, risk of tolerance/dependence & alternative treatments discussed., No signs of potential drug abuse or diversion identified. filed at 04/07/2022 0909          Urine Drug Screenings (1 yr)    No resulted procedures found. Medication Contract and Consent for Opioid Use Documents Filed        No documents found                     PHYSICAL EXAM    Physical Exam  Vitals and nursing note reviewed. Exam conducted with a chaperone present. Constitutional:       General: She is not in acute distress. Appearance: Normal appearance. She is normal weight. She is not ill-appearing, toxic-appearing or diaphoretic. HENT:      Head: Normocephalic and atraumatic. Nose: Nose normal.      Mouth/Throat:      Mouth: Mucous membranes are moist.      Pharynx: Oropharynx is clear. No oropharyngeal exudate or posterior oropharyngeal erythema. Eyes:      General: No scleral icterus. Extraocular Movements: Extraocular movements intact. Conjunctiva/sclera: Conjunctivae normal.      Pupils: Pupils are equal, round, and reactive to light. Cardiovascular:      Rate and Rhythm: Normal rate and regular rhythm. Heart sounds: Normal heart sounds. Pulmonary:      Effort: Pulmonary effort is normal. No respiratory distress.       Breath sounds: Normal breath sounds. No stridor. No wheezing, rhonchi or rales. Chest:      Chest wall: No tenderness. Abdominal:      General: Abdomen is flat. There is no distension. Palpations: Abdomen is soft. There is no mass. Tenderness: There is no abdominal tenderness. There is no guarding or rebound. Musculoskeletal:      Cervical back: Normal range of motion. Tenderness present. Right lower leg: No edema. Left lower leg: No edema. Lymphadenopathy:      Cervical: Cervical adenopathy (small tender bilateral upper cervical nodes about 1 cm each) present. Skin:     General: Skin is warm and dry. Coloration: Skin is not jaundiced or pale. Findings: No bruising or lesion. Neurological:      General: No focal deficit present. Mental Status: She is alert and oriented to person, place, and time. Mental status is at baseline. Cranial Nerves: No cranial nerve deficit. Motor: No weakness. Gait: Gait normal.   Psychiatric:         Mood and Affect: Mood normal.         Behavior: Behavior normal.         Thought Content: Thought content normal.         Judgment: Judgment normal.   In the left breast she has a well-healed surgical scar from about 12:00 to 5. She has a well-healed surgical scar in the left axilla of about 2 cm. There is no evidence of recurrent disease. The right breast is normal.    ECOG STATUS    0 : Fully active, able to carry all pre- disease performance without restriction    LABS/IMAGING    No laboratory data was requested. PATHOLOGY/GENETICS    Invasive lobular carcinoma of the left breast, ER positive, LA positive, Ki-67 25% HER2 negative. It was 1.1 x 1.3 x 1.5 cm, grade 2, no lymphovascular invasion and lymph nodes were negative. ASSESSMENT and PLAN    1. Invasive lobular carcinoma of the left breast, ER positive, LA positive, Ki-67 25% HER2 negative. It was 1.1 x 1.3 x 1.5 cm, grade 2, no lymphovascular invasion and lymph nodes were negative. She is now surgically postmenopausal and can consider an aromatase inhibitor. Prescription for Arimidex was sent to her pharmacy. We discussed the benefits and side effects of this medication and encouraged her to take calcium and vitamin D and perform weightbearing exercise aiming for 30 minutes most days of the week. She knows that she may experience arthralgias as well as menopausal symptoms. We discussed changing her over from Prozac to Effexor to help to control her depression as well as her menopausal symptoms. We conferred with pharmacist Karina Mendoza. She is seeing Dr. Didier De Dios in 3 months and she will see us in 6 months or sooner as her symptoms dictate. 2.  She will continue with her current regimen of medication and her current follow-up with her multiple providers for her comorbidities include her history of kidney stones, renal angiomyolipoma, anxiety and depression and degenerative joint disease as well as her cervical adenopathy. She knows that she should call us in the interim if she has any change in her status, questions or concerns.           Constantine Malloy MD 2/23/2023 2:29 PM

## 2023-02-23 NOTE — PATIENT INSTRUCTIONS
Reviewed pathology and medical history. Discussed her recent ovary removal now making her menopausal. Reviewed the side effects from medically induced menopause including hot flushes, vaginal dryness and irritability. Discussed changing over to Effexor to help control symptoms. Instructed her to stop her Prozac and start the Effexor dose daily. Encouraged patient to take Calcium and Vitamin D  2 times daily to help keep her bones strong. Also weight bearing exercises. Reviewed discussion about her aromatase inhibitor. Script for Arimidex sent to pharmacy. Start taking daily.   Return to see MD in 6 months (Seeing Dr. Divina Black in 3 months)

## 2023-03-02 NOTE — PATIENT INSTRUCTIONS
Patient Education        Swollen Lymph Nodes: Care Instructions  Your Care Instructions     Lymph nodes are small, bean-shaped glands throughout the body. They help your body fight germs and infections. Lymph nodes often swell when there is a problem such as an injury, infection, or tumor. · The nodes in your neck, under your chin, or behind your ears may swell when you have a cold or sore throat. · An injury or infection in a leg or foot can make the nodes in your groin swell. · Sometimes medicine can make lymph nodes swell, but this is rare. Treatment depends on what caused your nodes to swell. Usually the nodes return to normal size without a problem. Follow-up care is a key part of your treatment and safety. Be sure to make and go to all appointments, and call your doctor if you are having problems. It's also a good idea to know your test results and keep a list of the medicines you take. How can you care for yourself at home? · Take your medicines exactly as prescribed. Call your doctor if you think you are having a problem with your medicine. · Avoid irritation. ? Do not squeeze or pick at the lump. ? Do not stick a needle in it. · Prevent infection. Do not squeeze, drain, or puncture a painful lump. Doing this can irritate or inflame the lump, push any existing infection deeper into the skin, or cause severe bleeding. · Get extra rest. Slow down just a little from your usual routine. · Drink plenty of fluids. If you have kidney, heart, or liver disease and have to limit fluids, talk with your doctor before you increase the amount of fluids you drink. · Take an over-the-counter pain medicine, such as acetaminophen (Tylenol), ibuprofen (Advil, Motrin), or naproxen (Aleve). Read and follow all instructions on the label. · Do not take two or more pain medicines at the same time unless the doctor told you to. Many pain medicines have acetaminophen, which is Tylenol.  Too much acetaminophen (Tylenol) can be harmful. When should you call for help? Call your doctor now or seek immediate medical care if:    · You have worse symptoms of infection, such as:  ? Increased pain, swelling, warmth, or redness. ? Red streaks leading from the area. ? Pus draining from the area. ? A fever. Watch closely for changes in your health, and be sure to contact your doctor if:    · Your lymph nodes do not get smaller or do not return to normal.     · You do not get better as expected. Where can you learn more? Go to https://SunglasspeFluxeb.Good People. org and sign in to your [employer] account. Enter L405 in the OfficeDrop box to learn more about \"Swollen Lymph Nodes: Care Instructions. \"     If you do not have an account, please click on the \"Sign Up Now\" link. Current as of: July 1, 2021               Content Version: 13.1  © 2006-2021 [employer]. Care instructions adapted under license by [facility] ([facility]). If you have questions about a medical condition or this instruction, always ask your healthcare professional. [place] [address] any warranty or liability for your use of this information. Patient Education        Nausea and Vomiting: Care Instructions  Overview     When you are nauseated, you may feel weak and sweaty and notice a lot of saliva in your mouth. Nausea often leads to vomiting. Most of the time you do not need to worry about nausea and vomiting, but they can be signs of other illnesses. Two common causes of nausea and vomiting are a stomach infection and food poisoning. Nausea and vomiting from a viral stomach infection will usually start to improve within 24 hours. Nausea and vomiting from food poisoning may last from 12 to 48 hours. The doctor has checked you carefully, but problems can develop later. If you notice any problems or new symptoms, get medical treatment right away. Follow-up care is a key part of your treatment and safety.  Be sure to make and go to all appointments, and call your doctor if you are having problems. It's also a good idea to know your test results and keep a list of the medicines you take. How can you care for yourself at home? · To prevent dehydration, drink plenty of fluids. Choose water and other clear liquids until you feel better. If you have kidney, heart, or liver disease and have to limit fluids, talk with your doctor before you increase the amount of fluids you drink. · Rest in bed until you feel better. · When you are able to eat, try clear soups, mild foods, and liquids until all symptoms are gone for 12 to 48 hours. Other good choices include dry toast, crackers, cooked cereal, and gelatin dessert, such as Jell-O. When should you call for help? Call 911 anytime you think you may need emergency care. For example, call if:    · You passed out (lost consciousness). Call your doctor now or seek immediate medical care if:    · You have symptoms of dehydration, such as:  ? Dry eyes and a dry mouth. ? Passing only a little urine. ? Feeling thirstier than usual.     · You have new or worsening belly pain.     · You have a new or higher fever.     · You vomit blood or what looks like coffee grounds. Watch closely for changes in your health, and be sure to contact your doctor if:    · You have ongoing nausea and vomiting.     · Your vomiting is getting worse.     · Your vomiting lasts longer than 2 days.     · You are not getting better as expected. Where can you learn more? Go to https://mLED.Pockethernet. org and sign in to your g4interactive account. Enter 68 [id number] in the [facility] box to learn more about \"Nausea and Vomiting: Care Instructions. \"     If you do not have an account, please click on the \"Sign Up Now\" link. Current as of: July 1, 2021               Content Version: 13.1  © 3279-0264 Healthwise, Incorporated. Care instructions adapted under license by [facility] ([facility]).  If you have questions about a medical condition or this instruction, always ask your healthcare professional. [place] [address] any warranty or liability for your use of this information. Bilobed Flap Text: The defect edges were debeveled with a #15 scalpel blade.  Given the location of the defect and the proximity to free margins a bilobe flap was deemed most appropriate.  Using a sterile surgical marker, an appropriate bilobe flap drawn around the defect.    The area thus outlined was incised deep to adipose tissue with a #15 scalpel blade.  The skin margins were undermined to an appropriate distance in all directions utilizing iris scissors.

## 2023-03-09 ENCOUNTER — SOCIAL WORK (OUTPATIENT)
Dept: INFUSION THERAPY | Age: 43
End: 2023-03-09

## 2023-03-09 NOTE — PROGRESS NOTES
Oncology Social Work    Date: 3/9/2023  Time: 1:53 PM  Name: Missy Izquierdo  MRN: [id number]     Contact Type: Follow-up    Note:   Situation: Follow-up call to introduce this staff as Joceline's new Oncology Social Worker    Background: Riaz Almendarez had been diagnosed with breast cancer in Sept 2022. Assessment[de-identified]  She was assisted by the previous SW to investigate financial assistance from breast cancer agencies. It is not known if she was awarded any assistance at this time. Recommendation: Patient will initiate further follow-up based on need.  provided Riaz Almendarez with my contact information.  will continue to follow up as needed. Riaz Almendarez returned my call at 2:51pm. She shared that she has finished treatment and will be getting her MRI next week. \"It has been a long journey, but I am glad it's over - hopefully\" were her closing words. This staff will remain available for support if needed.      XAVIER Quinn, JM, STEPHIE  Oncology Social Worker      Electronically signed by XAVIER Quinn, STEPHIE ONEAL on 3/9/2023 at 1:53 PM

## 2023-03-10 ENCOUNTER — TELEPHONE (OUTPATIENT)
Dept: ONCOLOGY | Age: 43
End: 2023-03-10

## 2023-03-10 NOTE — TELEPHONE ENCOUNTER
Patient sent a message to Dr Sima Cruz concerning her Effexor. I tried calling her to tell her to increase the amount she's taking, but she didn't answer, and I was unable to leave a voicemail.

## 2023-03-15 ENCOUNTER — HOSPITAL ENCOUNTER (OUTPATIENT)
Dept: MRI IMAGING | Age: 43
Discharge: HOME | End: 2023-03-15
Payer: COMMERCIAL

## 2023-03-15 DIAGNOSIS — Z17.0 MALIGNANT NEOPLASM OF UPPER-OUTER QUADRANT OF LEFT BREAST IN FEMALE, ESTROGEN RECEPTOR POSITIVE (HCC): ICD-10-CM

## 2023-03-15 DIAGNOSIS — C50.412 MALIGNANT NEOPLASM OF UPPER-OUTER QUADRANT OF LEFT BREAST IN FEMALE, ESTROGEN RECEPTOR POSITIVE (HCC): ICD-10-CM

## 2023-03-15 PROCEDURE — C8908 MRI W/O FOL W/CONT, BREAST,: HCPCS

## 2023-03-15 PROCEDURE — A9579 GAD-BASE MR CONTRAST NOS,1ML: HCPCS | Performed by: INTERNAL MEDICINE

## 2023-03-15 PROCEDURE — 6360000004 HC RX CONTRAST MEDICATION: Performed by: INTERNAL MEDICINE

## 2023-03-15 RX ADMIN — GADOTERIDOL 10 ML: 279.3 INJECTION, SOLUTION INTRAVENOUS at 16:16

## 2023-04-06 ENCOUNTER — TELEPHONE (OUTPATIENT)
Dept: ONCOLOGY | Age: 43
End: 2023-04-06

## 2023-04-06 RX ORDER — VENLAFAXINE HYDROCHLORIDE 150 MG/1
150 CAPSULE, EXTENDED RELEASE ORAL DAILY
Qty: 30 CAPSULE | Refills: 1 | Status: SHIPPED | OUTPATIENT
Start: 2023-04-06 | End: 2023-04-07 | Stop reason: DRUGHIGH

## 2023-04-06 NOTE — TELEPHONE ENCOUNTER
Patient said she has been taking 75mg of Effexor for 2 weeks. She is completely out and tried getting a refill and they said she is too early since she has doubled it. Can you send in a new prescription with the new amount?  She's also coming in to see you Tuesday at 245pm.

## 2023-04-06 NOTE — TELEPHONE ENCOUNTER
Patient started taking 75mg of Effexor. You sent in a prescription for 150mg. She said she felt better after increasing from 37.5mg to 75mg. Do you want her to take 150, or just 75?

## 2023-04-07 RX ORDER — VENLAFAXINE HYDROCHLORIDE 75 MG/1
75 CAPSULE, EXTENDED RELEASE ORAL DAILY
Qty: 30 CAPSULE | Refills: 3 | Status: SHIPPED | OUTPATIENT
Start: 2023-04-07

## 2023-04-11 ENCOUNTER — HOSPITAL ENCOUNTER (OUTPATIENT)
Dept: INFUSION THERAPY | Age: 43
Discharge: HOME | End: 2023-04-11
Payer: COMMERCIAL

## 2023-04-11 ENCOUNTER — OFFICE VISIT (OUTPATIENT)
Dept: ONCOLOGY | Age: 43
End: 2023-04-11
Payer: COMMERCIAL

## 2023-04-11 VITALS
WEIGHT: 139.2 LBS | OXYGEN SATURATION: 100 % | TEMPERATURE: 98.3 F | BODY MASS INDEX: 25.62 KG/M2 | HEIGHT: 62 IN | DIASTOLIC BLOOD PRESSURE: 69 MMHG | SYSTOLIC BLOOD PRESSURE: 108 MMHG | RESPIRATION RATE: 16 BRPM | HEART RATE: 84 BPM

## 2023-04-11 VITALS
SYSTOLIC BLOOD PRESSURE: 108 MMHG | HEART RATE: 84 BPM | DIASTOLIC BLOOD PRESSURE: 69 MMHG | TEMPERATURE: 98.3 F | RESPIRATION RATE: 16 BRPM | OXYGEN SATURATION: 100 %

## 2023-04-11 DIAGNOSIS — C50.411 MALIGNANT NEOPLASM OF UPPER-OUTER QUADRANT OF RIGHT BREAST IN FEMALE, ESTROGEN RECEPTOR POSITIVE (HCC): Primary | ICD-10-CM

## 2023-04-11 DIAGNOSIS — Z17.0 MALIGNANT NEOPLASM OF UPPER-OUTER QUADRANT OF RIGHT BREAST IN FEMALE, ESTROGEN RECEPTOR POSITIVE (HCC): Primary | ICD-10-CM

## 2023-04-11 PROCEDURE — G8427 DOCREV CUR MEDS BY ELIG CLIN: HCPCS | Performed by: INTERNAL MEDICINE

## 2023-04-11 PROCEDURE — G8419 CALC BMI OUT NRM PARAM NOF/U: HCPCS | Performed by: INTERNAL MEDICINE

## 2023-04-11 PROCEDURE — 99211 OFF/OP EST MAY X REQ PHY/QHP: CPT

## 2023-04-11 PROCEDURE — 1036F TOBACCO NON-USER: CPT | Performed by: INTERNAL MEDICINE

## 2023-04-11 PROCEDURE — 99214 OFFICE O/P EST MOD 30 MIN: CPT | Performed by: INTERNAL MEDICINE

## 2023-04-26 ENCOUNTER — TELEPHONE (OUTPATIENT)
Dept: ONCOLOGY | Age: 43
End: 2023-04-26

## 2023-04-26 DIAGNOSIS — C50.411 MALIGNANT NEOPLASM OF UPPER-OUTER QUADRANT OF RIGHT BREAST IN FEMALE, ESTROGEN RECEPTOR POSITIVE (HCC): Primary | ICD-10-CM

## 2023-04-26 DIAGNOSIS — F32.9 REACTIVE DEPRESSION: ICD-10-CM

## 2023-04-26 DIAGNOSIS — Z17.0 MALIGNANT NEOPLASM OF UPPER-OUTER QUADRANT OF RIGHT BREAST IN FEMALE, ESTROGEN RECEPTOR POSITIVE (HCC): Primary | ICD-10-CM

## 2023-04-26 RX ORDER — VENLAFAXINE HYDROCHLORIDE 75 MG/1
225 CAPSULE, EXTENDED RELEASE ORAL DAILY
Qty: 90 CAPSULE | Refills: 1 | Status: SHIPPED | OUTPATIENT
Start: 2023-04-26 | End: 2023-05-26

## 2023-04-26 RX ORDER — VENLAFAXINE HYDROCHLORIDE 75 MG/1
225 CAPSULE, EXTENDED RELEASE ORAL DAILY
Qty: 90 CAPSULE | Refills: 1 | Status: CANCELLED | OUTPATIENT
Start: 2023-04-26 | End: 2023-05-26

## 2023-04-26 RX ORDER — VENLAFAXINE HYDROCHLORIDE 75 MG/1
225 CAPSULE, EXTENDED RELEASE ORAL DAILY
Qty: 90 CAPSULE | Refills: 1 | Status: SHIPPED | OUTPATIENT
Start: 2023-04-26 | End: 2023-04-26 | Stop reason: SDUPTHER

## 2023-04-26 NOTE — TELEPHONE ENCOUNTER
Called patient due to messages being confusing. Patient is out of her Effexor 75 mg- so she will need a refill. Pharmacy is out of that medication right now but she is willing to wait ant not send it to a different pharmacy. She is also wondering about with taking it 3 times a day how it would effect her sleep? She said she mentioned this at her last appointment- she switched to taking it just in the morning because of it messing with her sleep. Please advise.

## 2023-05-11 ENCOUNTER — TELEPHONE (OUTPATIENT)
Dept: ONCOLOGY | Age: 43
End: 2023-05-11

## 2023-05-16 ENCOUNTER — TELEPHONE (OUTPATIENT)
Dept: CASE MANAGEMENT | Age: 43
End: 2023-05-16

## 2023-06-01 ENCOUNTER — OFFICE VISIT (OUTPATIENT)
Dept: FAMILY MEDICINE CLINIC | Age: 43
End: 2023-06-01
Payer: COMMERCIAL

## 2023-06-01 VITALS
SYSTOLIC BLOOD PRESSURE: 104 MMHG | OXYGEN SATURATION: 99 % | DIASTOLIC BLOOD PRESSURE: 66 MMHG | WEIGHT: 130 LBS | HEART RATE: 107 BPM | RESPIRATION RATE: 16 BRPM | TEMPERATURE: 98.3 F | BODY MASS INDEX: 23.78 KG/M2

## 2023-06-01 DIAGNOSIS — S43.432D LABRAL TEAR OF SHOULDER, LEFT, SUBSEQUENT ENCOUNTER: ICD-10-CM

## 2023-06-01 DIAGNOSIS — C50.919 MALIGNANT NEOPLASM OF BREAST IN FEMALE, ESTROGEN RECEPTOR POSITIVE, UNSPECIFIED LATERALITY, UNSPECIFIED SITE OF BREAST (HCC): ICD-10-CM

## 2023-06-01 DIAGNOSIS — F43.23 ADJUSTMENT DISORDER WITH MIXED ANXIETY AND DEPRESSED MOOD: ICD-10-CM

## 2023-06-01 DIAGNOSIS — F41.8 SITUATIONAL ANXIETY: ICD-10-CM

## 2023-06-01 DIAGNOSIS — G89.29 CHRONIC RIGHT SHOULDER PAIN: Primary | ICD-10-CM

## 2023-06-01 DIAGNOSIS — Z17.0 MALIGNANT NEOPLASM OF BREAST IN FEMALE, ESTROGEN RECEPTOR POSITIVE, UNSPECIFIED LATERALITY, UNSPECIFIED SITE OF BREAST (HCC): ICD-10-CM

## 2023-06-01 DIAGNOSIS — M25.511 CHRONIC RIGHT SHOULDER PAIN: Primary | ICD-10-CM

## 2023-06-01 PROCEDURE — 1036F TOBACCO NON-USER: CPT | Performed by: NURSE PRACTITIONER

## 2023-06-01 PROCEDURE — 99214 OFFICE O/P EST MOD 30 MIN: CPT | Performed by: NURSE PRACTITIONER

## 2023-06-01 PROCEDURE — G8420 CALC BMI NORM PARAMETERS: HCPCS | Performed by: NURSE PRACTITIONER

## 2023-06-01 PROCEDURE — G8427 DOCREV CUR MEDS BY ELIG CLIN: HCPCS | Performed by: NURSE PRACTITIONER

## 2023-06-01 RX ORDER — DIAZEPAM 2 MG/1
2 TABLET ORAL EVERY 8 HOURS PRN
Qty: 90 TABLET | Refills: 0 | Status: SHIPPED | OUTPATIENT
Start: 2023-06-01 | End: 2023-08-30

## 2023-06-01 RX ORDER — TRAMADOL HYDROCHLORIDE 50 MG/1
50 TABLET ORAL EVERY 6 HOURS PRN
Qty: 90 TABLET | Refills: 0 | Status: SHIPPED | OUTPATIENT
Start: 2023-06-01 | End: 2023-07-01

## 2023-06-01 RX ORDER — LIDOCAINE 50 MG/G
2 PATCH TOPICAL DAILY
Qty: 60 PATCH | Refills: 1 | Status: SHIPPED | OUTPATIENT
Start: 2023-06-01 | End: 2023-07-01

## 2023-06-01 SDOH — ECONOMIC STABILITY: INCOME INSECURITY: HOW HARD IS IT FOR YOU TO PAY FOR THE VERY BASICS LIKE FOOD, HOUSING, MEDICAL CARE, AND HEATING?: NOT HARD AT ALL

## 2023-06-01 SDOH — ECONOMIC STABILITY: HOUSING INSECURITY
IN THE LAST 12 MONTHS, WAS THERE A TIME WHEN YOU DID NOT HAVE A STEADY PLACE TO SLEEP OR SLEPT IN A SHELTER (INCLUDING NOW)?: NO

## 2023-06-01 SDOH — ECONOMIC STABILITY: FOOD INSECURITY: WITHIN THE PAST 12 MONTHS, YOU WORRIED THAT YOUR FOOD WOULD RUN OUT BEFORE YOU GOT MONEY TO BUY MORE.: NEVER TRUE

## 2023-06-01 SDOH — ECONOMIC STABILITY: FOOD INSECURITY: WITHIN THE PAST 12 MONTHS, THE FOOD YOU BOUGHT JUST DIDN'T LAST AND YOU DIDN'T HAVE MONEY TO GET MORE.: NEVER TRUE

## 2023-06-01 ASSESSMENT — PATIENT HEALTH QUESTIONNAIRE - PHQ9
10. IF YOU CHECKED OFF ANY PROBLEMS, HOW DIFFICULT HAVE THESE PROBLEMS MADE IT FOR YOU TO DO YOUR WORK, TAKE CARE OF THINGS AT HOME, OR GET ALONG WITH OTHER PEOPLE: 0
9. THOUGHTS THAT YOU WOULD BE BETTER OFF DEAD, OR OF HURTING YOURSELF: 0
7. TROUBLE CONCENTRATING ON THINGS, SUCH AS READING THE NEWSPAPER OR WATCHING TELEVISION: 0
1. LITTLE INTEREST OR PLEASURE IN DOING THINGS: 2
3. TROUBLE FALLING OR STAYING ASLEEP: 3
5. POOR APPETITE OR OVEREATING: 0
6. FEELING BAD ABOUT YOURSELF - OR THAT YOU ARE A FAILURE OR HAVE LET YOURSELF OR YOUR FAMILY DOWN: 0
SUM OF ALL RESPONSES TO PHQ QUESTIONS 1-9: 6
2. FEELING DOWN, DEPRESSED OR HOPELESS: 0
8. MOVING OR SPEAKING SO SLOWLY THAT OTHER PEOPLE COULD HAVE NOTICED. OR THE OPPOSITE, BEING SO FIGETY OR RESTLESS THAT YOU HAVE BEEN MOVING AROUND A LOT MORE THAN USUAL: 0
SUM OF ALL RESPONSES TO PHQ9 QUESTIONS 1 & 2: 2
4. FEELING TIRED OR HAVING LITTLE ENERGY: 1

## 2023-06-02 ENCOUNTER — TELEPHONE (OUTPATIENT)
Dept: CASE MANAGEMENT | Age: 43
End: 2023-06-02

## 2023-06-02 ASSESSMENT — ENCOUNTER SYMPTOMS
CHEST TIGHTNESS: 0
SHORTNESS OF BREATH: 0
COUGH: 0
ABDOMINAL PAIN: 0
WHEEZING: 0
ABDOMINAL DISTENTION: 0
BACK PAIN: 0

## 2023-06-02 NOTE — TELEPHONE ENCOUNTER
Name: Angel Graham  : 1980  MRN: [id number]    Oncology Navigation Follow-Up Note    Contact Type:  Telephone    Notes: Patient missed survivor visit on 23 and was not interested in rescheduling. Called patient to discuss survivorship care plan. Reviewed importance of self breast exams. When discussing the tolerance of Arimidex, pt revealed that she has not taken any. She has the prescription filled but never started the meds due to fear of side effects. Arimidex was ordered 23. Discussed importance of taking AI to decrease risk of recurrence. Discussed potential side effects and reminded her we are here to help support her through treatment, etc. Patient agreed to come in to talk to Dr. Isabelle Ruiz further. Appointment scheduled for 23. Will further discuss SCP on next appointment.     Electronically signed by Renie Fabry, RN on 2023 at 1:34 PM

## 2023-06-02 NOTE — PROGRESS NOTES
[address]  [place] [address] [address]  Dept: [phone]  Dept Fax: [phone]  Loc: [phone]  PROGRESS NOTE      VisitDate: 6/1/2023    Rocio Salamanca is a 44 y.o. female who presents today for:     Chief Complaint   Patient presents with    Shoulder Pain     Unable to get into shoulder dr until end of the month, req something for pain         Subjective:  Patient presents with complaint of continued right shoulder pain posterior aspect. Has appointment with Ortho scheduled the end of June. Requesting pain medication if possible. Patient has known labral tear. History of the invasive ductal carcinoma left breast, kidney stones, anxiety depression. Patient requesting refill of her Valium. Mood stable. Review of Systems   Constitutional:  Negative for activity change, appetite change, chills, fatigue and fever. Eyes:  Negative for visual disturbance. Respiratory:  Negative for cough, chest tightness, shortness of breath and wheezing. Cardiovascular:  Negative for chest pain, palpitations and leg swelling. Gastrointestinal:  Negative for abdominal distention and abdominal pain. Genitourinary:  Negative for dysuria. Musculoskeletal:  Negative for arthralgias, back pain and neck pain. Skin: Negative. Negative for rash. Neurological:  Negative for dizziness, light-headedness and headaches. Hematological:  Negative for adenopathy. Psychiatric/Behavioral:  Positive for decreased concentration and dysphoric mood. The patient is nervous/anxious. All other systems reviewed and are negative.   Past Medical History:   Diagnosis Date    Invasive ductal carcinoma of breast, female, left ([address] 09/07/2022    with lobular features    Kidney stone     2005 with pregnancy    PONV (postoperative nausea and vomiting)     Prolonged emergence from general anesthesia     \"During Hysterectomy- bradycardia- meds to increase

## 2023-06-14 ENCOUNTER — HOSPITAL ENCOUNTER (EMERGENCY)
Age: 43
Discharge: HOME | End: 2023-06-14
Payer: COMMERCIAL

## 2023-06-14 VITALS
RESPIRATION RATE: 15 BRPM | SYSTOLIC BLOOD PRESSURE: 107 MMHG | TEMPERATURE: 99 F | DIASTOLIC BLOOD PRESSURE: 67 MMHG | OXYGEN SATURATION: 98 % | HEART RATE: 111 BPM

## 2023-06-14 DIAGNOSIS — N30.01 ACUTE CYSTITIS WITH HEMATURIA: Primary | ICD-10-CM

## 2023-06-14 DIAGNOSIS — K52.9 GASTROENTERITIS: ICD-10-CM

## 2023-06-14 LAB
BILIRUB UR STRIP.AUTO-MCNC: NEGATIVE MG/DL
CHARACTER UR: CLEAR
COLOR: YELLOW
GLUCOSE UR QL STRIP.AUTO: NEGATIVE MG/DL
KETONES UR QL STRIP.AUTO: ABNORMAL
NITRITE UR QL STRIP.AUTO: NEGATIVE
PH UR STRIP.AUTO: 7 [PH] (ref 5–9)
PROT UR STRIP.AUTO-MCNC: ABNORMAL MG/DL
RBC #/AREA URNS HPF: ABNORMAL /[HPF]
SP GR UR STRIP.AUTO: 1.01 (ref 1–1.03)
UROBILINOGEN, URINE: 2 EU/DL (ref 0.2–1)
WBC #/AREA URNS HPF: ABNORMAL /[HPF]

## 2023-06-14 PROCEDURE — 87086 URINE CULTURE/COLONY COUNT: CPT

## 2023-06-14 PROCEDURE — 99213 OFFICE O/P EST LOW 20 MIN: CPT

## 2023-06-14 PROCEDURE — 81003 URINALYSIS AUTO W/O SCOPE: CPT

## 2023-06-14 RX ORDER — ONDANSETRON 4 MG/1
4 TABLET, ORALLY DISINTEGRATING ORAL EVERY 8 HOURS PRN
Qty: 24 TABLET | Refills: 0 | Status: SHIPPED | OUTPATIENT
Start: 2023-06-14

## 2023-06-14 RX ORDER — CIPROFLOXACIN 500 MG/1
500 TABLET, FILM COATED ORAL 2 TIMES DAILY
Qty: 14 TABLET | Refills: 0 | Status: SHIPPED | OUTPATIENT
Start: 2023-06-14 | End: 2023-06-21

## 2023-06-14 RX ORDER — PHENAZOPYRIDINE HYDROCHLORIDE 100 MG/1
100 TABLET, FILM COATED ORAL 3 TIMES DAILY PRN
Qty: 9 TABLET | Refills: 0 | Status: SHIPPED | OUTPATIENT
Start: 2023-06-14 | End: 2023-06-17

## 2023-06-14 ASSESSMENT — ENCOUNTER SYMPTOMS
VOMITING: 1
SORE THROAT: 0
ABDOMINAL PAIN: 1
NAUSEA: 1
DIARRHEA: 1

## 2023-06-14 ASSESSMENT — PAIN - FUNCTIONAL ASSESSMENT: PAIN_FUNCTIONAL_ASSESSMENT: NONE - DENIES PAIN

## 2023-06-14 NOTE — DISCHARGE INSTRUCTIONS
Tylenol and Motrin  Benadryl or melatonin  Cipro - antibiotic  Zofran - nausea  Pyridium - for bladder pain   Probiotic daily  Icard diet  Drink lots of fluids

## 2023-06-14 NOTE — ED TRIAGE NOTES
Pt arrival to urgent care with complaint of urinary burning and frequency, nauseous, fever and weakness. Patient states she recently started her cancer medication on Monday. Patient states her daughter had a GI bug over the weekend. Patient is also having fatigue and body aches. Patient is alert and oriented and breathing with ease. Vitals as documented.

## 2023-06-14 NOTE — ED NOTES
Patient in stable condition. Alert and oriented x3. Unlabored breathing present. Patient aware of plan of care. Patient discharge instructions given and explained. Follow up information instructions given. AVS reviewed. Patient agreeable to plan of care. Patient states understanding and denies any questions or concerns. Patient ambulated out of urgent care with no complications.         [place]  06/14/23 1077

## 2023-06-14 NOTE — DISCHARGE INSTR - COC
Continuity of Care Form    Patient Name: Angel Graham   :  1980  MRN:  [id number]    Admit date:  2023  Discharge date:  ***    Code Status Order: Prior   Advance Directives:     Admitting Physician:  No admitting provider for patient encounter.   PCP: Travis Morse MD    Discharging Nurse: [facility] Unit/Room#:   Discharging Unit Phone Number: ***    Emergency Contact:   Extended Emergency Contact Information  Primary Emergency Contact: Murphy Villegas [address] Phone: [phone]  Relation: Spouse  Secondary Emergency Contact: Amena Conde  Address: [address] [place] [address] Road           [address] [place] [address] Phone: [phone]  Relation: Parent    Past Surgical History:  Past Surgical History:   Procedure Laterality Date    BREAST LUMPECTOMY Left 2022    LEFT BREAST LUMPECTOMY WITH SENTINELYMPH NODE BX performed by Jeni Hassan MD at [facility] LUMPECTOMY Left 2022    RE-EXCISION LEFT BREAST DEEP MARGINS performed [facility] Boy[address] Wire[address] [facility] 2021    ACDF C4-C7-T1 performed by Juvenal Pablo MD at [place] [address]  2016    Dr. Jodeane Dakins N/A 2017    DILATATION AND CURETTAGE HYSTEROSCOPY, POSSIBLE MYOSURE performed by Cyndie Jaquez DO at [place] [address]  2018    [address]    umbilical hernia    HYSTERECTOMY (CERVIX STATUS UNKNOWN)  2019    partial    HYSTERECTOMY ABDOMINAL N/A 2019    ROBOTIC TOTAL LAPAROSCOPIC HYSTERECTOMY WITH BILATERAL SALPINGECTOMY performed by Cyndie Jaquez DO at [address] [facility]  2018    [address] LEFT Left 2022    MARGUERITE [address] LEFT 2022 Ervin Ortez MD [facility]      one ovary remains    OVARY REMOVAL Bilateral     UT HYSTEROSCOPY ENDOMETRIAL ABLATION N/A 2018    HYSTEROSCOPY

## 2023-06-14 NOTE — ED PROVIDER NOTES
[place]  Urgent Care Encounter       CHIEF COMPLAINT       Chief Complaint   Patient presents with    Urinary Frequency    Nausea    Fatigue       Nurses Notes reviewed and I agree except as noted in the HPI. HISTORY OF PRESENT ILLNESS   Belle Aguilar is a 44 y.o. female who presents with complaints of dysuria, frequency, and urgency for the last 2 days. Patient also reports nausea, vomiting, and diarrhea. Patient reports that there is a GI bug going around her house at this time. Patient reports that all she has tried is Motrin. Patient reports that her diarrhea did stop after she was able to hold down applesauce. The history is provided by the patient. REVIEW OF SYSTEMS     Review of Systems   Constitutional:  Positive for fatigue. Negative for fever. HENT:  Negative for congestion, ear pain and sore throat. Gastrointestinal:  Positive for abdominal pain, diarrhea, nausea and vomiting. Genitourinary:  Positive for dysuria, flank pain, frequency and urgency. Negative for decreased urine volume, genital sores, hematuria, vaginal discharge and vaginal pain. Musculoskeletal:  Positive for myalgias. Neurological:  Negative for headaches. All other systems reviewed and are negative. PAST MEDICAL HISTORY         Diagnosis Date    Invasive ductal carcinoma of breast, female, left ([facility] 09/07/2022    with lobular features    Kidney stone     2005 with pregnancy    PONV (postoperative nausea and vomiting)     Prolonged emergence from general anesthesia     \"During Hysterectomy- bradycardia- meds to increase heart rate. \"       SURGICALHISTORY     Patient  has a past surgical history that includes hernia repair (1998); Tubal ligation; Colonoscopy (03/25/2016); Dilation and curettage of uterus (N/A, 09/13/2017); hysteroscopy (04/18/2018); Endometrial ablation (04/18/2018); pr hysteroscopy endometrial ablation (N/A, 04/18/2018);  HYSTERECTOMY ABDOMINAL (N/A, 03/13/2019);

## 2023-06-15 LAB
BACTERIA UR CULT: ABNORMAL
ORGANISM: ABNORMAL

## 2023-06-26 DIAGNOSIS — F32.9 REACTIVE DEPRESSION: ICD-10-CM

## 2023-06-27 RX ORDER — VENLAFAXINE HYDROCHLORIDE 75 MG/1
225 CAPSULE, EXTENDED RELEASE ORAL DAILY
Qty: 90 CAPSULE | Refills: 1 | Status: SHIPPED | OUTPATIENT
Start: 2023-06-27 | End: 2023-07-27

## 2023-07-11 DIAGNOSIS — F32.9 REACTIVE DEPRESSION: ICD-10-CM

## 2023-07-12 RX ORDER — VENLAFAXINE HYDROCHLORIDE 75 MG/1
225 CAPSULE, EXTENDED RELEASE ORAL DAILY
Qty: 270 CAPSULE | Refills: 1 | Status: SHIPPED | OUTPATIENT
Start: 2023-07-12

## 2023-07-19 ENCOUNTER — OFFICE VISIT (OUTPATIENT)
Dept: ONCOLOGY | Age: 43
End: 2023-07-19
Payer: COMMERCIAL

## 2023-07-19 ENCOUNTER — HOSPITAL ENCOUNTER (OUTPATIENT)
Dept: INFUSION THERAPY | Age: 43
Discharge: HOME | End: 2023-07-19
Payer: COMMERCIAL

## 2023-07-19 VITALS
BODY MASS INDEX: 23.41 KG/M2 | SYSTOLIC BLOOD PRESSURE: 107 MMHG | WEIGHT: 127.2 LBS | TEMPERATURE: 98.2 F | DIASTOLIC BLOOD PRESSURE: 67 MMHG | HEIGHT: 62 IN | HEART RATE: 85 BPM | RESPIRATION RATE: 16 BRPM | OXYGEN SATURATION: 99 %

## 2023-07-19 DIAGNOSIS — Z17.0 MALIGNANT NEOPLASM OF UPPER-OUTER QUADRANT OF RIGHT BREAST IN FEMALE, ESTROGEN RECEPTOR POSITIVE (HCC): Primary | ICD-10-CM

## 2023-07-19 DIAGNOSIS — C50.411 MALIGNANT NEOPLASM OF UPPER-OUTER QUADRANT OF RIGHT BREAST IN FEMALE, ESTROGEN RECEPTOR POSITIVE (HCC): Primary | ICD-10-CM

## 2023-07-19 PROCEDURE — G8427 DOCREV CUR MEDS BY ELIG CLIN: HCPCS | Performed by: INTERNAL MEDICINE

## 2023-07-19 PROCEDURE — 99214 OFFICE O/P EST MOD 30 MIN: CPT | Performed by: INTERNAL MEDICINE

## 2023-07-19 PROCEDURE — 1036F TOBACCO NON-USER: CPT | Performed by: INTERNAL MEDICINE

## 2023-07-19 PROCEDURE — G8420 CALC BMI NORM PARAMETERS: HCPCS | Performed by: INTERNAL MEDICINE

## 2023-07-19 PROCEDURE — 99211 OFF/OP EST MAY X REQ PHY/QHP: CPT

## 2023-07-19 RX ORDER — GABAPENTIN 100 MG/1
CAPSULE ORAL
COMMUNITY
Start: 2023-06-30

## 2023-07-19 NOTE — PROGRESS NOTES
scleral icterus. Extraocular Movements: Extraocular movements intact. Conjunctiva/sclera: Conjunctivae normal.      Pupils: Pupils are equal, round, and reactive to light. Cardiovascular:      Rate and Rhythm: Normal rate and regular rhythm. Heart sounds: Normal heart sounds. Pulmonary:      Effort: Pulmonary effort is normal. No respiratory distress. Breath sounds: No wheezing or rales. Chest:      Comments: Left breast in upper out quadrant has a well healed surgical scar with some fullness and some \"Lumpy, bumpy breast\"  Right breast is normal.  Abdominal:      General: Abdomen is flat. There is no distension. Palpations: Abdomen is soft. There is no mass. Tenderness: There is no abdominal tenderness. There is no guarding or rebound. Musculoskeletal:      Cervical back: Normal range of motion and neck supple. No rigidity. Right lower leg: No edema. Left lower leg: No edema. Lymphadenopathy:      Cervical: No cervical adenopathy. Skin:     General: Skin is warm and dry. Coloration: Skin is not jaundiced or pale. Neurological:      General: No focal deficit present. Mental Status: She is alert and oriented to person, place, and time. Cranial Nerves: No cranial nerve deficit. Motor: No weakness. Gait: Gait normal.   Psychiatric:         Mood and Affect: Mood normal.         Behavior: Behavior normal.         Thought Content: Thought content normal.         Judgment: Judgment normal.        ECOG STATUS    0 : Fully active, able to carry all pre- disease performance without restriction    LABS/IMAGING    She has not had any recent lab data        PATHOLOGY/GENETICS    Moderately differentiated invasive ductal carcinoma with lobular features, grade 2, ER +100% strong, ME +100% strong, Ki-67 25%, HER2 negative node-negative. No significant mutations identified. ASSESSMENT and PLAN    1.   Moderately differentiated invasive ductal

## 2023-07-19 NOTE — PATIENT INSTRUCTIONS
Reviewed labs and recent medical history. Discussed her Arimidex and the side effects. Encouraged her to use Evening Primrose oil for the hot flashes. Also she can use glucosamine Chondroitin for her joint aches and pain. Encouraged her to continue to use daily as directed. Discussed the lymph node on her left neck area and she will call and reschedule her CT Scan of her neck. She is due for a mammogram at the end of August. Order placed. Encouraged her to use Calcium and vitamin D (Caltrate-D) 2 times daily to help keep your bones strong. Return to see MD in 3 months.

## 2023-08-09 ENCOUNTER — TELEPHONE (OUTPATIENT)
Dept: ENT CLINIC | Age: 43
End: 2023-08-09

## 2023-08-09 NOTE — TELEPHONE ENCOUNTER
I received a call from Radiology who said that the order for the CTA Neck is not valid and that is usually only used when they are looking at the carotid arteries.  They would like for the CTA Neck be changed to CT Neck Soft Tissue W W/O Contrast. Can you send a new order please

## 2023-08-10 NOTE — TELEPHONE ENCOUNTER
Yes, this should be CT with and without contrast not a CTA. She is actually lost to follow-up. May be worth giving her a call and significant venous change to why she did not follow-up with us in the event that she has changed care to a different practice. If she just forgot to have this study and somehow it was prompted again, then we can proceed as planned with a follow-up visit.   Thank you  PFC

## 2023-08-10 NOTE — TELEPHONE ENCOUNTER
I called radiology and they have not received a new order.  Can we send the order for CT Neck Sot Tissue W WO Contrast please

## 2023-08-14 ENCOUNTER — HOSPITAL ENCOUNTER (EMERGENCY)
Age: 43
Discharge: HOME | End: 2023-08-14
Payer: COMMERCIAL

## 2023-08-14 VITALS
RESPIRATION RATE: 16 BRPM | DIASTOLIC BLOOD PRESSURE: 77 MMHG | BODY MASS INDEX: 23.05 KG/M2 | TEMPERATURE: 97.3 F | SYSTOLIC BLOOD PRESSURE: 118 MMHG | HEART RATE: 92 BPM | OXYGEN SATURATION: 97 % | WEIGHT: 126 LBS

## 2023-08-14 DIAGNOSIS — N39.0 URINARY TRACT INFECTION WITHOUT HEMATURIA, SITE UNSPECIFIED: Primary | ICD-10-CM

## 2023-08-14 LAB
BILIRUB UR STRIP.AUTO-MCNC: NEGATIVE MG/DL
CHARACTER UR: CLEAR
COLOR: YELLOW
GLUCOSE UR QL STRIP.AUTO: NEGATIVE MG/DL
KETONES UR QL STRIP.AUTO: NEGATIVE
NITRITE UR QL STRIP.AUTO: NEGATIVE
PH UR STRIP.AUTO: 6 [PH] (ref 5–9)
PROT UR STRIP.AUTO-MCNC: NEGATIVE MG/DL
RBC #/AREA URNS HPF: NEGATIVE /[HPF]
SP GR UR STRIP.AUTO: 1.01 (ref 1–1.03)
UROBILINOGEN, URINE: 0.2 EU/DL (ref 0.2–1)
WBC #/AREA URNS HPF: ABNORMAL /[HPF]

## 2023-08-14 PROCEDURE — 81003 URINALYSIS AUTO W/O SCOPE: CPT

## 2023-08-14 PROCEDURE — 99213 OFFICE O/P EST LOW 20 MIN: CPT

## 2023-08-14 PROCEDURE — 99213 OFFICE O/P EST LOW 20 MIN: CPT | Performed by: NURSE PRACTITIONER

## 2023-08-14 PROCEDURE — 87086 URINE CULTURE/COLONY COUNT: CPT

## 2023-08-14 RX ORDER — SULFAMETHOXAZOLE AND TRIMETHOPRIM 800; 160 MG/1; MG/1
1 TABLET ORAL 2 TIMES DAILY
Qty: 6 TABLET | Refills: 0 | Status: SHIPPED | OUTPATIENT
Start: 2023-08-14 | End: 2023-08-17

## 2023-08-14 RX ORDER — PHENAZOPYRIDINE HYDROCHLORIDE 100 MG/1
100 TABLET, FILM COATED ORAL 3 TIMES DAILY PRN
Qty: 6 TABLET | Refills: 0 | Status: SHIPPED | OUTPATIENT
Start: 2023-08-14 | End: 2023-08-17

## 2023-08-14 RX ORDER — IBUPROFEN 800 MG/1
800 TABLET ORAL EVERY 8 HOURS PRN
Qty: 30 TABLET | Refills: 0 | Status: SHIPPED | OUTPATIENT
Start: 2023-08-14 | End: 2023-08-24

## 2023-08-14 ASSESSMENT — ENCOUNTER SYMPTOMS
BACK PAIN: 1
SORE THROAT: 0
COUGH: 0
ABDOMINAL PAIN: 0
EYE REDNESS: 0
DIARRHEA: 0
EYE DISCHARGE: 0
TROUBLE SWALLOWING: 0
SHORTNESS OF BREATH: 0
NAUSEA: 0
VOMITING: 0
RHINORRHEA: 0

## 2023-08-14 ASSESSMENT — PAIN DESCRIPTION - PAIN TYPE: TYPE: ACUTE PAIN

## 2023-08-14 ASSESSMENT — PAIN DESCRIPTION - FREQUENCY: FREQUENCY: CONTINUOUS

## 2023-08-14 ASSESSMENT — PAIN SCALES - GENERAL: PAINLEVEL_OUTOF10: 6

## 2023-08-14 ASSESSMENT — PAIN DESCRIPTION - DESCRIPTORS: DESCRIPTORS: ACHING

## 2023-08-14 ASSESSMENT — PAIN DESCRIPTION - LOCATION: LOCATION: BACK

## 2023-08-14 ASSESSMENT — PAIN - FUNCTIONAL ASSESSMENT
PAIN_FUNCTIONAL_ASSESSMENT: 0-10
PAIN_FUNCTIONAL_ASSESSMENT: PREVENTS OR INTERFERES SOME ACTIVE ACTIVITIES AND ADLS

## 2023-08-14 ASSESSMENT — PAIN DESCRIPTION - ONSET: ONSET: PROGRESSIVE

## 2023-08-14 ASSESSMENT — PAIN DESCRIPTION - ORIENTATION: ORIENTATION: RIGHT;LOWER

## 2023-08-14 NOTE — ED PROVIDER NOTES
mouth 2 times daily for 3 days     Current Discharge Medication List        CONTINUE these medications which have CHANGED    Details   ibuprofen (ADVIL;MOTRIN) 800 MG tablet Take 1 tablet by mouth every 8 hours as needed for Pain or Fever  Qty: 30 tablet, Refills: 0             GERALDINE Ge CNP, APRN - CNP  08/14/23 9863

## 2023-08-14 NOTE — ED TRIAGE NOTES
Pt to room 1 with her  and c/o low right sided back pain  and possible urinary frequency starting on Friday.  She reports taking Tramadol for the pain at home

## 2023-08-14 NOTE — DISCHARGE INSTRUCTIONS
Take all antibiotics as prescribed. Increase water intake and avoid triggers such as caffeine, red sauce, spicy foods. Seek immediate Emergency medical treatment for increasing pain, fever greater than 101.5, increased blood in the urine, or inability to urinate with the pain or pressure. Ensure adequate hydration. May take Tylenol as needed for pain.

## 2023-08-16 LAB
BACTERIA UR CULT: ABNORMAL
ORGANISM: ABNORMAL

## 2023-08-29 ENCOUNTER — PATIENT MESSAGE (OUTPATIENT)
Dept: ONCOLOGY | Age: 43
End: 2023-08-29

## 2023-08-29 DIAGNOSIS — F32.9 REACTIVE DEPRESSION: ICD-10-CM

## 2023-08-29 RX ORDER — VENLAFAXINE HYDROCHLORIDE 75 MG/1
225 CAPSULE, EXTENDED RELEASE ORAL DAILY
Qty: 270 CAPSULE | Refills: 1 | OUTPATIENT
Start: 2023-08-29

## 2023-08-30 ENCOUNTER — HOSPITAL ENCOUNTER (OUTPATIENT)
Dept: WOMENS IMAGING | Age: 43
Discharge: HOME | End: 2023-08-30
Attending: INTERNAL MEDICINE
Payer: COMMERCIAL

## 2023-08-30 VITALS — BODY MASS INDEX: 23.19 KG/M2 | HEIGHT: 62 IN | WEIGHT: 126 LBS

## 2023-08-30 DIAGNOSIS — Z12.31 VISIT FOR SCREENING MAMMOGRAM: ICD-10-CM

## 2023-08-30 DIAGNOSIS — Z17.0 MALIGNANT NEOPLASM OF UPPER-OUTER QUADRANT OF RIGHT BREAST IN FEMALE, ESTROGEN RECEPTOR POSITIVE (HCC): ICD-10-CM

## 2023-08-30 DIAGNOSIS — C50.411 MALIGNANT NEOPLASM OF UPPER-OUTER QUADRANT OF RIGHT BREAST IN FEMALE, ESTROGEN RECEPTOR POSITIVE (HCC): ICD-10-CM

## 2023-08-30 PROCEDURE — 77063 BREAST TOMOSYNTHESIS BI: CPT

## 2023-09-06 ENCOUNTER — OFFICE VISIT (OUTPATIENT)
Dept: FAMILY MEDICINE CLINIC | Age: 43
End: 2023-09-06
Payer: COMMERCIAL

## 2023-09-06 VITALS
RESPIRATION RATE: 114 BRPM | WEIGHT: 123 LBS | SYSTOLIC BLOOD PRESSURE: 90 MMHG | OXYGEN SATURATION: 98 % | HEIGHT: 63 IN | BODY MASS INDEX: 21.79 KG/M2 | HEART RATE: 80 BPM | DIASTOLIC BLOOD PRESSURE: 64 MMHG

## 2023-09-06 DIAGNOSIS — K21.9 GASTROESOPHAGEAL REFLUX DISEASE, UNSPECIFIED WHETHER ESOPHAGITIS PRESENT: ICD-10-CM

## 2023-09-06 DIAGNOSIS — Z00.00 ENCOUNTER FOR WELL ADULT EXAM WITHOUT ABNORMAL FINDINGS: Primary | ICD-10-CM

## 2023-09-06 DIAGNOSIS — C50.919 MALIGNANT NEOPLASM OF BREAST IN FEMALE, ESTROGEN RECEPTOR POSITIVE, UNSPECIFIED LATERALITY, UNSPECIFIED SITE OF BREAST (HCC): ICD-10-CM

## 2023-09-06 DIAGNOSIS — Z17.0 MALIGNANT NEOPLASM OF BREAST IN FEMALE, ESTROGEN RECEPTOR POSITIVE, UNSPECIFIED LATERALITY, UNSPECIFIED SITE OF BREAST (HCC): ICD-10-CM

## 2023-09-06 DIAGNOSIS — F41.8 SITUATIONAL ANXIETY: ICD-10-CM

## 2023-09-06 PROCEDURE — 99396 PREV VISIT EST AGE 40-64: CPT | Performed by: NURSE PRACTITIONER

## 2023-09-07 ENCOUNTER — NURSE ONLY (OUTPATIENT)
Dept: LAB | Age: 43
End: 2023-09-07

## 2023-09-07 DIAGNOSIS — Z00.00 ENCOUNTER FOR WELL ADULT EXAM WITHOUT ABNORMAL FINDINGS: ICD-10-CM

## 2023-09-07 LAB
ALBUMIN SERPL BCG-MCNC: 4.6 G/DL (ref 3.5–5.1)
ALP SERPL-CCNC: 71 U/L (ref 38–126)
ALT SERPL W/O P-5'-P-CCNC: 8 U/L (ref 11–66)
ANION GAP SERPL CALC-SCNC: 11 MEQ/L (ref 8–16)
AST SERPL-CCNC: 18 U/L (ref 5–40)
BASOPHILS ABSOLUTE: 0 THOU/MM3 (ref 0–0.1)
BASOPHILS NFR BLD AUTO: 0.3 %
BILIRUB SERPL-MCNC: 0.4 MG/DL (ref 0.3–1.2)
BUN SERPL-MCNC: 10 MG/DL (ref 7–22)
CALCIUM SERPL-MCNC: 10 MG/DL (ref 8.5–10.5)
CHLORIDE SERPL-SCNC: 105 MEQ/L (ref 98–111)
CHOLEST SERPL-MCNC: 212 MG/DL (ref 100–199)
CO2 SERPL-SCNC: 28 MEQ/L (ref 23–33)
CREAT SERPL-MCNC: 0.7 MG/DL (ref 0.4–1.2)
DEPRECATED RDW RBC AUTO: 39.3 FL (ref 35–45)
EOSINOPHIL NFR BLD AUTO: 0 %
EOSINOPHILS ABSOLUTE: 0 THOU/MM3 (ref 0–0.4)
ERYTHROCYTE [DISTWIDTH] IN BLOOD BY AUTOMATED COUNT: 12.2 % (ref 11.5–14.5)
GFR SERPL CREATININE-BSD FRML MDRD: > 60 ML/MIN/1.73M2
GLUCOSE SERPL-MCNC: 85 MG/DL (ref 70–108)
HCT VFR BLD AUTO: 40.1 % (ref 37–47)
HDLC SERPL-MCNC: 87 MG/DL
HGB BLD-MCNC: 13 GM/DL (ref 12–16)
IMM GRANULOCYTES # BLD AUTO: 0 THOU/MM3 (ref 0–0.07)
IMM GRANULOCYTES NFR BLD AUTO: 0 %
LDLC SERPL CALC-MCNC: 108 MG/DL
LYMPHOCYTES ABSOLUTE: 1.2 THOU/MM3 (ref 1–4.8)
LYMPHOCYTES NFR BLD AUTO: 41.5 %
MCH RBC QN AUTO: 28.4 PG (ref 26–33)
MCHC RBC AUTO-ENTMCNC: 32.4 GM/DL (ref 32.2–35.5)
MCV RBC AUTO: 87.7 FL (ref 81–99)
MONOCYTES ABSOLUTE: 0.3 THOU/MM3 (ref 0.4–1.3)
MONOCYTES NFR BLD AUTO: 9.2 %
NEUTROPHILS NFR BLD AUTO: 49 %
NRBC BLD AUTO-RTO: 0 /100 WBC
PLATELET # BLD AUTO: 251 THOU/MM3 (ref 130–400)
PMV BLD AUTO: 10.5 FL (ref 9.4–12.4)
POTASSIUM SERPL-SCNC: 4.3 MEQ/L (ref 3.5–5.2)
PROT SERPL-MCNC: 6.8 G/DL (ref 6.1–8)
RBC # BLD AUTO: 4.57 MILL/MM3 (ref 4.2–5.4)
SEGMENTED NEUTROPHILS ABSOLUTE COUNT: 1.4 THOU/MM3 (ref 1.8–7.7)
SODIUM SERPL-SCNC: 144 MEQ/L (ref 135–145)
TRIGL SERPL-MCNC: 87 MG/DL (ref 0–199)
WBC # BLD AUTO: 2.9 THOU/MM3 (ref 4.8–10.8)

## 2023-09-07 ASSESSMENT — ENCOUNTER SYMPTOMS
ABDOMINAL PAIN: 0
SHORTNESS OF BREATH: 0
COUGH: 0
ABDOMINAL DISTENTION: 0
CHEST TIGHTNESS: 0
WHEEZING: 0
BACK PAIN: 0

## 2023-09-07 NOTE — PROGRESS NOTES
[address]  MEDICINE  [address] [place] [address]  Dept: [phone]  Dept Fax: [phone]  Loc: [phone]  PROGRESS NOTE      VisitDate: 9/6/2023    Monisha Lorenzo is a 44 y.o. female who presents today for:     Chief Complaint   Patient presents with    Annual Exam     Wellness visit for insurance. No forms to fill out. Subjective:  Presents for annual wellness examination. History of breast cancer radiation therapy kidney stones. Patient without complaint. Denies any fever illness fatigue headaches dizziness syncope chest pain shortness of breath abdominal pain numbness rash or edema. Review of Systems   Constitutional:  Negative for activity change, appetite change, chills, fatigue and fever. Eyes:  Negative for visual disturbance. Respiratory:  Negative for cough, chest tightness, shortness of breath and wheezing. Cardiovascular:  Negative for chest pain, palpitations and leg swelling. Gastrointestinal:  Negative for abdominal distention and abdominal pain. Genitourinary:  Negative for dysuria. Musculoskeletal:  Negative for arthralgias, back pain and neck pain. Skin: Negative. Negative for rash. Neurological:  Negative for dizziness, light-headedness and headaches. Hematological:  Negative for adenopathy. Psychiatric/Behavioral:  Negative for decreased concentration and dysphoric mood. All other systems reviewed and are negative. Past Medical History:   Diagnosis Date    BRCA1 negative     BRCA2 negative     History of therapeutic radiation     2022    Invasive ductal carcinoma of breast, female, left ([address]) 09/07/2022    with lobular features    Kidney stone     2005 with pregnancy    PONV (postoperative nausea and vomiting)     Prolonged emergence from general anesthesia     \"During Hysterectomy- bradycardia- meds to increase heart rate. \"      Past Surgical History:   Procedure Laterality Date

## 2023-09-14 RX ORDER — BUPROPION HYDROCHLORIDE 150 MG/1
150 TABLET ORAL EVERY MORNING
Qty: 30 TABLET | Refills: 3 | Status: SHIPPED | OUTPATIENT
Start: 2023-09-14

## 2023-09-21 ENCOUNTER — HOSPITAL ENCOUNTER (OUTPATIENT)
Dept: CT IMAGING | Age: 43
Discharge: HOME | End: 2023-09-21
Attending: OTOLARYNGOLOGY
Payer: COMMERCIAL

## 2023-09-21 DIAGNOSIS — K11.23 CHRONIC SCLEROSING SIALADENITIS: ICD-10-CM

## 2023-09-21 PROCEDURE — 6360000004 HC RX CONTRAST MEDICATION: Performed by: OTOLARYNGOLOGY

## 2023-09-21 PROCEDURE — 70492 CT SFT TSUE NCK W/O & W/DYE: CPT

## 2023-09-21 RX ADMIN — IOPAMIDOL 80 ML: 755 INJECTION, SOLUTION INTRAVENOUS at 12:19

## 2023-10-16 DIAGNOSIS — Z17.0 MALIGNANT NEOPLASM OF UPPER-OUTER QUADRANT OF LEFT BREAST IN FEMALE, ESTROGEN RECEPTOR POSITIVE (HCC): ICD-10-CM

## 2023-10-16 DIAGNOSIS — C50.412 MALIGNANT NEOPLASM OF UPPER-OUTER QUADRANT OF LEFT BREAST IN FEMALE, ESTROGEN RECEPTOR POSITIVE (HCC): ICD-10-CM

## 2023-10-16 RX ORDER — ANASTROZOLE 1 MG/1
1 TABLET ORAL DAILY
Qty: 30 TABLET | Refills: 3 | OUTPATIENT
Start: 2023-10-16

## 2023-10-16 NOTE — PROGRESS NOTES
4/19/2024) for review DEXA, get breast exam, Dr Nica Weeks.      Paula Hurst [facility]

## 2023-10-17 ENCOUNTER — OFFICE VISIT (OUTPATIENT)
Dept: ENT CLINIC | Age: 43
End: 2023-10-17
Payer: COMMERCIAL

## 2023-10-17 VITALS
WEIGHT: 124 LBS | OXYGEN SATURATION: 98 % | BODY MASS INDEX: 21.97 KG/M2 | RESPIRATION RATE: 16 BRPM | TEMPERATURE: 98.9 F | HEIGHT: 63 IN | HEART RATE: 81 BPM | SYSTOLIC BLOOD PRESSURE: 110 MMHG | DIASTOLIC BLOOD PRESSURE: 74 MMHG

## 2023-10-17 DIAGNOSIS — Z85.3 HISTORY OF BREAST CANCER: ICD-10-CM

## 2023-10-17 DIAGNOSIS — K11.23 CHRONIC SCLEROSING SIALADENITIS: Primary | ICD-10-CM

## 2023-10-17 DIAGNOSIS — R59.0 CERVICAL LYMPHADENOPATHY: ICD-10-CM

## 2023-10-17 PROCEDURE — 99214 OFFICE O/P EST MOD 30 MIN: CPT | Performed by: OTOLARYNGOLOGY

## 2023-10-17 PROCEDURE — 1036F TOBACCO NON-USER: CPT | Performed by: OTOLARYNGOLOGY

## 2023-10-17 PROCEDURE — G8484 FLU IMMUNIZE NO ADMIN: HCPCS | Performed by: OTOLARYNGOLOGY

## 2023-10-17 PROCEDURE — G8427 DOCREV CUR MEDS BY ELIG CLIN: HCPCS | Performed by: OTOLARYNGOLOGY

## 2023-10-17 PROCEDURE — G8420 CALC BMI NORM PARAMETERS: HCPCS | Performed by: OTOLARYNGOLOGY

## 2023-10-17 NOTE — PROGRESS NOTES
neck, no interval change since previous study dated 12/7/2022. 2. Small lymph nodes adjacent to the submandibular glands and the posterior triangles the neck. 3. Postoperative changes between C4 and T1. **This report has been created using voice recognition software. It may contain minor errors which are inherent in voice recognition technology. ** Final report electronically signed by DR Latonia Roa on 9/22/2023 7:47 AM     Lab Results   Component Value Date/Time     09/07/2023 11:50 AM     04/25/2022 11:14 PM     02/28/2022 08:00 AM    K 4.3 09/07/2023 11:50 AM    K 3.5 04/25/2022 11:14 PM    K 4.8 02/28/2022 08:00 AM     09/07/2023 11:50 AM     04/25/2022 11:14 PM     02/28/2022 08:00 AM    CO2 28 09/07/2023 11:50 AM    CO2 28 04/25/2022 11:14 PM    CO2 26 02/28/2022 08:00 AM    BUN 10 09/07/2023 11:50 AM    BUN 12 04/25/2022 11:14 PM    BUN 10 02/28/2022 08:00 AM    CREATININE 0.7 09/07/2023 11:50 AM    CREATININE 0.74 04/25/2022 11:14 PM    CREATININE 0.7 02/28/2022 08:00 AM    CALCIUM 10.0 09/07/2023 11:50 AM    CALCIUM 9.30 04/25/2022 11:14 PM    CALCIUM 9.4 02/28/2022 08:00 AM    PROT 6.8 09/07/2023 11:50 AM    PROT 6.7 04/11/2021 03:09 PM    PROT 7.7 02/09/2017 11:13 AM    LABALBU 4.6 09/07/2023 11:50 AM    LABALBU 5.0 11/16/2021 09:07 AM    LABALBU 4.7 04/11/2021 03:09 PM    BILITOT 0.4 09/07/2023 11:50 AM    BILITOT 0.3 04/11/2021 03:09 PM    BILITOT 0.4 02/09/2017 11:13 AM    ALKPHOS 71 09/07/2023 11:50 AM    ALKPHOS 58 04/11/2021 03:09 PM    ALKPHOS 58 02/09/2017 11:13 AM    AST 18 09/07/2023 11:50 AM    AST 14 04/11/2021 03:09 PM    AST 18 02/09/2017 11:13 AM    ALT 8 09/07/2023 11:50 AM    ALT 10 04/11/2021 03:09 PM    ALT 8 02/09/2017 11:13 AM       All of the past medical history, past surgical history, family history,social history, allergies and current medications were reviewed with the patient.      Assessment & Plan   Diagnoses and all orders for this

## 2023-10-19 ENCOUNTER — OFFICE VISIT (OUTPATIENT)
Dept: ONCOLOGY | Age: 43
End: 2023-10-19
Payer: COMMERCIAL

## 2023-10-19 ENCOUNTER — HOSPITAL ENCOUNTER (OUTPATIENT)
Dept: INFUSION THERAPY | Age: 43
Discharge: HOME | End: 2023-10-19
Payer: COMMERCIAL

## 2023-10-19 VITALS
RESPIRATION RATE: 16 BRPM | TEMPERATURE: 97.9 F | SYSTOLIC BLOOD PRESSURE: 113 MMHG | DIASTOLIC BLOOD PRESSURE: 71 MMHG | HEART RATE: 75 BPM | OXYGEN SATURATION: 97 %

## 2023-10-19 VITALS
RESPIRATION RATE: 16 BRPM | HEART RATE: 75 BPM | BODY MASS INDEX: 21.69 KG/M2 | TEMPERATURE: 97.9 F | WEIGHT: 122.4 LBS | HEIGHT: 63 IN | OXYGEN SATURATION: 97 % | DIASTOLIC BLOOD PRESSURE: 71 MMHG | SYSTOLIC BLOOD PRESSURE: 113 MMHG

## 2023-10-19 DIAGNOSIS — Z17.0 MALIGNANT NEOPLASM OF UPPER-OUTER QUADRANT OF LEFT BREAST IN FEMALE, ESTROGEN RECEPTOR POSITIVE (HCC): Primary | ICD-10-CM

## 2023-10-19 DIAGNOSIS — Z51.81 ENCOUNTER FOR MONITORING AROMATASE INHIBITOR THERAPY: ICD-10-CM

## 2023-10-19 DIAGNOSIS — F32.9 REACTIVE DEPRESSION: ICD-10-CM

## 2023-10-19 DIAGNOSIS — Z79.811 ENCOUNTER FOR MONITORING AROMATASE INHIBITOR THERAPY: ICD-10-CM

## 2023-10-19 DIAGNOSIS — C50.412 MALIGNANT NEOPLASM OF UPPER-OUTER QUADRANT OF LEFT BREAST IN FEMALE, ESTROGEN RECEPTOR POSITIVE (HCC): Primary | ICD-10-CM

## 2023-10-19 PROCEDURE — 99214 OFFICE O/P EST MOD 30 MIN: CPT | Performed by: INTERNAL MEDICINE

## 2023-10-19 PROCEDURE — G8484 FLU IMMUNIZE NO ADMIN: HCPCS | Performed by: INTERNAL MEDICINE

## 2023-10-19 PROCEDURE — 1036F TOBACCO NON-USER: CPT | Performed by: INTERNAL MEDICINE

## 2023-10-19 PROCEDURE — 99211 OFF/OP EST MAY X REQ PHY/QHP: CPT

## 2023-10-19 PROCEDURE — G8420 CALC BMI NORM PARAMETERS: HCPCS | Performed by: INTERNAL MEDICINE

## 2023-10-19 PROCEDURE — G8427 DOCREV CUR MEDS BY ELIG CLIN: HCPCS | Performed by: INTERNAL MEDICINE

## 2023-10-19 RX ORDER — GINGER ROOT/GINGER ROOT EXT 262.5 MG
2 CAPSULE ORAL DAILY
Qty: 180 TABLET | Refills: 3 | Status: SHIPPED | OUTPATIENT
Start: 2023-10-19 | End: 2024-10-13

## 2023-10-19 RX ORDER — ANASTROZOLE 1 MG/1
1 TABLET ORAL DAILY
Qty: 90 TABLET | Refills: 3 | Status: SHIPPED | OUTPATIENT
Start: 2023-10-19 | End: 2024-10-13

## 2023-10-19 RX ORDER — VENLAFAXINE HYDROCHLORIDE 75 MG/1
150 CAPSULE, EXTENDED RELEASE ORAL DAILY
Qty: 180 CAPSULE | Refills: 3 | Status: SHIPPED | OUTPATIENT
Start: 2023-10-19

## 2023-10-19 NOTE — PATIENT INSTRUCTIONS
Start taking calcium and vit D for your postmenopausal bone health (sent to [facility])  Refills for Arimidex and effexor also sent  RTC in 6 months - please get DEXA scan a few days prior and we'll go over results together, plan on getting breast exam that day

## 2023-10-24 RX ORDER — CYCLOBENZAPRINE HCL 10 MG
TABLET ORAL
COMMUNITY
Start: 2023-09-06

## 2023-10-24 RX ORDER — TRAMADOL HYDROCHLORIDE 50 MG/1
TABLET ORAL
COMMUNITY
Start: 2023-09-08

## 2023-10-24 NOTE — PROGRESS NOTES
Follow all instructions given by your physician  NPO after midnight  Sips of water am of surgery with allowed medications  Bring insurance info and 's license  Wear clean comfortable , loose-fitting clothing  No jewelry or contact lenses to be worn day of surgery  No glue on dentures morning of surgery; you will be asked to remove them for surgery. Case for glasses. Shower the night before and the morning of surgery with a liquid antibacterial soap, dry with new fresh clean towel after each shower, no lotions, creams or powder. Clean sheets and pillowcase on bed night before surgery  Bring medications in original bottles    Our pharmacy has a Meds to [facility] program where they will deliver any new prescriptions you may have to your room before you leave. Our Pharmacy will clear it through your insurance; for example (same co pay). This enables you to take your new RX as soon as you need when you get home and avoids stop/wait delays on the way home. Please have a form of payment with you and have someone designated as your Pharmacy contact with their phone # as you may not feel well or still be under the influence of anesthesia. Please refer to the SSI-Surgical Site Infection Flyer you hopefully received in the mail-together we can prevent infections; signs and symptoms reviewed. When discharged be sure you understand how to care for your wound and that you have the Dr./office phone # to call if you have any concerns or questions about your wound.      needed at discharge and someone over 18 to stay with you for 24 hours overnight (surgery may be cancelled if you don't have this)  Report to [facility] on 2nd floor  If you would become ill prior to surgery, please call the surgeon  May have a visitor with you, we request that you limit to 2 visitors in pre-op area  Masks are recommended but not required, new masks at entrance desk  Call [phone] for any questions

## 2023-10-26 ENCOUNTER — PREP FOR PROCEDURE (OUTPATIENT)
Dept: ENT CLINIC | Age: 43
End: 2023-10-26

## 2023-10-26 ENCOUNTER — CLINICAL DOCUMENTATION (OUTPATIENT)
Facility: HOSPITAL | Age: 43
End: 2023-10-26

## 2023-10-27 NOTE — PROGRESS NOTES
Patient Assistance                   Additional notes: Reviewed chart and no assistance is needed. Patient has reached OOP Max has been met for 2023.

## 2023-10-31 ENCOUNTER — ANESTHESIA EVENT (OUTPATIENT)
Dept: OPERATING ROOM | Age: 43
End: 2023-10-31
Payer: COMMERCIAL

## 2023-10-31 RX ORDER — DEXAMETHASONE SODIUM PHOSPHATE 10 MG/ML
10 INJECTION INTRAMUSCULAR; INTRAVENOUS ONCE
Status: CANCELLED | OUTPATIENT
Start: 2023-10-31 | End: 2023-10-31

## 2023-10-31 RX ORDER — SODIUM CHLORIDE 0.9 % (FLUSH) 0.9 %
5-40 SYRINGE (ML) INJECTION PRN
Status: CANCELLED | OUTPATIENT
Start: 2023-10-31

## 2023-10-31 RX ORDER — SODIUM CHLORIDE 9 MG/ML
INJECTION, SOLUTION INTRAVENOUS PRN
Status: CANCELLED | OUTPATIENT
Start: 2023-10-31

## 2023-10-31 RX ORDER — SODIUM CHLORIDE 0.9 % (FLUSH) 0.9 %
5-40 SYRINGE (ML) INJECTION EVERY 12 HOURS SCHEDULED
Status: CANCELLED | OUTPATIENT
Start: 2023-10-31

## 2023-10-31 NOTE — H&P
Adapted from prior ENT note:    Chronic submandibular sialadenitis; Cervical lymphadenopathy  History of breast cancer  No new symptoms    Past Medical History:   Diagnosis Date    BRCA1 negative     BRCA2 negative     History of therapeutic radiation     2022    Invasive ductal carcinoma of breast, female, left ([address]) 09/07/2022    with lobular features    Kidney stone     2005 with pregnancy    PONV (postoperative nausea and vomiting)     Prolonged emergence from general anesthesia     \"During Hysterectomy- bradycardia- meds to increase heart rate. \"       Past Surgical History:   Procedure Laterality Date    BREAST LUMPECTOMY Left 09/19/2022    LEFT BREAST LUMPECTOMY WITH SENTINELYMPH NODE BX performed by Halina Whitt MD at [facility] LUMPECTOMY Left 09/29/2022    RE-EXCISION LEFT BREAST DEEP MARGINS performed by Halina Whitt MD at [address] 12/08/2021    ACDF C4-C7-T1 performed by Simona Christine MD at [facility]  03/25/2016    Dr. Lon Truong N/A 09/13/2017    DILATATION AND CURETTAGE HYSTEROSCOPY, POSSIBLE MYOSURE performed by Bhaskar Duque DO at [address]  04/18/2018    [address]    umbilical hernia    HYSTERECTOMY (CERVIX STATUS UNKNOWN)  03/13/2019    partial    HYSTERECTOMY ABDOMINAL N/A 03/13/2019    ROBOTIC TOTAL LAPAROSCOPIC HYSTERECTOMY WITH BILATERAL SALPINGECTOMY performed by Bhaskar Duque DO at [facility]  04/18/2018    [facility] [address] [facility] LEFT Left 09/07/2022    [facility] [address] [facility] LEFT 9/7/2022 Yoandy Irene MD [address] [employer] Bilateral     2023    [place] HYSTEROSCOPY ENDOMETRIAL ABLATION N/A 04/18/2018    HYSTEROSCOPY ENDOMETRIAL ABLATION performed by Bhaskar Duque DO at [facility] LEFT Left 09/07/2022    US BREAST BIOPSY NEEDLE ADDITIONAL LEFT 9/7/2022 Dewey Youssef minor errors which are inherent in voice recognition technology. **

## 2023-11-01 ENCOUNTER — HOSPITAL ENCOUNTER (OUTPATIENT)
Age: 43
Discharge: HOME | End: 2023-11-01
Attending: OTOLARYNGOLOGY | Admitting: OTOLARYNGOLOGY
Payer: COMMERCIAL

## 2023-11-01 ENCOUNTER — ANESTHESIA (OUTPATIENT)
Dept: OPERATING ROOM | Age: 43
End: 2023-11-01
Payer: COMMERCIAL

## 2023-11-01 VITALS
TEMPERATURE: 97 F | DIASTOLIC BLOOD PRESSURE: 82 MMHG | WEIGHT: 121 LBS | RESPIRATION RATE: 18 BRPM | SYSTOLIC BLOOD PRESSURE: 117 MMHG | HEIGHT: 62 IN | BODY MASS INDEX: 22.26 KG/M2 | HEART RATE: 92 BPM | OXYGEN SATURATION: 97 %

## 2023-11-01 DIAGNOSIS — Z85.3 HX: BREAST CANCER: ICD-10-CM

## 2023-11-01 DIAGNOSIS — K11.23 CHRONIC SCLEROSING SIALADENITIS: ICD-10-CM

## 2023-11-01 DIAGNOSIS — G89.18 ACUTE POST-OPERATIVE PAIN: Primary | ICD-10-CM

## 2023-11-01 DIAGNOSIS — R59.0 CERVICAL LYMPHADENOPATHY: ICD-10-CM

## 2023-11-01 PROCEDURE — 3600000013 HC SURGERY LEVEL 3 ADDTL 15MIN: Performed by: OTOLARYNGOLOGY

## 2023-11-01 PROCEDURE — 88305 TISSUE EXAM BY PATHOLOGIST: CPT

## 2023-11-01 PROCEDURE — 2720000010 HC SURG SUPPLY STERILE: Performed by: OTOLARYNGOLOGY

## 2023-11-01 PROCEDURE — 2500000003 HC RX 250 WO HCPCS: Performed by: NURSE ANESTHETIST, CERTIFIED REGISTERED

## 2023-11-01 PROCEDURE — 7100000000 HC PACU RECOVERY - FIRST 15 MIN: Performed by: OTOLARYNGOLOGY

## 2023-11-01 PROCEDURE — 7100000001 HC PACU RECOVERY - ADDTL 15 MIN: Performed by: OTOLARYNGOLOGY

## 2023-11-01 PROCEDURE — 42440 EXCISE SUBMAXILLARY GLAND: CPT | Performed by: NURSE PRACTITIONER

## 2023-11-01 PROCEDURE — 6360000002 HC RX W HCPCS: Performed by: STUDENT IN AN ORGANIZED HEALTH CARE EDUCATION/TRAINING PROGRAM

## 2023-11-01 PROCEDURE — 6360000002 HC RX W HCPCS: Performed by: OTOLARYNGOLOGY

## 2023-11-01 PROCEDURE — 6370000000 HC RX 637 (ALT 250 FOR IP): Performed by: ANESTHESIOLOGY

## 2023-11-01 PROCEDURE — 7100000010 HC PHASE II RECOVERY - FIRST 15 MIN: Performed by: OTOLARYNGOLOGY

## 2023-11-01 PROCEDURE — 88307 TISSUE EXAM BY PATHOLOGIST: CPT

## 2023-11-01 PROCEDURE — 2580000003 HC RX 258: Performed by: NURSE PRACTITIONER

## 2023-11-01 PROCEDURE — 42440 EXCISE SUBMAXILLARY GLAND: CPT | Performed by: OTOLARYNGOLOGY

## 2023-11-01 PROCEDURE — 38542 EXPLORE DEEP NODE(S) NECK: CPT | Performed by: OTOLARYNGOLOGY

## 2023-11-01 PROCEDURE — 3600000003 HC SURGERY LEVEL 3 BASE: Performed by: OTOLARYNGOLOGY

## 2023-11-01 PROCEDURE — 6360000002 HC RX W HCPCS: Performed by: NURSE PRACTITIONER

## 2023-11-01 PROCEDURE — 3700000001 HC ADD 15 MINUTES (ANESTHESIA): Performed by: OTOLARYNGOLOGY

## 2023-11-01 PROCEDURE — 7100000011 HC PHASE II RECOVERY - ADDTL 15 MIN: Performed by: OTOLARYNGOLOGY

## 2023-11-01 PROCEDURE — 2580000003 HC RX 258: Performed by: OTOLARYNGOLOGY

## 2023-11-01 PROCEDURE — 3700000000 HC ANESTHESIA ATTENDED CARE: Performed by: OTOLARYNGOLOGY

## 2023-11-01 PROCEDURE — 38542 EXPLORE DEEP NODE(S) NECK: CPT | Performed by: NURSE PRACTITIONER

## 2023-11-01 PROCEDURE — 2709999900 HC NON-CHARGEABLE SUPPLY: Performed by: OTOLARYNGOLOGY

## 2023-11-01 PROCEDURE — 88342 IMHCHEM/IMCYTCHM 1ST ANTB: CPT

## 2023-11-01 PROCEDURE — 6360000002 HC RX W HCPCS: Performed by: NURSE ANESTHETIST, CERTIFIED REGISTERED

## 2023-11-01 PROCEDURE — A4216 STERILE WATER/SALINE, 10 ML: HCPCS | Performed by: OTOLARYNGOLOGY

## 2023-11-01 PROCEDURE — 6370000000 HC RX 637 (ALT 250 FOR IP): Performed by: OTOLARYNGOLOGY

## 2023-11-01 PROCEDURE — 6360000002 HC RX W HCPCS

## 2023-11-01 RX ORDER — EPHEDRINE SULFATE/0.9% NACL/PF 50 MG/5 ML
SYRINGE (ML) INTRAVENOUS PRN
Status: DISCONTINUED | OUTPATIENT
Start: 2023-11-01 | End: 2023-11-01 | Stop reason: SDUPTHER

## 2023-11-01 RX ORDER — SODIUM CHLORIDE 0.9 % (FLUSH) 0.9 %
5-40 SYRINGE (ML) INJECTION PRN
Status: DISCONTINUED | OUTPATIENT
Start: 2023-11-01 | End: 2023-11-01 | Stop reason: HOSPADM

## 2023-11-01 RX ORDER — ONDANSETRON 2 MG/ML
INJECTION INTRAMUSCULAR; INTRAVENOUS PRN
Status: DISCONTINUED | OUTPATIENT
Start: 2023-11-01 | End: 2023-11-01 | Stop reason: SDUPTHER

## 2023-11-01 RX ORDER — PROPOFOL 10 MG/ML
INJECTION, EMULSION INTRAVENOUS PRN
Status: DISCONTINUED | OUTPATIENT
Start: 2023-11-01 | End: 2023-11-01 | Stop reason: SDUPTHER

## 2023-11-01 RX ORDER — SODIUM CHLORIDE 0.9 % (FLUSH) 0.9 %
5-40 SYRINGE (ML) INJECTION EVERY 12 HOURS SCHEDULED
Status: DISCONTINUED | OUTPATIENT
Start: 2023-11-01 | End: 2023-11-01 | Stop reason: HOSPADM

## 2023-11-01 RX ORDER — DEXAMETHASONE SODIUM PHOSPHATE 10 MG/ML
INJECTION, EMULSION INTRAMUSCULAR; INTRAVENOUS PRN
Status: DISCONTINUED | OUTPATIENT
Start: 2023-11-01 | End: 2023-11-01 | Stop reason: SDUPTHER

## 2023-11-01 RX ORDER — LIDOCAINE HCL/PF 100 MG/5ML
SYRINGE (ML) INJECTION PRN
Status: DISCONTINUED | OUTPATIENT
Start: 2023-11-01 | End: 2023-11-01 | Stop reason: SDUPTHER

## 2023-11-01 RX ORDER — SUCCINYLCHOLINE/SOD CL,ISO/PF 200MG/10ML
SYRINGE (ML) INTRAVENOUS PRN
Status: DISCONTINUED | OUTPATIENT
Start: 2023-11-01 | End: 2023-11-01 | Stop reason: SDUPTHER

## 2023-11-01 RX ORDER — LABETALOL HYDROCHLORIDE 5 MG/ML
INJECTION INTRAVENOUS
Status: COMPLETED
Start: 2023-11-01 | End: 2023-11-01

## 2023-11-01 RX ORDER — SCOLOPAMINE TRANSDERMAL SYSTEM 1 MG/1
1 PATCH, EXTENDED RELEASE TRANSDERMAL ONCE
Status: DISCONTINUED | OUTPATIENT
Start: 2023-11-01 | End: 2023-11-01 | Stop reason: HOSPADM

## 2023-11-01 RX ORDER — MEPERIDINE HYDROCHLORIDE 25 MG/ML
12.5 INJECTION INTRAMUSCULAR; INTRAVENOUS; SUBCUTANEOUS EVERY 5 MIN PRN
Status: DISCONTINUED | OUTPATIENT
Start: 2023-11-01 | End: 2023-11-01 | Stop reason: HOSPADM

## 2023-11-01 RX ORDER — ONDANSETRON 2 MG/ML
4 INJECTION INTRAMUSCULAR; INTRAVENOUS
Status: DISCONTINUED | OUTPATIENT
Start: 2023-11-01 | End: 2023-11-01 | Stop reason: HOSPADM

## 2023-11-01 RX ORDER — FENTANYL CITRATE 50 UG/ML
INJECTION, SOLUTION INTRAMUSCULAR; INTRAVENOUS PRN
Status: DISCONTINUED | OUTPATIENT
Start: 2023-11-01 | End: 2023-11-01 | Stop reason: SDUPTHER

## 2023-11-01 RX ORDER — DIPHENHYDRAMINE HYDROCHLORIDE 50 MG/ML
12.5 INJECTION INTRAMUSCULAR; INTRAVENOUS
Status: DISCONTINUED | OUTPATIENT
Start: 2023-11-01 | End: 2023-11-01 | Stop reason: HOSPADM

## 2023-11-01 RX ORDER — SULFAMETHOXAZOLE AND TRIMETHOPRIM 800; 160 MG/1; MG/1
1 TABLET ORAL 2 TIMES DAILY
Qty: 20 TABLET | Refills: 0 | Status: SHIPPED | OUTPATIENT
Start: 2023-11-01 | End: 2023-11-11

## 2023-11-01 RX ORDER — DEXAMETHASONE SODIUM PHOSPHATE 4 MG/ML
10 INJECTION, SOLUTION INTRA-ARTICULAR; INTRALESIONAL; INTRAMUSCULAR; INTRAVENOUS; SOFT TISSUE ONCE
Status: COMPLETED | OUTPATIENT
Start: 2023-11-01 | End: 2023-11-01

## 2023-11-01 RX ORDER — SODIUM CHLORIDE 9 MG/ML
INJECTION, SOLUTION INTRAVENOUS PRN
Status: DISCONTINUED | OUTPATIENT
Start: 2023-11-01 | End: 2023-11-01 | Stop reason: HOSPADM

## 2023-11-01 RX ORDER — HYDROCODONE BITARTRATE AND ACETAMINOPHEN 5; 325 MG/1; MG/1
1 TABLET ORAL ONCE
Status: COMPLETED | OUTPATIENT
Start: 2023-11-01 | End: 2023-11-01

## 2023-11-01 RX ORDER — HYDROMORPHONE HYDROCHLORIDE 2 MG/ML
INJECTION, SOLUTION INTRAMUSCULAR; INTRAVENOUS; SUBCUTANEOUS PRN
Status: DISCONTINUED | OUTPATIENT
Start: 2023-11-01 | End: 2023-11-01 | Stop reason: SDUPTHER

## 2023-11-01 RX ORDER — FENTANYL CITRATE 50 UG/ML
50 INJECTION, SOLUTION INTRAMUSCULAR; INTRAVENOUS
Status: DISCONTINUED | OUTPATIENT
Start: 2023-11-01 | End: 2023-11-01 | Stop reason: HOSPADM

## 2023-11-01 RX ORDER — FENTANYL CITRATE 50 UG/ML
50 INJECTION, SOLUTION INTRAMUSCULAR; INTRAVENOUS EVERY 5 MIN PRN
Status: DISCONTINUED | OUTPATIENT
Start: 2023-11-01 | End: 2023-11-01 | Stop reason: HOSPADM

## 2023-11-01 RX ORDER — MIDAZOLAM HYDROCHLORIDE 1 MG/ML
INJECTION INTRAMUSCULAR; INTRAVENOUS PRN
Status: DISCONTINUED | OUTPATIENT
Start: 2023-11-01 | End: 2023-11-01 | Stop reason: SDUPTHER

## 2023-11-01 RX ORDER — PHENYLEPHRINE HCL IN 0.9% NACL 1 MG/10 ML
SYRINGE (ML) INTRAVENOUS PRN
Status: DISCONTINUED | OUTPATIENT
Start: 2023-11-01 | End: 2023-11-01 | Stop reason: SDUPTHER

## 2023-11-01 RX ORDER — LABETALOL HYDROCHLORIDE 5 MG/ML
10 INJECTION INTRAVENOUS ONCE
Status: COMPLETED | OUTPATIENT
Start: 2023-11-01 | End: 2023-11-01

## 2023-11-01 RX ORDER — HYDROCODONE BITARTRATE AND ACETAMINOPHEN 5; 325 MG/1; MG/1
1 TABLET ORAL EVERY 6 HOURS PRN
Qty: 20 TABLET | Refills: 0 | Status: SHIPPED | OUTPATIENT
Start: 2023-11-01 | End: 2023-11-06

## 2023-11-01 RX ADMIN — MIDAZOLAM 2 MG: 1 INJECTION INTRAMUSCULAR; INTRAVENOUS at 12:42

## 2023-11-01 RX ADMIN — PROPOFOL 150 MG: 10 INJECTION, EMULSION INTRAVENOUS at 12:46

## 2023-11-01 RX ADMIN — FENTANYL CITRATE 50 MCG: 50 INJECTION, SOLUTION INTRAMUSCULAR; INTRAVENOUS at 15:47

## 2023-11-01 RX ADMIN — LABETALOL HYDROCHLORIDE 10 MG: 5 INJECTION INTRAVENOUS at 16:18

## 2023-11-01 RX ADMIN — PIPERACILLIN SODIUM,TAZOBACTAM SODIUM 3375 MG: 3; .375 INJECTION, POWDER, FOR SOLUTION INTRAVENOUS at 12:57

## 2023-11-01 RX ADMIN — SODIUM CHLORIDE 50 ML/HR: 9 INJECTION, SOLUTION INTRAVENOUS at 11:46

## 2023-11-01 RX ADMIN — Medication 10 MG: at 13:05

## 2023-11-01 RX ADMIN — Medication 100 MG: at 12:46

## 2023-11-01 RX ADMIN — DEXAMETHASONE SODIUM PHOSPHATE 8 MG: 10 INJECTION, EMULSION INTRAMUSCULAR; INTRAVENOUS at 13:15

## 2023-11-01 RX ADMIN — HYDROCODONE BITARTRATE AND ACETAMINOPHEN 1 TABLET: 5; 325 TABLET ORAL at 17:21

## 2023-11-01 RX ADMIN — PROPOFOL 50 MG: 10 INJECTION, EMULSION INTRAVENOUS at 12:59

## 2023-11-01 RX ADMIN — FENTANYL CITRATE 50 MCG: 50 INJECTION, SOLUTION INTRAMUSCULAR; INTRAVENOUS at 15:52

## 2023-11-01 RX ADMIN — FENTANYL CITRATE 100 MCG: 50 INJECTION, SOLUTION INTRAMUSCULAR; INTRAVENOUS at 13:52

## 2023-11-01 RX ADMIN — FENTANYL CITRATE 50 MCG: 50 INJECTION, SOLUTION INTRAMUSCULAR; INTRAVENOUS at 15:59

## 2023-11-01 RX ADMIN — FENTANYL CITRATE 100 MCG: 50 INJECTION, SOLUTION INTRAMUSCULAR; INTRAVENOUS at 12:42

## 2023-11-01 RX ADMIN — Medication 10 MG: at 13:24

## 2023-11-01 RX ADMIN — Medication 140 MG: at 12:46

## 2023-11-01 RX ADMIN — ONDANSETRON 4 MG: 2 INJECTION INTRAMUSCULAR; INTRAVENOUS at 13:15

## 2023-11-01 RX ADMIN — Medication 10 MG: at 12:56

## 2023-11-01 RX ADMIN — PROPOFOL 50 MG: 10 INJECTION, EMULSION INTRAVENOUS at 13:53

## 2023-11-01 RX ADMIN — HYDROMORPHONE HYDROCHLORIDE 1 MG: 2 INJECTION INTRAMUSCULAR; INTRAVENOUS; SUBCUTANEOUS at 12:55

## 2023-11-01 RX ADMIN — Medication 100 MCG: at 13:18

## 2023-11-01 RX ADMIN — Medication 10 MG: at 13:14

## 2023-11-01 RX ADMIN — DEXAMETHASONE SODIUM PHOSPHATE 10 MG: 4 INJECTION, SOLUTION INTRAMUSCULAR; INTRAVENOUS at 11:51

## 2023-11-01 ASSESSMENT — PAIN SCALES - GENERAL
PAINLEVEL_OUTOF10: 8
PAINLEVEL_OUTOF10: 5

## 2023-11-01 ASSESSMENT — PAIN - FUNCTIONAL ASSESSMENT: PAIN_FUNCTIONAL_ASSESSMENT: 0-10

## 2023-11-01 ASSESSMENT — PAIN DESCRIPTION - PAIN TYPE: TYPE: SURGICAL PAIN

## 2023-11-01 ASSESSMENT — PAIN DESCRIPTION - ORIENTATION: ORIENTATION: INNER

## 2023-11-01 ASSESSMENT — PAIN DESCRIPTION - DESCRIPTORS: DESCRIPTORS: ACHING

## 2023-11-01 ASSESSMENT — PAIN DESCRIPTION - LOCATION: LOCATION: THROAT

## 2023-11-01 NOTE — PROGRESS NOTES
Pt has met discharge criteria and states she is ready for discharge to home. IV removed, gauze and tape applied. Dressed in own clothes and personal belongings gathered. Discharge instructions (with opioid medication education information) given to pt and family; pt and family verbalized understanding of discharge instructions, prescriptions and follow up appointments. Pt transported to discharge lobby by Mallika Brunner Principal [facility] staff.

## 2023-11-01 NOTE — OP NOTE
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]     * Cervical lymphadenopathy [R59.0]     * HX: breast cancer [Z85.3]    Post-Op Diagnosis: Same       Procedure(s):  Dissection/ Exploration Deep Node, Neck/Juglar (levels I and IIA)  Excision of Submandibular (Submaxillary) Gland    Surgeon(s):  Romana Hughs, MD    Assistant:   * No surgical staff found *    Anesthesia: General    Estimated Blood Loss (mL): less than 50     Complications: None    Specimens:   ID Type Source Tests Collected by Time Destination   1 : Level 1 Fibro lymhatic tissue for nodes Tissue Neck SURGICAL PATHOLOGY Romana Hughs, MD 11/1/2023 1446    A : Right submandibular gland Tissue Neck SURGICAL PATHOLOGY Romana Hughs, MD 11/1/2023 1445        Implants:  * No implants in log *      Drains: * No LDAs found *    Findings: 1. Generalized periglandular fibrosis  2. Level 1 and level 2A nodes cleared as a single specimen of fibro lymphatic tissue  3. Level 2B explored into the submuscular recess without finding any additional Notes  4. Marginal mandibular nerve identified isolated and preserved  5. Hypoglossal and lingual nerves isolated and preserved            Detailed Description of Procedure: The patient was taken to the operating room awake and placed in supine position. General anesthesia was induced and the patient was intubated with a 7.0 endotracheal tube without difficulty or vital sign instability. The patient's head was placed in the Torres head rubio and extended an appropriate amount. Nerve integrity monitoring electrodes were placed in the right submental distribution and in the right tongue.   The patient had no discernible neck skin creases so a line of about 6 cm in length was marked at the level of the hyoid bone and injected with 9 cc of 1-50,000 saline

## 2023-11-01 NOTE — PROGRESS NOTES
1526: pt arrives to pacu, respirations unlabored on room air. Placed on 2L nasal cannula. Pain 5/10 and tolerable    1547: pain 7/10, medicated with 50mcg fentanyl. 1552:  pain unchanged, medicated with 50mcg fentanyl. 1559: pain 7/10, medicated with 50mcg fentanyl. 1604: pain 4/10 and tolerable. 1618: Dr Parris Craig notified of tachycardia, New orders given. Medicated with 10 mg labetalol    1625: pt resting, resps easy. Pain tolerable at this time. 1638: pt meets criteria for discharge from pacu at this time.  pt transported to [facility] in stable condition

## 2023-11-01 NOTE — PROGRESS NOTES
Pt returned to [facility] room 11. Vitals and assessment as charted. 0.9 infusing, @650ml to count from PACU. Pt has ice cream and water. Family at the bedside. Pt and family verbalized understanding of discharge criteria and call light use. Call light in reach.

## 2023-11-01 NOTE — DISCHARGE INSTRUCTIONS
- Resume home mediations  - Diet as tolerated  - Empty drain and record amount of output daily and as needed  - No heavy lifting or straining  - Keep incision and drain site clean and dry. Okay to keep open to air (no need for bandage covering it). - Do not take your Tramadol while you are taking Norco (they are both controlled substances). - Hold the ibuprofen until after your post op appointment and you have the okay to do so.

## 2023-11-01 NOTE — ANESTHESIA PRE PROCEDURE
Department of Anesthesiology  Preprocedure Note       Name:  Nakia Chambers   Age:  44 y.o.  :  1980                                          MRN:  [id number]         Date:  2023      Surgeon: Lakshmi Shaver):  Kyle Ferro MD    Procedure: Procedure(s):  Dissection/ Exploration Deep Node, Neck/Juglar (levels I and IIA)  Excision of Submandibular (Submaxillary) Gland    Medications prior to admission:   Prior to Admission medications    Medication Sig Start Date End Date Taking? Authorizing Provider   cyclobenzaprine (FLEXERIL) 10 MG tablet TAKE 1 TABLET BY MOUTH THREE TIMES DAILY FOR 15 DAYS 23   Ny Rdz MD   traMADol (ULTRAM) 50 MG tablet TAKE 1 TABLET BY MOUTH EVERY 12 HOURS FOR 5 DAYS 23   Ny Rdz MD   anastrozole (ARIMIDEX) 1 MG tablet Take 1 tablet by mouth daily 10/19/23 10/13/24  Kraig Moody MD PhD   venlafaxine (EFFEXOR XR) 75 MG extended release capsule Take 2 capsules by mouth daily 10/19/23   Kraig Moody MD PhD   calcium carb-cholecalciferol (CALCIUM 600+D3) 600-20 MG-MCG TABS Take 2 tablets by mouth daily Pharmacy, ok to substitute for generic  Patient not taking: Reported on 10/24/2023 10/19/23 10/13/24  Kraig Moody MD PhD   buPROPion (WELLBUTRIN XL) 150 MG extended release tablet Take 1 tablet by mouth every morning 23   GERALDINE Askew - CNP   ibuprofen (ADVIL;MOTRIN) 800 MG tablet Take 1 tablet by mouth every 8 hours as needed for Pain or Fever 23  Ruby SerGERALDINE gonzáles - CNP   gabapentin (NEURONTIN) 100 MG capsule 3 times daily as needed.  23   Ny Rdz MD   ondansetron (ZOFRAN-ODT) 4 MG disintegrating tablet Take 1 tablet by mouth every 8 hours as needed for Nausea or Vomiting  Patient not taking: Reported on 10/24/2023 6/14/23   GERALDINE Calixto CNP       Current medications:    Current Facility-Administered Medications   Medication Dose Route Frequency Provider Last Rate Last

## 2023-11-02 ENCOUNTER — TELEPHONE (OUTPATIENT)
Dept: ENT CLINIC | Age: 43
End: 2023-11-02

## 2023-11-02 ENCOUNTER — TELEPHONE (OUTPATIENT)
Dept: FAMILY MEDICINE CLINIC | Age: 43
End: 2023-11-02

## 2023-11-02 NOTE — TELEPHONE ENCOUNTER
Care Transitions Initial Follow Up Call    Outreach made within 2 business days of discharge: Yes    Patient: Tad Tate Patient : 1980   MRN: [id number]  Reason for Admission: There are no discharge diagnoses documented for the most recent discharge. Discharge Date: 23       Spoke with: Patient     Discharge department/facility: [facility]    TCM Interactive Patient Contact:  Was patient able to fill all prescriptions: Yes  Was patient instructed to bring all medications to the follow-up visit: Yes  Is patient taking all medications as directed in the discharge summary?  Yes  Does patient understand their discharge instructions: No  Does patient have questions or concerns that need addressed prior to 7-14 day follow up office visit: no    Patient following up with specialist.    Follow Up  Future Appointments   Date Time Provider [address]   2023  8:45 AM Armando Askew MD N ENT [facility]   2023  1:00 PM [address] Neurology -   2024 10:20 AM [facility] BHARAT  [facility] WOMEN STR Rad/Card   2024  1:00 PM Gretchen Perez MD PhD N Oncology [address] [place] [address]

## 2023-11-02 NOTE — TELEPHONE ENCOUNTER
Patient's , Grazyna Carty, called in stating above the patient's incision is swollen and she looks like she has a double chin. He didn't notice it looking like that when she left the hospital after surgery. He also states is it hard to the touch and hurting. Grazyna Carty stated there was only about 1/2 oz of drainage in her drain from last night to this morning at 10:30. Patient and her  were just checking to see if this would be something normal after surgery or not. Please advise.

## 2023-11-06 ENCOUNTER — TELEPHONE (OUTPATIENT)
Dept: ENT CLINIC | Age: 43
End: 2023-11-06

## 2023-11-06 NOTE — TELEPHONE ENCOUNTER
Patient called in and said that she has blood leaking where the tube is inserted . I spoke to [address] . 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 Crystal said that she would expect blood to come out and its better than staying in. Patient said that when she drains the tube the ball stays compressed until it begins to fill up. Alfredo Méndez know this. I told patient this and she was still concerned. Patient said that this didn't happen last time when she had surgery and would like to talk to a provider.

## 2023-11-06 NOTE — TELEPHONE ENCOUNTER
There is a small tunnel from the inside of the wound to where the drain inserts/exits. Blood or serous fluid can leak from this area. If the drain bulb is staying compressed after being emptied and re-capped, there is no concern for anything unusual going on.

## 2023-11-07 PROBLEM — G89.18 ACUTE POST-OPERATIVE PAIN: Status: ACTIVE | Noted: 2023-11-07

## 2023-11-08 ENCOUNTER — OFFICE VISIT (OUTPATIENT)
Dept: ENT CLINIC | Age: 43
End: 2023-11-08

## 2023-11-08 VITALS
BODY MASS INDEX: 22.54 KG/M2 | HEIGHT: 62 IN | TEMPERATURE: 97.5 F | RESPIRATION RATE: 18 BRPM | SYSTOLIC BLOOD PRESSURE: 118 MMHG | HEART RATE: 85 BPM | DIASTOLIC BLOOD PRESSURE: 72 MMHG | WEIGHT: 122.5 LBS | OXYGEN SATURATION: 98 %

## 2023-11-08 DIAGNOSIS — R55 VASOVAGAL REACTION: ICD-10-CM

## 2023-11-08 DIAGNOSIS — R59.0 CERVICAL LYMPHADENOPATHY: ICD-10-CM

## 2023-11-08 DIAGNOSIS — K11.23 CHRONIC SCLEROSING SIALADENITIS: Primary | ICD-10-CM

## 2023-11-08 DIAGNOSIS — Z85.3 HISTORY OF BREAST CANCER: ICD-10-CM

## 2023-11-08 RX ORDER — HYDROCODONE BITARTRATE AND ACETAMINOPHEN 5; 325 MG/1; MG/1
1 TABLET ORAL EVERY 6 HOURS PRN
COMMUNITY

## 2023-11-08 NOTE — PROGRESS NOTES
[facility] [place] [facility]  [facility] EAR, NOSE AND THROAT  [address]  Dept: [phone]  Dept Fax: [phone]  Loc: Alia Valdes is a 44 y.o. female who was referred by No ref. provider found for:  Chief Complaint   Patient presents with    Post-Op Check     Patient here for post op exam. Patient states she has swelling under her chin       HPI:     Mila Andrade is a 44 y.o. female status post a right submandibular excision with the surrounding lymphadenopathy. Patient has history of breast cancer and was very concerned about her submandibular and represented cancer or that she had had a recurrence of her disease in the submental and. Submandibular space prompting the recommendation to remove the surrounding lymph nodes and make sure they do not represent any dangerous pathology. This has been achieved and that the gland itself was considered to be benign and the lymph nodes were additionally stained with a means for seeing breast tissue and none was found. Patient is here today with her  for drain removal and to have her wound checked. She has been having no particular problems postoperatively but is concerned about the swelling she perceives in the submental space. History: Allergies   Allergen Reactions    Amoxicillin Hives     Current Outpatient Medications   Medication Sig Dispense Refill    HYDROcodone-acetaminophen (NORCO) 5-325 MG per tablet Take 1 tablet by mouth every 6 hours as needed for Pain.  Max Daily Amount: 4 tablets      sulfamethoxazole-trimethoprim (BACTRIM DS;SEPTRA DS) 800-160 MG per tablet Take 1 tablet by mouth 2 times daily for 10 days 20 tablet 0    cyclobenzaprine (FLEXERIL) 10 MG tablet TAKE 1 TABLET BY MOUTH THREE TIMES DAILY FOR 15 DAYS      anastrozole (ARIMIDEX) 1 MG tablet Take 1 tablet by mouth daily 90 tablet 3    venlafaxine (EFFEXOR XR) 75 MG extended release capsule Take 2 capsules

## 2023-11-08 NOTE — TELEPHONE ENCOUNTER
Patient is currently in the office for a visit and will address these issues with Dr. Art Montgomery.

## 2023-11-09 PROBLEM — R55 VASOVAGAL REACTION: Status: ACTIVE | Noted: 2023-11-09

## 2024-01-08 RX ORDER — BUPROPION HYDROCHLORIDE 150 MG/1
150 TABLET ORAL EVERY MORNING
Qty: 30 TABLET | Refills: 5 | Status: SHIPPED | OUTPATIENT
Start: 2024-01-08

## 2024-01-17 ENCOUNTER — PATIENT MESSAGE (OUTPATIENT)
Dept: FAMILY MEDICINE CLINIC | Age: 44
End: 2024-01-17

## 2024-01-17 DIAGNOSIS — M25.519 CHRONIC SHOULDER PAIN, UNSPECIFIED LATERALITY: Primary | ICD-10-CM

## 2024-01-17 DIAGNOSIS — G89.29 CHRONIC SHOULDER PAIN, UNSPECIFIED LATERALITY: Primary | ICD-10-CM

## 2024-01-17 RX ORDER — TRAMADOL HYDROCHLORIDE 50 MG/1
50 TABLET ORAL EVERY 4 HOURS PRN
Qty: 42 TABLET | Refills: 0 | Status: SHIPPED | OUTPATIENT
Start: 2024-01-17 | End: 2024-01-24

## 2024-01-17 NOTE — TELEPHONE ENCOUNTER
LOV 09/06/2023    Saw Dr. Benedicto Locke for her shoulder.    Patient would need appt with our office to discuss possible pain medication, but would this be something you would do or does OIO need to be prescribing her?

## 2024-01-17 NOTE — TELEPHONE ENCOUNTER
From: Joceline Khan  To: Av Gregory  Sent: 1/17/2024 12:32 PM EST  Subject: Non urgent     I was wondering Av knows I have issues with my shoulder I’ve been reaching out to dr. Locke and he was in surgery yesterday was wondering if he can prescribe me some pain medicine for it

## 2024-01-25 ENCOUNTER — HOSPITAL ENCOUNTER (EMERGENCY)
Age: 44
Discharge: HOME | End: 2024-01-25
Payer: COMMERCIAL

## 2024-01-25 VITALS
TEMPERATURE: 98.1 F | HEIGHT: 62 IN | WEIGHT: 120 LBS | OXYGEN SATURATION: 96 % | DIASTOLIC BLOOD PRESSURE: 63 MMHG | HEART RATE: 86 BPM | BODY MASS INDEX: 22.08 KG/M2 | SYSTOLIC BLOOD PRESSURE: 91 MMHG | RESPIRATION RATE: 16 BRPM

## 2024-01-25 DIAGNOSIS — S39.012A STRAIN OF LUMBAR REGION, INITIAL ENCOUNTER: ICD-10-CM

## 2024-01-25 DIAGNOSIS — G89.29 ACUTE EXACERBATION OF CHRONIC LOW BACK PAIN: Primary | ICD-10-CM

## 2024-01-25 DIAGNOSIS — M54.50 ACUTE EXACERBATION OF CHRONIC LOW BACK PAIN: Primary | ICD-10-CM

## 2024-01-25 PROCEDURE — 99213 OFFICE O/P EST LOW 20 MIN: CPT

## 2024-01-25 RX ORDER — CYCLOBENZAPRINE HCL 10 MG
10 TABLET ORAL 3 TIMES DAILY PRN
Qty: 21 TABLET | Refills: 0 | Status: SHIPPED | OUTPATIENT
Start: 2024-01-25 | End: 2024-02-04

## 2024-01-25 RX ORDER — PREDNISONE 20 MG/1
20 TABLET ORAL 2 TIMES DAILY
Qty: 10 TABLET | Refills: 0 | Status: SHIPPED | OUTPATIENT
Start: 2024-01-25 | End: 2024-01-30

## 2024-01-25 RX ORDER — DICLOFENAC SODIUM 75 MG/1
75 TABLET, DELAYED RELEASE ORAL 2 TIMES DAILY
Qty: 60 TABLET | Refills: 3 | Status: SHIPPED | OUTPATIENT
Start: 2024-01-25

## 2024-01-25 ASSESSMENT — PAIN DESCRIPTION - PAIN TYPE: TYPE: ACUTE PAIN

## 2024-01-25 ASSESSMENT — PAIN DESCRIPTION - FREQUENCY: FREQUENCY: INTERMITTENT

## 2024-01-25 ASSESSMENT — ENCOUNTER SYMPTOMS: BACK PAIN: 1

## 2024-01-25 ASSESSMENT — PAIN - FUNCTIONAL ASSESSMENT
PAIN_FUNCTIONAL_ASSESSMENT: PREVENTS OR INTERFERES SOME ACTIVE ACTIVITIES AND ADLS
PAIN_FUNCTIONAL_ASSESSMENT: 0-10

## 2024-01-25 ASSESSMENT — PAIN DESCRIPTION - ORIENTATION: ORIENTATION: LOWER

## 2024-01-25 ASSESSMENT — PAIN DESCRIPTION - DESCRIPTORS: DESCRIPTORS: ACHING

## 2024-01-25 ASSESSMENT — PAIN SCALES - GENERAL: PAINLEVEL_OUTOF10: 5

## 2024-01-25 ASSESSMENT — PAIN DESCRIPTION - ONSET: ONSET: GRADUAL

## 2024-01-25 ASSESSMENT — PAIN DESCRIPTION - LOCATION: LOCATION: BACK

## 2024-01-25 NOTE — ED TRIAGE NOTES
Pt to [facility] ambulatory with low back pain.  Pain started on Sunday.  Pt does not know what happened to her back.  No injury to her back.

## 2024-01-25 NOTE — ED PROVIDER NOTES
[facility] URGENT CARE  Urgent Care Encounter       CHIEF COMPLAINT       Chief Complaint   Patient presents with    Back Pain     low       Nurses Notes reviewed and I agree except as noted in the HPI.  HISTORY OF PRESENT ILLNESS   Joceline Khan is a 43 y.o. female who presents with concerns of lower back pain. 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 Reports no injury or call, believe she has overworked her back muscles and caused a strain. Reports pain improves while sitting and using heat, no numbness or tingling in lower or upper extremities. Reports chronic back pain that is managed with PCP, has been taking Toradol for pain management. Patient declines xray imaging.      HPI    REVIEW OF SYSTEMS     Review of Systems   Constitutional:  Negative for activity change and fever.   Musculoskeletal:  Positive for back pain (lumbar, midline).   Neurological:  Negative for dizziness, syncope and light-headedness.   All other systems reviewed and are negative.      PAST MEDICAL HISTORY         Diagnosis Date    BRCA1 negative     BRCA2 negative     History of therapeutic radiation     2022    Invasive ductal carcinoma of breast, female, left (HCC) 09/07/2022    with lobular features    Kidney stone     2005 with pregnancy    PONV (postoperative nausea and vomiting)     Prolonged emergence from general anesthesia     \"During Hysterectomy- bradycardia- meds to increase heart rate.\"       SURGICALHISTORY     Patient  has a past surgical history that includes hernia repair (1998); Tubal ligation; Colonoscopy (03/25/2016); Dilation and curettage of uterus (N/A, 09/13/2017); hysteroscopy (04/18/2018); Endometrial ablation (04/18/2018); pr hysteroscopy endometrial ablation (N/A, 04/18/2018); HYSTERECTOMY ABDOMINAL (N/A, 03/13/2019); cervical fusion (N/A, 12/08/2021); Hysterectomy (03/13/2019); Ovary removal (Bilateral); US BREAST BIOPSY W LOC DEVICE EACH ADDL LESION LEFT (Left,      PROCEDURES:  None    FINAL IMPRESSION      1. Acute exacerbation of chronic low back pain    2. Strain of lumbar region, initial encounter          DISPOSITION/ PLAN     Patient seen and evaluated for the above symptoms. Exam suggestive of acute exacerbation of chronic back pain and stain of lumbar region.    The patient will be instructed to continue taking anti-inflammatory medication, and given a Rx for short course of muscle relaxer's.  Rest,Ice 15-20 minutes TID x 2 days,Then Heat 15-20 minutes TID as needed The patient will be given back stretching exercises, and instructed to follow up with their PCP or community clinic for further evaluation. The patient should return to the ED if the back pain worsens, or if they experience incontinence, numbness or tingling in the legs, or inability to ambulate.  The patient is in agreement with this plan.The patient tolerated their visit well.  The patient and / or the family were informed of the results of any tests, a time was given to answer questions, a plan was proposed and they agreed with plan. Follow up with PCP ×2-3 days for reevaluation and further management of care        PATIENT REFERRED TO:  West Henderson MD  [address] / [facility] [address]      DISCHARGE MEDICATIONS:  Discharge Medication List as of 1/25/2024  9:27 AM        START taking these medications    Details   predniSONE (DELTASONE) 20 MG tablet Take 1 tablet by mouth 2 times daily for 5 days, Disp-10 tablet, R-0Normal      diclofenac (VOLTAREN) 75 MG EC tablet Take 1 tablet by mouth 2 times daily, Disp-60 tablet, R-3Normal             Discharge Medication List as of 1/25/2024  9:27 AM        STOP taking these medications       HYDROcodone-acetaminophen (NORCO) 5-325 MG per tablet Comments:   Reason for Stopping:         gabapentin (NEURONTIN) 100 MG capsule Comments:   Reason for Stopping:               Discharge Medication List as of 1/25/2024  9:27 AM        CONTINUE these medications

## 2024-03-07 ENCOUNTER — PATIENT MESSAGE (OUTPATIENT)
Dept: FAMILY MEDICINE CLINIC | Age: 44
End: 2024-03-07

## 2024-03-07 DIAGNOSIS — G47.00 INSOMNIA, UNSPECIFIED TYPE: Primary | ICD-10-CM

## 2024-03-07 RX ORDER — ZOLPIDEM TARTRATE 5 MG/1
5 TABLET ORAL NIGHTLY PRN
Qty: 30 TABLET | Refills: 0 | Status: SHIPPED | OUTPATIENT
Start: 2024-03-07 | End: 2024-04-06

## 2024-03-07 NOTE — TELEPHONE ENCOUNTER
From: Joceline Khan  To: Av Gregory  Sent: 3/7/2024 6:28 AM EST  Subject: Non urgent     I was wondering if I would have to come in to change my antidepressant I’m still taking Effexor because it really helps with hot flashes I’m going through a lot with my  and I can’t sleep and have no energy to do anything eating a lot more because of my anxiety so if you decide to give me something that I can take with my Effexor I don’t want anything to increase my weight

## 2024-04-09 ENCOUNTER — OFFICE VISIT (OUTPATIENT)
Dept: FAMILY MEDICINE CLINIC | Age: 44
End: 2024-04-09
Payer: COMMERCIAL

## 2024-04-09 VITALS
BODY MASS INDEX: 21.97 KG/M2 | RESPIRATION RATE: 16 BRPM | HEIGHT: 63 IN | DIASTOLIC BLOOD PRESSURE: 74 MMHG | HEART RATE: 66 BPM | SYSTOLIC BLOOD PRESSURE: 104 MMHG | WEIGHT: 124 LBS

## 2024-04-09 DIAGNOSIS — G47.00 INSOMNIA, UNSPECIFIED TYPE: ICD-10-CM

## 2024-04-09 DIAGNOSIS — M25.811 IMPINGEMENT OF RIGHT SHOULDER: ICD-10-CM

## 2024-04-09 DIAGNOSIS — F43.23 ADJUSTMENT DISORDER WITH MIXED ANXIETY AND DEPRESSED MOOD: Primary | ICD-10-CM

## 2024-04-09 PROCEDURE — 99213 OFFICE O/P EST LOW 20 MIN: CPT | Performed by: FAMILY MEDICINE

## 2024-04-09 RX ORDER — ZOLPIDEM TARTRATE 5 MG/1
5 TABLET ORAL NIGHTLY PRN
Qty: 30 TABLET | Refills: 2 | Status: SHIPPED | OUTPATIENT
Start: 2024-04-09 | End: 2024-07-08

## 2024-04-09 RX ORDER — PREDNISONE 10 MG/1
TABLET ORAL
Qty: 30 TABLET | Refills: 0 | Status: SHIPPED | OUTPATIENT
Start: 2024-04-09 | End: 2024-04-19

## 2024-04-09 ASSESSMENT — PATIENT HEALTH QUESTIONNAIRE - PHQ9
SUM OF ALL RESPONSES TO PHQ QUESTIONS 1-9: 12
5. POOR APPETITE OR OVEREATING: SEVERAL DAYS
9. THOUGHTS THAT YOU WOULD BE BETTER OFF DEAD, OR OF HURTING YOURSELF: NOT AT ALL
3. TROUBLE FALLING OR STAYING ASLEEP: NEARLY EVERY DAY
3. TROUBLE FALLING OR STAYING ASLEEP: MORE THAN HALF THE DAYS
SUM OF ALL RESPONSES TO PHQ QUESTIONS 1-9: 8
4. FEELING TIRED OR HAVING LITTLE ENERGY: NEARLY EVERY DAY
6. FEELING BAD ABOUT YOURSELF - OR THAT YOU ARE A FAILURE OR HAVE LET YOURSELF OR YOUR FAMILY DOWN: SEVERAL DAYS
SUM OF ALL RESPONSES TO PHQ QUESTIONS 1-9: 12
7. TROUBLE CONCENTRATING ON THINGS, SUCH AS READING THE NEWSPAPER OR WATCHING TELEVISION: MORE THAN HALF THE DAYS
SUM OF ALL RESPONSES TO PHQ QUESTIONS 1-9: 12
8. MOVING OR SPEAKING SO SLOWLY THAT OTHER PEOPLE COULD HAVE NOTICED. OR THE OPPOSITE, BEING SO FIGETY OR RESTLESS THAT YOU HAVE BEEN MOVING AROUND A LOT MORE THAN USUAL: NOT AT ALL
4. FEELING TIRED OR HAVING LITTLE ENERGY: MORE THAN HALF THE DAYS
9. THOUGHTS THAT YOU WOULD BE BETTER OFF DEAD, OR OF HURTING YOURSELF: NOT AT ALL
6. FEELING BAD ABOUT YOURSELF - OR THAT YOU ARE A FAILURE OR HAVE LET YOURSELF OR YOUR FAMILY DOWN: SEVERAL DAYS
2. FEELING DOWN, DEPRESSED OR HOPELESS: SEVERAL DAYS
10. IF YOU CHECKED OFF ANY PROBLEMS, HOW DIFFICULT HAVE THESE PROBLEMS MADE IT FOR YOU TO DO YOUR WORK, TAKE CARE OF THINGS AT HOME, OR GET ALONG WITH OTHER PEOPLE: NOT DIFFICULT AT ALL
SUM OF ALL RESPONSES TO PHQ QUESTIONS 1-9: 8
SUM OF ALL RESPONSES TO PHQ9 QUESTIONS 1 & 2: 3
10. IF YOU CHECKED OFF ANY PROBLEMS, HOW DIFFICULT HAVE THESE PROBLEMS MADE IT FOR YOU TO DO YOUR WORK, TAKE CARE OF THINGS AT HOME, OR GET ALONG WITH OTHER PEOPLE: SOMEWHAT DIFFICULT
7. TROUBLE CONCENTRATING ON THINGS, SUCH AS READING THE NEWSPAPER OR WATCHING TELEVISION: SEVERAL DAYS
1. LITTLE INTEREST OR PLEASURE IN DOING THINGS: MORE THAN HALF THE DAYS
SUM OF ALL RESPONSES TO PHQ QUESTIONS 1-9: 12
8. MOVING OR SPEAKING SO SLOWLY THAT OTHER PEOPLE COULD HAVE NOTICED. OR THE OPPOSITE, BEING SO FIGETY OR RESTLESS THAT YOU HAVE BEEN MOVING AROUND A LOT MORE THAN USUAL: NOT AT ALL
5. POOR APPETITE OR OVEREATING: SEVERAL DAYS

## 2024-04-18 ENCOUNTER — TELEPHONE (OUTPATIENT)
Dept: ONCOLOGY | Age: 44
End: 2024-04-18

## 2024-04-18 DIAGNOSIS — Z79.811 ENCOUNTER FOR MONITORING AROMATASE INHIBITOR THERAPY: Primary | ICD-10-CM

## 2024-04-18 DIAGNOSIS — C50.412 MALIGNANT NEOPLASM OF UPPER-OUTER QUADRANT OF LEFT BREAST IN FEMALE, ESTROGEN RECEPTOR POSITIVE (HCC): ICD-10-CM

## 2024-04-18 DIAGNOSIS — Z51.81 ENCOUNTER FOR MONITORING AROMATASE INHIBITOR THERAPY: Primary | ICD-10-CM

## 2024-04-18 DIAGNOSIS — Z17.0 MALIGNANT NEOPLASM OF UPPER-OUTER QUADRANT OF LEFT BREAST IN FEMALE, ESTROGEN RECEPTOR POSITIVE (HCC): ICD-10-CM

## 2024-04-18 NOTE — TELEPHONE ENCOUNTER
Received call from registration [facility]- she said that the patient is coming in tomorrow for a Dexa scan and Dr. Keenan had ordered that. Since she is no longer here, they need a new order. Could you please place one? Thanks!

## 2024-04-19 ENCOUNTER — TELEPHONE (OUTPATIENT)
Dept: ONCOLOGY | Age: 44
End: 2024-04-19

## 2024-04-19 NOTE — TELEPHONE ENCOUNTER
CALLED PATIENT TO GET HER SET UP WITH A FOLLOW UP APPT TO GO OVER THE DEXA SCAN SHE IS DOING TODAY. PATIENT STATED HER  IS UNDERGOING TREATMENT IN [place] SHE WILL HAVE TO CALL BACK TO R/S HER APPT AT A LATER DATE.

## 2024-04-22 NOTE — TELEPHONE ENCOUNTER
Patient no showed to Dexa scan appointment. Patient also cancelled appointment with us and said she would call us back to reschedule because she is waiting on getting a \"medical card.\" This was documented in the appointment tab.

## 2024-04-23 ENCOUNTER — PATIENT MESSAGE (OUTPATIENT)
Dept: FAMILY MEDICINE CLINIC | Age: 44
End: 2024-04-23

## 2024-04-23 NOTE — TELEPHONE ENCOUNTER
From: Joceline Khan  To: Dr. West Henderson  Sent: 4/23/2024 9:19 AM EDT  Subject: Non urgent     I was just wondering because of the way I feel I still have no energy it feels like and depressed and wanting to bite people head off just wondering if I can switch that antidepressant right now

## 2024-04-24 RX ORDER — FLUOXETINE 10 MG/1
10 CAPSULE ORAL DAILY
Qty: 30 CAPSULE | Refills: 2 | Status: SHIPPED | OUTPATIENT
Start: 2024-04-24

## 2024-04-24 NOTE — TELEPHONE ENCOUNTER
Fluoxetine 10mg daily 30/2rf was escribed to [facility] per Dr. Henderson's order. [facility]

## 2024-04-29 DIAGNOSIS — M25.811 IMPINGEMENT OF RIGHT SHOULDER: ICD-10-CM

## 2024-04-30 RX ORDER — HYDROCODONE BITARTRATE AND ACETAMINOPHEN 5; 325 MG/1; MG/1
1 TABLET ORAL EVERY 6 HOURS PRN
Qty: 28 TABLET | Refills: 0 | Status: SHIPPED | OUTPATIENT
Start: 2024-04-30 | End: 2024-05-07

## 2024-05-13 ENCOUNTER — PATIENT MESSAGE (OUTPATIENT)
Dept: FAMILY MEDICINE CLINIC | Age: 44
End: 2024-05-13

## 2024-05-13 RX ORDER — ESCITALOPRAM OXALATE 10 MG/1
10 TABLET ORAL DAILY
Qty: 30 TABLET | Refills: 5 | Status: SHIPPED | OUTPATIENT
Start: 2024-05-13

## 2024-05-13 NOTE — TELEPHONE ENCOUNTER
Lexapro 10mg daily 30/5rf was escribed to [facility] [address] per 's order. PT informed to stop Prozac and begin Lexapro.

## 2024-05-13 NOTE — TELEPHONE ENCOUNTER
From: Joceline Khan  To: Dr. West Henderson  Sent: 5/13/2024 5:57 AM EDT  Subject: Non urgent     I’ve been taking Prozac for a couple weeks now and I noticed my legs being tingly should I stop taking the Prozac and if you decide to take me off can I try lexapro again it done really good for me

## 2024-05-23 ENCOUNTER — PATIENT MESSAGE (OUTPATIENT)
Dept: FAMILY MEDICINE CLINIC | Age: 44
End: 2024-05-23

## 2024-05-23 DIAGNOSIS — M25.811 IMPINGEMENT OF RIGHT SHOULDER: ICD-10-CM

## 2024-05-23 RX ORDER — HYDROCODONE BITARTRATE AND ACETAMINOPHEN 5; 325 MG/1; MG/1
1 TABLET ORAL EVERY 6 HOURS PRN
Qty: 28 TABLET | Refills: 0 | Status: SHIPPED | OUTPATIENT
Start: 2024-05-23 | End: 2024-05-30

## 2024-05-23 NOTE — TELEPHONE ENCOUNTER
From: Joceline Khan  To: Dr. West Henderson  Sent: 5/23/2024 10:24 AM EDT  Subject: Non urgent     Can you prescribe me some more pain meds

## 2024-06-20 ENCOUNTER — PATIENT MESSAGE (OUTPATIENT)
Dept: FAMILY MEDICINE CLINIC | Age: 44
End: 2024-06-20

## 2024-06-20 DIAGNOSIS — M25.811 IMPINGEMENT OF RIGHT SHOULDER: ICD-10-CM

## 2024-06-20 RX ORDER — HYDROCODONE BITARTRATE AND ACETAMINOPHEN 5; 325 MG/1; MG/1
1 TABLET ORAL EVERY 6 HOURS PRN
Qty: 28 TABLET | Refills: 0 | Status: SHIPPED | OUTPATIENT
Start: 2024-06-20 | End: 2024-06-27

## 2024-06-20 NOTE — TELEPHONE ENCOUNTER
LOV 04/09/2024    Last ordered 05/23/2024 disp 28/0    Informed patient she needs a 3 month follow up appt in a few weeks.

## 2024-06-20 NOTE — TELEPHONE ENCOUNTER
From: Joceline Khan  To: Dr. West Henderson  Sent: 6/20/2024 10:04 AM EDT  Subject: Non urgent     Can you prescribe me some more pain medicine thank you

## 2024-08-29 ENCOUNTER — HOSPITAL ENCOUNTER (OUTPATIENT)
Dept: WOMENS IMAGING | Age: 44
Discharge: HOME | End: 2024-08-29
Payer: COMMERCIAL

## 2024-08-29 VITALS — HEIGHT: 62 IN | WEIGHT: 130 LBS | BODY MASS INDEX: 23.92 KG/M2

## 2024-08-29 DIAGNOSIS — Z12.31 VISIT FOR SCREENING MAMMOGRAM: ICD-10-CM

## 2024-08-29 PROCEDURE — 77063 BREAST TOMOSYNTHESIS BI: CPT

## 2024-09-30 ENCOUNTER — OFFICE VISIT (OUTPATIENT)
Dept: FAMILY MEDICINE CLINIC | Age: 44
End: 2024-09-30
Payer: COMMERCIAL

## 2024-09-30 VITALS
BODY MASS INDEX: 24.66 KG/M2 | OXYGEN SATURATION: 92 % | WEIGHT: 134 LBS | SYSTOLIC BLOOD PRESSURE: 112 MMHG | DIASTOLIC BLOOD PRESSURE: 64 MMHG | RESPIRATION RATE: 16 BRPM | TEMPERATURE: 98.1 F | HEART RATE: 76 BPM | HEIGHT: 62 IN

## 2024-09-30 DIAGNOSIS — G47.00 INSOMNIA, UNSPECIFIED TYPE: ICD-10-CM

## 2024-09-30 DIAGNOSIS — Z13.220 SCREENING CHOLESTEROL LEVEL: ICD-10-CM

## 2024-09-30 DIAGNOSIS — F41.8 SITUATIONAL ANXIETY: ICD-10-CM

## 2024-09-30 DIAGNOSIS — G89.29 CHRONIC RIGHT SHOULDER PAIN: ICD-10-CM

## 2024-09-30 DIAGNOSIS — Z17.0 MALIGNANT NEOPLASM OF BREAST IN FEMALE, ESTROGEN RECEPTOR POSITIVE, UNSPECIFIED LATERALITY, UNSPECIFIED SITE OF BREAST (HCC): ICD-10-CM

## 2024-09-30 DIAGNOSIS — C50.919 MALIGNANT NEOPLASM OF BREAST IN FEMALE, ESTROGEN RECEPTOR POSITIVE, UNSPECIFIED LATERALITY, UNSPECIFIED SITE OF BREAST (HCC): ICD-10-CM

## 2024-09-30 DIAGNOSIS — F43.21 SITUATIONAL DEPRESSION: ICD-10-CM

## 2024-09-30 DIAGNOSIS — M25.511 CHRONIC RIGHT SHOULDER PAIN: ICD-10-CM

## 2024-09-30 DIAGNOSIS — Z00.00 WELCOME TO MEDICARE PREVENTIVE VISIT: Primary | ICD-10-CM

## 2024-09-30 PROCEDURE — 99213 OFFICE O/P EST LOW 20 MIN: CPT | Performed by: NURSE PRACTITIONER

## 2024-09-30 PROCEDURE — G0402 INITIAL PREVENTIVE EXAM: HCPCS | Performed by: NURSE PRACTITIONER

## 2024-09-30 RX ORDER — ARIPIPRAZOLE 5 MG/1
5 TABLET ORAL DAILY
Qty: 30 TABLET | Refills: 3 | Status: SHIPPED | OUTPATIENT
Start: 2024-09-30

## 2024-09-30 RX ORDER — ARIPIPRAZOLE 2 MG/1
2 TABLET ORAL DAILY
Qty: 14 TABLET | Refills: 0 | Status: SHIPPED | OUTPATIENT
Start: 2024-09-30

## 2024-09-30 SDOH — ECONOMIC STABILITY: FOOD INSECURITY: WITHIN THE PAST 12 MONTHS, THE FOOD YOU BOUGHT JUST DIDN'T LAST AND YOU DIDN'T HAVE MONEY TO GET MORE.: NEVER TRUE

## 2024-09-30 SDOH — ECONOMIC STABILITY: FOOD INSECURITY: WITHIN THE PAST 12 MONTHS, YOU WORRIED THAT YOUR FOOD WOULD RUN OUT BEFORE YOU GOT MONEY TO BUY MORE.: NEVER TRUE

## 2024-09-30 SDOH — ECONOMIC STABILITY: INCOME INSECURITY: HOW HARD IS IT FOR YOU TO PAY FOR THE VERY BASICS LIKE FOOD, HOUSING, MEDICAL CARE, AND HEATING?: NOT HARD AT ALL

## 2024-09-30 ASSESSMENT — PATIENT HEALTH QUESTIONNAIRE - PHQ9
7. TROUBLE CONCENTRATING ON THINGS, SUCH AS READING THE NEWSPAPER OR WATCHING TELEVISION: NOT AT ALL
SUM OF ALL RESPONSES TO PHQ9 QUESTIONS 1 & 2: 5
SUM OF ALL RESPONSES TO PHQ QUESTIONS 1-9: 14
SUM OF ALL RESPONSES TO PHQ QUESTIONS 1-9: 14
6. FEELING BAD ABOUT YOURSELF - OR THAT YOU ARE A FAILURE OR HAVE LET YOURSELF OR YOUR FAMILY DOWN: NOT AT ALL
10. IF YOU CHECKED OFF ANY PROBLEMS, HOW DIFFICULT HAVE THESE PROBLEMS MADE IT FOR YOU TO DO YOUR WORK, TAKE CARE OF THINGS AT HOME, OR GET ALONG WITH OTHER PEOPLE: SOMEWHAT DIFFICULT
8. MOVING OR SPEAKING SO SLOWLY THAT OTHER PEOPLE COULD HAVE NOTICED. OR THE OPPOSITE, BEING SO FIGETY OR RESTLESS THAT YOU HAVE BEEN MOVING AROUND A LOT MORE THAN USUAL: NOT AT ALL
SUM OF ALL RESPONSES TO PHQ QUESTIONS 1-9: 14
2. FEELING DOWN, DEPRESSED OR HOPELESS: MORE THAN HALF THE DAYS
1. LITTLE INTEREST OR PLEASURE IN DOING THINGS: NEARLY EVERY DAY
4. FEELING TIRED OR HAVING LITTLE ENERGY: NEARLY EVERY DAY
SUM OF ALL RESPONSES TO PHQ QUESTIONS 1-9: 14
9. THOUGHTS THAT YOU WOULD BE BETTER OFF DEAD, OR OF HURTING YOURSELF: NOT AT ALL
5. POOR APPETITE OR OVEREATING: NEARLY EVERY DAY
3. TROUBLE FALLING OR STAYING ASLEEP: NEARLY EVERY DAY

## 2024-09-30 ASSESSMENT — LIFESTYLE VARIABLES
HAVE YOU OR SOMEONE ELSE BEEN INJURED AS A RESULT OF YOUR DRINKING: NO
HOW OFTEN DURING THE LAST YEAR HAVE YOU NEEDED AN ALCOHOLIC DRINK FIRST THING IN THE MORNING TO GET YOURSELF GOING AFTER A NIGHT OF HEAVY DRINKING: NEVER
HOW OFTEN DURING THE LAST YEAR HAVE YOU BEEN UNABLE TO REMEMBER WHAT HAPPENED THE NIGHT BEFORE BECAUSE YOU HAD BEEN DRINKING: NEVER
HOW OFTEN DURING THE LAST YEAR HAVE YOU FAILED TO DO WHAT WAS NORMALLY EXPECTED FROM YOU BECAUSE OF DRINKING: NEVER
HOW OFTEN DO YOU HAVE A DRINK CONTAINING ALCOHOL: 2-4 TIMES A MONTH
HOW OFTEN DURING THE LAST YEAR HAVE YOU HAD A FEELING OF GUILT OR REMORSE AFTER DRINKING: NEVER
HOW OFTEN DURING THE LAST YEAR HAVE YOU FOUND THAT YOU WERE NOT ABLE TO STOP DRINKING ONCE YOU HAD STARTED: NEVER
HAS A RELATIVE, FRIEND, DOCTOR, OR ANOTHER HEALTH PROFESSIONAL EXPRESSED CONCERN ABOUT YOUR DRINKING OR SUGGESTED YOU CUT DOWN: NO
HOW MANY STANDARD DRINKS CONTAINING ALCOHOL DO YOU HAVE ON A TYPICAL DAY: 3 OR 4

## 2024-09-30 NOTE — PROGRESS NOTES
1 tablet by mouth 2 times daily as needed) 60 tablet 3    anastrozole (ARIMIDEX) 1 MG tablet Take 1 tablet by mouth daily 90 tablet 3    venlafaxine (EFFEXOR XR) 75 MG extended release capsule Take 2 capsules by mouth daily 180 capsule 3    [DISCONTINUED] escitalopram (LEXAPRO) 10 MG tablet Take 1 tablet by mouth daily 30 tablet 5     No facility-administered encounter medications on file as of 9/30/2024.     Orders Placed This Encounter   Procedures    Comprehensive Metabolic Panel     Standing Status:   Future     Standing Expiration Date:   9/30/2025    Lipid Panel     Standing Status:   Future     Standing Expiration Date:   9/30/2025     Order Specific Question:   Is Patient Fasting?/# of Hours     Answer:   yes/12    T4, Free     Standing Status:   Future     Standing Expiration Date:   9/30/2025    TSH     Standing Status:   Future     Standing Expiration Date:   9/30/2025     CMP FLP TSH free T4.  Abilify 2 mg daily for 2 weeks then increase to 5 mg.  Follow-up 1 month

## 2024-10-30 ENCOUNTER — OFFICE VISIT (OUTPATIENT)
Dept: FAMILY MEDICINE CLINIC | Age: 44
End: 2024-10-30
Payer: COMMERCIAL

## 2024-10-30 VITALS
RESPIRATION RATE: 16 BRPM | SYSTOLIC BLOOD PRESSURE: 108 MMHG | DIASTOLIC BLOOD PRESSURE: 64 MMHG | TEMPERATURE: 98.3 F | BODY MASS INDEX: 25.24 KG/M2 | HEART RATE: 85 BPM | WEIGHT: 138 LBS | OXYGEN SATURATION: 98 %

## 2024-10-30 DIAGNOSIS — F43.21 SITUATIONAL DEPRESSION: Primary | ICD-10-CM

## 2024-10-30 PROCEDURE — 99213 OFFICE O/P EST LOW 20 MIN: CPT | Performed by: NURSE PRACTITIONER

## 2024-10-30 RX ORDER — ARIPIPRAZOLE 10 MG/1
10 TABLET ORAL DAILY
Qty: 30 TABLET | Refills: 2 | Status: SHIPPED | OUTPATIENT
Start: 2024-10-30

## 2024-10-30 ASSESSMENT — ENCOUNTER SYMPTOMS
WHEEZING: 0
SHORTNESS OF BREATH: 0
ABDOMINAL PAIN: 0
BACK PAIN: 0
CHEST TIGHTNESS: 0
ABDOMINAL DISTENTION: 0
COUGH: 0

## 2024-10-30 NOTE — PROGRESS NOTES
Father     Lung Cancer Father     No Known Problems Sister     No Known Problems Sister     No Known Problems Sister     No Known Problems Brother     No Known Problems Brother     No Known Problems Brother     Leukemia Maternal Grandmother     Lung Cancer Maternal Aunt         age >50    Breast Cancer Paternal Aunt         age >50    Colon Cancer Paternal Uncle         age 60s    Lung Cancer Paternal Uncle         age >50    Asthma Neg Hx      Social History     Tobacco Use    Smoking status: Never     Passive exposure: Never    Smokeless tobacco: Never   Substance Use Topics    Alcohol use: Yes     Comment: occasional      Current Outpatient Medications   Medication Sig Dispense Refill    ARIPiprazole (ABILIFY) 10 MG tablet Take 1 tablet by mouth daily 30 tablet 2    tiZANidine (ZANAFLEX) 4 MG tablet Take 1 tablet by mouth every 8 hours as needed (Muscle spasm) 90 tablet 1    diclofenac (VOLTAREN) 75 MG EC tablet Take 1 tablet by mouth 2 times daily (Patient taking differently: Take 1 tablet by mouth 2 times daily as needed) 60 tablet 3    venlafaxine (EFFEXOR XR) 75 MG extended release capsule Take 2 capsules by mouth daily 180 capsule 3    anastrozole (ARIMIDEX) 1 MG tablet Take 1 tablet by mouth daily 90 tablet 3     No current facility-administered medications for this visit.     Allergies   Allergen Reactions    Amoxicillin Hives     Health Maintenance   Topic Date Due    COVID-19 Vaccine (1) Never done    Pneumococcal 0-64 years Vaccine (1 of 2 - PCV) Never done    Varicella vaccine (1 of 2 - 13+ 2-dose series) Never done    HIV screen  Never done    Hepatitis C screen  Never done    Hepatitis B vaccine (1 of 3 - 19+ 3-dose series) Never done    DTaP/Tdap/Td vaccine (1 - Tdap) Never done    Shingles vaccine (1 of 2) Never done    Flu vaccine (1) Never done    Breast cancer screen  08/29/2025    Depression Monitoring  09/30/2025    Lipids  09/07/2028    Annual Wellness Visit (Medicare Advantage)  Completed

## 2024-11-06 ENCOUNTER — PATIENT MESSAGE (OUTPATIENT)
Dept: FAMILY MEDICINE CLINIC | Age: 44
End: 2024-11-06

## 2024-11-07 RX ORDER — ESCITALOPRAM OXALATE 10 MG/1
10 TABLET ORAL DAILY
Qty: 30 TABLET | Refills: 2 | Status: SHIPPED | OUTPATIENT
Start: 2024-11-07

## 2024-11-07 NOTE — TELEPHONE ENCOUNTER
Pt informed ok to stop Abilify and start Lexapro. Lexapro 10mg daily 30/2rf [facility] per [facility]. Henderson's order.

## 2024-11-21 DIAGNOSIS — Z17.0 MALIGNANT NEOPLASM OF UPPER-OUTER QUADRANT OF LEFT BREAST IN FEMALE, ESTROGEN RECEPTOR POSITIVE (HCC): ICD-10-CM

## 2024-11-21 DIAGNOSIS — C50.412 MALIGNANT NEOPLASM OF UPPER-OUTER QUADRANT OF LEFT BREAST IN FEMALE, ESTROGEN RECEPTOR POSITIVE (HCC): ICD-10-CM

## 2024-11-21 DIAGNOSIS — Z51.81 ENCOUNTER FOR MONITORING AROMATASE INHIBITOR THERAPY: ICD-10-CM

## 2024-11-21 DIAGNOSIS — F32.9 REACTIVE DEPRESSION: ICD-10-CM

## 2024-11-21 DIAGNOSIS — Z79.811 ENCOUNTER FOR MONITORING AROMATASE INHIBITOR THERAPY: ICD-10-CM

## 2024-11-21 RX ORDER — VENLAFAXINE HYDROCHLORIDE 75 MG/1
150 CAPSULE, EXTENDED RELEASE ORAL DAILY
Qty: 180 CAPSULE | Refills: 3 | OUTPATIENT
Start: 2024-11-21

## 2024-11-25 ENCOUNTER — TELEPHONE (OUTPATIENT)
Dept: FAMILY MEDICINE CLINIC | Age: 44
End: 2024-11-25

## 2024-11-25 NOTE — TELEPHONE ENCOUNTER
Called for 30 day visit update; no concerns at this time other than concerns of her  being on hospice for stage four cancer. Faxing current care plan

## 2024-11-26 ENCOUNTER — PATIENT MESSAGE (OUTPATIENT)
Dept: FAMILY MEDICINE CLINIC | Age: 44
End: 2024-11-26

## 2024-11-26 RX ORDER — ESCITALOPRAM OXALATE 10 MG/1
10 TABLET ORAL DAILY
Qty: 30 TABLET | Refills: 2 | Status: SHIPPED | OUTPATIENT
Start: 2024-11-26

## 2024-11-26 RX ORDER — BUPROPION HYDROCHLORIDE 150 MG/1
150 TABLET ORAL EVERY MORNING
Qty: 30 TABLET | Refills: 3 | Status: SHIPPED | OUTPATIENT
Start: 2024-11-26

## 2024-11-26 NOTE — TELEPHONE ENCOUNTER
Wellbutrin and Lexapro sent in.  Recommend on the Effexor just to take every other day for a week and switch over to Lexapro and Wellbutrin.

## 2024-12-30 ENCOUNTER — OFFICE VISIT (OUTPATIENT)
Dept: FAMILY MEDICINE CLINIC | Age: 44
End: 2024-12-30

## 2024-12-30 VITALS
WEIGHT: 138 LBS | BODY MASS INDEX: 25.24 KG/M2 | TEMPERATURE: 98.5 F | HEART RATE: 68 BPM | OXYGEN SATURATION: 100 % | SYSTOLIC BLOOD PRESSURE: 108 MMHG | DIASTOLIC BLOOD PRESSURE: 80 MMHG

## 2024-12-30 DIAGNOSIS — J02.9 ACUTE PHARYNGITIS, UNSPECIFIED ETIOLOGY: Primary | ICD-10-CM

## 2024-12-30 DIAGNOSIS — H65.01 RIGHT ACUTE SEROUS OTITIS MEDIA, RECURRENCE NOT SPECIFIED: ICD-10-CM

## 2024-12-30 DIAGNOSIS — R19.7 DIARRHEA, UNSPECIFIED TYPE: ICD-10-CM

## 2024-12-30 DIAGNOSIS — R23.2 HOT FLASHES: ICD-10-CM

## 2024-12-30 DIAGNOSIS — Z17.0 MALIGNANT NEOPLASM OF BREAST IN FEMALE, ESTROGEN RECEPTOR POSITIVE, UNSPECIFIED LATERALITY, UNSPECIFIED SITE OF BREAST (HCC): ICD-10-CM

## 2024-12-30 DIAGNOSIS — F41.8 SITUATIONAL ANXIETY: ICD-10-CM

## 2024-12-30 DIAGNOSIS — C50.919 MALIGNANT NEOPLASM OF BREAST IN FEMALE, ESTROGEN RECEPTOR POSITIVE, UNSPECIFIED LATERALITY, UNSPECIFIED SITE OF BREAST (HCC): ICD-10-CM

## 2024-12-30 RX ORDER — DIAZEPAM 2 MG/1
2 TABLET ORAL EVERY 8 HOURS PRN
Qty: 90 TABLET | Refills: 0 | Status: SHIPPED | OUTPATIENT
Start: 2024-12-30 | End: 2025-03-30

## 2024-12-30 RX ORDER — DIPHENOXYLATE HYDROCHLORIDE AND ATROPINE SULFATE 2.5; .025 MG/1; MG/1
1 TABLET ORAL 4 TIMES DAILY PRN
Qty: 40 TABLET | Refills: 0 | Status: SHIPPED | OUTPATIENT
Start: 2024-12-30 | End: 2025-01-09

## 2024-12-30 RX ORDER — AZITHROMYCIN 250 MG/1
TABLET, FILM COATED ORAL
Qty: 6 TABLET | Refills: 0 | Status: SHIPPED | OUTPATIENT
Start: 2024-12-30 | End: 2025-01-09

## 2024-12-30 ASSESSMENT — ENCOUNTER SYMPTOMS
ABDOMINAL DISTENTION: 0
ABDOMINAL PAIN: 0
SORE THROAT: 1
SHORTNESS OF BREATH: 0
BACK PAIN: 0
WHEEZING: 0
CHEST TIGHTNESS: 0
COUGH: 0

## 2024-12-30 NOTE — PROGRESS NOTES
warm and dry.   Neurological:      General: No focal deficit present.      Mental Status: She is alert and oriented to person, place, and time.   Psychiatric:         Mood and Affect: Mood normal.         Thought Content: Thought content normal.         Judgment: Judgment normal.       /80 (Site: Left Upper Arm)   Pulse 68   Temp 98.5 °F (36.9 °C) (Oral)   Wt 62.6 kg (138 lb)   SpO2 100%   BMI 25.24 kg/m²       Impression/Plan:  1. Acute pharyngitis, unspecified etiology    2. Right acute serous otitis media, recurrence not specified    3. Situational anxiety    4. Hot flashes    5. Malignant neoplasm of breast in female, estrogen receptor positive, unspecified laterality, unspecified site of breast (HCC)    6. Diarrhea, unspecified type      Requested Prescriptions     Signed Prescriptions Disp Refills    diazePAM (VALIUM) 2 MG tablet 90 tablet 0     Sig: Take 1 tablet by mouth every 8 hours as needed for Anxiety for up to 90 days. Max Daily Amount: 6 mg    diphenoxylate-atropine (LOMOTIL) 2.5-0.025 MG per tablet 40 tablet 0     Sig: Take 1 tablet by mouth 4 times daily as needed for Diarrhea for up to 10 days. Max Daily Amount: 4 tablets    azithromycin (ZITHROMAX Z-NBA) 250 MG tablet 6 tablet 0     Si pills orally for 1 day, then 1 pill orally for 4 days     No orders of the defined types were placed in this encounter.    Valium 2 mg 1Q8 as needed #90 OARRS report reviewed Lomotil 4 times daily as needed Z-Nba prescribed.  Continue other meds.   Patient giveneducational materials - see patient instructions.  Discussed use, benefit, and side effects of prescribed medications.  All patient questions answered.  Pt voiced understanding. Reviewed health maintenance. Patient agreedwith treatment plan. Follow up as directed.          Electronically signed by GERALDINE CARDOSO CNP on 2024 at 11:15 AM

## 2025-03-13 ENCOUNTER — TRANSCRIBE ORDERS (OUTPATIENT)
Dept: ADMINISTRATIVE | Age: 45
End: 2025-03-13

## 2025-03-13 ENCOUNTER — LAB (OUTPATIENT)
Dept: LAB | Age: 45
End: 2025-03-13

## 2025-03-13 DIAGNOSIS — Z85.3 PERSONAL HISTORY OF BREAST CANCER: Primary | ICD-10-CM

## 2025-03-13 LAB
ALBUMIN SERPL BCG-MCNC: 4.5 G/DL (ref 3.4–4.9)
ALP SERPL-CCNC: 96 U/L (ref 35–104)
ALT SERPL W/O P-5'-P-CCNC: 11 U/L (ref 10–35)
ANION GAP SERPL CALC-SCNC: 10 MEQ/L (ref 8–16)
AST SERPL-CCNC: 20 U/L (ref 10–35)
BASOPHILS ABSOLUTE: 0 THOU/MM3 (ref 0–0.1)
BASOPHILS NFR BLD AUTO: 0.4 %
BILIRUB SERPL-MCNC: 0.3 MG/DL (ref 0.3–1.2)
BUN SERPL-MCNC: 10 MG/DL (ref 8–23)
CALCIUM SERPL-MCNC: 10.2 MG/DL (ref 8.6–10)
CHLORIDE SERPL-SCNC: 105 MEQ/L (ref 98–111)
CHOLEST SERPL-MCNC: 216 MG/DL (ref 100–199)
CO2 SERPL-SCNC: 28 MEQ/L (ref 22–29)
CREAT SERPL-MCNC: 0.7 MG/DL (ref 0.5–0.9)
DEPRECATED MEAN GLUCOSE BLD GHB EST-ACNC: 108 MG/DL (ref 70–126)
DEPRECATED RDW RBC AUTO: 38.7 FL (ref 35–45)
EOSINOPHIL NFR BLD AUTO: 2 %
EOSINOPHILS ABSOLUTE: 0.1 THOU/MM3 (ref 0–0.4)
ERYTHROCYTE [DISTWIDTH] IN BLOOD BY AUTOMATED COUNT: 12.2 % (ref 11.5–14.5)
GFR SERPL CREATININE-BSD FRML MDRD: > 90 ML/MIN/1.73M2
GLUCOSE SERPL-MCNC: 92 MG/DL (ref 74–109)
HBA1C MFR BLD HPLC: 5.6 % (ref 4–6)
HCT VFR BLD AUTO: 41.5 % (ref 37–47)
HDLC SERPL-MCNC: 73 MG/DL
HGB BLD-MCNC: 13.4 GM/DL (ref 12–16)
IMM GRANULOCYTES # BLD AUTO: 0.01 THOU/MM3 (ref 0–0.07)
IMM GRANULOCYTES NFR BLD AUTO: 0.2 %
LDLC SERPL CALC-MCNC: 114 MG/DL
LYMPHOCYTES ABSOLUTE: 1.5 THOU/MM3 (ref 1–4.8)
LYMPHOCYTES NFR BLD AUTO: 29 %
MCH RBC QN AUTO: 28 PG (ref 26–33)
MCHC RBC AUTO-ENTMCNC: 32.3 GM/DL (ref 32.2–35.5)
MCV RBC AUTO: 86.8 FL (ref 81–99)
MONOCYTES ABSOLUTE: 0.4 THOU/MM3 (ref 0.4–1.3)
MONOCYTES NFR BLD AUTO: 7 %
NEUTROPHILS ABSOLUTE: 3.1 THOU/MM3 (ref 1.8–7.7)
NEUTROPHILS NFR BLD AUTO: 61.4 %
NRBC BLD AUTO-RTO: 0 /100 WBC
PLATELET # BLD AUTO: 307 THOU/MM3 (ref 130–400)
PMV BLD AUTO: 9.6 FL (ref 9.4–12.4)
POTASSIUM SERPL-SCNC: 4.3 MEQ/L (ref 3.5–5.2)
PROT SERPL-MCNC: 7.2 G/DL (ref 6.4–8.3)
RBC # BLD AUTO: 4.78 MILL/MM3 (ref 4.2–5.4)
SODIUM SERPL-SCNC: 143 MEQ/L (ref 135–145)
TRIGL SERPL-MCNC: 144 MG/DL (ref 0–199)
TSH SERPL DL<=0.05 MIU/L-ACNC: 2.04 UIU/ML (ref 0.27–4.2)
WBC # BLD AUTO: 5.1 THOU/MM3 (ref 4.8–10.8)

## 2025-03-20 RX ORDER — BUPROPION HYDROCHLORIDE 150 MG/1
150 TABLET ORAL EVERY MORNING
Qty: 30 TABLET | Refills: 3 | Status: SHIPPED | OUTPATIENT
Start: 2025-03-20

## 2025-04-07 ENCOUNTER — OFFICE VISIT (OUTPATIENT)
Dept: FAMILY MEDICINE CLINIC | Age: 45
End: 2025-04-07
Payer: COMMERCIAL

## 2025-04-07 VITALS
SYSTOLIC BLOOD PRESSURE: 116 MMHG | RESPIRATION RATE: 16 BRPM | HEART RATE: 79 BPM | OXYGEN SATURATION: 100 % | BODY MASS INDEX: 26.98 KG/M2 | HEIGHT: 62 IN | DIASTOLIC BLOOD PRESSURE: 82 MMHG | WEIGHT: 146.6 LBS

## 2025-04-07 DIAGNOSIS — R63.2 BINGE EATING: Primary | ICD-10-CM

## 2025-04-07 DIAGNOSIS — F41.8 SITUATIONAL ANXIETY: ICD-10-CM

## 2025-04-07 DIAGNOSIS — C50.919 MALIGNANT NEOPLASM OF BREAST IN FEMALE, ESTROGEN RECEPTOR POSITIVE, UNSPECIFIED LATERALITY, UNSPECIFIED SITE OF BREAST: ICD-10-CM

## 2025-04-07 DIAGNOSIS — Z17.0 MALIGNANT NEOPLASM OF BREAST IN FEMALE, ESTROGEN RECEPTOR POSITIVE, UNSPECIFIED LATERALITY, UNSPECIFIED SITE OF BREAST: ICD-10-CM

## 2025-04-07 DIAGNOSIS — F43.21 SITUATIONAL DEPRESSION: ICD-10-CM

## 2025-04-07 DIAGNOSIS — R23.2 HOT FLASHES: ICD-10-CM

## 2025-04-07 DIAGNOSIS — E78.2 MIXED HYPERLIPIDEMIA: ICD-10-CM

## 2025-04-07 PROCEDURE — 99214 OFFICE O/P EST MOD 30 MIN: CPT | Performed by: NURSE PRACTITIONER

## 2025-04-07 SDOH — ECONOMIC STABILITY: FOOD INSECURITY: WITHIN THE PAST 12 MONTHS, YOU WORRIED THAT YOUR FOOD WOULD RUN OUT BEFORE YOU GOT MONEY TO BUY MORE.: NEVER TRUE

## 2025-04-07 SDOH — ECONOMIC STABILITY: FOOD INSECURITY: WITHIN THE PAST 12 MONTHS, THE FOOD YOU BOUGHT JUST DIDN'T LAST AND YOU DIDN'T HAVE MONEY TO GET MORE.: NEVER TRUE

## 2025-04-07 ASSESSMENT — PATIENT HEALTH QUESTIONNAIRE - PHQ9
SUM OF ALL RESPONSES TO PHQ QUESTIONS 1-9: 5
3. TROUBLE FALLING OR STAYING ASLEEP: SEVERAL DAYS
8. MOVING OR SPEAKING SO SLOWLY THAT OTHER PEOPLE COULD HAVE NOTICED. OR THE OPPOSITE, BEING SO FIGETY OR RESTLESS THAT YOU HAVE BEEN MOVING AROUND A LOT MORE THAN USUAL: NOT AT ALL
SUM OF ALL RESPONSES TO PHQ QUESTIONS 1-9: 5
5. POOR APPETITE OR OVEREATING: SEVERAL DAYS
SUM OF ALL RESPONSES TO PHQ QUESTIONS 1-9: 5
1. LITTLE INTEREST OR PLEASURE IN DOING THINGS: NOT AT ALL
6. FEELING BAD ABOUT YOURSELF - OR THAT YOU ARE A FAILURE OR HAVE LET YOURSELF OR YOUR FAMILY DOWN: NOT AT ALL
SUM OF ALL RESPONSES TO PHQ QUESTIONS 1-9: 5
9. THOUGHTS THAT YOU WOULD BE BETTER OFF DEAD, OR OF HURTING YOURSELF: NOT AT ALL
2. FEELING DOWN, DEPRESSED OR HOPELESS: NOT AT ALL
7. TROUBLE CONCENTRATING ON THINGS, SUCH AS READING THE NEWSPAPER OR WATCHING TELEVISION: NOT AT ALL
10. IF YOU CHECKED OFF ANY PROBLEMS, HOW DIFFICULT HAVE THESE PROBLEMS MADE IT FOR YOU TO DO YOUR WORK, TAKE CARE OF THINGS AT HOME, OR GET ALONG WITH OTHER PEOPLE: SOMEWHAT DIFFICULT
4. FEELING TIRED OR HAVING LITTLE ENERGY: NEARLY EVERY DAY

## 2025-04-08 ASSESSMENT — ENCOUNTER SYMPTOMS
WHEEZING: 0
SHORTNESS OF BREATH: 0
ABDOMINAL DISTENTION: 0
BACK PAIN: 0
COUGH: 0
ABDOMINAL PAIN: 0
CHEST TIGHTNESS: 0

## 2025-04-08 NOTE — PROGRESS NOTES
JVD.      Trachea: Trachea normal.   Cardiovascular:      Rate and Rhythm: Normal rate and regular rhythm.      Heart sounds: Normal heart sounds, S1 normal and S2 normal. No murmur heard.     No friction rub. No gallop.   Pulmonary:      Effort: Pulmonary effort is normal. No respiratory distress.      Breath sounds: Normal breath sounds. No wheezing, rhonchi or rales.   Chest:      Chest wall: No tenderness.   Abdominal:      General: Bowel sounds are normal.      Palpations: Abdomen is soft. There is no hepatomegaly, splenomegaly or mass.      Tenderness: There is no guarding or rebound.      Hernia: No hernia is present. There is no hernia in the ventral area or left inguinal area.   Musculoskeletal:         General: No tenderness. Normal range of motion.      Cervical back: Normal range of motion and neck supple. No edema or erythema. Normal range of motion.   Lymphadenopathy:      Head:      Right side of head: No submental, submandibular, tonsillar, preauricular, posterior auricular or occipital adenopathy.      Left side of head: No submental, submandibular, tonsillar, preauricular, posterior auricular or occipital adenopathy.      Cervical: No cervical adenopathy.      Right cervical: No superficial, deep or posterior cervical adenopathy.     Left cervical: No superficial, deep or posterior cervical adenopathy.      Upper Body:      Right upper body: No supraclavicular or pectoral adenopathy.      Left upper body: No supraclavicular or pectoral adenopathy.   Skin:     General: Skin is warm and dry.      Coloration: Skin is not pale.      Findings: No bruising, ecchymosis, laceration, lesion or rash.      Nails: There is no clubbing.   Neurological:      Mental Status: Joceline is alert and oriented to person, place, and time.      Cranial Nerves: No cranial nerve deficit.      Motor: No abnormal muscle tone.      Coordination: Coordination normal.      Deep Tendon Reflexes: Reflexes normal.   Psychiatric:

## 2025-04-23 DIAGNOSIS — G47.00 INSOMNIA, UNSPECIFIED TYPE: Primary | ICD-10-CM

## 2025-04-23 RX ORDER — ZOLPIDEM TARTRATE 5 MG/1
5 TABLET ORAL NIGHTLY PRN
Qty: 30 TABLET | Refills: 0 | Status: SHIPPED | OUTPATIENT
Start: 2025-04-23 | End: 2025-05-23

## 2025-05-22 ENCOUNTER — HOSPITAL ENCOUNTER (OUTPATIENT)
Dept: MRI IMAGING | Age: 45
Discharge: HOME | End: 2025-05-22
Payer: COMMERCIAL

## 2025-05-22 DIAGNOSIS — Z85.3 PERSONAL HISTORY OF BREAST CANCER: ICD-10-CM

## 2025-05-22 PROCEDURE — A9579 GAD-BASE MR CONTRAST NOS,1ML: HCPCS | Performed by: PHYSICIAN ASSISTANT

## 2025-05-22 PROCEDURE — 6360000004 HC RX CONTRAST MEDICATION: Performed by: PHYSICIAN ASSISTANT

## 2025-05-22 PROCEDURE — C8908 MRI W/O FOL W/CONT, BREAST,: HCPCS

## 2025-05-22 RX ADMIN — GADOTERIDOL 15 ML: 279.3 INJECTION, SOLUTION INTRAVENOUS at 11:51

## 2025-06-09 ENCOUNTER — PATIENT MESSAGE (OUTPATIENT)
Dept: FAMILY MEDICINE CLINIC | Age: 45
End: 2025-06-09

## 2025-06-09 RX ORDER — ESCITALOPRAM OXALATE 10 MG/1
10 TABLET ORAL DAILY
Qty: 30 TABLET | Refills: 2 | Status: SHIPPED | OUTPATIENT
Start: 2025-06-09

## 2025-09-03 ENCOUNTER — OFFICE VISIT (OUTPATIENT)
Dept: FAMILY MEDICINE CLINIC | Age: 45
End: 2025-09-03
Payer: COMMERCIAL

## 2025-09-03 VITALS
DIASTOLIC BLOOD PRESSURE: 74 MMHG | SYSTOLIC BLOOD PRESSURE: 118 MMHG | HEIGHT: 63 IN | OXYGEN SATURATION: 98 % | HEART RATE: 88 BPM | BODY MASS INDEX: 26.05 KG/M2 | TEMPERATURE: 97.9 F | WEIGHT: 147 LBS | RESPIRATION RATE: 16 BRPM

## 2025-09-03 DIAGNOSIS — J02.9 ACUTE PHARYNGITIS, UNSPECIFIED ETIOLOGY: ICD-10-CM

## 2025-09-03 DIAGNOSIS — F33.1 MODERATE EPISODE OF RECURRENT MAJOR DEPRESSIVE DISORDER (HCC): ICD-10-CM

## 2025-09-03 DIAGNOSIS — R63.2 BINGE EATING: ICD-10-CM

## 2025-09-03 DIAGNOSIS — F43.21 SITUATIONAL DEPRESSION: ICD-10-CM

## 2025-09-03 DIAGNOSIS — Z00.00 INITIAL MEDICARE ANNUAL WELLNESS VISIT: Primary | ICD-10-CM

## 2025-09-03 PROCEDURE — 99213 OFFICE O/P EST LOW 20 MIN: CPT | Performed by: NURSE PRACTITIONER

## 2025-09-03 PROCEDURE — G0438 PPPS, INITIAL VISIT: HCPCS | Performed by: NURSE PRACTITIONER

## 2025-09-03 RX ORDER — AZITHROMYCIN 250 MG/1
TABLET, FILM COATED ORAL
Qty: 6 TABLET | Refills: 0 | Status: SHIPPED | OUTPATIENT
Start: 2025-09-03 | End: 2025-09-13

## 2025-09-03 RX ORDER — ESCITALOPRAM OXALATE 20 MG/1
20 TABLET ORAL DAILY
Qty: 90 TABLET | Refills: 1 | Status: SHIPPED | OUTPATIENT
Start: 2025-09-03

## 2025-09-03 ASSESSMENT — PATIENT HEALTH QUESTIONNAIRE - PHQ9
2. FEELING DOWN, DEPRESSED OR HOPELESS: MORE THAN HALF THE DAYS
SUM OF ALL RESPONSES TO PHQ QUESTIONS 1-9: 7
10. IF YOU CHECKED OFF ANY PROBLEMS, HOW DIFFICULT HAVE THESE PROBLEMS MADE IT FOR YOU TO DO YOUR WORK, TAKE CARE OF THINGS AT HOME, OR GET ALONG WITH OTHER PEOPLE: SOMEWHAT DIFFICULT
SUM OF ALL RESPONSES TO PHQ QUESTIONS 1-9: 7
3. TROUBLE FALLING OR STAYING ASLEEP: SEVERAL DAYS
SUM OF ALL RESPONSES TO PHQ QUESTIONS 1-9: 7
9. THOUGHTS THAT YOU WOULD BE BETTER OFF DEAD, OR OF HURTING YOURSELF: NOT AT ALL
1. LITTLE INTEREST OR PLEASURE IN DOING THINGS: MORE THAN HALF THE DAYS
SUM OF ALL RESPONSES TO PHQ QUESTIONS 1-9: 7
7. TROUBLE CONCENTRATING ON THINGS, SUCH AS READING THE NEWSPAPER OR WATCHING TELEVISION: NOT AT ALL
8. MOVING OR SPEAKING SO SLOWLY THAT OTHER PEOPLE COULD HAVE NOTICED. OR THE OPPOSITE, BEING SO FIGETY OR RESTLESS THAT YOU HAVE BEEN MOVING AROUND A LOT MORE THAN USUAL: NOT AT ALL
6. FEELING BAD ABOUT YOURSELF - OR THAT YOU ARE A FAILURE OR HAVE LET YOURSELF OR YOUR FAMILY DOWN: NOT AT ALL
4. FEELING TIRED OR HAVING LITTLE ENERGY: SEVERAL DAYS
5. POOR APPETITE OR OVEREATING: SEVERAL DAYS

## 2025-09-03 ASSESSMENT — LIFESTYLE VARIABLES
HOW OFTEN DO YOU HAVE A DRINK CONTAINING ALCOHOL: MONTHLY OR LESS
HOW MANY STANDARD DRINKS CONTAINING ALCOHOL DO YOU HAVE ON A TYPICAL DAY: 1 OR 2

## 2025-09-04 RX ORDER — MELOXICAM 15 MG/1
15 TABLET ORAL DAILY
Qty: 30 TABLET | Refills: 3 | Status: SHIPPED | OUTPATIENT
Start: 2025-09-04

## (undated) DEVICE — CLIP LIG SM TI 6 BLU HNDL FOR OPN AND ENDOSCP SGL APPL

## (undated) DEVICE — DRAPE,UNDERBUTTOCKS,PCH,STERILE: Brand: MEDLINE

## (undated) DEVICE — SPONGE,PEANUT,XRAY,ST,SM,3/8",5/CARD: Brand: MEDLINE INDUSTRIES, INC.

## (undated) DEVICE — 3M™ STERI-STRIP™ COMPOUND BENZOIN TINCTURE 40 BAGS/CARTON 4 CARTONS/CASE C1544: Brand: 3M™ STERI-STRIP™

## (undated) DEVICE — GLOVE ORANGE PI 7 1/2   MSG9075

## (undated) DEVICE — SUTURE VCRL SZ 3-0 L18IN ABSRB VLT SUTUPAK PRECUT W/O NDL J104T

## (undated) DEVICE — GLOVE ORANGE PI 8   MSG9080

## (undated) DEVICE — SUTURE VCRL + SZ 3-0 L27IN ABSRB UD L26MM SH 1/2 CIR VCP416H

## (undated) DEVICE — APPLIER LIG CLP M L11IN TI STR RNG HNDL FOR 20 CLP DISP

## (undated) DEVICE — ADHESIVE SKIN CLSR 0.7ML TOP DERMBND ADV

## (undated) DEVICE — GOWN,SIRUS,NON REINFRCD,LARGE,SET IN SL: Brand: MEDLINE

## (undated) DEVICE — 4-PORT MANIFOLD: Brand: NEPTUNE 2

## (undated) DEVICE — SPECIMEN ORIENTATION CHARMS, SIX DISTINCTLY SHAPED STERILE 10MM CHARMS: Brand: MARGINMAP

## (undated) DEVICE — MEDI-VAC NON-CONDUCTIVE SUCTION TUBING 6MM X 6.1M (20 FT.) L: Brand: CARDINAL HEALTH

## (undated) DEVICE — BASIC SINGLE BASIN BTC-LF: Brand: MEDLINE INDUSTRIES, INC.

## (undated) DEVICE — DISSECTOR ENDOSCP L21CM TIP CURVATURE 40DEG FN CRV JAW VES

## (undated) DEVICE — Device: Brand: FABCO

## (undated) DEVICE — Y-TYPE TUR/BLADDER IRRIGATION SET, REGULATING CLAMP

## (undated) DEVICE — SET FLD CTRL SYS INFLO AND OUTFLO TB AQUILEX

## (undated) DEVICE — Device

## (undated) DEVICE — CORD,CAUTERY,BIPOLAR,STERILE: Brand: MEDLINE

## (undated) DEVICE — SUTURE VCRL SZ 3-0 L27IN ABSRB UD FS-2 L19MM 1/2 CIR J423H

## (undated) DEVICE — PENCIL SMK EVAC ALL IN 1 DSGN ENH VISIBILITY IMPROVED AIR

## (undated) DEVICE — SOLUTION IV IRRIG WATER 1000ML POUR BRL 2F7114

## (undated) DEVICE — COLUMN DRAPE

## (undated) DEVICE — PACK PROCEDURE SURG SET UP SRMC

## (undated) DEVICE — PAD,SANITARY,11 IN,MAXI,W/WINGS,N-STRL: Brand: MEDLINE

## (undated) DEVICE — 450 ML BOTTLE OF 0.05% CHLORHEXIDINE GLUCONATE IN 99.95% STERILE WATER FOR IRRIGATION, USP AND APPLICATOR.: Brand: IRRISEPT ANTIMICROBIAL WOUND LAVAGE

## (undated) DEVICE — AIRSEAL 8 MM ACCESS PORT AND LOW PROFILE OBTURATOR WITH BLADELESS OPTICAL TIP, 120 MM LENGTH: Brand: AIRSEAL

## (undated) DEVICE — DISPOSABLE STANDARD BIPOLAR FORCEPS, NON-STICK,: Brand: SPETZLER-MALIS

## (undated) DEVICE — GLOVE ORANGE PI 7   MSG9070

## (undated) DEVICE — SYRINGE,EAR/ULCER, 2 OZ, STERILE: Brand: MEDLINE

## (undated) DEVICE — BARRIER ADH W3XL4IN UTER PELV ABSRB GYNECARE INTCEED

## (undated) DEVICE — PACK-MAJOR

## (undated) DEVICE — DRESSING TRNSPAR W5XL4.5IN FLM SHT SEMIPERMEABLE WIND

## (undated) DEVICE — TROCAR ENDOSCP L100MM DIA5MM BLDELSS STBL SL THRD OPT VW

## (undated) DEVICE — SUTURE MCRYL + SZ 3-0 L27IN ABSRB UD L26MM SH 1/2 CIR MCP416H

## (undated) DEVICE — SUTURE VCRL + SZ 4-0 L27IN ABSRB WHT FS-2 3/8 CIR REV CUT VCP422H

## (undated) DEVICE — GLOVE SURG SZ 65 THK91MIL LTX FREE SYN POLYISOPRENE

## (undated) DEVICE — SYRINGE MED 10ML LUERLOCK TIP W/O SFTY DISP

## (undated) DEVICE — STRIP,CLOSURE,WOUND,MEDI-STRIP,1/2X4: Brand: MEDLINE

## (undated) DEVICE — SUTURE MCRYL SZ 4-0 L27IN ABSRB UD L19MM PS-2 1/2 CIR PRIM Y426H

## (undated) DEVICE — SUTURE ETHLN SZ 3-0 L18IN NONABSORBABLE BLK FS-1 L24MM 3/8 663H

## (undated) DEVICE — TIP COVER ACCESSORY

## (undated) DEVICE — DRAPE,TOP,102X53,STERILE: Brand: MEDLINE

## (undated) DEVICE — APPLICATOR MEDICATED 26 CC SOLUTION CLR STRL CHLORAPREP

## (undated) DEVICE — GLOVE ORANGE PI 8 1/2   MSG9085

## (undated) DEVICE — COVER,MAYO STAND,STERILE: Brand: MEDLINE

## (undated) DEVICE — SUTURE VCRL + SZ 2-0 L27IN ABSRB WHT SH 1/2 CIR TAPERCUT VCP417H

## (undated) DEVICE — AEGIS 1" DISK 4MM HOLE, PEEL OPEN: Brand: MEDLINE

## (undated) DEVICE — DRAPE,INSTRUMENT,MAGNETIC,10X16: Brand: MEDLINE

## (undated) DEVICE — DRAPE MICSCP W132XL406CM LENS DIA68MM W VARI LENS2 FOR LEICA

## (undated) DEVICE — BREAST HERNIA PACK: Brand: MEDLINE INDUSTRIES, INC.

## (undated) DEVICE — NEEDLE INSUF L120MM DIA2MM DISP FOR PNEUMOPERI ENDOPATH

## (undated) DEVICE — PAD OP RM W20XL72XH2IN PRECIS CUT FLAT EC BIOCLINIC

## (undated) DEVICE — CLIP LIG M TI 6 SIL HNDL FOR OPN AND ENDOSCP SGL APPL

## (undated) DEVICE — GLOVE SURG SZ 9 THK91MIL LTX FREE SYN POLYISOPRENE ANTI

## (undated) DEVICE — DRAIN SURG FLAT W7MMXL20CM FULL PERF

## (undated) DEVICE — TOWEL,OR,DSP,ST,BLUE,DLX,4/PK,20PK/CS: Brand: MEDLINE

## (undated) DEVICE — 3M™ STERI-DRAPE™ INCISE DRAPE 1050 (60CM X 45CM): Brand: STERI-DRAPE™

## (undated) DEVICE — CODMAN® SURGICAL PATTIES 1/2" X 1/2" (1.27CM X 1.27CM): Brand: CODMAN®

## (undated) DEVICE — SOLUTION IV 1000ML 0.9% SOD CHL PH 5 INJ USP VIAFLX PLAS

## (undated) DEVICE — SET UP: Brand: MEDLINE INDUSTRIES, INC.

## (undated) DEVICE — TELFA NON-ADHERENT ABSORBENT DRESSING: Brand: TELFA

## (undated) DEVICE — PACK,SET UP,NO DRAPES: Brand: MEDLINE

## (undated) DEVICE — 3M™ STERI-DRAPE™ INSTRUMENT POUCH 1018: Brand: STERI-DRAPE™

## (undated) DEVICE — 1010 S-DRAPE TOWEL DRAPE 10/BX: Brand: STERI-DRAPE™

## (undated) DEVICE — SUTURE ETHLN SZ 2-0 L30IN NONABSORBABLE BLK L36MM FSLX 3/8 1674H

## (undated) DEVICE — JELLY,LUBE,STERILE,FLIP TOP,TUBE,2-OZ: Brand: MEDLINE

## (undated) DEVICE — VCARE MEDIUM, UTERINE MANIPULATOR, VAGINAL-CERVICAL-AHLUWALIA'S-RETRACTOR-ELEVATOR: Brand: VCARE

## (undated) DEVICE — ARM DRAPE

## (undated) DEVICE — NEEDLE SPNL 22GA L3.5IN BLK HUB S STL REG WALL FIT STYL W/

## (undated) DEVICE — HANDPIECE ABLAT DISP FOR ENDOMET SYS

## (undated) DEVICE — GAUZE,SPONGE,8"X4",12PLY,XRAY,STRL,LF: Brand: MEDLINE

## (undated) DEVICE — COLLAR CERV UNIV AD L13-19IN TRACH OPN TWO PC RIG POLYETH

## (undated) DEVICE — DRAPE,EXTREMITY,89X128,STERILE: Brand: MEDLINE

## (undated) DEVICE — PACK PROCEDURE SURG GYN ROBOTIC

## (undated) DEVICE — DEVICE TISS REMOVING 17OZ L25.25IN DIA3MM INTUTER CTRL UNIT

## (undated) DEVICE — APPLICATOR MEDICATED 10.5 CC SOLUTION HI LT ORNG CHLORAPREP

## (undated) DEVICE — SOLUTION IV 1000ML LAC RINGERS PH 6.5 INJ USP VIAFLX PLAS

## (undated) DEVICE — HYPODERMIC SAFETY NEEDLE: Brand: MAGELLAN

## (undated) DEVICE — BANDAGE ADH W1XL3IN NAT FAB WVN FLX DURABLE N ADH PD SEAL

## (undated) DEVICE — BASIC SINGLE BASIN 1-LF: Brand: MEDLINE INDUSTRIES, INC.

## (undated) DEVICE — Device: Brand: LIGHT GUARD™ FLEXIBLE LIGHT HANDLE COVER (2 EACH/PKG)

## (undated) DEVICE — GOWN,SIRUS,NONRNF,SETINSLV,XL,20/CS: Brand: MEDLINE

## (undated) DEVICE — TUBING, SUCTION, 1/4" X 20', STRAIGHT: Brand: MEDLINE INDUSTRIES, INC.

## (undated) DEVICE — AGENT HEMOSTATIC SURGIFLOW MATRIX KIT W/THROMBIN

## (undated) DEVICE — GAUZE,PACKING STRIP,IODOFORM,1/2"X5YD,ST: Brand: CURAD

## (undated) DEVICE — ELECTRO LUBE IS A SINGLE PATIENT USE DEVICE THAT IS INTENDED TO BE USED ON ELECTROSURGICAL ELECTRODES TO REDUCE STICKING.: Brand: KEY SURGICAL ELECTRO LUBE

## (undated) DEVICE — BLADE ES L4IN INSUL EDGE

## (undated) DEVICE — ROYAL SILK SURGICAL GOWN, XXL: Brand: CONVERTORS

## (undated) DEVICE — DRILL, 14MM: Brand: ADMIRAL

## (undated) DEVICE — BUR CUT RND FLUT AGRSV 4.0MM

## (undated) DEVICE — COVER ARMBRD W13XL28.5IN IMPERV BLU FOR OP RM

## (undated) DEVICE — CANNULA SEAL

## (undated) DEVICE — TRAY PREP DRY W/ PREM GLV 2 APPL 6 SPNG 2 UNDPD 1 OVERWRAP

## (undated) DEVICE — RED RUBBER ROBINSON URETHRAL CATHETER, RADIOPAQUE, SMOOTH ROUNDED TIP, 14 FR (4.7 MM): Brand: DOVER

## (undated) DEVICE — DRAIN,WOUND,15FR,3/16,FULL-FLUTED: Brand: MEDLINE

## (undated) DEVICE — EVACUATOR SURG 100CC SIL BLB SUCT RESVR FOR CLS WND DRNGE

## (undated) DEVICE — SHEET, ORTHO, SPLIT, STERILE: Brand: MEDLINE

## (undated) DEVICE — TUBING, SUCTION, 1/4" X 12', STRAIGHT: Brand: MEDLINE

## (undated) DEVICE — BREAST HERNIA: Brand: MEDLINE INDUSTRIES, INC.

## (undated) DEVICE — PROBE 8225101 5PK STD PRASS FL TIP ROHS

## (undated) DEVICE — SURE SET SINGLE BASIN-LF: Brand: MEDLINE INDUSTRIES, INC.

## (undated) DEVICE — TRI-LUMEN FILTERED TUBE SET WITH ACTIVATED CHARCOAL FILTER: Brand: AIRSEAL

## (undated) DEVICE — BLADELESS OBTURATOR: Brand: WECK VISTA

## (undated) DEVICE — SET ENDOSCP SEAL HYSTEROSCOPE RIG OUTFLO CHN DISP MYOSURE

## (undated) DEVICE — BLADE ES ELASTOMERIC COAT INSUL DURABLE BEND UPTO 90DEG

## (undated) DEVICE — LIQUIBAND RAPID ADHESIVE 36/CS 0.8ML: Brand: MEDLINE

## (undated) DEVICE — DRAPE,U/SHT,SPLIT,FILM,60X84,STERILE: Brand: MEDLINE

## (undated) DEVICE — GLOVE SURG SZ 7.5 L11.73IN FNGR THK9.8MIL STRW LTX POLYMER

## (undated) DEVICE — PAD,NON-ADHERENT,3X8,STERILE,LF,1/PK: Brand: MEDLINE

## (undated) DEVICE — 4.0MM ROUND FLUTED AGGRESSIVE

## (undated) DEVICE — SYRINGE MED 10ML TRNSLUC BRL PLUNG BLK MRK POLYPR CTRL